# Patient Record
Sex: MALE | Race: WHITE | NOT HISPANIC OR LATINO | Employment: FULL TIME | URBAN - METROPOLITAN AREA
[De-identification: names, ages, dates, MRNs, and addresses within clinical notes are randomized per-mention and may not be internally consistent; named-entity substitution may affect disease eponyms.]

---

## 2017-03-08 DIAGNOSIS — E11.9 TYPE 2 DIABETES, HBA1C GOAL < 7% (H): Primary | ICD-10-CM

## 2017-03-08 NOTE — PATIENT INSTRUCTIONS
Need previsit labs-See pending orders and close encounter./Sendy Alvarez,         DM check 3/21/17

## 2017-03-14 ENCOUNTER — DOCUMENTATION ONLY (OUTPATIENT)
Dept: LAB | Facility: CLINIC | Age: 52
End: 2017-03-14

## 2017-03-14 NOTE — PROGRESS NOTES
PLEASE REVIEW, PLACE FUTURE ORDERS OR SIGN PENDED ORDERS FOR PATIENT'S UPCOMING LAB ONLY APPOINTMENT ON 03.15.2017 am.    THANK YOU.   MAN CRUZ

## 2017-03-15 LAB
ANION GAP SERPL CALCULATED.3IONS-SCNC: 8 MMOL/L (ref 3–14)
BUN SERPL-MCNC: 22 MG/DL (ref 7–30)
CALCIUM SERPL-MCNC: 8.8 MG/DL (ref 8.5–10.1)
CHLORIDE SERPL-SCNC: 104 MMOL/L (ref 94–109)
CO2 SERPL-SCNC: 26 MMOL/L (ref 20–32)
CREAT SERPL-MCNC: 0.92 MG/DL (ref 0.66–1.25)
GFR SERPL CREATININE-BSD FRML MDRD: 87 ML/MIN/1.7M2
GLUCOSE SERPL-MCNC: 180 MG/DL (ref 70–99)
HBA1C MFR BLD: 8 % (ref 4.3–6)
POTASSIUM SERPL-SCNC: 4 MMOL/L (ref 3.4–5.3)
SODIUM SERPL-SCNC: 138 MMOL/L (ref 133–144)

## 2017-03-15 PROCEDURE — 83036 HEMOGLOBIN GLYCOSYLATED A1C: CPT | Performed by: FAMILY MEDICINE

## 2017-03-15 PROCEDURE — 36415 COLL VENOUS BLD VENIPUNCTURE: CPT | Performed by: FAMILY MEDICINE

## 2017-03-15 PROCEDURE — 80048 BASIC METABOLIC PNL TOTAL CA: CPT | Performed by: FAMILY MEDICINE

## 2017-03-20 NOTE — PROGRESS NOTES
SUBJECTIVE:                                                    Wei Wolf is a 51 year old male who presents to clinic today for the following health issues:      Diabetes   Diabetes:     Frequency of checking blood sugars::  Weekly    Diabetic concerns::  None    Hypoglycemia symptoms::  Shaky    Paraesthesia present::  No    Eye Exam in the last year::  Yes    11-16  Frequency of exercise::  2-3 days/week  Duration of exercise::  15-30 minutes  Taking medications regularly::  Yes  Additional concerns today::  No  History of Present Illness   Diabetes:     Frequency of checking blood sugars::  Weekly    Diabetic concerns::  None    Hypoglycemia symptoms::  Shaky    Paraesthesia present::  No    Eye Exam in the last year::  Yes    11-16  Frequency of exercise::  2-3 days/week  Duration of exercise::  15-30 minutes  Taking medications regularly::  Yes  Additional concerns today::  No    SUBJECTIVE:  51 year oldyear old male enters with a hx of hypretension.  Pt. Has been compliant with medications and medications were reviewed.  No side effects. No chest pain or sob. Low sodium diet.    Current Outpatient Prescriptions:      losartan (COZAAR) 25 MG tablet, Take 1 tablet (25 mg) by mouth daily, Disp: 90 tablet, Rfl: 1     metFORMIN (GLUCOPHAGE-XR) 500 MG 24 hr tablet, Take 2 tablets (1,000 mg) by mouth daily (with dinner), Disp: 180 tablet, Rfl: 0     glipiZIDE (GLIPIZIDE XL) 10 MG 24 hr tablet, Two tablet daily, Disp: 180 tablet, Rfl: 0     pioglitazone (ACTOS) 45 MG tablet, Take 1 tablet (45 mg) by mouth daily, Disp: 90 tablet, Rfl: 0     blood glucose monitoring (NO BRAND SPECIFIED) test strip, 1 strip by In Vitro route daily accucheck compact plus test strips.  Testing once daily, Disp: 3 Box, Rfl: 3     simvastatin (ZOCOR) 10 MG tablet, Take 1 tablet (10 mg) by mouth At Bedtime, Disp: 90 tablet, Rfl: 2     order for DME, Equipment being ordered: wrist splint, Disp: 1 Units, Rfl: 0     ASPIRIN NOT  PRESCRIBED, INTENTIONAL,, continuous prn Antiplatelet medication not prescribed intentionally due to GI Issues / Ulcer Disease, Disp: 1 each, Rfl: 0     BLOOD GLUCOSE MONITOR KIT, pt tests twice daily - per insurance plan, Disp: 1 Kit, Rfl: 0     [DISCONTINUED] pioglitazone (ACTOS) 45 MG tablet, Take 1 tablet (45 mg) by mouth daily, Disp: 90 tablet, Rfl: 0     [DISCONTINUED] glipiZIDE (GLIPIZIDE XL) 10 MG 24 hr tablet, Two tablet daily, Disp: 180 tablet, Rfl: 0     [DISCONTINUED] metFORMIN (GLUCOPHAGE-XR) 500 MG 24 hr tablet, Take 2 tablets (1,000 mg) by mouth daily (with dinner), Disp: 180 tablet, Rfl: 0     [DISCONTINUED] losartan (COZAAR) 25 MG tablet, Take 1 tablet (25 mg) by mouth daily, Disp: 90 tablet, Rfl: 1  Past Medical History   Diagnosis Date     AR (allergic rhinitis)      Diabetes (H)      Hypertension goal BP (blood pressure) < 140/90 12/20/2010     Orders Only on 03/15/2017   Component Date Value Ref Range Status     Hemoglobin A1C 03/15/2017 8.0* 4.3 - 6.0 % Final     Sodium 03/15/2017 138  133 - 144 mmol/L Final     Potassium 03/15/2017 4.0  3.4 - 5.3 mmol/L Final     Chloride 03/15/2017 104  94 - 109 mmol/L Final     Carbon Dioxide 03/15/2017 26  20 - 32 mmol/L Final     Anion Gap 03/15/2017 8  3 - 14 mmol/L Final     Glucose 03/15/2017 180* 70 - 99 mg/dL Final    Fasting specimen     Urea Nitrogen 03/15/2017 22  7 - 30 mg/dL Final     Creatinine 03/15/2017 0.92  0.66 - 1.25 mg/dL Final     GFR Estimate 03/15/2017 87  >60 mL/min/1.7m2 Final    Non  GFR Calc     GFR Estimate If Black 03/15/2017   >60 mL/min/1.7m2 Final                    Value:>90   GFR Calc       Calcium 03/15/2017 8.8  8.5 - 10.1 mg/dL Final      Reviewed health maintenance  Patient Active Problem List   Diagnosis     TYPE 2 DIABETES, HBA1C GOAL < 7%     HYPERLIPIDEMIA LDL GOAL <100     Hypertension goal BP (blood pressure) < 140/90     Obesity     SAI (obstructive sleep apnea)     Morbid obesity  "(H)     Uncontrolled diabetes mellitus type 2 without complications (H)         OBJECTIVE:  no apparent distress  /65  Pulse 97  Temp 97.9  F (36.6  C) (Oral)  Ht 5' 9\" (1.753 m)  Wt 278 lb 9.6 oz (126.4 kg)  SpO2 98%  BMI 41.14 kg/m2     Head: Normocephalic. No masses, lesions, tenderness or abnormalities.  Neck::Neck supple. No adenopathy. Thyroid symmetric, normal size.    Cardiovascular: negative. No lifts, heaves, or thrills. RRR. No murmurs, clicks gallops or rubs  Respiratory. Good diaphragmatic excursion. Lungs clear  Gastrointestinal:Abdomen soft, non-tender.  No masses, organomegaly    Orders Only on 03/15/2017   Component Date Value Ref Range Status     Hemoglobin A1C 03/15/2017 8.0* 4.3 - 6.0 % Final     Sodium 03/15/2017 138  133 - 144 mmol/L Final     Potassium 03/15/2017 4.0  3.4 - 5.3 mmol/L Final     Chloride 03/15/2017 104  94 - 109 mmol/L Final     Carbon Dioxide 03/15/2017 26  20 - 32 mmol/L Final     Anion Gap 03/15/2017 8  3 - 14 mmol/L Final     Glucose 03/15/2017 180* 70 - 99 mg/dL Final    Fasting specimen     Urea Nitrogen 03/15/2017 22  7 - 30 mg/dL Final     Creatinine 03/15/2017 0.92  0.66 - 1.25 mg/dL Final     GFR Estimate 03/15/2017 87  >60 mL/min/1.7m2 Final    Non  GFR Calc     GFR Estimate If Black 03/15/2017   >60 mL/min/1.7m2 Final                    Value:>90   GFR Calc       Calcium 03/15/2017 8.8  8.5 - 10.1 mg/dL Final         ICD-10-CM    1. SAI (obstructive sleep apnea) G47.33    2. Uncontrolled type 2 diabetes mellitus without complication, without long-term current use of insulin (H) E11.65 metFORMIN (GLUCOPHAGE-XR) 500 MG 24 hr tablet     glipiZIDE (GLIPIZIDE XL) 10 MG 24 hr tablet     pioglitazone (ACTOS) 45 MG tablet     blood glucose monitoring (NO BRAND SPECIFIED) test strip     DIABETES EDUCATOR REFERRAL   3. Morbid obesity due to excess calories (H) E66.01    4. Hypertension goal BP (blood pressure) < 140/90 I10 losartan " (COZAAR) 25 MG tablet    PLAN: Follow up in 6 months               SUBJECTIVE:  Wei Wolf presents today for follow up of DIABETES MELLITUS .       Patient glucose self monitoring: one time daily, once daily.  Blood glucose averages: 125  Symptoms of low blood sugar (hypoglycemia)? y  Problems taking medications regularly? y   Side effects? n  What are you doing for exercise outside of work or your daily activities? Not much  Reviewed health maintenance  Patient Active Problem List   Diagnosis     TYPE 2 DIABETES, HBA1C GOAL < 7%     HYPERLIPIDEMIA LDL GOAL <100     Hypertension goal BP (blood pressure) < 140/90     Obesity     SAI (obstructive sleep apnea)     Morbid obesity (H)     Uncontrolled diabetes mellitus type 2 without complications (H)       Smokes n  PHQ-2  Over the last two weeks- Have you been bothered by little interest or pleasure in doing things?  No  Over the last two weeks- Have you been been feeling down, depressed, or hopeless?  No    BP:   BP Readings from Last 3 Encounters:   03/21/17 135/65   08/01/16 136/75   02/22/16 138/75       Lab Results   Component Value Date    A1C 8.0 03/15/2017    A1C 7.6 10/28/2016    A1C 8.5 07/28/2016       Recent Labs   Lab Test  07/28/16   0940  05/27/15   1652  07/21/14   0915   CHOL  121  156  134   HDL  43  37*  28*   LDL  64  95  81   TRIG  70  121  126   CHOLHDLRATIO   --   4.2  4.8       Wt Readings from Last 3 Encounters:   03/21/17 278 lb 9.6 oz (126.4 kg)   08/01/16 251 lb (113.9 kg)   02/22/16 260 lb (117.9 kg)     Asa is included in med list    Current Outpatient Prescriptions   Medication Sig Dispense Refill     losartan (COZAAR) 25 MG tablet Take 1 tablet (25 mg) by mouth daily 90 tablet 1     metFORMIN (GLUCOPHAGE-XR) 500 MG 24 hr tablet Take 2 tablets (1,000 mg) by mouth daily (with dinner) 180 tablet 0     glipiZIDE (GLIPIZIDE XL) 10 MG 24 hr tablet Two tablet daily 180 tablet 0     pioglitazone (ACTOS) 45 MG tablet Take 1 tablet (45  "mg) by mouth daily 90 tablet 0     blood glucose monitoring (NO BRAND SPECIFIED) test strip 1 strip by In Vitro route daily accucheck compact plus test strips.  Testing once daily 3 Box 3     simvastatin (ZOCOR) 10 MG tablet Take 1 tablet (10 mg) by mouth At Bedtime 90 tablet 2     order for DME Equipment being ordered: wrist splint 1 Units 0     ASPIRIN NOT PRESCRIBED, INTENTIONAL, continuous prn Antiplatelet medication not prescribed intentionally due to GI Issues / Ulcer Disease 1 each 0     BLOOD GLUCOSE MONITOR KIT pt tests twice daily - per insurance plan 1 Kit 0     [DISCONTINUED] pioglitazone (ACTOS) 45 MG tablet Take 1 tablet (45 mg) by mouth daily 90 tablet 0     [DISCONTINUED] glipiZIDE (GLIPIZIDE XL) 10 MG 24 hr tablet Two tablet daily 180 tablet 0     [DISCONTINUED] metFORMIN (GLUCOPHAGE-XR) 500 MG 24 hr tablet Take 2 tablets (1,000 mg) by mouth daily (with dinner) 180 tablet 0     [DISCONTINUED] losartan (COZAAR) 25 MG tablet Take 1 tablet (25 mg) by mouth daily 90 tablet 1       Histories reviewed and updated in Epic.       EXAM:  Vitals: /65  Pulse 97  Temp 97.9  F (36.6  C) (Oral)  Ht 5' 9\" (1.753 m)  Wt 278 lb 9.6 oz (126.4 kg)  SpO2 98%  BMI 41.14 kg/m2  BMIE= Body mass index is 41.14 kg/(m^2).  GENERAL APPEARANCE: alert and no acute distress  PSYCH: mentation appears normal and affect normal/bright  RESP: lungs clear to auscultation - no rales, rhonchi or wheezes  CV: regular rate and rhythm, normal S1 S2, no S3 or S4 and no murmur, click or rub -  EXT: no cyanosis or edema in lower extremities  SKIN: no venous stasis changes    ICD-10-CM    1. SAI (obstructive sleep apnea) G47.33    2. Uncontrolled type 2 diabetes mellitus without complication, without long-term current use of insulin (H) E11.65 metFORMIN (GLUCOPHAGE-XR) 500 MG 24 hr tablet     glipiZIDE (GLIPIZIDE XL) 10 MG 24 hr tablet     pioglitazone (ACTOS) 45 MG tablet     blood glucose monitoring (NO BRAND SPECIFIED) test strip     " "DIABETES EDUCATOR REFERRAL   3. Morbid obesity due to excess calories (H) E66.01    4. Hypertension goal BP (blood pressure) < 140/90 I10 losartan (COZAAR) 25 MG tablet    PLAN:qid blood sugars and follow up with diabetic education                SUBJECTIVE:  51 year old.The patient has a history of right hand pain and swelling.  This started 6 months ago. Location dorsum of hand at the 2nd MCP koint quality sharp Associated symptoms are pop sometimes.  Brought on by use of hand .  Better with rest. ROS does not get hot or red    NO injury  Reviewed health maintenance  Patient Active Problem List   Diagnosis     TYPE 2 DIABETES, HBA1C GOAL < 7%     HYPERLIPIDEMIA LDL GOAL <100     Hypertension goal BP (blood pressure) < 140/90     Obesity     SAI (obstructive sleep apnea)     Morbid obesity (H)     Uncontrolled diabetes mellitus type 2 without complications (H)     Past Medical History   Diagnosis Date     AR (allergic rhinitis)      Diabetes (H)      Hypertension goal BP (blood pressure) < 140/90 12/20/2010       OBJECTIVE:  no apparent distress  /65  Pulse 97  Temp 97.9  F (36.6  C) (Oral)  Ht 5' 9\" (1.753 m)  Wt 278 lb 9.6 oz (126.4 kg)  SpO2 98%  BMI 41.14 kg/m2    Swelling orver dorsum of the right hand at his 2 MCP joint       ICD-10-CM    1. SAI (obstructive sleep apnea) G47.33    2. Uncontrolled type 2 diabetes mellitus without complication, without long-term current use of insulin (H) E11.65 metFORMIN (GLUCOPHAGE-XR) 500 MG 24 hr tablet     glipiZIDE (GLIPIZIDE XL) 10 MG 24 hr tablet     pioglitazone (ACTOS) 45 MG tablet     blood glucose monitoring (NO BRAND SPECIFIED) test strip     DIABETES EDUCATOR REFERRAL   3. Morbid obesity due to excess calories (H) E66.01    4. Hypertension goal BP (blood pressure) < 140/90 I10 losartan (COZAAR) 25 MG tablet    PLAN:         "

## 2017-03-21 ENCOUNTER — RADIANT APPOINTMENT (OUTPATIENT)
Dept: GENERAL RADIOLOGY | Facility: CLINIC | Age: 52
End: 2017-03-21
Attending: FAMILY MEDICINE
Payer: COMMERCIAL

## 2017-03-21 ENCOUNTER — OFFICE VISIT (OUTPATIENT)
Dept: FAMILY MEDICINE | Facility: CLINIC | Age: 52
End: 2017-03-21
Payer: COMMERCIAL

## 2017-03-21 VITALS
HEART RATE: 97 BPM | BODY MASS INDEX: 41.26 KG/M2 | TEMPERATURE: 97.9 F | SYSTOLIC BLOOD PRESSURE: 135 MMHG | DIASTOLIC BLOOD PRESSURE: 65 MMHG | WEIGHT: 278.6 LBS | OXYGEN SATURATION: 98 % | HEIGHT: 69 IN

## 2017-03-21 DIAGNOSIS — M79.644 PAIN OF FINGER OF RIGHT HAND: ICD-10-CM

## 2017-03-21 DIAGNOSIS — G47.33 OSA (OBSTRUCTIVE SLEEP APNEA): Primary | ICD-10-CM

## 2017-03-21 DIAGNOSIS — I10 HYPERTENSION GOAL BP (BLOOD PRESSURE) < 140/90: ICD-10-CM

## 2017-03-21 DIAGNOSIS — E66.01 MORBID OBESITY DUE TO EXCESS CALORIES (H): ICD-10-CM

## 2017-03-21 LAB
BASOPHILS # BLD AUTO: 0 10E9/L (ref 0–0.2)
BASOPHILS NFR BLD AUTO: 0.6 %
CRP SERPL-MCNC: <2.9 MG/L (ref 0–8)
DIFFERENTIAL METHOD BLD: NORMAL
EOSINOPHIL # BLD AUTO: 0.1 10E9/L (ref 0–0.7)
EOSINOPHIL NFR BLD AUTO: 2.7 %
ERYTHROCYTE [DISTWIDTH] IN BLOOD BY AUTOMATED COUNT: 13.4 % (ref 10–15)
ERYTHROCYTE [SEDIMENTATION RATE] IN BLOOD BY WESTERGREN METHOD: 6 MM/H (ref 0–15)
HCT VFR BLD AUTO: 43.7 % (ref 40–53)
HGB BLD-MCNC: 14.4 G/DL (ref 13.3–17.7)
LYMPHOCYTES # BLD AUTO: 1.3 10E9/L (ref 0.8–5.3)
LYMPHOCYTES NFR BLD AUTO: 27.2 %
MCH RBC QN AUTO: 30.1 PG (ref 26.5–33)
MCHC RBC AUTO-ENTMCNC: 33 G/DL (ref 31.5–36.5)
MCV RBC AUTO: 91 FL (ref 78–100)
MONOCYTES # BLD AUTO: 0.3 10E9/L (ref 0–1.3)
MONOCYTES NFR BLD AUTO: 6.9 %
NEUTROPHILS # BLD AUTO: 3 10E9/L (ref 1.6–8.3)
NEUTROPHILS NFR BLD AUTO: 62.6 %
PLATELET # BLD AUTO: 174 10E9/L (ref 150–450)
RBC # BLD AUTO: 4.78 10E12/L (ref 4.4–5.9)
URATE SERPL-MCNC: 3.4 MG/DL (ref 3.5–7.2)
WBC # BLD AUTO: 4.8 10E9/L (ref 4–11)

## 2017-03-21 PROCEDURE — 85025 COMPLETE CBC W/AUTO DIFF WBC: CPT | Performed by: FAMILY MEDICINE

## 2017-03-21 PROCEDURE — 36415 COLL VENOUS BLD VENIPUNCTURE: CPT | Performed by: FAMILY MEDICINE

## 2017-03-21 PROCEDURE — 85652 RBC SED RATE AUTOMATED: CPT | Performed by: FAMILY MEDICINE

## 2017-03-21 PROCEDURE — 86038 ANTINUCLEAR ANTIBODIES: CPT | Performed by: FAMILY MEDICINE

## 2017-03-21 PROCEDURE — 86431 RHEUMATOID FACTOR QUANT: CPT | Performed by: FAMILY MEDICINE

## 2017-03-21 PROCEDURE — 86140 C-REACTIVE PROTEIN: CPT | Performed by: FAMILY MEDICINE

## 2017-03-21 PROCEDURE — 99214 OFFICE O/P EST MOD 30 MIN: CPT | Performed by: FAMILY MEDICINE

## 2017-03-21 PROCEDURE — 73130 X-RAY EXAM OF HAND: CPT | Mod: RT

## 2017-03-21 PROCEDURE — 84550 ASSAY OF BLOOD/URIC ACID: CPT | Performed by: FAMILY MEDICINE

## 2017-03-21 RX ORDER — METFORMIN HCL 500 MG
1000 TABLET, EXTENDED RELEASE 24 HR ORAL
Qty: 180 TABLET | Refills: 0 | Status: SHIPPED | OUTPATIENT
Start: 2017-03-21 | End: 2017-07-18

## 2017-03-21 RX ORDER — GLIPIZIDE 10 MG/1
TABLET, FILM COATED, EXTENDED RELEASE ORAL
Qty: 180 TABLET | Refills: 0 | Status: SHIPPED | OUTPATIENT
Start: 2017-03-21 | End: 2017-07-18

## 2017-03-21 RX ORDER — PIOGLITAZONEHYDROCHLORIDE 45 MG/1
45 TABLET ORAL DAILY
Qty: 90 TABLET | Refills: 0 | Status: SHIPPED | OUTPATIENT
Start: 2017-03-21 | End: 2017-07-18

## 2017-03-21 RX ORDER — LOSARTAN POTASSIUM 25 MG/1
25 TABLET ORAL DAILY
Qty: 90 TABLET | Refills: 1 | Status: SHIPPED | OUTPATIENT
Start: 2017-03-21 | End: 2017-12-21

## 2017-03-21 NOTE — NURSING NOTE
"Chief Complaint   Patient presents with     Diabetes     Musculoskeletal Problem     recheck on right hand        Initial /73  Pulse 97  Temp 97.9  F (36.6  C) (Oral)  Ht 5' 9\" (1.753 m)  Wt 278 lb 9.6 oz (126.4 kg)  SpO2 98%  BMI 41.14 kg/m2 Estimated body mass index is 41.14 kg/(m^2) as calculated from the following:    Height as of this encounter: 5' 9\" (1.753 m).    Weight as of this encounter: 278 lb 9.6 oz (126.4 kg).  Medication Reconciliation: complete  Mathew Tidwell CMA    "

## 2017-03-21 NOTE — MR AVS SNAPSHOT
After Visit Summary   3/21/2017    Wei Wolf    MRN: [id number]           Patient Information     Date Of Birth          1965        Visit Information        Provider Department      3/21/2017 10:15 AM Yobany Bernal MD [facility]        Today's Diagnoses     SAI (obstructive sleep apnea)    -  1    Uncontrolled type 2 diabetes mellitus without complication, without long-term current use of insulin (H)        Morbid obesity due to excess calories (H)        Hypertension goal BP (blood pressure) < 140/90           Follow-ups after your visit        Additional Services     DIABETES EDUCATOR REFERRAL       DIABETES SELF MANAGEMENT TRAINING (DSMT)      Your provider has referred you to Diabetes Education: [facility]: Diabetes Education - All [facility] [phone]   [email or web address]    Type of training and number of hours: Previous Diagnosis: Follow-up DSMT - 2 hours.    Medicare covers: 10 hours of initial DSMT in 12 month period from the time of first visit, plus 2 hours of follow-up DSMT annually, and additional hours as requested for insulin training.    Diabetes Type: Type 2 - On Oral Medication             Diabetes Co-Morbidities: hypertension               A1C Goal:  <7.0       A1C is: Lab Results       Component                Value               Date                       A1C                      8.0                 03/15/2017              If an urgent visit is needed or A1C is above 12, Care Team to call the Diabetes Education Team at [phone] or send an In Basket message to the Diabetes Education Pool (P DIAB ED-PATIENT CARE).    Diabetes Education Topics: Comprehensive Knowledge Assessment and Instruction    Special Educational Needs Requiring Individual DSMT: None       MEDICAL NUTRITION THERAPY (MNT) for Diabetes    Medical Nutrition Therapy with a Registered Dietitian can be provided in coordination  with Diabetes Self-Management Training to assist in achieving optimal diabetes management.                         Medicare will cover: 3 hours initial MNT in 12 month period after first visit, plus 2 hours of follow-up MNT annually    Please be aware that coverage of these services is subject to the terms and limitations of your health insurance plan.  Call member services at your health plan to determine Diabetes Self-Management Training benefits and ask which blood glucose monitor brands are covered by your plan.      Please bring the following with you to your appointment:    (1)  List of current medications   (2)  List of Blood Glucose Monitor brands that are covered by your insurance plan  (3)  Blood Glucose Monitor and log book  (4)   Food records for the 3 days prior to your visit    The Certified Diabetes Educator may make diabetes medication adjustments per the CDE Protocol and Collaborative Practice Agreement.                  Who to contact     If you have questions or need follow up information about today's clinic visit or your schedule please contact [facility] directly at [phone].  Normal or non-critical lab and imaging results will be communicated to you by PrePlayhart, letter or phone within 4 business days after the clinic has received the results. If you do not hear from us within 7 days, please contact the clinic through [employer] or phone. If you have a critical or abnormal lab result, we will notify you by phone as soon as possible.  Submit refill requests through [employer] or call your pharmacy and they will forward the refill request to us. Please allow 3 business days for your refill to be completed.          Additional Information About Your Visit        PrePlayharWP Engine Information     [employer] gives you secure access to your electronic health record. If you see a primary care provider, you can also send messages to your care team and make appointments. If you have questions, please call  "your primary care clinic.  If you do not have a primary care provider, please call [phone] and they will assist you.        Care EveryWhere ID     This is your Care EveryWhere ID. This could be used by other organizations to access your [facility] medical records  [id number]        Your Vitals Were     Pulse Temperature Height Pulse Oximetry BMI (Body Mass Index)       97 97.9  F (36.6  C) (Oral) 5' 9\" (1.753 m) 98% 41.14 kg/m2        Blood Pressure from Last 3 Encounters:   03/21/17 135/65   08/01/16 136/75   02/22/16 138/75    Weight from Last 3 Encounters:   03/21/17 278 lb 9.6 oz (126.4 kg)   08/01/16 251 lb (113.9 kg)   02/22/16 260 lb (117.9 kg)              We Performed the Following     DIABETES EDUCATOR REFERRAL          Where to get your medicines      These medications were sent to [id number] - [place] - [address] [address]  [address] [facility] [place] [address]    Hours:  test fax successful 4/5/04  Phone:  [phone]     blood glucose monitoring test strip    glipiZIDE 10 MG 24 hr tablet    losartan 25 MG tablet    metFORMIN 500 MG 24 hr tablet    pioglitazone 45 MG tablet          Primary Care Provider Office Phone # Fax #    Yobany Bernal [phone]       [facility] [address] [address] [address]        Thank you!     Thank you for choosing [facility]  for your care. Our goal is always to provide you with excellent care. Hearing back from our patients is one way we can continue to improve our services. Please take a few minutes to complete the written survey that you may receive in the mail after your visit with us. Thank you!             Your Updated Medication List - Protect others around you: Learn how to safely use, store and throw away your medicines at www.disposemymeds.org.          This list is accurate as of: 3/21/17 10:31 AM.  Always use your most recent med list.                   Brand Name " Dispense Instructions for use    ASPIRIN NOT PRESCRIBED    INTENTIONAL    1 each    continuous prn Antiplatelet medication not prescribed intentionally due to GI Issues / Ulcer Disease       blood glucose monitor Kit     1 Kit    pt tests twice daily - per insurance plan       blood glucose monitoring test strip    no brand specified    3 Box    1 strip by In Vitro route daily accucheck compact plus test strips.  Testing once daily       glipiZIDE 10 MG 24 hr tablet    glipiZIDE XL    180 tablet    Two tablet daily       losartan 25 MG tablet    COZAAR    90 tablet    Take 1 tablet (25 mg) by mouth daily       metFORMIN 500 MG 24 hr tablet    GLUCOPHAGE-XR    180 tablet    Take 2 tablets (1,000 mg) by mouth daily (with dinner)       order for DME     1 Units    Equipment being ordered: wrist splint       pioglitazone 45 MG tablet    ACTOS    90 tablet    Take 1 tablet (45 mg) by mouth daily       simvastatin 10 MG tablet    ZOCOR    90 tablet    Take 1 tablet (10 mg) by mouth At Bedtime

## 2017-03-22 LAB
ANA SER QL IA: NORMAL
RHEUMATOID FACT SER NEPH-ACNC: <20 IU/ML (ref 0–20)

## 2017-03-22 NOTE — PROGRESS NOTES
"SUBJECTIVE:  Wei Wolf is a 51 year old male who presents at the request of Yobany Bernal MD  sustained a right hand pain at the 2nd MCP joint area for 2 months. The pain and swelling is constant with the swelling being on and off. Symptoms have been worsening since that time. The pain is more in the 2nd webbed space, dorsally. He has some snapping.     Prior history of related problems: no prior problems with this area in the past.    Past Medical, social, family histories, medications, and allergies reviewed and updated.     OBJECTIVE:  Blood pressure 136/70, pulse 89, resp. rate 14, height 1.753 m (5' 9\"), weight 125.6 kg (277 lb).     RIGHT HAND EXAM:  There is some fullness over the 2nd webbed space that is tender. It feels soft and compressible.   It seems to be swollen medial or ulnar to the 2nd MCP joint.   Tender: 2nd webbed space dorsally  Mild pain with squeezing or compression of the fingers.   Pain with resisted adduction of the index finger.   Not much pain with resisted abduction.   Mild pain with resisted extension of the PIP joint.   Pain with resisted extension of the MP joint.  No subluxation of the extensor tendons can be appreciated.     X-ray: Obtained 3/21/17 of the RIGHT HAND: 3-views, reviewed in the office with the patient by myself today and are essentially normal. No arthritic changes or calcific changes. There might be a small bony erosion or bone cyst in the 2nd metacarpal head and an even smaller one more centrally.     ASSESSMENT:  1. Mass of the right hand with a non specific lytic lesion in the distal 2nd metacarpal.  I'm not sure if the two are associated.  No history of CA.    PLAN:  I recommended an MRI of the right hand to assess the fullness at the 2nd metacarpal. All questions were answered. The patient understands and has elected to proceed.   MRI scan of the right hand ordered with IV contrast.     Return to the clinic to discuss MRI results.     BLANE Johnson " MD  Dept. Orthopedic Surgery  [facility]    This document serves as a record of the services and decisions personally performed and made by Dr. BLANE Johnson MD. It was created on his behalf by Susan Bettencourt, a trained medical scribe. The creation of this record is based on the provider's personal observations and the statements of the patient. This document has been checked and approved by the attending provider.   Susan Bettencourt March 23, 2017 9:51 AM

## 2017-03-23 ENCOUNTER — OFFICE VISIT (OUTPATIENT)
Dept: ORTHOPEDICS | Facility: CLINIC | Age: 52
End: 2017-03-23
Payer: COMMERCIAL

## 2017-03-23 VITALS
BODY MASS INDEX: 41.03 KG/M2 | HEIGHT: 69 IN | RESPIRATION RATE: 14 BRPM | WEIGHT: 277 LBS | HEART RATE: 89 BPM | SYSTOLIC BLOOD PRESSURE: 136 MMHG | DIASTOLIC BLOOD PRESSURE: 70 MMHG

## 2017-03-23 DIAGNOSIS — M25.841 MASS OF JOINT OF RIGHT HAND: Primary | ICD-10-CM

## 2017-03-23 PROCEDURE — 99203 OFFICE O/P NEW LOW 30 MIN: CPT | Performed by: ORTHOPAEDIC SURGERY

## 2017-03-23 ASSESSMENT — PAIN SCALES - GENERAL: PAINLEVEL: MILD PAIN (3)

## 2017-03-23 NOTE — MR AVS SNAPSHOT
After Visit Summary   3/23/2017    Wei Wolf    MRN: [id number]           Patient Information     Date Of Birth          1965        Visit Information        Provider Department      3/23/2017 9:15 AM Ajith Johnson MD [facility]        Today's Diagnoses     Mass of joint of right hand    -  1      Care Instructions    Please remember to call and schedule a follow up appointment if one was recommended at your earliest convenience.   Orthopedics CLINIC HOURS TELEPHONE NUMBER   Ajith Johnson M.D.      Iwona Edwards  Medical Assistant Tuesday 8 am -5 pm    Wednesday 8 am -1 pm    Thursday 8 am -5 pm   Specialty schedulers:   [phone] to make an appointment with any Specialty Provider.   Main Clinic:   [phone] to make an appointment with your primary provider   Urgent Care locations:    [facility] Monday-Friday 5 pm - 9 pm  Saturday-Sunday 9 am -5pm      Monday-Friday 11 am - 9 pm  Saturday 9 am - 5 pm [phone]     If SURGERY has been recommended, please call our Specialty Schedulers at [phone] to schedule your procedure.    If you need a medication refill, please contact your pharmacy. Please allow 3 business days for your refill to be completed.  Use [employer] (secure e-mail communication and access to your chart) to send a message or to make an appointment. Please ask how you can sign up for [employer].        Follow-ups after your visit        Future tests that were ordered for you today     Open Future Orders        Priority Expected Expires Ordered    MR Hand Right w Contrast Routine 3/23/2017 3/23/2018 3/23/2017    X-ray rt Arthrogram of wrist Routine 3/23/2017 3/23/2018 3/23/2017            Who to contact     If you have questions or need follow up information about today's clinic visit or your schedule please contact [facility] directly at [phone].  Normal or non-critical lab and  "imaging results will be communicated to you by MyChart, letter or phone within 4 business days after the clinic has received the results. If you do not hear from us within 7 days, please contact the clinic through [employer] or phone. If you have a critical or abnormal lab result, we will notify you by phone as soon as possible.  Submit refill requests through [employer] or call your pharmacy and they will forward the refill request to us. Please allow 3 business days for your refill to be completed.          Additional Information About Your Visit        [employer] Information     [employer] gives you secure access to your electronic health record. If you see a primary care provider, you can also send messages to your care team and make appointments. If you have questions, please call your primary care clinic.  If you do not have a primary care provider, please call [phone] and they will assist you.        Care EveryWhere ID     This is your Care EveryWhere ID. This could be used by other organizations to access your [place] medical records  [id number]        Your Vitals Were     Pulse Respirations Height BMI (Body Mass Index)          89 14 1.753 m (5' 9\") 40.91 kg/m2         Blood Pressure from Last 3 Encounters:   03/23/17 136/70   03/21/17 135/65   08/01/16 136/75    Weight from Last 3 Encounters:   03/23/17 125.6 kg (277 lb)   03/21/17 126.4 kg (278 lb 9.6 oz)   08/01/16 113.9 kg (251 lb)               Primary Care Provider Office Phone # Fax #    Yobany Bernal [phone]       [facility] [address] [facility] [address]        Thank you!     Thank you for choosing [facility]  for your care. Our goal is always to provide you with excellent care. Hearing back from our patients is one way we can continue to improve our services. Please take a few minutes to complete the written survey that you may receive in the mail after your visit with us. Thank you!      "        Your Updated Medication List - Protect others around you: Learn how to safely use, store and throw away your medicines at www.disposemymeds.org.          This list is accurate as of: 3/23/17 11:16 AM.  Always use your most recent med list.                   Brand Name Dispense Instructions for use    ASPIRIN NOT PRESCRIBED    INTENTIONAL    1 each    continuous prn Antiplatelet medication not prescribed intentionally due to GI Issues / Ulcer Disease       blood glucose monitor Kit     1 Kit    pt tests twice daily - per insurance plan       blood glucose monitoring test strip    no brand specified    3 Box    1 strip by In Vitro route daily accucheck compact plus test strips.  Testing once daily       glipiZIDE 10 MG 24 hr tablet    glipiZIDE XL    180 tablet    Two tablet daily       losartan 25 MG tablet    COZAAR    90 tablet    Take 1 tablet (25 mg) by mouth daily       metFORMIN 500 MG 24 hr tablet    GLUCOPHAGE-XR    180 tablet    Take 2 tablets (1,000 mg) by mouth daily (with dinner)       order for DME     1 Units    Equipment being ordered: wrist splint       pioglitazone 45 MG tablet    ACTOS    90 tablet    Take 1 tablet (45 mg) by mouth daily       simvastatin 10 MG tablet    ZOCOR    90 tablet    Take 1 tablet (10 mg) by mouth At Bedtime

## 2017-03-23 NOTE — LETTER
"  3/23/2017       RE: Wei Wolf  [address] [facility] [address]           Dear Colleague,    Thank you for referring your patient, Wei Wolf, to the [facility]. Please see a copy of my visit note below.    SUBJECTIVE:  Wei Wolf is a 51 year old male who presents at the request of Yobany Bernal MD  sustained a right hand pain at the 2nd MCP joint area for 2 months. The pain and swelling is constant with the swelling being on and off. Symptoms have been worsening since that time. The pain is more in the 2nd webbed space, dorsally. He has some snapping.     Prior history of related problems: no prior problems with this area in the past.    Past Medical, social, family histories, medications, and allergies reviewed and updated.     OBJECTIVE:  Blood pressure 136/70, pulse 89, resp. rate 14, height 1.753 m (5' 9\"), weight 125.6 kg (277 lb).     RIGHT HAND EXAM:  There is some fullness over the 2nd webbed space that is tender. It feels soft and compressible.   It seems to be swollen medial or ulnar to the 2nd MCP joint.   Tender: 2nd webbed space dorsally  Mild pain with squeezing or compression of the fingers.   Pain with resisted adduction of the index finger.   Not much pain with resisted abduction.   Mild pain with resisted extension of the PIP joint.   Pain with resisted extension of the MP joint.  No subluxation of the extensor tendons can be appreciated.     X-ray: Obtained 3/21/17 of the RIGHT HAND: 3-views, reviewed in the office with the patient by myself today and are essentially normal. No arthritic changes or calcific changes. There might be a small bony erosion or bone cyst in the 2nd metacarpal head and an even smaller one more centrally.     ASSESSMENT:  1. Mass of the right hand with a non specific lytic lesion in the distal 2nd metacarpal.  I'm not sure if the two are associated.  No history of CA.    PLAN:  I recommended an MRI of the right hand to " assess the fullness at the 2nd metacarpal. All questions were answered. The patient understands and has elected to proceed.   MRI scan of the right hand ordered with IV contrast.     Return to the clinic to discuss MRI results.     BLANE Johnson MD  Dept. Orthopedic Surgery  [facility]    This document serves as a record of the services and decisions personally performed and made by Dr. BLANE Johnson MD. It was created on his behalf by Susan Bettencourt, a trained medical scribe. The creation of this record is based on the provider's personal observations and the statements of the patient. This document has been checked and approved by the attending provider.   Susan Bettencourt March 23, 2017 9:51 AM    Again, thank you for allowing me to participate in the care of your patient.        Sincerely,    Ajith Johnson MD

## 2017-03-23 NOTE — PATIENT INSTRUCTIONS
Please remember to call and schedule a follow up appointment if one was recommended at your earliest convenience.   Orthopedics CLINIC HOURS TELEPHONE NUMBER   Ajith Johnson M.D.      Iwona Edwards  Medical Assistant Tuesday 8 am -5 pm    Wednesday 8 am -1 pm    Thursday 8 am -5 pm   Specialty schedulers:   [phone] to make an appointment with any Specialty Provider.   Main Clinic:   [phone] to make an appointment with your primary provider   Urgent Care locations:    [facility] Monday-Friday 5 pm - 9 pm  Saturday-Sunday 9 am -5pm      Monday-Friday 11 am - 9 pm  Saturday 9 am - 5 pm [phone]     If SURGERY has been recommended, please call our Specialty Schedulers at [phone] to schedule your procedure.    If you need a medication refill, please contact your pharmacy. Please allow 3 business days for your refill to be completed.  Use [employer] (secure e-mail communication and access to your chart) to send a message or to make an appointment. Please ask how you can sign up for [employer].

## 2017-03-23 NOTE — NURSING NOTE
"Chief Complaint   Patient presents with     Hand Pain     right hand pain and swelling.started 6 months ago. Location dorsum of hand at the 2nd MCP       Initial /70 (BP Location: Left arm, Patient Position: Chair, Cuff Size: Adult Large)  Pulse 89  Resp 14  Ht 1.753 m (5' 9\")  Wt 125.6 kg (277 lb)  BMI 40.91 kg/m2 Estimated body mass index is 40.91 kg/(m^2) as calculated from the following:    Height as of this encounter: 1.753 m (5' 9\").    Weight as of this encounter: 125.6 kg (277 lb).  Medication Reconciliation: complete   Iwona Edwards MA      "

## 2017-03-24 ENCOUNTER — RADIANT APPOINTMENT (OUTPATIENT)
Dept: MRI IMAGING | Facility: CLINIC | Age: 52
End: 2017-03-24
Attending: ORTHOPAEDIC SURGERY
Payer: COMMERCIAL

## 2017-03-24 DIAGNOSIS — M25.841 MASS OF JOINT OF RIGHT HAND: ICD-10-CM

## 2017-03-24 PROCEDURE — 73223 MRI JOINT UPR EXTR W/O&W/DYE: CPT | Mod: RT | Performed by: RADIOLOGY

## 2017-03-24 PROCEDURE — A9585 GADOBUTROL INJECTION: HCPCS | Performed by: RADIOLOGY

## 2017-03-24 RX ORDER — GADOBUTROL 604.72 MG/ML
10 INJECTION INTRAVENOUS ONCE
Status: COMPLETED | OUTPATIENT
Start: 2017-03-24 | End: 2017-03-24

## 2017-03-24 RX ADMIN — GADOBUTROL 10 ML: 604.72 INJECTION INTRAVENOUS at 10:15

## 2017-03-24 NOTE — NURSING NOTE
Mass right hand 2nd MCP joint. MRI with IV contrast.  **NOT an ARTHROGRAM**, but no other option on EPIC to choose IV contrast. (per  Radiology statement regarding confusing and incomplete description of this requested study)  Alberto Velazquez OPA

## 2017-04-05 ENCOUNTER — TELEPHONE (OUTPATIENT)
Dept: EDUCATION SERVICES | Facility: CLINIC | Age: 52
End: 2017-04-05

## 2017-04-05 NOTE — TELEPHONE ENCOUNTER
I know, but he may not be thinking this through or communicating well.    Luciana Borrego RN/CDE  [place] [facility]

## 2017-04-05 NOTE — TELEPHONE ENCOUNTER
Is there anyway someone can call his insurance to see about coverage to come and see me.?    Luciana Borrego RN/ÁNGEL  [place] [facility]

## 2017-04-05 NOTE — TELEPHONE ENCOUNTER
Typically it is the patient's responsibility to check on insurance coverage, unless it is a PA or an Appeal.    Sendy Alvarez,

## 2017-04-18 ENCOUNTER — OFFICE VISIT (OUTPATIENT)
Dept: ORTHOPEDICS | Facility: CLINIC | Age: 52
End: 2017-04-18
Payer: COMMERCIAL

## 2017-04-18 VITALS — WEIGHT: 279 LBS | RESPIRATION RATE: 14 BRPM | BODY MASS INDEX: 41.32 KG/M2 | HEIGHT: 69 IN

## 2017-04-18 DIAGNOSIS — M85.441: Primary | ICD-10-CM

## 2017-04-18 PROCEDURE — 99213 OFFICE O/P EST LOW 20 MIN: CPT | Mod: 25 | Performed by: ORTHOPAEDIC SURGERY

## 2017-04-18 PROCEDURE — 20600 DRAIN/INJ JOINT/BURSA W/O US: CPT | Mod: RT | Performed by: ORTHOPAEDIC SURGERY

## 2017-04-18 ASSESSMENT — PAIN SCALES - GENERAL: PAINLEVEL: EXTREME PAIN (8)

## 2017-04-18 NOTE — NURSING NOTE
"A steroid and or Hylan G - F 20 injection was performed on 4/18/2017 at 5:12 PM. Risks,benefits and complications of the injection were discussed with the patient and the patient has elected to proceed after verbal consent. Using sterile technique, the area was prepped with betadine . A 25 Gauge 1.5\" was used to inject the medication(s) listed below.This was well tolerated.    The following medication(s) was given:     MEDICATION: Kenalog-40 40mg per mL  ROUTE: intraarticular  SITE:Right index finger   : [employer] (Kenalog-40)  DOSE: 1 ml  LOT #: [id number]  EXPIRATION DATE:  Jun 2018    Alberto CUMMINGS      "

## 2017-04-18 NOTE — MR AVS SNAPSHOT
"              After Visit Summary   4/18/2017    Wei Wolf    MRN: [id number]           Patient Information     Date Of Birth          1965        Visit Information        Provider Department      4/18/2017 4:15 PM Ajith Johnson MD [facility]         Follow-ups after your visit        Who to contact     If you have questions or need follow up information about today's clinic visit or your schedule please contact [facility] at [phone].  Normal or non-critical lab and imaging results will be communicated to you by MyChart, letter or phone within 4 business days after the clinic has received the results. If you do not hear from us within 7 days, please contact the clinic through [employer] or phone. If you have a critical or abnormal lab result, we will notify you by phone as soon as possible.  Submit refill requests through [employer] or call your pharmacy and they will forward the refill request to us. Please allow 3 business days for your refill to be completed.          Additional Information About Your Visit        MyChart Information     [employer] gives you secure access to your electronic health record. If you see a primary care provider, you can also send messages to your care team and make appointments. If you have questions, please call your primary care clinic.  If you do not have a primary care provider, please call [phone] and they will assist you.        Care EveryWhere ID     This is your Care EveryWhere ID. This could be used by other organizations to access your [place] medical records  [id number]        Your Vitals Were     Respirations Height BMI (Body Mass Index)             14 1.753 m (5' 9\") 41.2 kg/m2          Blood Pressure from Last 3 Encounters:   03/23/17 136/70   03/21/17 135/65   08/01/16 136/75    Weight from Last 3 Encounters:   04/18/17 126.6 kg (279 lb)   03/23/17 125.6 kg (277 lb)   03/21/17 126.4 kg (278 lb 9.6 oz)    "           Today, you had the following     No orders found for display       Primary Care Provider Office Phone # Fax #    Yobany Bernal [phone]       [facility] [address] [facility] [address]        Thank you!     Thank you for choosing [facility]  for your care. Our goal is always to provide you with excellent care. Hearing back from our patients is one way we can continue to improve our services. Please take a few minutes to complete the written survey that you may receive in the mail after your visit with us. Thank you!             Your Updated Medication List - Protect others around you: Learn how to safely use, store and throw away your medicines at www.disposemymeds.org.          This list is accurate as of: 4/18/17  4:24 PM.  Always use your most recent med list.                   Brand Name Dispense Instructions for use    ASPIRIN NOT PRESCRIBED    INTENTIONAL    1 each    continuous prn Antiplatelet medication not prescribed intentionally due to GI Issues / Ulcer Disease       blood glucose monitor Kit     1 Kit    pt tests twice daily - per insurance plan       blood glucose monitoring test strip    no brand specified    3 Box    1 strip by In Vitro route daily accucheck compact plus test strips.  Testing once daily       glipiZIDE 10 MG 24 hr tablet    glipiZIDE XL    180 tablet    Two tablet daily       losartan 25 MG tablet    COZAAR    90 tablet    Take 1 tablet (25 mg) by mouth daily       metFORMIN 500 MG 24 hr tablet    GLUCOPHAGE-XR    180 tablet    Take 2 tablets (1,000 mg) by mouth daily (with dinner)       order for DME     1 Units    Equipment being ordered: wrist splint       pioglitazone 45 MG tablet    ACTOS    90 tablet    Take 1 tablet (45 mg) by mouth daily       simvastatin 10 MG tablet    ZOCOR    90 tablet    Take 1 tablet (10 mg) by mouth At Bedtime

## 2017-04-18 NOTE — LETTER
4/18/2017       RE: Wei Wolf  [address]  [facility] [address]           Dear Colleague,    Thank you for referring your patient, Wei Wolf, to the [facility]. Please see a copy of my visit note below.    SUBJECTIVE:  Patient returns for 3/24/17 right hand MRI results, which are reviewed with the patient by myself and showed:  1. 3.5 x 3.5 x 3.5 mm subcortical cyst in the second metacarpal head with subjacent articular cartilage loss most consistent with an osteochondral lesion with surrounding reactive edema likely  degenerative in nature. Overall mild enhancement of the synovium of the second metacarpophalangeal joint, the subcortical cyst itself and the tendon sheath of the extensor tendon at this level consistent with mild reactive inflammatory changes.  2. Nonspecific tiny subcortical cyst in the third metacarpal head.     I did send a correspondence to our hand specialist about their opinion, but did not get a response.     ASSESSMENT:  1. Subcortical cyst of the 3rd metacarpal head, right     PLAN:  We discussed the possibilities which include, surgical curettement vs steroid injection to control the inflammation. We decided to proceed a steroid injection today. If symptoms do not improve with the steroid injection, consider referral to hand service.     A Right Hand intraarticular second MCP joint steroid injection was performed using 1 cc Kenalog-40 40mg per mL after sterile prep. Risks, benefits and complications of the injection were discussed with the patient and the patient elected to proceed. This was tolerated well by the patient. It was a difficult injection, due to the landmarks being obscured by the swelling of his hand, and may not have entered the joint.    Return to the clinic MARGARETTE Johnson MD  Dept. Orthopedic Surgery  [facility]    This document serves as a record of the services and decisions personally performed and made by Dr. ARGUELLES  KATLIN Johnson MD. It was created on his behalf by Susan Bettencourt, a trained medical scribe. The creation of this record is based on the provider's personal observations and the statements of the patient. This document has been checked and approved by the attending provider.   Susan Bettencourt April 18, 2017 4:35 PM    Again, thank you for allowing me to participate in the care of your patient.        Sincerely,              Ajith Johnson MD

## 2017-04-18 NOTE — PATIENT INSTRUCTIONS
Please remember to call and schedule a follow up appointment if one was recommended at your earliest convenience.   Orthopedics CLINIC HOURS TELEPHONE NUMBER   Ajith Johnson M.D.      Iwona Edwards  Medical Assistant Tuesday 8 am -5 pm    Wednesday 8 am -1 pm    Thursday 8 am -5 pm   Specialty schedulers:   [phone] to make an appointment with any Specialty Provider.   Main Clinic:   [phone] to make an appointment with your primary provider   Urgent Care locations:    [facility] Monday-Friday 5 pm - 9 pm  Saturday-Sunday 9 am -5pm      Monday-Friday 11 am - 9 pm  Saturday 9 am - 5 pm [phone]     If SURGERY has been recommended, please call our Specialty Schedulers at [phone] to schedule your procedure.    If you need a medication refill, please contact your pharmacy. Please allow 3 business days for your refill to be completed.  Use [employer] (secure e-mail communication and access to your chart) to send a message or to make an appointment. Please ask how you can sign up for [employer].

## 2017-04-18 NOTE — NURSING NOTE
"Chief Complaint   Patient presents with     RECHECK     follow up of right hand mass.      Results     MRI results right hand       Initial Resp 14  Ht 1.753 m (5' 9\")  Wt 126.6 kg (279 lb)  BMI 41.2 kg/m2 Estimated body mass index is 41.2 kg/(m^2) as calculated from the following:    Height as of this encounter: 1.753 m (5' 9\").    Weight as of this encounter: 126.6 kg (279 lb).  Medication Reconciliation: complete   Iwona Edwards MA      "

## 2017-04-18 NOTE — PROGRESS NOTES
SUBJECTIVE:  Patient returns for 3/24/17 right hand MRI results, which are reviewed with the patient by myself and showed:  1. 3.5 x 3.5 x 3.5 mm subcortical cyst in the second metacarpal head with subjacent articular cartilage loss most consistent with an osteochondral lesion with surrounding reactive edema likely  degenerative in nature. Overall mild enhancement of the synovium of the second metacarpophalangeal joint, the subcortical cyst itself and the tendon sheath of the extensor tendon at this level consistent with mild reactive inflammatory changes.  2. Nonspecific tiny subcortical cyst in the third metacarpal head.     I did send a correspondence to our hand specialist about their opinion, but did not get a response.     ASSESSMENT:  1. Subcortical cyst of the 3rd metacarpal head, right     PLAN:  We discussed the possibilities which include, surgical curettement vs steroid injection to control the inflammation. We decided to proceed a steroid injection today. If symptoms do not improve with the steroid injection, consider referral to hand service.     A Right Hand intraarticular second MCP joint steroid injection was performed using 1 cc Kenalog-40 40mg per mL after sterile prep. Risks, benefits and complications of the injection were discussed with the patient and the patient elected to proceed. This was tolerated well by the patient. It was a difficult injection, due to the landmarks being obscured by the swelling of his hand, and may not have entered the joint.    Return to the clinic PRN.     BLANE Johnson MD  Dept. Orthopedic Surgery  [facility]    This document serves as a record of the services and decisions personally performed and made by Dr. BLANE Johnson MD. It was created on his behalf by Susan Bettencourt, a trained medical scribe. The creation of this record is based on the provider's personal observations and the statements of the patient. This document has been checked and approved  by the attending provider.   Susan Bettencourt April 18, 2017 4:35 PM

## 2017-05-25 ENCOUNTER — TELEPHONE (OUTPATIENT)
Dept: FAMILY MEDICINE | Facility: CLINIC | Age: 52
End: 2017-05-25

## 2017-05-25 NOTE — TELEPHONE ENCOUNTER
Panel Management Review      Patient has the following on his problem list:     Diabetes    ASA: Not Required     Last A1C  Lab Results   Component Value Date    A1C 8.0 03/15/2017    A1C 7.6 10/28/2016    A1C 8.5 07/28/2016    A1C 7.9 12/08/2015    A1C 8.0 11/03/2015     A1C tested: FAILED    Last LDL:    Lab Results   Component Value Date    CHOL 121 07/28/2016     Lab Results   Component Value Date    HDL 43 07/28/2016     Lab Results   Component Value Date    LDL 64 07/28/2016     Lab Results   Component Value Date    TRIG 70 07/28/2016     Lab Results   Component Value Date    CHOLHDLRATIO 4.2 05/27/2015     Lab Results   Component Value Date    NHDL 78 07/28/2016       Is the patient on a Statin? YES             Is the patient on Aspirin? NO    Medications     HMG CoA Reductase Inhibitors    simvastatin (ZOCOR) 10 MG tablet          Last three blood pressure readings:  BP Readings from Last 3 Encounters:   03/23/17 136/70   03/21/17 135/65   08/01/16 136/75       Date of last diabetes office visit: 3/2017     Tobacco History:     History   Smoking Status     Never Smoker   Smokeless Tobacco     Never Used           Composite cancer screening  Chart review shows that this patient is due/due soon for the following None  Summary:    Patient is due/failing the following:   A1C    Action needed:   Patient needs office visit for diabetic follow up.    Type of outreach:    none.  Per last visit note patient to follow up in September    Questions for provider review:    None                                                                                                                                    Mathew Tidwell CMA       Chart routed to closed .

## 2017-08-29 ENCOUNTER — TELEPHONE (OUTPATIENT)
Dept: FAMILY MEDICINE | Facility: CLINIC | Age: 52
End: 2017-08-29

## 2017-08-29 NOTE — TELEPHONE ENCOUNTER
Panel Management Review      Patient has the following on his problem list:     Diabetes    ASA: Passed    Last A1C  Lab Results   Component Value Date    A1C 8.0 03/15/2017    A1C 7.6 10/28/2016    A1C 8.5 07/28/2016    A1C 7.9 12/08/2015    A1C 8.0 11/03/2015     A1C tested: FAILED    Last LDL:    Lab Results   Component Value Date    CHOL 121 07/28/2016     Lab Results   Component Value Date    HDL 43 07/28/2016     Lab Results   Component Value Date    LDL 64 07/28/2016     Lab Results   Component Value Date    TRIG 70 07/28/2016     Lab Results   Component Value Date    CHOLHDLRATIO 4.2 05/27/2015     Lab Results   Component Value Date    NHDL 78 07/28/2016       Is the patient on a Statin? YES             Is the patient on Aspirin? YES    Medications     HMG CoA Reductase Inhibitors    simvastatin (ZOCOR) 10 MG tablet          Last three blood pressure readings:  BP Readings from Last 3 Encounters:   03/23/17 136/70   03/21/17 135/65   08/01/16 136/75       Date of last diabetes office visit: 3/21/17     Tobacco History:     History   Smoking Status     Never Smoker   Smokeless Tobacco     Never Used             Composite cancer screening  Chart review shows that this patient is due/due soon for the following None  Summary:    Patient is due/failing the following:   A1C    Action needed:   Patient needs office visit for diabetic check.    Type of outreach:    Sent letter.    Questions for provider review:    None                                                                                                                                    Mathew Tidwell CMA       Chart routed to closed .

## 2017-08-29 NOTE — LETTER
[facility]  [address] [facility] [address]  Phone: [phone]    08/29/17    Wei Schmidthosea  [address]  [facility] [address]      To whom it may concern:     Our records indicate that you have not scheduled for a(n)appointment with  Yobany Bernal MD which was recommended by your health care team. Monitoring and managing your preventative and chronic health conditions are very important to us.     If you have received your health care elsewhere, please provide us with that information so it can be documented in your chart.    Please call [phone] or message us through your [facility] account to schedule an appointment or provide information for your chart.     I look forward to seeing you and working with you on your health care needs.       *If you have already scheduled an appointment, please disregard this reminder      Sincerely,      Yobany Bernal MD/suhail

## 2017-09-06 RX ORDER — PIOGLITAZONEHYDROCHLORIDE 45 MG/1
TABLET ORAL
Qty: 90 TABLET | Refills: 0 | OUTPATIENT
Start: 2017-09-06

## 2017-10-12 ENCOUNTER — OFFICE VISIT (OUTPATIENT)
Dept: URGENT CARE | Facility: URGENT CARE | Age: 52
End: 2017-10-12
Payer: COMMERCIAL

## 2017-10-12 VITALS
SYSTOLIC BLOOD PRESSURE: 142 MMHG | HEART RATE: 87 BPM | OXYGEN SATURATION: 97 % | DIASTOLIC BLOOD PRESSURE: 77 MMHG | WEIGHT: 280 LBS | BODY MASS INDEX: 41.35 KG/M2 | TEMPERATURE: 98.5 F

## 2017-10-12 DIAGNOSIS — B96.89 BACTERIAL CONJUNCTIVITIS OF BOTH EYES: Primary | ICD-10-CM

## 2017-10-12 DIAGNOSIS — H10.9 BACTERIAL CONJUNCTIVITIS OF BOTH EYES: Primary | ICD-10-CM

## 2017-10-12 PROCEDURE — 99213 OFFICE O/P EST LOW 20 MIN: CPT | Performed by: NURSE PRACTITIONER

## 2017-10-12 RX ORDER — POLYMYXIN B SULFATE AND TRIMETHOPRIM 1; 10000 MG/ML; [USP'U]/ML
1 SOLUTION OPHTHALMIC EVERY 4 HOURS
Qty: 3 ML | Refills: 0 | Status: SHIPPED | OUTPATIENT
Start: 2017-10-12 | End: 2017-10-19

## 2017-10-12 NOTE — MR AVS SNAPSHOT
After Visit Summary   10/12/2017    Wei Wolf    MRN: [id number]           Patient Information     Date Of Birth          1965        Visit Information        Provider Department      10/12/2017 7:45 PM Echo Scott NP [facility]        Today's Diagnoses     Bacterial conjunctivitis of both eyes    -  1      Care Instructions      Conjunctivitis, Bacterial    You have an infection in the membranes covering the white part of the eye. This part of the eye is called the conjunctiva. The infection is called conjunctivitis. The most common symptoms of conjunctivitis include a thick, pus-like discharge from the eye, swollen eyelids, redness, eyelids sticking together upon awakening, and a gritty or scratchy feeling in the eye. Your infection was caused by bacteria. It may be treated with medicine. With treatment, the infection takes about 7 to 10 days to resolve.  Home care    Use prescribed antibiotic eye drops or ointment as directed to treat the infection.    Apply a warm compress (towel soaked in warm water) to the affected eye 3 to 4 times a day. Do this just before applying medicine to the eye.    Use a warm, wet cloth to wipe away crusting of the eyelids in the morning. This is caused by mucus drainage during the night. You may also use saline irrigating solution or artificial tears to rinse away mucus in the eye. Do not put a patch over the eye.    Wash your hands before and after touching the infected eye. This is to prevent spreading the infection to the other eye, and to other people. Do not share your towels or washcloths with others.    You may use acetaminophen or ibuprofen to control pain, unless another medicine was prescribed. (Note: If you have chronic liver or kidney disease or have ever had a stomach ulcer or gastrointestinal bleeding, talk with your doctor before using these medicines.)    Do not wear contact lenses until your eyes have healed and all  symptoms are gone.  Follow-up care  Follow up with your healthcare provider, or as advised.  When to seek medical advice  Call your healthcare provider right away if any of these occur:    Worsening vision    Increasing pain in the eye    Increasing swelling or redness of the eyelid    Redness spreading around the eye  Date Last Reviewed: 6/14/2015 2000-2017 [employer]. [address] [place] [address]. All rights reserved. This information is not intended as a substitute for professional medical care. Always follow your healthcare professional's instructions.                Follow-ups after your visit        Your next 10 appointments already scheduled     Nov 30, 2017  8:00 AM CST   (Arrive by 7:45 AM)   NEW HAND with Lydia Corrales MD   [facility] ([facility])    [address] [place] [address] [phone]              Who to contact     If you have questions or need follow up information about today's clinic visit or your schedule please contact [facility] directly at [phone].  Normal or non-critical lab and imaging results will be communicated to you by BrainMasshart, letter or phone within 4 business days after the clinic has received the results. If you do not hear from us within 7 days, please contact the clinic through BrainMasshart or phone. If you have a critical or abnormal lab result, we will notify you by phone as soon as possible.  Submit refill requests through jiffstore or call your pharmacy and they will forward the refill request to us. Please allow 3 business days for your refill to be completed.          Additional Information About Your Visit        BrainMassharXIFIN Information     jiffstore gives you secure access to your electronic health record. If you see a primary care provider, you can also send messages to your care team and make appointments. If you have questions, please call your  primary care clinic.  If you do not have a primary care provider, please call [phone] and they will assist you.        Care EveryWhere ID     This is your Care EveryWhere ID. This could be used by other organizations to access your [place] medical records  [id number]        Your Vitals Were     Pulse Temperature Pulse Oximetry BMI (Body Mass Index)          87 98.5  F (36.9  C) (Tympanic) 97% 41.35 kg/m2         Blood Pressure from Last 3 Encounters:   10/12/17 142/77   03/23/17 136/70   03/21/17 135/65    Weight from Last 3 Encounters:   10/12/17 280 lb (127 kg)   04/18/17 279 lb (126.6 kg)   03/23/17 277 lb (125.6 kg)              Today, you had the following     No orders found for display         Today's Medication Changes          These changes are accurate as of: 10/12/17  8:09 PM.  If you have any questions, ask your nurse or doctor.               Start taking these medicines.        Dose/Directions    trimethoprim-polymyxin b ophthalmic solution   Commonly known as:  POLYTRIM   Used for:  Bacterial conjunctivitis of both eyes   Started by:  Echo Scott NP        Dose:  1 drop   Place 1 drop into both eyes every 4 hours for 7 days   Quantity:  3 mL   Refills:  0            Where to get your medicines      [facility] [address] [place] [address] [address] [place] [address] AT SEC of [address]  [address] [facility] [address]     Phone:  [phone]     trimethoprim-polymyxin b ophthalmic solution                Primary Care Provider Office Phone # Fax #    Yobany Bernal [phone] [address] [facility] [address]        Equal Access to Services     RYAN MOSER AH: Hadii luciana bunn Sodiego, waaxda luqadaha, qaybta kaalmada adeegyada, ray aburto. So [facility] [phone].    ATENCIÓN: Si habla español, tiene a richardson disposición servicios gratuitos de asistencia lingüística. Llame  al [phone].    We comply with applicable federal civil rights laws and [place] laws. We do not discriminate on the basis of race, color, national origin, age, disability, sex, sexual orientation, or gender identity.            Thank you!     Thank you for choosing [facility]  [facility] your [facility]. Our goal is always to provide you with excellent care. Hearing back from our patients is one way we can continue to improve our services. Please take a few minutes to complete the written survey that you may receive in the mail after your visit with us. Thank you!             Your Updated Medication List - Protect others around you: Learn how to safely use, store and throw away your medicines at www.disposemymeds.org.          This list is accurate as of: 10/12/17  8:09 PM.  Always use your most recent med list.                   Brand Name Dispense Instructions for use Diagnosis    ASPIRIN NOT PRESCRIBED    INTENTIONAL    1 each    continuous prn Antiplatelet medication not prescribed intentionally due to GI Issues / Ulcer Disease        blood glucose monitor Kit     1 Kit    pt tests twice daily - per insurance plan    Type II or unspecified type diabetes mellitus without mention of complication, not stated as uncontrolled       blood glucose monitoring test strip    no brand specified    3 Box    1 strip by In Vitro route daily accucheck compact plus test strips.  Testing once daily    Uncontrolled type 2 diabetes mellitus without complication, without long-term current use of insulin (H)       glipiZIDE 10 MG 24 hr tablet    glipiZIDE XL    180 tablet    Two tablet daily    Uncontrolled type 2 diabetes mellitus without complication, without long-term current use of insulin (H)       losartan 25 MG tablet    COZAAR    90 tablet    Take 1 tablet (25 mg) by mouth daily    Hypertension goal BP (blood pressure) < 140/90       metFORMIN 500 MG 24 hr tablet    GLUCOPHAGE-XR    180 tablet    Take 2 tablets (1,000  mg) by mouth daily (with dinner)    Uncontrolled type 2 diabetes mellitus without complication, without long-term current use of insulin (H)       order for DME     1 Units    Equipment being ordered: wrist splint    Left wrist pain       pioglitazone 45 MG tablet    ACTOS    90 tablet    Take 1 tablet (45 mg) by mouth daily    Uncontrolled type 2 diabetes mellitus without complication, without long-term current use of insulin (H)       simvastatin 10 MG tablet    ZOCOR    90 tablet    Take 1 tablet (10 mg) by mouth At Bedtime    High cholesterol       trimethoprim-polymyxin b ophthalmic solution    POLYTRIM    3 mL    Place 1 drop into both eyes every 4 hours for 7 days    Bacterial conjunctivitis of both eyes

## 2017-10-13 NOTE — PATIENT INSTRUCTIONS
Conjunctivitis, Bacterial    You have an infection in the membranes covering the white part of the eye. This part of the eye is called the conjunctiva. The infection is called conjunctivitis. The most common symptoms of conjunctivitis include a thick, pus-like discharge from the eye, swollen eyelids, redness, eyelids sticking together upon awakening, and a gritty or scratchy feeling in the eye. Your infection was caused by bacteria. It may be treated with medicine. With treatment, the infection takes about 7 to 10 days to resolve.  Home care    Use prescribed antibiotic eye drops or ointment as directed to treat the infection.    Apply a warm compress (towel soaked in warm water) to the affected eye 3 to 4 times a day. Do this just before applying medicine to the eye.    Use a warm, wet cloth to wipe away crusting of the eyelids in the morning. This is caused by mucus drainage during the night. You may also use saline irrigating solution or artificial tears to rinse away mucus in the eye. Do not put a patch over the eye.    Wash your hands before and after touching the infected eye. This is to prevent spreading the infection to the other eye, and to other people. Do not share your towels or washcloths with others.    You may use acetaminophen or ibuprofen to control pain, unless another medicine was prescribed. (Note: If you have chronic liver or kidney disease or have ever had a stomach ulcer or gastrointestinal bleeding, talk with your doctor before using these medicines.)    Do not wear contact lenses until your eyes have healed and all symptoms are gone.  Follow-up care  Follow up with your healthcare provider, or as advised.  When to seek medical advice  Call your healthcare provider right away if any of these occur:    Worsening vision    Increasing pain in the eye    Increasing swelling or redness of the eyelid    Redness spreading around the eye  Date Last Reviewed: 6/14/2015 2000-2017 [employer]. [address] [place] [address], [place] [address]. All rights reserved. This information is not intended as a substitute for professional medical care. Always follow your healthcare professional's instructions.

## 2017-10-13 NOTE — PROGRESS NOTES
SUBJECTIVE:                                                    Wei Wolf is a 51 year old male who presents to clinic today for the following health issues:      Eye(s) Problem      Duration: today     Description:  Location: bilateral  Pain: no   Redness: YES  Discharge: YES    Accompanying signs and symptoms:     History (Trauma, foreign body exposure,): None    Precipitating or alleviating factors (contact use): None, daughter had pink eye     Therapies tried and outcome: None        Problem list and histories reviewed & adjusted, as indicated.  Additional history: as documented    Patient Active Problem List   Diagnosis     TYPE 2 DIABETES, HBA1C GOAL < 7%     HYPERLIPIDEMIA LDL GOAL <100     Hypertension goal BP (blood pressure) < 140/90     Obesity     SAI (obstructive sleep apnea)     Morbid obesity (H)     Uncontrolled diabetes mellitus type 2 without complications (H)     Past Surgical History:   Procedure Laterality Date     ABDOMEN SURGERY       NO HISTORY OF SURGERY         Social History   Substance Use Topics     Smoking status: Never Smoker     Smokeless tobacco: Never Used     Alcohol use No     Family History   Problem Relation Age of Onset     Asthma Mother      CANCER Mother      CERVICAL     DIABETES Mother      CANCER Father      BRAIN     DIABETES Father      Asthma Brother          Current Outpatient Prescriptions   Medication Sig Dispense Refill     trimethoprim-polymyxin b (POLYTRIM) ophthalmic solution Place 1 drop into both eyes every 4 hours for 7 days 3 mL 0     simvastatin (ZOCOR) 10 MG tablet Take 1 tablet (10 mg) by mouth At Bedtime 90 tablet 0     metFORMIN (GLUCOPHAGE-XR) 500 MG 24 hr tablet Take 2 tablets (1,000 mg) by mouth daily (with dinner) 180 tablet 0     glipiZIDE (GLIPIZIDE XL) 10 MG 24 hr tablet Two tablet daily 180 tablet 0     pioglitazone (ACTOS) 45 MG tablet Take 1 tablet (45 mg) by mouth daily 90 tablet 0     losartan (COZAAR) 25 MG tablet Take 1 tablet  (25 mg) by mouth daily 90 tablet 1     blood glucose monitoring (NO BRAND SPECIFIED) test strip 1 strip by In Vitro route daily accucheck compact plus test strips.  Testing once daily 3 Box 3     order for DME Equipment being ordered: wrist splint 1 Units 0     ASPIRIN NOT PRESCRIBED, INTENTIONAL, continuous prn Antiplatelet medication not prescribed intentionally due to GI Issues / Ulcer Disease 1 each 0     BLOOD GLUCOSE MONITOR KIT pt tests twice daily - per insurance plan 1 Kit 0     Allergies   Allergen Reactions     Metformin Diarrhea         ROS:  C: NEGATIVE for fever, chills, change in weight  EYES: discharge bilateral and redness bilateral  E/M: NEGATIVE for ear, mouth and throat problems  R: NEGATIVE for significant cough or SOB  CV: NEGATIVE for chest pain, palpitations or peripheral edema    OBJECTIVE:     /77  Pulse 87  Temp 98.5  F (36.9  C) (Tympanic)  Wt 280 lb (127 kg)  SpO2 97%  BMI 41.35 kg/m2  Body mass index is 41.35 kg/(m^2).  GENERAL: healthy, alert and no distress  EYES: PERRL, EOMI and conjunctiva/corneas- conjunctival injection on both eyes and yellow colored discharge present bilateral  NECK: no adenopathy, no asymmetry, masses, or scars and thyroid normal to palpation  RESP: lungs clear to auscultation - no rales, rhonchi or wheezes  CV: regular rate and rhythm, normal S1 S2, no S3 or S4, no murmur, click or rub, no peripheral edema and peripheral pulses strong  ABDOMEN: soft, nontender, no hepatosplenomegaly, no masses and bowel sounds normal  MS: no gross musculoskeletal defects noted, no edema    Diagnostic Test Results:  none     ASSESSMENT/PLAN:       ICD-10-CM    1. Bacterial conjunctivitis of both eyes H10.9 trimethoprim-polymyxin b (POLYTRIM) ophthalmic solution     Advised patient to use warm compresses to keep the eyelids clean. To keep the bacteria from spreading, wash  hands often. Use a separate tissue to wipe each eye. Don t touch eyes or share bedding or  towels.  Patient educational/instructional material provided including reasons for follow-up    The patient indicates understanding of these issues and agrees with the plan.    Echo Scott NP  [facility]

## 2017-11-15 NOTE — TELEPHONE ENCOUNTER
APPT INFO    Date /Time: 11/30/17 8AM   Reason for Appt: Rt Hand Pointer Finger Cyst   Ref Provider/Clinic: Dr. Ajith Johnson   Are there internal records? If yes, list: YES -      [facility] -- imaging in pacs   Patient Contact (Y/N) & Call Details: No - internal referral   Action: --

## 2017-11-16 ASSESSMENT — ENCOUNTER SYMPTOMS
SMELL DISTURBANCE: 0
STIFFNESS: 0
COUGH DISTURBING SLEEP: 1
MYALGIAS: 0
SHORTNESS OF BREATH: 0
NECK PAIN: 0
SINUS CONGESTION: 1
BACK PAIN: 0
JOINT SWELLING: 0
SPUTUM PRODUCTION: 1
HEMOPTYSIS: 0
SINUS PAIN: 0
NECK MASS: 0
MUSCLE WEAKNESS: 0
TASTE DISTURBANCE: 0
POSTURAL DYSPNEA: 0
TROUBLE SWALLOWING: 0
SNORES LOUDLY: 1
DYSPNEA ON EXERTION: 0
SORE THROAT: 0
MUSCLE CRAMPS: 0
ARTHRALGIAS: 1
COUGH: 1
HOARSE VOICE: 0
WHEEZING: 0

## 2017-11-30 ENCOUNTER — PRE VISIT (OUTPATIENT)
Dept: ORTHOPEDICS | Facility: CLINIC | Age: 52
End: 2017-11-30

## 2017-11-30 ENCOUNTER — OFFICE VISIT (OUTPATIENT)
Dept: ORTHOPEDICS | Facility: CLINIC | Age: 52
End: 2017-11-30

## 2017-11-30 ENCOUNTER — DOCUMENTATION ONLY (OUTPATIENT)
Dept: LAB | Facility: CLINIC | Age: 52
End: 2017-11-30

## 2017-11-30 VITALS — HEIGHT: 70 IN | WEIGHT: 288.7 LBS | BODY MASS INDEX: 41.33 KG/M2

## 2017-11-30 DIAGNOSIS — I10 HYPERTENSION GOAL BP (BLOOD PRESSURE) < 140/90: ICD-10-CM

## 2017-11-30 DIAGNOSIS — M79.644 PAIN OF FINGER OF RIGHT HAND: Primary | ICD-10-CM

## 2017-11-30 DIAGNOSIS — E78.5 HYPERLIPIDEMIA LDL GOAL <100: ICD-10-CM

## 2017-11-30 LAB — HBA1C MFR BLD: 8.8 % (ref 4.3–6)

## 2017-11-30 PROCEDURE — 82043 UR ALBUMIN QUANTITATIVE: CPT | Performed by: FAMILY MEDICINE

## 2017-11-30 PROCEDURE — 84443 ASSAY THYROID STIM HORMONE: CPT | Performed by: FAMILY MEDICINE

## 2017-11-30 PROCEDURE — 36415 COLL VENOUS BLD VENIPUNCTURE: CPT | Performed by: FAMILY MEDICINE

## 2017-11-30 PROCEDURE — 83036 HEMOGLOBIN GLYCOSYLATED A1C: CPT | Performed by: FAMILY MEDICINE

## 2017-11-30 PROCEDURE — 80061 LIPID PANEL: CPT | Performed by: FAMILY MEDICINE

## 2017-11-30 NOTE — PROGRESS NOTES
HISTORY OF PRESENT ILLNESS:  Wei is here on referral from Dr. Yasmeen Johnson for evaluation and treatment of right index finger pain.  He reports that he has had difficulties for about a year.  He initially saw Dr. Johnson on 04/18/2017.  An MRI was performed after plain x-rays.  He was noted to have a subcortical cyst in the second metacarpal head.  He underwent an injection which gave him about 4-5 months of relief.  He would like to have something more permanent done.  Dr. Johnson had talked about a possible bone graft.      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      PAST SURGICAL HISTORY:  Non-insulin-dependent diabetes.      REVIEW OF SYSTEMS:  See Epic reviewed questions.        FAMILY HISTORY:  Noncontributory.      MEDICATIONS:  Reviewed in Epic.      PHYSICAL EXAMINATION:  On examination, today, Wei is a delightful, cooperative 51-year-old male.  He demonstrates for me today swelling that is visible on the index finger MCP joint.  His range of motion is from 0-70 degrees.  His PIP and DIP joints are unaffected.  His pain is localized specifically to the index metacarpal head which he ranks at 3/10 at rest.  His  strength is 110 on the right and 120 on the left.  His pinch strength is 18 on the right and 24 on the left nondominant hand.      IMAGING:  X-rays are taken today.  He has a 5 cm subchondral cyst on the index finger metacarpal head.      MRI scan from previous is reviewed and shows a subchondral cyst with what appears to be intact cartilage over the surface.  It measures 7 mm in April at the time of the scan.      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      At this time, I discussed with him a curettage and bone graft if the cartilaginous surface is intact and autograft could be done with cancellous bone from the distal radius.  I also offered him an allograft and he would prefer autograft.  We did discuss that if the  cartilage surface is not intact, that an osteochondral graft may be necessary.  This could be done from the radial styloid area.  He appears to be in full understanding and wishes to proceed; he will be in a cast for a month followed by a brace for month with protective rehabilitation.  Surgery orders were written, verbal consent was obtained.      cc: Ajith Johnson MD       Answers for HPI/ROS submitted by the patient on 11/16/2017   General Symptoms: No  Skin Symptoms: No  HENT Symptoms: Yes  EYE SYMPTOMS: No  HEART SYMPTOMS: No  LUNG SYMPTOMS: Yes  INTESTINAL SYMPTOMS: No  URINARY SYMPTOMS: No  REPRODUCTIVE SYMPTOMS: No  SKELETAL SYMPTOMS: Yes  BLOOD SYMPTOMS: No  NERVOUS SYSTEM SYMPTOMS: No  MENTAL HEALTH SYMPTOMS: No  Ear pain: No  Ear discharge: No  Hearing loss: No  Tinnitus: No  Nosebleeds: No  Congestion: Yes  Sinus pain: No  Trouble swallowing: No   Voice hoarseness: No  Mouth sores: No  Sore throat: No  Tooth pain: No  Gum tenderness: No  Bleeding gums: No  Change in taste: No  Change in sense of smell: No  Dry mouth: No  Hearing aid used: No  Neck lump: No  Cough: Yes  Sputum or phlegm: Yes  Coughing up blood: No  Difficulty breating or shortness of breath: No  Snoring: Yes  Wheezing: No  Difficulty breathing on exertion: No  Nighttime Cough: Yes  Difficulty breathing when lying flat: No  Back pain: No  Muscle aches: No  Neck pain: No  Swollen joints: No  Joint pain: Yes  Bone pain: No  Muscle cramps: No  Muscle weakness: No  Joint stiffness: No  Bone fracture: No

## 2017-11-30 NOTE — NURSING NOTE
"Reason For Visit:   Chief Complaint   Patient presents with     Consult     The patient is here today with right index finger pain. He reports having a cyst in the joint. He has has a previous cortison injection with relief of his complaints.        Primary MD: Yobany Bernal  Ref. MD: Ajith Johnson    Age: 51 year old    ?  No    The patient is looking for other options then injections.     Ht 1.765 m (5' 9.5\")  Wt 131 kg (288 lb 11.2 oz)  BMI 42.02 kg/m2      Pain Assessment  Patient Currently in Pain: Yes  0-10 Pain Scale: 3  Primary Pain Location: Finger (Comment which one) (index)  Pain Orientation: Right  Pain Descriptors: Sharp  Alleviating Factors: Rest  Aggravating Factors: Movement    Hand Dominance Evaluation  Hand Dominance: Right  Pinch force  R hand key force: 8.165 kg (18 lb)  L hand key force: 10.9 kg (24 lb)   force  R hand  level 2 force: 49.9 kg (110 lb)  L hand  level  2 force: 54.4 kg (120 lb)    QuickDASH Assessment  QuickDASH Main 11/16/2017   1.Open a tight or new jar. Mild difficulty   2. Do heavy household chores (e.g., wash walls, floors) Mild difficulty   3. Carry a shopping bag or briefcase. Mild difficulty   4. Wash your back. Mild difficulty   5. Use a knife to cut food. Mild difficulty   6. Recreational activities in which you take some force or impact through your arm, shoulder or hand (e.g., golf, hammering, tennis, etc.). Moderate difficulty   7. During the past week, to what extent has your arm, shoulder or hand problem interfered with your normal social activities with family, friends, neighbours or groups? Slightly   8. During the past week, were you limited in your work or other regular daily activities as a result of your arm, shoulder or hand problem? Slightly limited   9. Arm, shoulder or hand pain. Moderate   10.Tingling (pins and needles) in your arm,shoulder or hand. None   11. During the past week, how much difficulty have you had sleeping " because of the pain in your arm, shoulder or hand? ([place] number) No difficulty   Quickdash Ability Score 25          Current Outpatient Prescriptions   Medication Sig Dispense Refill     simvastatin (ZOCOR) 10 MG tablet Take 1 tablet (10 mg) by mouth At Bedtime 90 tablet 0     metFORMIN (GLUCOPHAGE-XR) 500 MG 24 hr tablet Take 2 tablets (1,000 mg) by mouth daily (with dinner) 180 tablet 0     glipiZIDE (GLIPIZIDE XL) 10 MG 24 hr tablet Two tablet daily 180 tablet 0     pioglitazone (ACTOS) 45 MG tablet Take 1 tablet (45 mg) by mouth daily 90 tablet 0     losartan (COZAAR) 25 MG tablet Take 1 tablet (25 mg) by mouth daily 90 tablet 1     blood glucose monitoring (NO BRAND SPECIFIED) test strip 1 strip by In Vitro route daily accucheck compact plus test strips.  Testing once daily 3 Box 3     order for DME Equipment being ordered: wrist splint 1 Units 0     ASPIRIN NOT PRESCRIBED, INTENTIONAL, continuous prn Antiplatelet medication not prescribed intentionally due to GI Issues / Ulcer Disease 1 each 0     BLOOD GLUCOSE MONITOR KIT pt tests twice daily - per insurance plan 1 Kit 0       Allergies   Allergen Reactions     Metformin Diarrhea       Michell Arias, ATC

## 2017-11-30 NOTE — MR AVS SNAPSHOT
After Visit Summary   11/30/2017    Wei Wolf    MRN: [id number]           Patient Information     Date Of Birth          1965        Visit Information        Provider Department      11/30/2017 8:00 AM Lydia Corrales MD [facility]        Today's Diagnoses     Pain of finger of right hand    -  1       Follow-ups after your visit        Your next 10 appointments already scheduled     Dec 14, 2017  7:30 AM CST   MyChart Long with Yobany Bernal MD   [facility]    [id number] [facility] [id number] [phone]            Dec 21, 2017   Procedure with Lydia Corrales MD   [facility] ([facility])    [address] [place] [id number] [phone]           Located in the Clinics and Surgery Center at [address] [place] [address].   parking is very convenient and highly recommended.  is a $6 flat rate fee.  Both  and self parkers should enter the main arrival plaza from [facility]; parking attendants will direct you based on your parking preference.            Jan 11, 2018 12:30 PM CST   (Arrive by 12:15 PM)   Post-Op with SURAJ FELDMAN ORTHO HAND POP   [facility] ([facility])    [address] [place] [id number] [phone]            Jan 11, 2018  1:00 PM CST   (Arrive by 12:45 PM)   CONOR Hand with ABELINO Green   [facility] ([facility])    [address] [place] [id number] [phone]            Feb 01, 2018 11:00 AM CST   (Arrive by 10:45 AM)   POST-OP HAND with Lydia Corrales MD   [facility] ([facility])    [address] [place] [id number] [phone]              Who to contact  "    Please call your clinic at [phone] to:    Ask questions about your health    Make or cancel appointments    Discuss your medicines    Learn about your test results    Speak to your doctor   If you have compliments or concerns about an experience at your clinic, or if you wish to file a complaint, please contact [facility] Patient Relations at [phone] or email us at [email or web address]         Additional Information About Your Visit        NearVersehart Information     Singspielt gives you secure access to your electronic health record. If you see a primary care provider, you can also send messages to your care team and make appointments. If you have questions, please call your primary care clinic.  If you do not have a primary care provider, please call [phone] and they will assist you.      Devtap is an electronic gateway that provides easy, online access to your medical records. With Devtap, you can request a clinic appointment, read your test results, renew a prescription or communicate with your care team.     To access your existing account, please contact your [facility] or call [phone] for assistance.        Care EveryWhere ID     This is your Care EveryWhere ID. This could be used by other organizations to access your [facility] medical records  [id number]        Your Vitals Were     Height BMI (Body Mass Index)                1.765 m (5' 9.5\") 42.02 kg/m2           Blood Pressure from Last 3 Encounters:   10/12/17 142/77   03/23/17 136/70   03/21/17 135/65    Weight from Last 3 Encounters:   11/30/17 131 kg (288 lb 11.2 oz)   10/12/17 127 kg (280 lb)   04/18/17 126.6 kg (279 lb)              We Performed the Following     Ness-Operative Worksheet (Hand)        Primary Care Provider Office Phone # Fax #    Yobany Bernal [phone] [address] [facility] [address]        Equal Access to " Services     [facility]: Hadii aad ku hadkinzamyrna Deannaali, watedda luqadaha, qaybta kachrispratibha freeman, ray aburto. So [facility] [phone].    ATENCIÓN: Si habla español, tiene a richardson disposición servicios gratuitos de asistencia lingüística. Llame al [phone].    We comply with applicable federal civil rights laws and [place] laws. We do not discriminate on the basis of race, color, national origin, age, disability, sex, sexual orientation, or gender identity.            Thank you!     Thank you for choosing [facility]  for your care. Our goal is always to provide you with excellent care. Hearing back from our patients is one way we can continue to improve our services. Please take a few minutes to complete the written survey that you may receive in the mail after your visit with us. Thank you!             Your Updated Medication List - Protect others around you: Learn how to safely use, store and throw away your medicines at www.disposemymeds.org.          This list is accurate as of: 11/30/17 11:59 PM.  Always use your most recent med list.                   Brand Name Dispense Instructions for use Diagnosis    ASPIRIN NOT PRESCRIBED    INTENTIONAL    1 each    continuous prn Antiplatelet medication not prescribed intentionally due to GI Issues / Ulcer Disease        blood glucose monitor Kit     1 Kit    pt tests twice daily - per insurance plan    Type II or unspecified type diabetes mellitus without mention of complication, not stated as uncontrolled       blood glucose monitoring test strip    no brand specified    3 Box    1 strip by In Vitro route daily accucheck compact plus test strips.  Testing once daily    Uncontrolled type 2 diabetes mellitus without complication, without long-term current use of insulin (H)       glipiZIDE 10 MG 24 hr tablet    glipiZIDE XL    180 tablet    Two tablet daily    Uncontrolled type 2 diabetes mellitus without complication,  without long-term current use of insulin (H)       losartan 25 MG tablet    COZAAR    90 tablet    Take 1 tablet (25 mg) by mouth daily    Hypertension goal BP (blood pressure) < 140/90       order for DME     1 Units    Equipment being ordered: wrist splint    Left wrist pain

## 2017-11-30 NOTE — NURSING NOTE
Teaching Flowsheet   Relevant Diagnosis: Right index finger MC bone cyst   Teaching Topic: Pre-op Right index finger MC bone cyst currettage with autograft (distal radius)      Person(s) involved in teaching:   Patient     Motivation Level:  Asks Questions: Yes  Eager to Learn: Yes  Cooperative: Yes  Receptive (willing/able to accept information): Yes  Any cultural factors/[place]beterian beliefs that may influence understanding or compliance? No       Patient demonstrates understanding of the following:  Reason for the appointment, diagnosis and treatment plan: Yes  Knowledge of proper use of medications and conditions for which they are ordered (with special attention to potential side effects or drug interactions): Yes  Which situations necessitate calling provider and whom to contact: Yes       Teaching Concerns Addressed:   Comments: Patient is Diabetic. Understands he needs a responsible adult  and caretaker for 24 hours after surgery.      Proper use and care of  (medical equip, care aids, etc.): NA  Nutritional needs and diet plan: Yes  Pain management techniques: Yes  Wound Care: Yes  How and/when to access community resources: NA     Instructional Materials Used/Given: pre-op packet, antiseptic soap      Time spent with patient: 12 minutes.

## 2017-11-30 NOTE — LETTER
11/30/2017       RE: Wei Wolf  [address] [facility] [address]     Dear Colleague,    Thank you for referring your patient, Wei Wolf, to the [facility] at [facility]. Please see a copy of my visit note below.    HISTORY OF PRESENT ILLNESS:  Wei is here on referral from Dr. Yasmeen Johnson for evaluation and treatment of right index finger pain.  He reports that he has had difficulties for about a year.  He initially saw Dr. Johnson on 04/18/2017.  An MRI was performed after plain x-rays.  He was noted to have a subcortical cyst in the second metacarpal head.  He underwent an injection which gave him about 4-5 months of relief.  He would like to have something more permanent done.  Dr. Johnson had talked about a possible bone graft.      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      PAST SURGICAL HISTORY:  Non-insulin-dependent diabetes.      REVIEW OF SYSTEMS:  See Epic reviewed questions.        FAMILY HISTORY:  Noncontributory.      MEDICATIONS:  Reviewed in Epic.      PHYSICAL EXAMINATION:  On examination, today, Wei is a delightful, cooperative 51-year-old male.  He demonstrates for me today swelling that is visible on the index finger MCP joint.  His range of motion is from 0-70 degrees.  His PIP and DIP joints are unaffected.  His pain is localized specifically to the index metacarpal head which he ranks at 3/10 at rest.  His  strength is 110 on the right and 120 on the left.  His pinch strength is 18 on the right and 24 on the left nondominant hand.      IMAGING:  X-rays are taken today.  He has a 5 cm subchondral cyst on the index finger metacarpal head.      MRI scan from previous is reviewed and shows a subchondral cyst with what appears to be intact cartilage over the surface.  It measures 7 mm in April at the time of the scan.      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      At this time, I discussed with him a curettage and bone graft if the cartilaginous surface is intact and autograft could be done with cancellous bone from the distal radius.  I also offered him an allograft and he would prefer autograft.  We did discuss that if the cartilage surface is not intact, that an osteochondral graft may be necessary.  This could be done from the radial styloid area.  He appears to be in full understanding and wishes to proceed; he will be in a cast for a month followed by a brace for month with protective rehabilitation.  Surgery orders were written, verbal consent was obtained.      cc: Ajith Johnson MD         Again, thank you for allowing me to participate in the care of your patient.      Sincerely,    Lydia Corrales MD

## 2017-12-01 LAB
CHOLEST SERPL-MCNC: 173 MG/DL
CREAT UR-MCNC: 199 MG/DL
HDLC SERPL-MCNC: 41 MG/DL
LDLC SERPL CALC-MCNC: 115 MG/DL
MICROALBUMIN UR-MCNC: 15 MG/L
MICROALBUMIN/CREAT UR: 7.69 MG/G CR (ref 0–17)
NONHDLC SERPL-MCNC: 132 MG/DL
TRIGL SERPL-MCNC: 83 MG/DL
TSH SERPL DL<=0.005 MIU/L-ACNC: 1.56 MU/L (ref 0.4–4)

## 2017-12-12 NOTE — PROGRESS NOTES
Diabetes     Diet:  Diabetic  Frequency of exercise:  2-3 days/week  Duration of exercise:  30-45 minutes  Taking medications regularly:  Yes  Medication side effects:  None  History of Present Illness     Diet:  Diabetic  Frequency of exercise:  2-3 days/week  Duration of exercise:  30-45 minutes  Taking medications regularly:  Yes  Medication side effects:  None    PRE-OP EVALUATION:  Today's date: 2017    Wei Wolf (: 1965) presents for pre-operative evaluation assessment as requested by Dr. Corrales.  He requires evaluation and anesthesia risk assessment prior to undergoing surgery/procedure for treatment of Exostosis   .  Proposed procedure: Right Index Finger Metacarpal Bone Cyst Curettage with Allograft from Distal Radius    Date of Surgery/ Procedure: 17  Time of Surgery/ Procedure: 3:30  Hospital[facility]Surgical [facility]   Fax number for surgical facility:    Primary Physician: Yobany Bernal  Type of Anesthesia Anticipated: Regional     Patient has a Health Care Directive or Living Will:  NO    1. NO - Do you have a history of heart attack, stroke, stent, bypass or surgery on an artery in the head, neck, heart or legs?  2. NO - Do you ever have any pain or discomfort in your chest?  3. NO - Do you have a history of  Heart Failure?  4. NO - Are you troubled by shortness of breath when: walking on the level, up a slight hill or at night?  5. NO - Do you currently have a cold, bronchitis or other respiratory infection?  6. YES - DO YOU HAVE A COUGH, SHORTNESS OF BREATH OR WHEEZING? Bad cold in October but has improved  7. NO - Do you sometimes get pains in the calves of your legs when you walk?  8. NO - Do you or anyone in your family have previous history of blood clots?  9. NO - Do you or does anyone in your family have a serious bleeding problem such as prolonged bleeding following surgeries or cuts?  10. NO - Have you ever had problems with anemia or been told to  take iron pills?  11. NO - Have you had any abnormal blood loss such as black, tarry or bloody stools, or abnormal vaginal bleeding?  12. NO - Have you ever had a blood transfusion?  13. NO - Have you or any of your relatives ever had problems with anesthesia?  14. YES - DO YOU HAVE SLEEP APNEA, EXCESSIVE SNORING OR DAYTIME DROWSINESS? CPAP  15. NO - Do you have any prosthetic heart valves?  16. NO - Do you have prosthetic joints?  17. NO - Is there any chance that you may be pregnant?        HPI:                                                      Brief HPI related to upcoming procedure: right bone graft to index finger      DIABETES - Patient has a longstanding history of DiabetesType Type II . Patient is being treated with oral agents and denies significant side effects. Control has been good. Complicating factors include but are not limited to: none.                                                                                                             .  HYPERTENSION - Patient has longstanding history of mod-severe HTN , currently denies any symptoms referable to elevated blood pressure. Specifically denies chest pain, palpitations, dyspnea, orthopnea, PND or peripheral edema. Blood pressure readings have been in normal range. Current medication regimen is as listed below. Patient denies any side effects of medication.                                                                                                                                                                                          .    MEDICAL HISTORY:                                                    Patient Active Problem List    Diagnosis Date Noted     Morbid obesity (H) 10/21/2015     Priority: Medium     Uncontrolled diabetes mellitus type 2 without complications (H) 10/21/2015     Priority: Medium     SAI (obstructive sleep apnea) 03/24/2011     Priority: Medium     AHI was 106.7, with desaturations down to 71%. .2. REM  RDI N/A. Supine .6. No PLMS       Obesity 01/12/2011     Priority: Medium     Hypertension goal BP (blood pressure) < 140/90 12/20/2010     Priority: Medium     TYPE 2 DIABETES, HBA1C GOAL < 7% 10/31/2010     Priority: Medium     HYPERLIPIDEMIA LDL GOAL <100 10/31/2010     Priority: Medium      Past Medical History:   Diagnosis Date     AR (allergic rhinitis)      Diabetes (H)      Hypertension goal BP (blood pressure) < 140/90 12/20/2010     Past Surgical History:   Procedure Laterality Date     ABDOMEN SURGERY       NO HISTORY OF SURGERY       Current Outpatient Prescriptions   Medication Sig Dispense Refill     metFORMIN (GLUCOPHAGE-XR) 500 MG 24 hr tablet Take 2 tablets (1,000 mg) by mouth daily (with dinner) 60 tablet 0     pioglitazone (ACTOS) 45 MG tablet Take 1 tablet (45 mg) by mouth daily 30 tablet 0     simvastatin (ZOCOR) 10 MG tablet Take 1 tablet (10 mg) by mouth At Bedtime 30 tablet 0     glipiZIDE (GLIPIZIDE XL) 10 MG 24 hr tablet Two tablet daily 180 tablet 0     losartan (COZAAR) 25 MG tablet Take 1 tablet (25 mg) by mouth daily 90 tablet 1     blood glucose monitoring (NO BRAND SPECIFIED) test strip 1 strip by In Vitro route daily accucheck compact plus test strips.  Testing once daily 3 Box 3     order for DME Equipment being ordered: wrist splint 1 Units 0     ASPIRIN NOT PRESCRIBED, INTENTIONAL, continuous prn Antiplatelet medication not prescribed intentionally due to GI Issues / Ulcer Disease 1 each 0     BLOOD GLUCOSE MONITOR KIT pt tests twice daily - per insurance plan 1 Kit 0     OTC products: None, except as noted above and Aspirin    Allergies   Allergen Reactions     Metformin Diarrhea      Latex Allergy: NO    Social History   Substance Use Topics     Smoking status: Never Smoker     Smokeless tobacco: Never Used     Alcohol use No     History   Drug Use No       REVIEW OF SYSTEMS:                                                    C: NEGATIVE for fever, chills, change in  weight  I: NEGATIVE for worrisome rashes, moles or lesions  E: NEGATIVE for vision changes or irritation  E/M: NEGATIVE for ear, mouth and throat problems  R: NEGATIVE for significant cough or SOB  B: NEGATIVE for masses, tenderness or discharge  CV: NEGATIVE for chest pain, palpitations or peripheral edema  GI: NEGATIVE for nausea, abdominal pain, heartburn, or change in bowel habits  : NEGATIVE for frequency, dysuria, or hematuria  M: NEGATIVE for significant arthralgias or myalgia  N: NEGATIVE for weakness, dizziness or paresthesias  E: NEGATIVE for temperature intolerance, skin/hair changes  H: NEGATIVE for bleeding problems  P: NEGATIVE for changes in mood or affect    EXAM:                                                    /70    GENERAL APPEARANCE: healthy, alert and no distress     EYES: EOMI,  PERRL     HENT: ear canals and TM's normal and nose and mouth without ulcers or lesions     NECK: no adenopathy, no asymmetry, masses, or scars and thyroid normal to palpation     RESP: lungs clear to auscultation - no rales, rhonchi or wheezes     CV: regular rates and rhythm, normal S1 S2, no S3 or S4 and no murmur, click or rub     ABDOMEN:  soft, nontender, no HSM or masses and bowel sounds normal     MS: extremities normal- no gross deformities noted, no evidence of inflammation in joints, FROM in all extremities.     SKIN: no suspicious lesions or rashes     NEURO: Normal strength and tone, sensory exam grossly normal, mentation intact and speech normal     PSYCH: mentation appears normal. and affect normal/bright     LYMPHATICS: No axillary, cervical, or supraclavicular nodes    DIAGNOSTICS:                                                    EKG: appears normal, NSR, normal axis, normal intervals, no acute ST/T changes c/w ischemia, no LVH by voltage criteria, unchanged from previous tracings    Recent Labs   Lab Test  11/30/17   1111  03/21/17   1057  03/15/17   0800  10/28/16   1000   07/21/14   0915    HGB   --   14.4   --    --    --   15.3   PLT   --   174   --    --    --   169   NA   --    --   138  141   < >  134   POTASSIUM   --    --   4.0  4.1   < >  4.1   CR   --    --   0.92  0.92   < >  0.76   A1C  8.8*   --   8.0*  7.6*   < >  9.9*    < > = values in this interval not displayed.        IMPRESSION:                                                    Reason for surgery/procedure: exise exostosis finger  Diagnosis/reason for consult: Anesthesia     The proposed surgical procedure is considered LOW risk.    REVISED CARDIAC RISK INDEX  The patient has the following serious cardiovascular risks for perioperative complications such as (MI, PE, VFib and 3  AV Block):  No serious cardiac risks  INTERPRETATION: 0 risks: Class I (very low risk - 0.4% complication rate)    The patient has the following additional risks for perioperative complications:  Patient has A1c above 8.0      ICD-10-CM    1. Preop general physical exam Z01.818 FOOT EXAM  NO CHARGE [93573.114]     EKG 12-lead complete w/read - Clinics     DIABETES EDUCATOR REFERRAL   2. Uncontrolled type 2 diabetes mellitus without complication, without long-term current use of insulin (H) E11.65 DIABETES EDUCATOR REFERRAL       RECOMMENDATIONS:                                                          --Patient is to take all scheduled medications on the day of surgery EXCEPT for modifications listed below.    Surgery is NOT recommended due to uncontrolled diabetes (A1C >8.5%, Glucose >200 mg/dl). Stabilization required prior to elective surgery. Referral to Diabetes Educator (Preoperative Intensive Glucose Management)          Signed Electronically by: Yobany Bernal MD    Copy of this evaluation report is provided to requesting physician.    [facility] Preop Guidelines

## 2017-12-14 ENCOUNTER — MYC MEDICAL ADVICE (OUTPATIENT)
Dept: FAMILY MEDICINE | Facility: CLINIC | Age: 52
End: 2017-12-14

## 2017-12-14 ENCOUNTER — TELEPHONE (OUTPATIENT)
Dept: ORTHOPEDICS | Facility: CLINIC | Age: 52
End: 2017-12-14

## 2017-12-14 ENCOUNTER — OFFICE VISIT (OUTPATIENT)
Dept: FAMILY MEDICINE | Facility: CLINIC | Age: 52
End: 2017-12-14
Payer: COMMERCIAL

## 2017-12-14 VITALS — SYSTOLIC BLOOD PRESSURE: 135 MMHG | DIASTOLIC BLOOD PRESSURE: 70 MMHG

## 2017-12-14 DIAGNOSIS — Z01.818 PREOP GENERAL PHYSICAL EXAM: Primary | ICD-10-CM

## 2017-12-14 PROCEDURE — 99215 OFFICE O/P EST HI 40 MIN: CPT | Performed by: FAMILY MEDICINE

## 2017-12-14 PROCEDURE — 93000 ELECTROCARDIOGRAM COMPLETE: CPT | Performed by: FAMILY MEDICINE

## 2017-12-14 NOTE — TELEPHONE ENCOUNTER
Patient called stating he had his pre-op physical today and his A1C was 8.8. Dr. Corrales was notified and stated she is comfortable proceeding with surgery next week.

## 2017-12-14 NOTE — MR AVS SNAPSHOT
After Visit Summary   12/14/2017    Wei Wolf    MRN: [id number]           Patient Information     Date Of Birth          1965        Visit Information        Provider Department      12/14/2017 7:30 AM Yobany Bernal MD [facility] [place]        Today's Diagnoses     Preop general physical exam    -  1    Uncontrolled type 2 diabetes mellitus without complication, without long-term current use of insulin (H)          Care Instructions      Before Your Surgery      Call your surgeon if there is any change in your health. This includes signs of a cold or flu (such as a sore throat, runny nose, cough, rash or fever).    Do not smoke, drink alcohol or take over the counter medicine (unless your surgeon or primary care doctor tells you to) for the 24 hours before and after surgery.    If you take prescribed drugs: Follow your doctor s orders about which medicines to take and which to stop until after surgery.    Eating and drinking prior to surgery: follow the instructions from your surgeon    Take a shower or bath the night before surgery. Use the soap your surgeon gave you to gently clean your skin. If you do not have soap from your surgeon, use your regular soap. Do not shave or scrub the surgery site.  Wear clean pajamas and have clean sheets on your bed.           Follow-ups after your visit        Additional Services     DIABETES EDUCATOR REFERRAL       DIABETES SELF MANAGEMENT TRAINING (DSMT)      Your provider has referred you to Diabetes Education: FM[facility]: Diabetes Education - All [facility] [phone]   [email or web address]     If an urgent visit is needed or A1C is above 12, Care Team to call the Diabetes  Education Team at [phone] or send an In Basket message to the Diabetes Education Pool (P DIAB ED-PATIENT CARE).    A  will call you to make your appointment. If it has been more than 3 business  days since your referral was placed, please call the above phone number to schedule.    Type of training and number of hours: Preoperative Intensive Glucose Management     Medicare covers: 10 hours of initial DSMT in 12 month period from the time of first visit, plus 2 hours of follow-up DSMT annually, and additional hours as requested for insulin training.    Diabetes Type: Type 2 - Diet Control             Diabetes Co-Morbidities: none               A1C Goal:  <7.0       A1C is: Lab Results       Component                Value               Date                       A1C                      8.8                 11/30/2017              Diabetes Education Topics: Comprehensive Knowledge Assessment and Instruction    Special Educational Needs Requiring Individual DSMT: None       MEDICAL NUTRITION THERAPY (MNT) for Diabetes    Medical Nutrition Therapy with a Registered Dietitian can be provided in coordination with Diabetes Self-Management Training to assist in achieving optimal diabetes management.    MNT Type and Hours: Do not initiate MNT at this time.                       Medicare will cover: 3 hours initial MNT in 12 month period after first visit, plus 2 hours of follow-up MNT annually    Please be aware that coverage of these services is subject to the terms and limitations of your health insurance plan.  Call member services at your health plan to determine Diabetes Self-Management Training (Codes  &amp; ) and Medical Nutrition Therapy (Codes 36711 & 45260) benefits and ask which blood glucose monitor brands are covered by your plan.  Please bring the following with you to your appointment:    (1)  List of current medications   (2)  List of Blood Glucose Monitor brands that are covered by your insurance plan  (3)  Blood Glucose Monitor and log book  (4)   Food records for the 3 days prior to your visit    The Certified Diabetes Educator may make diabetes medication adjustments per the CDE Protocol  and Collaborative Practice Agreement.                  Your next 10 appointments already scheduled     Dec 21, 2017   Procedure with Lydia Corrales MD   [facility] ([facility])    [address] [place] [address]  [facility] [address] [phone]           Located in the Clinics and Surgery Center at [address] [place] [address].   parking is very convenient and highly recommended.  is a $6 flat rate fee.  Both  and self parkers should enter the main arrival plaza from [address]; parking attendants will direct you based on your parking preference.            Jan 11, 2018 12:30 PM CST   (Arrive by 12:15 PM)   Post-Op with SURAJ FELDMAN ORTHO HAND POP   [facility] ([facility])    [address] [place] [address]  [facility] [address] [phone]            Jan 11, 2018  1:00 PM CST   (Arrive by 12:45 PM)   CONOR Hand with ABELINO Green   [facility] ([facility])    [address] [place] [id number] [phone]            Feb 01, 2018 11:00 AM CST   (Arrive by 10:45 AM)   POST-OP HAND with Lydia Corrales MD   [facility] ([facility])    [address] [place] [address] [phone]              Who to contact     If you have questions or need follow up information about today's clinic visit or your schedule please contact [facility] directly at [phone].  Normal or non-critical lab and imaging results will be communicated to you by MyChart, letter or phone within 4 business days after the clinic has received the results. If you do not hear from us within 7 days, please contact the clinic through MyChart or phone. If you have a critical or abnormal lab result, we will notify you by phone as soon as possible.  Submit  refill requests through [employer] or call your pharmacy and they will forward the refill request to us. Please allow 3 business days for your refill to be completed.          Additional Information About Your Visit        [employer] Information     [employer] gives you secure access to your electronic health record. If you see a primary care provider, you can also send messages to your care team and make appointments. If you have questions, please call your primary care clinic.  If you do not have a primary care provider, please call [phone] and they will assist you.        Care EveryWhere ID     This is your Care EveryWhere ID. This could be used by other organizations to access your [facility] medical records  [id number]         Blood Pressure from Last 3 Encounters:   12/14/17 135/70   10/12/17 142/77   03/23/17 136/70    Weight from Last 3 Encounters:   11/30/17 288 lb 11.2 oz (131 kg)   10/12/17 280 lb (127 kg)   04/18/17 279 lb (126.6 kg)              We Performed the Following     DIABETES EDUCATOR REFERRAL     EKG 12-lead complete w/read - Clinics     FOOT EXAM  NO CHARGE [[id number]]        Primary Care Provider Office Phone # Fax #    Yobany Srini [facility] [phone] [address] [facility] [address]        Equal Access to Services     FINA MOSER : Hadii aad ku hadasho Soomaali, waaxda luqadaha, qaybta kaalmada adeegyada, waxay idiin hayolgan lilli aburto. So [facility] [phone].    ATENCIÓN: Si habla español, tiene a richardson disposición servicios gratuitos de asistencia lingüística. ame al [phone].    We comply with applicable federal civil rights laws and [place] laws. We do not discriminate on the basis of race, color, national origin, age, disability, sex, sexual orientation, or gender identity.            Thank you!     Thank you for choosing [facility]  for your care. Our goal is always to provide you with excellent care. Hearing back from our  patients is one way we can continue to improve our services. Please take a few minutes to complete the written survey that you may receive in the mail after your visit with us. Thank you!             Your Updated Medication List - Protect others around you: Learn how to safely use, store and throw away your medicines at www.disposemymeds.org.          This list is accurate as of: 12/14/17  8:03 AM.  Always use your most recent med list.                   Brand Name Dispense Instructions for use Diagnosis    ASPIRIN NOT PRESCRIBED    INTENTIONAL    1 each    continuous prn Antiplatelet medication not prescribed intentionally due to GI Issues / Ulcer Disease        blood glucose monitor Kit     1 Kit    pt tests twice daily - per insurance plan    Type II or unspecified type diabetes mellitus without mention of complication, not stated as uncontrolled       blood glucose monitoring test strip    no brand specified    3 Box    1 strip by In Vitro route daily accucheck compact plus test strips.  Testing once daily    Uncontrolled type 2 diabetes mellitus without complication, without long-term current use of insulin (H)       glipiZIDE 10 MG 24 hr tablet    glipiZIDE XL    180 tablet    Two tablet daily    Uncontrolled type 2 diabetes mellitus without complication, without long-term current use of insulin (H)       losartan 25 MG tablet    COZAAR    90 tablet    Take 1 tablet (25 mg) by mouth daily    Hypertension goal BP (blood pressure) < 140/90       metFORMIN 500 MG 24 hr tablet    GLUCOPHAGE-XR    60 tablet    Take 2 tablets (1,000 mg) by mouth daily (with dinner)    Uncontrolled type 2 diabetes mellitus without complication, without long-term current use of insulin (H)       order for DME     1 Units    Equipment being ordered: wrist splint    Left wrist pain       pioglitazone 45 MG tablet    ACTOS    30 tablet    Take 1 tablet (45 mg) by mouth daily    Uncontrolled type 2 diabetes mellitus without complication,  without long-term current use of insulin (H)       simvastatin 10 MG tablet    ZOCOR    30 tablet    Take 1 tablet (10 mg) by mouth At Bedtime    High cholesterol

## 2017-12-20 ENCOUNTER — ANESTHESIA EVENT (OUTPATIENT)
Dept: SURGERY | Facility: AMBULATORY SURGERY CENTER | Age: 52
End: 2017-12-20

## 2017-12-21 ENCOUNTER — ANESTHESIA (OUTPATIENT)
Dept: SURGERY | Facility: AMBULATORY SURGERY CENTER | Age: 52
End: 2017-12-21

## 2017-12-21 ENCOUNTER — SURGERY (OUTPATIENT)
Age: 52
End: 2017-12-21

## 2017-12-21 ENCOUNTER — HOSPITAL ENCOUNTER (OUTPATIENT)
Facility: AMBULATORY SURGERY CENTER | Age: 52
End: 2017-12-21
Attending: ORTHOPAEDIC SURGERY
Payer: COMMERCIAL

## 2017-12-21 ENCOUNTER — RADIANT APPOINTMENT (OUTPATIENT)
Dept: RADIOLOGY | Facility: AMBULATORY SURGERY CENTER | Age: 52
End: 2017-12-21
Attending: ORTHOPAEDIC SURGERY
Payer: COMMERCIAL

## 2017-12-21 VITALS
RESPIRATION RATE: 16 BRPM | DIASTOLIC BLOOD PRESSURE: 68 MMHG | OXYGEN SATURATION: 94 % | TEMPERATURE: 96.6 F | HEART RATE: 78 BPM | SYSTOLIC BLOOD PRESSURE: 132 MMHG

## 2017-12-21 DIAGNOSIS — M79.644 PAIN OF FINGER OF RIGHT HAND: ICD-10-CM

## 2017-12-21 DIAGNOSIS — M85.60 BONE CYST: Primary | ICD-10-CM

## 2017-12-21 LAB
GLUCOSE BLDC GLUCOMTR-MCNC: 142 MG/DL (ref 70–99)
GLUCOSE BLDC GLUCOMTR-MCNC: 162 MG/DL (ref 70–99)

## 2017-12-21 RX ORDER — ACETAMINOPHEN 325 MG/1
975 TABLET ORAL ONCE
Status: COMPLETED | OUTPATIENT
Start: 2017-12-21 | End: 2017-12-21

## 2017-12-21 RX ORDER — FENTANYL CITRATE 50 UG/ML
25-50 INJECTION, SOLUTION INTRAMUSCULAR; INTRAVENOUS
Status: DISCONTINUED | OUTPATIENT
Start: 2017-12-21 | End: 2017-12-21 | Stop reason: HOSPADM

## 2017-12-21 RX ORDER — LIDOCAINE 40 MG/G
CREAM TOPICAL
Status: DISCONTINUED | OUTPATIENT
Start: 2017-12-21 | End: 2017-12-21 | Stop reason: HOSPADM

## 2017-12-21 RX ORDER — GABAPENTIN 300 MG/1
300 CAPSULE ORAL ONCE
Status: DISCONTINUED | OUTPATIENT
Start: 2017-12-21 | End: 2017-12-21 | Stop reason: HOSPADM

## 2017-12-21 RX ORDER — FLUMAZENIL 0.1 MG/ML
0.2 INJECTION, SOLUTION INTRAVENOUS
Status: DISCONTINUED | OUTPATIENT
Start: 2017-12-21 | End: 2017-12-21 | Stop reason: HOSPADM

## 2017-12-21 RX ORDER — ONDANSETRON 4 MG/1
4 TABLET, ORALLY DISINTEGRATING ORAL EVERY 30 MIN PRN
Status: DISCONTINUED | OUTPATIENT
Start: 2017-12-21 | End: 2017-12-22 | Stop reason: HOSPADM

## 2017-12-21 RX ORDER — NALOXONE HYDROCHLORIDE 0.4 MG/ML
.1-.4 INJECTION, SOLUTION INTRAMUSCULAR; INTRAVENOUS; SUBCUTANEOUS
Status: DISCONTINUED | OUTPATIENT
Start: 2017-12-21 | End: 2017-12-22 | Stop reason: HOSPADM

## 2017-12-21 RX ORDER — SODIUM CHLORIDE, SODIUM LACTATE, POTASSIUM CHLORIDE, CALCIUM CHLORIDE 600; 310; 30; 20 MG/100ML; MG/100ML; MG/100ML; MG/100ML
INJECTION, SOLUTION INTRAVENOUS CONTINUOUS
Status: DISCONTINUED | OUTPATIENT
Start: 2017-12-21 | End: 2017-12-22 | Stop reason: HOSPADM

## 2017-12-21 RX ORDER — ACETAMINOPHEN 325 MG/1
650 TABLET ORAL
Status: DISCONTINUED | OUTPATIENT
Start: 2017-12-21 | End: 2017-12-22 | Stop reason: HOSPADM

## 2017-12-21 RX ORDER — MAGNESIUM HYDROXIDE 1200 MG/15ML
LIQUID ORAL PRN
Status: DISCONTINUED | OUTPATIENT
Start: 2017-12-21 | End: 2017-12-21 | Stop reason: HOSPADM

## 2017-12-21 RX ORDER — ONDANSETRON 2 MG/ML
4 INJECTION INTRAMUSCULAR; INTRAVENOUS EVERY 30 MIN PRN
Status: DISCONTINUED | OUTPATIENT
Start: 2017-12-21 | End: 2017-12-22 | Stop reason: HOSPADM

## 2017-12-21 RX ORDER — BUPIVACAINE HYDROCHLORIDE AND EPINEPHRINE 5; 5 MG/ML; UG/ML
INJECTION, SOLUTION PERINEURAL PRN
Status: DISCONTINUED | OUTPATIENT
Start: 2017-12-21 | End: 2017-12-21

## 2017-12-21 RX ORDER — OXYCODONE HYDROCHLORIDE 5 MG/1
5 TABLET ORAL
Status: DISCONTINUED | OUTPATIENT
Start: 2017-12-21 | End: 2017-12-22 | Stop reason: HOSPADM

## 2017-12-21 RX ORDER — LIDOCAINE HYDROCHLORIDE 20 MG/ML
INJECTION, SOLUTION INFILTRATION; PERINEURAL PRN
Status: DISCONTINUED | OUTPATIENT
Start: 2017-12-21 | End: 2017-12-21

## 2017-12-21 RX ORDER — PROPOFOL 10 MG/ML
INJECTION, EMULSION INTRAVENOUS CONTINUOUS PRN
Status: DISCONTINUED | OUTPATIENT
Start: 2017-12-21 | End: 2017-12-21

## 2017-12-21 RX ORDER — BACITRACIN ZINC 500 [USP'U]/G
OINTMENT TOPICAL PRN
Status: DISCONTINUED | OUTPATIENT
Start: 2017-12-21 | End: 2017-12-21 | Stop reason: HOSPADM

## 2017-12-21 RX ORDER — CEFAZOLIN SODIUM 1 G/50ML
3 SOLUTION INTRAVENOUS
Status: COMPLETED | OUTPATIENT
Start: 2017-12-21 | End: 2017-12-21

## 2017-12-21 RX ORDER — OXYCODONE HYDROCHLORIDE 5 MG/1
5-10 TABLET ORAL EVERY 4 HOURS PRN
Qty: 30 TABLET | Refills: 0 | Status: SHIPPED | OUTPATIENT
Start: 2017-12-21 | End: 2018-01-11

## 2017-12-21 RX ORDER — SODIUM CHLORIDE, SODIUM LACTATE, POTASSIUM CHLORIDE, CALCIUM CHLORIDE 600; 310; 30; 20 MG/100ML; MG/100ML; MG/100ML; MG/100ML
INJECTION, SOLUTION INTRAVENOUS CONTINUOUS
Status: DISCONTINUED | OUTPATIENT
Start: 2017-12-21 | End: 2017-12-21 | Stop reason: HOSPADM

## 2017-12-21 RX ORDER — NALOXONE HYDROCHLORIDE 0.4 MG/ML
.1-.4 INJECTION, SOLUTION INTRAMUSCULAR; INTRAVENOUS; SUBCUTANEOUS
Status: DISCONTINUED | OUTPATIENT
Start: 2017-12-21 | End: 2017-12-21 | Stop reason: HOSPADM

## 2017-12-21 RX ORDER — MEPERIDINE HYDROCHLORIDE 25 MG/ML
12.5 INJECTION INTRAMUSCULAR; INTRAVENOUS; SUBCUTANEOUS
Status: DISCONTINUED | OUTPATIENT
Start: 2017-12-21 | End: 2017-12-22 | Stop reason: HOSPADM

## 2017-12-21 RX ADMIN — BUPIVACAINE HYDROCHLORIDE AND EPINEPHRINE 25 ML: 5; 5 INJECTION, SOLUTION PERINEURAL at 15:10

## 2017-12-21 RX ADMIN — PROPOFOL: 10 INJECTION, EMULSION INTRAVENOUS at 16:52

## 2017-12-21 RX ADMIN — ACETAMINOPHEN 975 MG: 325 TABLET ORAL at 14:29

## 2017-12-21 RX ADMIN — CEFAZOLIN SODIUM 3 G: 1 SOLUTION INTRAVENOUS at 16:21

## 2017-12-21 RX ADMIN — FENTANYL CITRATE 25 MCG: 50 INJECTION, SOLUTION INTRAMUSCULAR; INTRAVENOUS at 15:05

## 2017-12-21 RX ADMIN — SODIUM CHLORIDE, SODIUM LACTATE, POTASSIUM CHLORIDE, CALCIUM CHLORIDE: 600; 310; 30; 20 INJECTION, SOLUTION INTRAVENOUS at 16:11

## 2017-12-21 RX ADMIN — BACITRACIN ZINC 1 G: 500 OINTMENT TOPICAL at 17:00

## 2017-12-21 RX ADMIN — PROPOFOL 150 MCG/KG/MIN: 10 INJECTION, EMULSION INTRAVENOUS at 16:15

## 2017-12-21 RX ADMIN — MAGNESIUM HYDROXIDE 1000 ML: 1200 LIQUID ORAL at 16:36

## 2017-12-21 RX ADMIN — LIDOCAINE HYDROCHLORIDE 100 MG: 20 INJECTION, SOLUTION INFILTRATION; PERINEURAL at 16:15

## 2017-12-21 NOTE — DISCHARGE INSTRUCTIONS
[facility]  Home Care Following Anesthesia  For 24 hours after surgery:  1. Get plenty of rest.  A responsible adult must stay with you for at least 24 hours after you leave the surgery center.  2. Do not drive or use heavy equipment.  If you have weakness or tingling, don't drive or use heavy equipment until this feeling goes away.   3. Do not drink alcohol.   4. Avoid strenuous or risky activities.  Ask for help when climbing stairs.  5. You may feel lightheaded.  IF so, sit for a few minutes before standing.  Have someone help you get up.   6. If you have nausea (feel sick to your stomach): Drink only clear liquids such as apple juice, ginger ale, broth or 7-Up.  Rest may also help.  Be sure to drink enough fluids.  Move to a regular diet as you feel able.   7. You may have a slight fever.  Call the doctor if your fever is over 100 F (37.7 C) (taken under the tongue) or lasts longer than 24 hours.  8. You may have a dry mouth, a sore throat, muscle aches or trouble sleeping. These should go away after 24 hours.  9. Do not make important or legal decisions.               Tips for taking pain medications  To get the best pain relief possible, remember these points:    Take pain medications as directed, before pain becomes severe.    Pain medication can upset your stomach: taking it with food may help.    Constipation is a common side effect of pain medication. Drink plenty of  fluids.    Eat foods high in fiber. Take a stool softener if recommended by your doctor or pharmacist.    Do not drink alcohol, drive or operate machinery while taking pain medications.    Ask about other ways to control pain, such as with heat, ice or relaxation.    Tylenol/Acetaminophen Consumption  To help encourage the safe use of acetaminophen, the makers of TYLENOL  have lowered the maximum daily dose for single-ingredient Extra Strength TYLENOL  (acetaminophen) products sold in the [place] from 8  pills per day (4,000 mg) to 6 pills per day (3,000 mg). The dosing interval has also changed from 2 pills every 4-6 hours to 2 pills every 6 hours.    If you feel your pain relief is insufficient, you may take Tylenol/Acetaminophen in addition to your narcotic pain medication.     Be careful not to exceed 3,000 mg of Tylenol/Acetaminophen in a 24 hour period from all sources.    If you are taking extra strength Tylenol/acetaminophen (500 mg), the maximum dose is 6 tablets in 24 hours.    If you are taking regular strength acetaminophen (325 mg), the maximum dose is 9 tablets in 24 hours.    Call a doctor for any of the followin. Signs of infection (fever, growing tenderness at the surgery site, a large amount of drainage or bleeding, severe pain, foul-smelling drainage, redness, swelling).  2. It has been over 8 to 10 hours since surgery and you are still not able to urinate (pass water).  3. Headache for over 24 hours.  4. Numbness, tingling or weakness the day after surgery (if you had spinal anesthesia).  Your doctor is:       Dr. Lydia Corrales, Orthopaedics: [phone]               Or dial [phone] and ask for the resident on call for:  Orthopaedics  For emergency care, call the:  [facility] Emergency Department: [phone] (TTY for hearing impaired: [phone])

## 2017-12-21 NOTE — OP NOTE
ORTHOPAEDIC OPERATIVE REPORT    PATIENT: Wei Wolf  : 1965  MRN: [id number]  DATE OF SERVICE: 2017 5:24 PM    PRE-OPERATIVE DIAGNOSIS:   1. Right Index Finger Metacarpal Bone Cyst    POST-OPERATIVE DIAGNOSIS:   1. Right Index Finger Metacarpal Bone Cyst    PROCEDURE(S) PERFORMED:   -Procedure(s):  Right Index Finger Metacarpal Bone Cyst Curettage with Allograft from Distal Radius - Wound Class: I-Clean   - Wound Class: I-Clean    STAFF: Sanjiv Osborne  ASSISTANTS: Gi Silvestre     IMPLANTS:  1. None      EBL: 5 cc  UOP: see anesthesia record    Tourniquet Time: 36  Drains: None  Intra-op Labs/Cxs/Specimens: None  Complications: None  Findings:   1. Intraosseous defect within 2nd metacarpal head    INDICATION FOR PROCEDURE:   Wei is here on referral from Dr. Yasmeen Johnson for evaluation and treatment of right index finger pain.  He reports that he has had difficulties for about a year.  He initially saw Dr. Johnson on 2017.  An MRI was performed after plain x-rays.  He was noted to have a subcortical cyst in the second metacarpal head.  He underwent an injection which gave him about 4-5 months of relief.  He would like to have something more permanent done.    DESCRIPTION OF THE PROCEDURE:  Patient was met in the preoperative holding area with the correct site was marked and the consent was confirmed. Patient was transferred to the operating table and placed supine with all bony prominences well-padded. Anesthetic was induced by anesthesia team. The operative extremity was prepped and draped in usual sterile fashion. A timeout was performed again and correctly identified and the patient lateral laterality and procedure performed. All in the room agreed.    The tourniquet was inflated.  We obtained fluoroscopic imaging to confirm placement of our incision.  We incised the skin with a 15 blade directly over the second metatarsal head overlying the extensor tendon.  We dissected down to the  sheath of the extensor tendon.  We split the extensor tendon over the metacarpal head and were able to reach the bony surface of the dorsal aspect of the second metacarpal.  We then used a dental pick to investigate the dorsal aspect of the bony cortex and the dental pick was readily advanced through the weakened bone.  We then subsequently dilated the cortical defect of the bone.  We confirmed placement with our dental pick using fluoroscopic imaging.  We then curettaged the bony lesion with curettes.  Next    We then moved to our autograft procedure of the dorsal radial aspect of the distal radius.  We planned our incision between the first and second dorsal compartments.  We incised the skin and then bluntly dissected down to the level of the extensor tendons.  We split the soft tissue between the first and second dorsal compartments and retracted the tendons volarly and dorsally.  We are then able to access the cortex of the distal radius.  We made a 5 mm x 5 mm cortical window with an osteotome and obtained our autograft from the distal radius.  We then implanted the distal radius autograft into the osseous defect of the metacarpal head.  We subsequently closed the longitudinal incision of the extensor tendon with running suture.  We then irrigated and debrided the wounds and subsequently closed these with chromic and PDS suture, respectively.  A sterile dressing was applied followed by a short arm volar dorsal splint to the level of the PIPs to allow for PIP range of motion but prohibit MCP range of motion.  At the end of the procedure sponge and needle counts were correct.  Patient was transferred to the PACU in stable condition.    POST-OPERATIVE PLAN:  Patient will be in the short arm splint until clinic follow-up in 1 week.  He was instructed to keep this clean dry and intact.  Again patient will follow-up in approximately 1 week time for splint removal wound check and placement into a cast.

## 2017-12-21 NOTE — ANESTHESIA PROCEDURE NOTES
Peripheral Nerve Block Procedure Note    Staff:     Anesthesiologist:  PHILLY PROCTOR    Resident/CRNA:  NEEL CORNEJO    Referred By:  CHIN JOHNSTON  Location: Pre-op  Procedure Start/Stop TImes:      12/21/2017 3:00 PM     12/21/2017 3:10 PM    patient identified, IV checked, site marked, risks and benefits discussed, informed consent, monitors and equipment checked, pre-op evaluation, at physician/surgeon's request and post-op pain management      Correct Patient: Yes      Correct Position: Yes      Correct Site: Yes      Correct Procedure: Yes      Correct Laterality:  Yes    Site Marked:  Yes  Procedure details:     Procedure:  Supraclavicular    Laterality:  Right    Position:  Sitting    Sterile Prep: chloraprep, patient draped, mask and sterile gloves      Local skin infiltration:  1% lidocaine    amount (mL):  2    Needle:  Short bevel and insulated    Needle gauge:  21    Needle length (mm):  100    Ultrasound: Yes      Ultrasound used to identify targeted nerve, plexus, or vascular structure and placed a needle adjacent to it      Permanent Image entered into patiient's record      Abnormal pain on injection: No      Blood Aspirated: No      Paresthesias:  No    Bleeding at site: No      Bolus via:  Needle    Infusion Method:  Single Shot    Complications:  None

## 2017-12-21 NOTE — ANESTHESIA CARE TRANSFER NOTE
Patient: Wei Wolf    Procedure(s):  Right Index Finger Metacarpal Bone Cyst Curettage with Allograft from Distal Radius - Wound Class: I-Clean   - Wound Class: I-Clean    Diagnosis: Right Index Finger Metacarpal Bone Cyst  Diagnosis Additional Information: No value filed.    Anesthesia Type:   MAC, Peripheral Nerve Block, Periph. Nerve Block for postop pain     Note:  Airway :Room Air  Patient transferred to:Phase II  Handoff Report: Identifed the Patient, Identified the Reponsible Provider, Reviewed the pertinent medical history, Discussed the surgical course, Reviewed Intra-OP anesthesia mangement and issues during anesthesia, Set expectations for post-procedure period and Allowed opportunity for questions and acknowledgement of understanding      Vitals: (Last set prior to Anesthesia Care Transfer)    CRNA VITALS  12/21/2017 1643 - 12/21/2017 1724      12/21/2017             Pulse: 86    SpO2: 96 %    Resp Rate (observed): (!)  1    Resp Rate (set): 10                Electronically Signed By: GARRETT Bhat CRNA  December 21, 2017  5:24 PM

## 2017-12-21 NOTE — IP AVS SNAPSHOT
[facility]    [address] [place] [address]    Phone:  [phone]    Fax:  [phone]                                       After Visit Summary   12/21/2017    Wei Wolf    MRN: [id number]           After Visit Summary Signature Page     I have received my discharge instructions, and my questions have been answered. I have discussed any challenges I see with this plan with the nurse or doctor.    ..........................................................................................................................................  Patient/Patient Representative Signature      ..........................................................................................................................................  Patient Representative Print Name and Relationship to Patient    ..................................................               ................................................  Date                                            Time    ..........................................................................................................................................  Reviewed by Signature/Title    ...................................................              ..............................................  Date                                                            Time

## 2017-12-21 NOTE — IP AVS SNAPSHOT
MRN:[id number]                      After Visit Summary   12/21/2017    Wei Wolf    MRN: [id number]           Thank you!     Thank you for choosing [facility] for your care. Our goal is always to provide you with excellent care. Hearing back from our patients is one way we can continue to improve our services. Please take a few minutes to complete the written survey that you may receive in the mail after you visit with us. Thank you!        Patient Information     Date Of Birth          1965        About your hospital stay     You were admitted on:  December 21, 2017 [facility]    You were discharged on:  December 21, 2017       Who to Call     For medical emergencies, please call 911.  For non-urgent questions about your medical care, please call your primary care provider or clinic, [phone]  For questions related to your surgery, please call your surgery clinic        Attending Provider     Provider Specialty    Lydia Corrales MD Orthopedics       Primary Care Provider Office Phone # Fax #    Yobany Bernal [phone]      After Care Instructions      Diet as Tolerated       Return to diet before surgery, unless instructed otherwise.            Discharge Instructions       Review outpatient procedure discharge instructions with patient as directed by Provider            Discharge Instructions - Lifting Limit (specify)       Instructions for after your surgery    Leave your splint on and keep it dry. Do not remove it or get it wet.     Keep your operative arm elevated as much as possible. This will help with pain and swelling.     Do not use your operative arm for any lifting, pushing, pulling, weight bearing, or other activities.     Wear your sling as needed for comfort. If you choose to wear the sling, be sure to take it off and range your shoulder and elbow (if not included in the  splint) a few times a day so they do not get stiff. (If you have received a regional block, wear the sling at all times until the block wears off.)     You will have a follow-up appointment scheduled with Dr. Qureshi or Rebeca. If you do not know when this appointment is, please call Dr. Qureshi's office to find out.     Call Dr. Corrales's office if you experience any of the following:   Fevers, chills, increasing wound drainage, pain that is not controlled with the medications you have been prescribing, swelling that is not controlled with elevation, problems with your splint, or any other questions or concerns.            Ice to affected area       Ice pack to surgical site every 15 minutes per hour for 24 hours            No Dressing Change       No dressing change until follow up appointment.            No driving or operating machinery        until the day after procedure            No weight bearing           Notify Provider       For signs and symptoms of infection: Fever greater than 101, redness, swelling, heat at site, drainage, pus.            Remove dressing - at 72 hours            Return to clinic       Return to clinic in 2 weeks            Shower       Cover dressing if dressing is not going to be changed today.  OK to remove dressing in 4 days and shower and get incision wet.  No soaking in a tub or pool for 2 weeks.                  Your next 10 appointments already scheduled     Dec 28, 2017  2:00 PM CST   Diabetic Education with [facility] DIAB[facility] ED RESOURCE   [facility]    [address] [facility] [address]   [phone]            Jan 11, 2018 12:30 PM CST   (Arrive by 12:15 PM)   Post-Op with [facility] ORTHO HAND POP   [facility] ([facility])    [address]  4th Floor  [facility] [address] [phone]            Jan 11, 2018  1:00 PM CST   (Arrive by 12:45 PM)   CONOR Hand with An Barraza  OTR   [facility] ([facility])    [address] [address] [phone]            Feb 01, 2018 11:00 AM CST   (Arrive by 10:45 AM)   POST-OP HAND with Lydia Corrales MD   [facility] ([facility])    [address] [place] [id number] [phone]              Further instructions from your care team       [facility]  Home Care Following Anesthesia  For 24 hours after surgery:  1. Get plenty of rest.  A responsible adult must stay with you for at least 24 hours after you leave the surgery center.  2. Do not drive or use heavy equipment.  If you have weakness or tingling, don't drive or use heavy equipment until this feeling goes away.   3. Do not drink alcohol.   4. Avoid strenuous or risky activities.  Ask for help when climbing stairs.  5. You may feel lightheaded.  IF so, sit for a few minutes before standing.  Have someone help you get up.   6. If you have nausea (feel sick to your stomach): Drink only clear liquids such as apple juice, ginger ale, broth or 7-Up.  Rest may also help.  Be sure to drink enough fluids.  Move to a regular diet as you feel able.   7. You may have a slight fever.  Call the doctor if your fever is over 100 F (37.7 C) (taken under the tongue) or lasts longer than 24 hours.  8. You may have a dry mouth, a sore throat, muscle aches or trouble sleeping. These should go away after 24 hours.  9. Do not make important or legal decisions.               Tips for taking pain medications  To get the best pain relief possible, remember these points:    Take pain medications as directed, before pain becomes severe.    Pain medication can upset your stomach: taking it with food may help.    Constipation is a common side effect of pain medication. Drink plenty of  fluids.    Eat foods high in fiber. Take a stool  softener if recommended by your doctor or pharmacist.    Do not drink alcohol, drive or operate machinery while taking pain medications.    Ask about other ways to control pain, such as with heat, ice or relaxation.    Tylenol/Acetaminophen Consumption  To help encourage the safe use of acetaminophen, the makers of TYLENOL  have lowered the maximum daily dose for single-ingredient Extra Strength TYLENOL  (acetaminophen) products sold in the [place] from 8 pills per day (4,000 mg) to 6 pills per day (3,000 mg). The dosing interval has also changed from 2 pills every 4-6 hours to 2 pills every 6 hours.    If you feel your pain relief is insufficient, you may take Tylenol/Acetaminophen in addition to your narcotic pain medication.     Be careful not to exceed 3,000 mg of Tylenol/Acetaminophen in a 24 hour period from all sources.    If you are taking extra strength Tylenol/acetaminophen (500 mg), the maximum dose is 6 tablets in 24 hours.    If you are taking regular strength acetaminophen (325 mg), the maximum dose is 9 tablets in 24 hours.    Call a doctor for any of the followin. Signs of infection (fever, growing tenderness at the surgery site, a large amount of drainage or bleeding, severe pain, foul-smelling drainage, redness, swelling).  2. It has been over 8 to 10 hours since surgery and you are still not able to urinate (pass water).  3. Headache for over 24 hours.  4. Numbness, tingling or weakness the day after surgery (if you had spinal anesthesia).  Your doctor is:       Dr. Lydia Corrales, Orthopaedics: [phone]               Or dial [phone] and ask for the resident on call for:  Orthopaedics  For emergency care, call the:  [facility] Emergency Department: [phone] (TTY for hearing impaired: [phone])                  Pending Results     No orders found from 2017 to 2017.            Admission Information     Date & Time Provider Department Dept. Phone    2017 Sanjiv Osborne  Lydia Holder MD [facility] [phone]      Your Vitals Were     Blood Pressure Pulse Temperature Respirations Pulse Oximetry       132/70 78 98.1  F (36.7  C) (Oral) 20 93%       MyChart Information     [facility] gives you secure access to your electronic health record. If you see a primary care provider, you can also send messages to your care team and make appointments. If you have questions, please call your primary care clinic.  If you do not have a primary care provider, please call [phone] and they will assist you.      [facility] is an electronic gateway that provides easy, online access to your medical records. With [facility], you can request a clinic appointment, read your test results, renew a prescription or communicate with your care team.     To access your existing account, please contact your [facility] or call [phone] for assistance.        Care EveryWhere ID     This is your Care EveryWhere ID. This could be used by other organizations to access your [facility] medical records  [id number]        Equal Access to Services     FINA MOSER : Hadii luciana lamao Sodiego, waaxda luqadaha, qaybta kaalmapratibha freeman, ray raines . So [facility] [phone].    ATENCIÓN: Si habla español, tiene a richardson disposición servicios gratuitos de asistencia lingüística. Llame al [phone].    We comply with applicable federal civil rights laws and [place] laws. We do not discriminate on the basis of race, color, national origin, age, disability, sex, sexual orientation, or gender identity.               Review of your medicines      START taking        Dose / Directions    oxyCODONE IR 5 MG tablet   Commonly known as:  ROXICODONE   Used for:  Bone cyst        Dose:  5-10 mg   Take 1-2 tablets (5-10 mg) by mouth every 4 hours as needed for pain or other (Moderate to Severe)   Quantity:  30 tablet   Refills:  0         CONTINUE  these medicines which have NOT CHANGED        Dose / Directions    ASPIRIN NOT PRESCRIBED   Commonly known as:  INTENTIONAL        continuous prn Antiplatelet medication not prescribed intentionally due to GI Issues / Ulcer Disease   Quantity:  1 each   Refills:  0       blood glucose monitor Kit   Used for:  Type II or unspecified type diabetes mellitus without mention of complication, not stated as uncontrolled        pt tests twice daily - per insurance plan   Quantity:  1 Kit   Refills:  0       blood glucose monitoring test strip   Commonly known as:  no brand specified   Used for:  Uncontrolled type 2 diabetes mellitus without complication, without long-term current use of insulin (H)        Dose:  1 strip   1 strip by In Vitro route daily accucheck compact plus test strips.  Testing once daily   Quantity:  3 Box   Refills:  3       glipiZIDE 10 MG 24 hr tablet   Commonly known as:  glipiZIDE XL   Used for:  Uncontrolled type 2 diabetes mellitus without complication, without long-term current use of insulin (H)        Two tablet daily   Quantity:  180 tablet   Refills:  0       metFORMIN 500 MG 24 hr tablet   Commonly known as:  GLUCOPHAGE-XR   Used for:  Uncontrolled type 2 diabetes mellitus without complication, without long-term current use of insulin (H)        Dose:  1000 mg   Take 2 tablets (1,000 mg) by mouth daily (with dinner)   Quantity:  60 tablet   Refills:  0       order for DME   Used for:  Left wrist pain        Equipment being ordered: wrist splint   Quantity:  1 Units   Refills:  0       pioglitazone 45 MG tablet   Commonly known as:  ACTOS   Used for:  Uncontrolled type 2 diabetes mellitus without complication, without long-term current use of insulin (H)        Dose:  45 mg   Take 1 tablet (45 mg) by mouth daily   Quantity:  30 tablet   Refills:  0       simvastatin 10 MG tablet   Commonly known as:  ZOCOR   Used for:  High cholesterol        Dose:  10 mg   Take 1 tablet (10 mg) by mouth At  Bedtime   Quantity:  30 tablet   Refills:  0            Where to get your medicines      Some of these will need a paper prescription and others can be bought over the counter. Ask your nurse if you have questions.     Bring a paper prescription for each of these medications     oxyCODONE IR 5 MG tablet                Protect others around you: Learn how to safely use, store and throw away your medicines at www.disposemymeds.org.             Medication List: This is a list of all your medications and when to take them. Check marks below indicate your daily home schedule. Keep this list as a reference.      Medications           Morning Afternoon Evening Bedtime As Needed    ASPIRIN NOT PRESCRIBED   Commonly known as:  INTENTIONAL   continuous prn Antiplatelet medication not prescribed intentionally due to GI Issues / Ulcer Disease                                blood glucose monitor Kit   pt tests twice daily - per insurance plan                                blood glucose monitoring test strip   Commonly known as:  no brand specified   1 strip by In Vitro route daily accucheck compact plus test strips.  Testing once daily                                glipiZIDE 10 MG 24 hr tablet   Commonly known as:  glipiZIDE XL   Two tablet daily                                metFORMIN 500 MG 24 hr tablet   Commonly known as:  GLUCOPHAGE-XR   Take 2 tablets (1,000 mg) by mouth daily (with dinner)                                order for DME   Equipment being ordered: wrist splint                                oxyCODONE IR 5 MG tablet   Commonly known as:  ROXICODONE   Take 1-2 tablets (5-10 mg) by mouth every 4 hours as needed for pain or other (Moderate to Severe)                                pioglitazone 45 MG tablet   Commonly known as:  ACTOS   Take 1 tablet (45 mg) by mouth daily                                simvastatin 10 MG tablet   Commonly known as:  ZOCOR   Take 1 tablet (10 mg) by mouth At Bedtime

## 2017-12-21 NOTE — ANESTHESIA PREPROCEDURE EVALUATION
Anesthesia Evaluation     . Pt has had prior anesthetic. Type: General           ROS/MED HX    ENT/Pulmonary:     (+)sleep apnea, uses CPAP , . .    Neurologic:  - neg neurologic ROS     Cardiovascular:     (+) hypertension----. : . . . :. .       METS/Exercise Tolerance:  4 - Raking leaves, gardening   Hematologic:  - neg hematologic  ROS       Musculoskeletal:  - neg musculoskeletal ROS       GI/Hepatic:  - neg GI/hepatic ROS       Renal/Genitourinary:  - ROS Renal section negative       Endo:     (+) type II DM Obesity, .      Psychiatric:  - neg psychiatric ROS       Infectious Disease:         Malignancy:         Other:                     Physical Exam  Normal systems: dental    Airway   Mallampati: II  TM distance: >3 FB  Neck ROM: full    Dental     Cardiovascular   Rhythm and rate: regular and normal      Pulmonary    breath sounds clear to auscultation                    Anesthesia Plan      History & Physical Review  History and physical reviewed and following examination; no interval change.    ASA Status:  2 .    NPO Status:  > 6 hours    Plan for MAC, Peripheral Nerve Block and Periph. Nerve Block for postop pain with Intravenous induction. Maintenance will be TIVA.  Reason for MAC:  Deep or markedly invasive procedure (G8)  PONV prophylaxis:  Ondansetron (or other 5HT-3) and Dexamethasone or Solumedrol       Postoperative Care  Postoperative pain management:  Oral pain medications, Multi-modal analgesia and Peripheral nerve block (Single Shot).      Consents  Anesthetic plan, risks, benefits and alternatives discussed with:  Patient..                          .

## 2017-12-28 ENCOUNTER — MYC REFILL (OUTPATIENT)
Dept: FAMILY MEDICINE | Facility: CLINIC | Age: 52
End: 2017-12-28

## 2017-12-28 ENCOUNTER — ALLIED HEALTH/NURSE VISIT (OUTPATIENT)
Dept: EDUCATION SERVICES | Facility: CLINIC | Age: 52
End: 2017-12-28
Payer: COMMERCIAL

## 2017-12-28 DIAGNOSIS — E78.00 HIGH CHOLESTEROL: ICD-10-CM

## 2017-12-28 PROCEDURE — 99207 ZZC DROP WITH A PROCEDURE: CPT

## 2017-12-28 PROCEDURE — G0108 DIAB MANAGE TRN  PER INDIV: HCPCS

## 2017-12-28 RX ORDER — GLIPIZIDE 10 MG/1
TABLET, FILM COATED, EXTENDED RELEASE ORAL
Qty: 90 TABLET | Refills: 1 | Status: SHIPPED | OUTPATIENT
Start: 2017-12-28 | End: 2018-06-22

## 2017-12-28 RX ORDER — LIRAGLUTIDE 6 MG/ML
1.2 INJECTION SUBCUTANEOUS DAILY
Qty: 18 ML | Refills: 3 | Status: SHIPPED | OUTPATIENT
Start: 2017-12-28 | End: 2018-01-29

## 2017-12-28 RX ORDER — METFORMIN HCL 500 MG
2000 TABLET, EXTENDED RELEASE 24 HR ORAL
Qty: 360 TABLET | Refills: 1 | Status: SHIPPED | OUTPATIENT
Start: 2017-12-28 | End: 2018-06-22

## 2017-12-28 NOTE — PROGRESS NOTES
Diabetes Self Management Training: Individual Review Visit    Wei Wolf presents today for education and evaluation of glucose control related to Type 2 diabetes.    He is accompanied by self    Patient's diabetes management related comments/concerns: A1C numbers are too high, and maxed out with medication.      Patient's emotional response to diabetes: expresses readiness to learn and concern for health and well-being    Patient would like this visit to be focused around the following diabetes-related behaviors and goals: Diabetes pathophysiology, Assistance with making lifestyle changes, Self-care behavioral goal setting, Healthy Eating, Being Active, Monitoring, Taking Medication, Problem Solving, Reducing Risks and Healthy Coping    ASSESSMENT:  Patient Problem List and Family Medical History reviewed for relevant medical history, current medical status, and diabetes risk factors.    Current Diabetes Management per Patient:  Taking diabetes medications?   yes:     Diabetes Medication(s)     Biguanides Sig    metFORMIN (GLUCOPHAGE-XR) 500 MG 24 hr tablet Take 2 tablets (1,000 mg) by mouth daily (with dinner)    Sulfonylureas Sig    glipiZIDE (GLIPIZIDE XL) 10 MG 24 hr tablet Two tablet daily    Insulin Sensitizing Agents Sig    pioglitazone (ACTOS) 45 MG tablet Take 1 tablet (45 mg) by mouth daily      , Oral Medications: Actos - Dose: 45 mg , Time: breakfast, Glipizide - Dose: XL 10 mg takes 20 mg , Time: breakfast and Metformin - Dose:  mg takes 1000 mg , Time: breakfast    *Abbreviated insulin dose documentation key: Insulin Trade Name (jjfyswrqx-ijdpu-kvgwli-bedtime) - i.e. Humalog 5-5-5-0 (Humalog 5 units at breakfast, 5 units at lunch, and 5 units at dinner).    Past Diabetes Education: Yes, about 10 yrs ago.     Patient glucose self monitoring as follows: rarely.   BG meter: Accu-Chek Compact meter  BG results: not available     BG values are: unable to assess  Patient's most recent   Lab  "Results   Component Value Date    A1C 8.8 11/30/2017    is not meeting goal of <7.0    Nutrition:  Patient has an inconsistent intake of carbohydrates    Breakfast - skips   Lunch - some times, at work.  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  Goes to [employer]   Dinner - chicken, pork.  Not many potatoes   Snacks - Belvita crackers.      Beverages: diet coke , water,     Cultural/Jainism diet restrictions: No     Biggest Challenge to Healthy Eating: knowing what to eat    Physical Activity:    Type: on the road a lot, not much but is planning a routine    Diabetes Risk Factors:  family history, age over 45 years, overweight/obesity and inactivity    Diabetes Complications:  Not discussed today.    Vitals:  There were no vitals taken for this visit.  Estimated body mass index is 42.02 kg/(m^2) as calculated from the following:    Height as of 11/30/17: 1.765 m (5' 9.5\").    Weight as of 11/30/17: 131 kg (288 lb 11.2 oz).   Last 3 BP:   BP Readings from Last 3 Encounters:   12/21/17 132/68   12/14/17 135/70   10/12/17 142/77       History   Smoking Status     Never Smoker   Smokeless Tobacco     Never Used       Labs:  Lab Results   Component Value Date    A1C 8.8 11/30/2017     Lab Results   Component Value Date     03/15/2017     Lab Results   Component Value Date     11/30/2017     HDL Cholesterol   Date Value Ref Range Status   11/30/2017 41 >39 mg/dL Final   ]  GFR Estimate   Date Value Ref Range Status   03/15/2017 87 >60 mL/min/1.7m2 Final     Comment:     Non  GFR Calc     GFR Estimate If Black   Date Value Ref Range Status   03/15/2017 >90   GFR Calc   >60 mL/min/1.7m2 Final     Lab Results   Component Value Date    CR 0.92 03/15/2017     No results found for: MICROALBUMIN    Socio/Economic Considerations:    Support system: family, spouse/significant other and children    Health Beliefs and Attitudes:   Patient Activation Measure Survey Score:  ANA Score (Last " Two) 9/27/2011   ANA Raw Score 39   Activation Score 56.4   ANA Level 3       Stage of Change: PREPARATION (Decided to change - considering how)      Diabetes knowledge and skills assessment:     Patient is knowledgeable in diabetes management concepts related to: Monitoring and Taking Medication    Patient needs further education on the following diabetes management concepts: Healthy Eating, Being Active, Monitoring and Taking Medication    Barriers to Learning Assessment: No Barriers identified    Based on learning assessment above, most appropriate setting for further diabetes education would be: Individual setting.    INTERVENTION:   Education provided today on:  AADE Self-Care Behaviors:  Healthy Eating: carbohydrate counting, consistency in amount, composition, and timing of food intake, weight reduction, heart healthy diet, eating out, portion control, plate planning method and label reading  Being Active: relationship to blood glucose and describe appropriate activity program  Monitoring: purpose, proper technique, log and interpret results, individual blood glucose targets and frequency of monitoring  Taking Medication: action of prescribed medication, drawing up, administering and storing injectable diabetes medications, proper site selection and rotation for injections, side effects of prescribed medications and when to take medications    Opportunities for ongoing education and support in diabetes-self management were discussed.    Pt verbalized understanding of concepts discussed and recommendations provided today.       Education Materials Provided:  [place] Understanding Diabetes Booklet, Carbohydrate Counting and My Plate Planner    PLAN:  See Patient Instructions for co-developed, patient-stated behavior change goals.  AVS printed and provided to patient today.    FOLLOW-UP:  Follow-up appointment scheduled on Jan 29.  Chart routed to referring provider.    Ongoing plan for education and support:  Follow-up visit with diabetes educator in [place]    Luciana Borrego RN/ÁNGEL Rojas Diabetes Educator    Time Spent: 60 minutes  Encounter Type: Individual    Any diabetes medication dose changes were made via the CDE Protocol and Collaborative Practice Agreement with the patient's primary care provider. A copy of this encounter was shared with the provider.

## 2017-12-28 NOTE — PATIENT INSTRUCTIONS
My Diabetes Care Goals:    Healthy Eating: 3 meals per day. Proteins with meals.      Being Active: come up with a plan ie : the mall to walk    Monitoring: check every morning and then one of your meals, check 2 hrs. After.     Taking Medication: Metformin  mg take 2 tablets with breakfast and 2 tablets with dinner or all 4 tablets with breakfast or dinner.   STOP Actos  Glipizide XL 10 mg take 1 daily]  Victoza start 0.6 mg daily for 7 days, the increase to 1.2 mg on day 8.    Follow up:  Follow-up diabetes education appointment scheduled on Monday Jan. 29 @ 8:30.      Bring blood glucose meter and logbook with you to all doctor and follow-up appointments.     [place] [facility] for the [facility]:  For Your Diabetes Education and Nutrition Appointments Call:  [phone]   For Diabetes Education or Nutrition Related Questions:   Phone: [phone]  E-mail: [email or web address]  Fax: [phone]   If you need a medication refill please contact your pharmacy. Please allow 3 business days for your refills to be completed.    Instructions for emailing the Diabetes Educators    If you need to communicate a non-urgent message to a Diabetes Educator via email, please send to [email or web address].    Please follow the following email guidelines:    Subject line: Secure: your clinic name (example: Secure: [facility])  In the email please include: First name, middle initial, last name and date of birth.    We will be in touch with you within one (1) business day.

## 2017-12-28 NOTE — MR AVS SNAPSHOT
After Visit Summary   12/28/2017    Wei Wolf    MRN: [id number]           Patient Information     Date Of Birth          1965        Visit Information        Provider Department      12/28/2017 2:00 PM AN DIABETIC ED RESOURCE [facility]   [place]        Today's Diagnoses     Uncontrolled type 2 diabetes mellitus without complication, without long-term current use of insulin (H)    -  1      Care Instructions    My Diabetes Care Goals:    Healthy Eating: 3 meals per day. Proteins with meals.      Being Active: come up with a plan ie : the mall to walk    Monitoring: check every morning and then one of your meals, check 2 hrs. After.     Taking Medication: Metformin  mg take 2 tablets with breakfast and 2 tablets with dinner or all 4 tablets with breakfast or dinner.   STOP Actos  Glipizide XL 10 mg take 1 daily]  Victoza start 0.6 mg daily for 7 days, the increase to 1.2 mg on day 8.    Follow up:  Follow-up diabetes education appointment scheduled on Monday Jan. 29 @ 8:30.      Bring blood glucose meter and logbook with you to all doctor and follow-up appointments.     [facility] for the [facility]:  For Your Diabetes Education and Nutrition Appointments Call:  [phone]   For Diabetes Education or Nutrition Related Questions:   Phone: [phone]  E-mail: [email or web address]  Fax: [phone]   If you need a medication refill please contact your pharmacy. Please allow 3 business days for your refills to be completed.    Instructions for emailing the Diabetes Educators    If you need to communicate a non-urgent message to a Diabetes Educator via email, please send to [email or web address].    Please follow the following email guidelines:    Subject line: Secure: your clinic name (example: Secure: [facility])  In the email please include: First name, middle initial, last name and date of birth.    We will be  in touch with you within one (1) business day.             Follow-ups after your visit        Your next 10 appointments already scheduled     Jan 11, 2018 12:30 PM CST   (Arrive by 12:15 PM)   Post-Op with SURAJ FELDMAN ORTHO HAND POP   [facility] ([facility])    [address] [place] [address]   [phone]            Jan 11, 2018  1:00 PM CST   (Arrive by 12:45 PM)   CONOR Hand with ABELINO Green   [facility] ([facility])    [address] [place] [address]   [phone]            Jan 29, 2018  8:30 AM CST   Diabetic Education with AN DIABETIC ED RESOURCE   [facility] ([facility])    [address] [facility] [address] [phone]            Feb 01, 2018 11:00 AM CST   (Arrive by 10:45 AM)   POST-OP HAND with Lydia Corrales MD   [facility] ([facility])    [address] [place] [id number] [phone]              Who to contact     If you have questions or need follow up information about today's clinic visit or your schedule please contact [facility] directly at [phone].  Normal or non-critical lab and imaging results will be communicated to you by MyChart, letter or phone within 4 business days after the clinic has received the results. If you do not hear from us within 7 days, please contact the clinic through [employer] or phone. If you have a critical or abnormal lab result, we will notify you by phone as soon as possible.  Submit refill requests through [employer] or call your pharmacy and they will forward the refill request to us. Please allow 3 business days for your refill to be completed.          Additional Information About Your Visit        [employer] Information     [employer] gives you secure access to your electronic  health record. If you see a primary care provider, you can also send messages to your care team and make appointments. If you have questions, please call your primary care clinic.  If you do not have a primary care provider, please call [phone] and they will assist you.        Care EveryWhere ID     This is your Care EveryWhere ID. This could be used by other organizations to access your [facility] medical records  [id number]         Blood Pressure from Last 3 Encounters:   12/21/17 132/68   12/14/17 135/70   10/12/17 142/77    Weight from Last 3 Encounters:   11/30/17 131 kg (288 lb 11.2 oz)   10/12/17 127 kg (280 lb)   04/18/17 126.6 kg (279 lb)              Today, you had the following     No orders found for display         Today's Medication Changes          These changes are accurate as of: 12/28/17  2:59 PM.  If you have any questions, ask your nurse or doctor.               Start taking these medicines.        Dose/Directions    insulin pen needle 32G X 4 MM   Commonly known as:  BD GABRIELA U/F   Used for:  Uncontrolled type 2 diabetes mellitus without complication, without long-term current use of insulin (H)        Use 1 daily as directed.   Quantity:  100 each   Refills:  11       liraglutide 18 MG/3ML soln   Commonly known as:  VICTOZA   Used for:  Uncontrolled type 2 diabetes mellitus without complication, without long-term current use of insulin (H)        Dose:  1.2 mg   Inject 1.2 mg Subcutaneous daily Start out with 0.6 mg daily for 7 days, then on day 8 increase to 1.2 mg   Quantity:  18 mL   Refills:  3         These medicines have changed or have updated prescriptions.        Dose/Directions    glipiZIDE 10 MG 24 hr tablet   Commonly known as:  glipiZIDE XL   This may have changed:  additional instructions   Used for:  Uncontrolled type 2 diabetes mellitus without complication, without long-term current use of insulin (H)        One tablet daily   Quantity:  90 tablet   Refills:  1        metFORMIN 500 MG 24 hr tablet   Commonly known as:  GLUCOPHAGE-XR   This may have changed:  how much to take   Used for:  Uncontrolled type 2 diabetes mellitus without complication, without long-term current use of insulin (H)        Dose:  2000 mg   Take 4 tablets (2,000 mg) by mouth daily (with dinner)   Quantity:  360 tablet   Refills:  1         Stop taking these medicines if you haven't already. Please contact your care team if you have questions.     blood glucose monitor Kit           pioglitazone 45 MG tablet   Commonly known as:  ACTOS                Where to get your medicines      These medications were sent to [id number] - [place], [place] - [address] [address]  [address] [address], [facility] [address]    Hours:  test fax successful 4/5/04 kr Phone:  [phone]     blood glucose monitoring test strip    glipiZIDE 10 MG 24 hr tablet    insulin pen needle 32G X 4 MM    liraglutide 18 MG/3ML soln    metFORMIN 500 MG 24 hr tablet                Primary Care Provider Office Phone # Fax #    Yobany Bernal [phone] [address] [facility] [address]        Equal Access to Services     [facility]: Hadii aad ku hadasho Soomaali, waaxda luqadaha, qaybta kaalmada adeegyada, ray holbrook haylev raines . So [facility] [phone].    ATENCIÓN: Si habla español, tiene a richardson disposición servicios gratuitos de asistencia lingüística. [facility] [place] [facility] [phone].    We comply with applicable federal civil rights laws and [place] laws. We do not discriminate on the basis of race, color, national origin, age, disability, sex, sexual orientation, or gender identity.            Thank you!     Thank you for choosing [facility]  for your care. Our goal is always to provide you with excellent care. Hearing back from our patients is one way we can continue to improve our services. Please take a few minutes to complete the written survey that  you may receive in the mail after your visit with us. Thank you!             Your Updated Medication List - Protect others around you: Learn how to safely use, store and throw away your medicines at www.disposemymeds.org.          This list is accurate as of: 12/28/17  2:59 PM.  Always use your most recent med list.                   Brand Name Dispense Instructions for use Diagnosis    ASPIRIN NOT PRESCRIBED    INTENTIONAL    1 each    continuous prn Antiplatelet medication not prescribed intentionally due to GI Issues / Ulcer Disease        blood glucose monitoring test strip    no brand specified    3 Box    1 strip by In Vitro route daily accucheck compact plus test strips.  Testing once daily    Uncontrolled type 2 diabetes mellitus without complication, without long-term current use of insulin (H)       glipiZIDE 10 MG 24 hr tablet    glipiZIDE XL    90 tablet    One tablet daily    Uncontrolled type 2 diabetes mellitus without complication, without long-term current use of insulin (H)       insulin pen needle 32G X 4 MM    BD GABRIELA U/F    100 each    Use 1 daily as directed.    Uncontrolled type 2 diabetes mellitus without complication, without long-term current use of insulin (H)       liraglutide 18 MG/3ML soln    VICTOZA    18 mL    Inject 1.2 mg Subcutaneous daily Start out with 0.6 mg daily for 7 days, then on day 8 increase to 1.2 mg    Uncontrolled type 2 diabetes mellitus without complication, without long-term current use of insulin (H)       metFORMIN 500 MG 24 hr tablet    GLUCOPHAGE-XR    360 tablet    Take 4 tablets (2,000 mg) by mouth daily (with dinner)    Uncontrolled type 2 diabetes mellitus without complication, without long-term current use of insulin (H)       order for DME     1 Units    Equipment being ordered: wrist splint    Left wrist pain       oxyCODONE IR 5 MG tablet    ROXICODONE    30 tablet    Take 1-2 tablets (5-10 mg) by mouth every 4 hours as needed for pain or other (Moderate  to Severe)    Bone cyst       simvastatin 10 MG tablet    ZOCOR    30 tablet    Take 1 tablet (10 mg) by mouth At Bedtime    High cholesterol

## 2017-12-29 RX ORDER — SIMVASTATIN 10 MG
10 TABLET ORAL AT BEDTIME
Qty: 30 TABLET | Refills: 0 | Status: SHIPPED | OUTPATIENT
Start: 2017-12-29 | End: 2018-01-02

## 2017-12-29 NOTE — TELEPHONE ENCOUNTER
#30 only as patient is still due for OV with provider.   Patient had lipids done but needs office visit.    TC, patient due for:  OV for med refills.    Karin Feng RN

## 2017-12-29 NOTE — TELEPHONE ENCOUNTER
Message from MyChart:  Original authorizing provider: MD Wei Alfaro would like a refill of the following medications:  simvastatin (ZOCOR) 10 MG tablet [Yobany Bernal MD]    Preferred pharmacy: [id number] - [place], [place] - [address] [address]    Comment:

## 2018-01-02 NOTE — TELEPHONE ENCOUNTER
Notified patient of 30 day refill and that he is due for OV for further refills.    Sendy Alvarez,

## 2018-01-11 ENCOUNTER — VIRTUAL VISIT (OUTPATIENT)
Dept: EDUCATION SERVICES | Facility: CLINIC | Age: 53
End: 2018-01-11
Payer: COMMERCIAL

## 2018-01-11 ENCOUNTER — OFFICE VISIT (OUTPATIENT)
Dept: ORTHOPEDICS | Facility: CLINIC | Age: 53
End: 2018-01-11
Payer: COMMERCIAL

## 2018-01-11 ENCOUNTER — THERAPY VISIT (OUTPATIENT)
Dept: OCCUPATIONAL THERAPY | Facility: CLINIC | Age: 53
End: 2018-01-11
Payer: COMMERCIAL

## 2018-01-11 DIAGNOSIS — M25.641 STIFFNESS OF RIGHT HAND JOINT: ICD-10-CM

## 2018-01-11 DIAGNOSIS — Z09 SURGICAL FOLLOW-UP CARE: Primary | ICD-10-CM

## 2018-01-11 DIAGNOSIS — M79.641 PAIN OF RIGHT HAND: ICD-10-CM

## 2018-01-11 PROCEDURE — 97110 THERAPEUTIC EXERCISES: CPT | Mod: GO | Performed by: OCCUPATIONAL THERAPIST

## 2018-01-11 PROCEDURE — 97760 ORTHOTIC MGMT&TRAING 1ST ENC: CPT | Mod: GO | Performed by: OCCUPATIONAL THERAPIST

## 2018-01-11 PROCEDURE — 97165 OT EVAL LOW COMPLEX 30 MIN: CPT | Mod: GO | Performed by: OCCUPATIONAL THERAPIST

## 2018-01-11 NOTE — MR AVS SNAPSHOT
After Visit Summary   1/11/2018    Wei Wolf    MRN: [id number]           Patient Information     Date Of Birth          1965        Visit Information        Provider Department      1/11/2018 1:00 PM An Barraza OTR M [facility]        Today's Diagnoses     Surgical follow-up care    -  1    Pain of right hand        Stiffness of right hand joint           Follow-ups after your visit        Your next 10 appointments already scheduled     Jan 18, 2018  8:00 AM CST   CONOR Hand with ABELINO Green [facility] ([facility])    [address] [place] [id number] [phone]            Jan 29, 2018  8:30 AM CST   Diabetic Education with AN DIABETIC ED RESOURCE   [facility] ([facility])    [address] [facility] [address] [phone]            Feb 01, 2018 11:00 AM CST   (Arrive by 10:45 AM)   POST-OP HAND with Lydia Corrales MD   [facility] ([facility])    [address] [place] [id number] [phone]              Who to contact     If you have questions or need follow up information about today's clinic visit or your schedule please contact [facility] directly at [phone].  Normal or non-critical lab and imaging results will be communicated to you by MyChart, letter or phone within 4 business days after the clinic has received the results. If you do not hear from us within 7 days, please contact the clinic through MyChart or phone. If you have a critical or abnormal lab result, we will notify you by phone as soon as possible.  Submit refill requests through [employer] or call your pharmacy and they will forward the refill request to us. Please allow 3 business days for your refill to be completed.          Additional Information About Your Visit        [employer]  Information     [facility] [employer] gives you secure access to your electronic health record. If you see a primary care provider, you can also send messages to your care team and make appointments. If you have questions, please call your primary care clinic.  If you do not have a primary care provider, please call [phone] and they will assist you.        Care EveryWhere ID     This is your Care EveryWhere ID. This could be used by other organizations to access your [facility] medical records  [id number]         Blood Pressure from Last 3 Encounters:   12/21/17 132/68   12/14/17 135/70   10/12/17 142/77    Weight from Last 3 Encounters:   11/30/17 131 kg (288 lb 11.2 oz)   10/12/17 127 kg (280 lb)   04/18/17 126.6 kg (279 lb)              We Performed the Following     HC OT EVAL, LOW COMPLEXITY     ORTHOTIC MGMT AND TRAINING, EACH 15 MIN     THERAPEUTIC EXERCISES        Primary Care Provider Office Phone # Fax #    Yobany Bernal [phone] [address] [facility] [address]        Equal Access to Services     [facility]: Hadii aad ku hadasho Soomaali, waaxda luqadaha, qaybta kaalmada adeegyapratibha, ray raines . So [facility] [phone].    ATENCIÓN: Si habla español, tiene a richardson disposición servicios gratLea Regional Medical Centeros de asistencia lingüística. [facility] [phone].    We comply with applicable federal civil rights laws and [place] laws. We do not discriminate on the basis of race, color, national origin, age, disability, sex, sexual orientation, or gender identity.            Thank you!     Thank you for choosing [facility]  for your care. Our goal is always to provide you with excellent care. Hearing back from our patients is one way we can continue to improve our services. Please take a few minutes to complete the written survey that you may receive in the mail after your visit with us. Thank you!             Your Updated Medication List - Protect others  around you: Learn how to safely use, store and throw away your medicines at www.disposemymeds.org.          This list is accurate as of: 1/11/18  2:05 PM.  Always use your most recent med list.                   Brand Name Dispense Instructions for use Diagnosis    ASPIRIN NOT PRESCRIBED    INTENTIONAL    1 each    continuous prn Antiplatelet medication not prescribed intentionally due to GI Issues / Ulcer Disease        blood glucose monitoring test strip    no brand specified    3 Box    1 strip by In Vitro route daily accucheck compact plus test strips.  Testing once daily    Uncontrolled type 2 diabetes mellitus without complication, without long-term current use of insulin (H)       glipiZIDE 10 MG 24 hr tablet    glipiZIDE XL    90 tablet    One tablet daily    Uncontrolled type 2 diabetes mellitus without complication, without long-term current use of insulin (H)       insulin pen needle 32G X 4 MM    BD GABRIELA U/F    100 each    Use 1 daily as directed.    Uncontrolled type 2 diabetes mellitus without complication, without long-term current use of insulin (H)       liraglutide 18 MG/3ML soln    VICTOZA    18 mL    Inject 1.2 mg Subcutaneous daily Start out with 0.6 mg daily for 7 days, then on day 8 increase to 1.2 mg    Uncontrolled type 2 diabetes mellitus without complication, without long-term current use of insulin (H)       metFORMIN 500 MG 24 hr tablet    GLUCOPHAGE-XR    360 tablet    Take 4 tablets (2,000 mg) by mouth daily (with dinner)    Uncontrolled type 2 diabetes mellitus without complication, without long-term current use of insulin (H)       order for DME     1 Units    Equipment being ordered: wrist splint    Left wrist pain       simvastatin 10 MG tablet    ZOCOR    90 tablet    Take 1 tablet (10 mg) by mouth At Bedtime    High cholesterol

## 2018-01-11 NOTE — PROGRESS NOTES
Reason for visit:    Wei Wolf came in to the clinic for a three week post op check.    His surgery was done 12/21/17 by Dr Corrales.  He had Right Index Finger Metacarpal Bone Cyst Curettage with Allograft from Distal Radius      Assessment:    Wei came into the clinic in post op splint Non-WB    The Surgical wounds were exposed and found to be well-healed and without evidence of infection; sutures are absorbable, so the sutures were not removed.    Patient currently denies pain. Denies numbness/tingling. Sensation intact.  He has a moderate amount of swelling to his hand and fingers.     Plan:     He was placed in a dry dressing and sent to hand therapy for splint fabrication.  He was told to not submerge hand in water until incisions are completely healed. He will see Hand therapy for splint fabrication and initiation of ROM.       He has an appointment to see Dr. Corrales 2/1/18 at that time Dr. Corrales will determine further restrictions.    He has our phone number and will call with questions or problems.    Answers for HPI/ROS submitted by the patient on 1/10/2018   General Symptoms: No  Skin Symptoms: No  HENT Symptoms: No  EYE SYMPTOMS: No  HEART SYMPTOMS: No  LUNG SYMPTOMS: No  INTESTINAL SYMPTOMS: No  URINARY SYMPTOMS: No  REPRODUCTIVE SYMPTOMS: No  SKELETAL SYMPTOMS: No  BLOOD SYMPTOMS: No  NERVOUS SYSTEM SYMPTOMS: No  MENTAL HEALTH SYMPTOMS: No

## 2018-01-11 NOTE — PROGRESS NOTES
Hand Therapy Initial Evaluation    Current Date:  1/11/2018    Diagnosis: Pain of finger of right hand, Surgical follow-up care  DOS:  12/21/17  Procedure:   Right Index Finger Metacarpal Bone Cyst Curettage with                                               Allograft from Distal Radius   Post:  3w   Referring MD: Lydia Corrales MD     Initial Subjective:  Wei Wolf is a 52 year old  right  hand dominant male.    Patient reports symptoms of pain, stiffness/loss of motion and edema of the right  wrist and hand  which occurred s/p surgery. Since onset symptoms are Gradually getting better.  Special tests:  x-ray.  Previous treatment: casted for 3 weeks.    General health as reported by patient is good.  Pertinent medical history includes:diabetes, overweight  Medical allergies:Metformin.  Surgical history: other: see EHR.  Medication history: Diabetes  .  Occupational Profile Information:  Current occupation    Currently working in normal job without restrictions  Job Tasks: lifting/carrying, computer work  Prior functional level:  no limitations  Barriers include:none  Mobility: No difficulty  Transportation: drives  Leisure activities/hobbies: fish, golf,     Functional Outcome Measure:  See flowsheet      Objective:  Observation: Color/Temperature:     [x]    Normal  []    Abnormal    PAIN 1/11/2018     Location Right  wrist and hand     Radiates no     Worse d/n daytime     Frequency activity dependant     Exacerbated by movement     Relieves rest     At rest 0-10/10 0/10     On use 0-10/10 7/10     Sleep disruption?   no     Progression Gradually getting better.         ROM:  Pain Report:  - none    + mild    ++ moderate    +++ severe     Wrist  1/11/2018   AROM (PROM) R L   Extension 45 + 60   Flexion 50 + 65   RD 10 20   UD 30 30   Supination 60 90   Pronation 70 85     HAND 1/11/2018    AROM(PROM)     Index MP 15/45    PIP 20/90    DIP 0/45    BEAN     Long MP 10/50    PIP 0/85    DIP  55    BEAN     Ring MP 10/50    PIP 0/84    DIP 0/55    BEAN     Small MP 3/55    PIP 0/90    DIP 0/55    BEAN       Pain with composite flexion/extension     Strength:  [x]   Contraindicated    Edema:  moderate of the  wrist and hand    Wound/Scar: tender, clean, normal, no  drainage    Assessment:   Patient presents with symptoms consistent with above diagnosis,  with surgical  intervention.     Patient's limitations or Problem List includes:  Pain, Decreased ROM/motion and Increased edema of the right wrist and hand which interferes with the patient's ability to perform Self Care Tasks (dressing), Work Tasks, Sleep Patterns, Recreational Activities, Household Chores and Driving  as compared to previous level of function.    Rehab Potential:  Excellent - Return to full activity, no limitations    Patient will benefit from skilled Occupational Therapy to increase ROM and decrease pain and edema to return to previous activity level and resume normal daily tasks and to reach their rehab potential.    Barriers to Learning:  No barrier    Communication Issues:  Patient appears to be able to clearly communicate and understand verbal and written communication and follow directions correctly.    Chart Review: Chart Review and Simple history review with patient    Identified Performance Deficits: dressing, driving and community mobility, home establishment and management, meal preparation and cleanup, shopping, sleep, work and leisure activities    Assessment of Occupational Performance:  1-3 Performance Deficits    Treatment Explanation:  The following has been discussed with the patient:  RX ordered/plan of care  Anticipated outcomes  Possible risks and side effects    Treatment Plan:   Frequency:  1 X week, once daily  Duration:  for 5 weeks     Modalities:  US  Therapeutic Exercise:  AROM, AAROM, PROM, Tendon Gliding, Blocking, Reverse Blocking, Extensor Tracking, Isotonics, Isometrics and Stabilization  Neuromuscular  re-education:  Kinesiotaping, Isometrics and Stabilization  Manual Techniques:  Manual edema mobilization  Orthotic Fabrication:  Static orthosis    Discharge Plan:  Achieve all LTG.  Independent in home treatment program.  Reach maximal therapeutic benefit.    Home Exercise Program:  Dorsal Static hand based orthosis to protect incision on R IF MCP  Tendon glides  Wrist circumduction   Reverse blocking    Next Visit:  Gentle PROM  AAROM  Sensation

## 2018-01-11 NOTE — MR AVS SNAPSHOT
After Visit Summary   1/11/2018    Wei Wolf    MRN: [id number]           Patient Information     Date Of Birth          1965        Visit Information        Provider Department      1/11/2018 12:30 PM [facility]        Today's Diagnoses     Surgical follow-up care    -  1       Follow-ups after your visit        Your next 10 appointments already scheduled     Jan 11, 2018  1:00 PM CST   (Arrive by 12:45 PM)   CONOR Hand with ABELINO Green   [facility] ([facility])    [address]  [address] [facility] [address] [phone]            Jan 29, 2018  8:30 AM CST   Diabetic Education with AN DIABETIC ED RESOURCE   [facility] [facility] ([facility])    [address] [address] [phone]            Feb 01, 2018 11:00 AM CST   (Arrive by 10:45 AM)   POST-OP HAND with Lydia Corrales MD   [facility] ([facility])    [address]  [address] [facility] [id number] [phone]              Who to contact     Please call your clinic at [phone] to:    Ask questions about your health    Make or cancel appointments    Discuss your medicines    Learn about your test results    Speak to your doctor   If you have compliments or concerns about an experience at your clinic, or if you wish to file a complaint, please contact [facility] Patient Relations at [phone] or email us at [email or web address]         Additional Information About Your Visit        MyChart Information     AnTuTuhart gives you secure access to your electronic health record. If you see a primary care provider, you can also send messages to your care team and make appointments. If you have questions, please call your primary care clinic.  If you do not have a primary care provider,  please call [phone] and they will assist you.      Long Play is an electronic gateway that provides easy, online access to your medical records. With Long Play, you can request a clinic appointment, read your test results, renew a prescription or communicate with your care team.     To access your existing account, please contact your [facility] or call [phone] for assistance.        Care EveryWhere ID     This is your Care EveryWhere ID. This could be used by other organizations to access your [place] medical records  [id number]         Blood Pressure from Last 3 Encounters:   12/21/17 132/68   12/14/17 135/70   10/12/17 142/77    Weight from Last 3 Encounters:   11/30/17 131 kg (288 lb 11.2 oz)   10/12/17 127 kg (280 lb)   04/18/17 126.6 kg (279 lb)              Today, you had the following     No orders found for display       Primary Care Provider Office Phone # Fax #    Yobany Bernal [phone] [address] [facility] [address]        Equal Access to Services     [facility]: Hadii aad ku hadasho Soomaali, waaxda luqadaha, qaybta kaalmada pete, ray raines . So [facility] [phone].    ATENCIÓN: Si habla español, tiene a richardson disposición servicios gratuitos de asistencia lingüística. [facility] [phone].    We comply with applicable federal civil rights laws and [place] laws. We do not discriminate on the basis of race, color, national origin, age, disability, sex, sexual orientation, or gender identity.            Thank you!     Thank you for choosing [facility]  for your care. Our goal is always to provide you with excellent care. Hearing back from our patients is one way we can continue to improve our services. Please take a few minutes to complete the written survey that you may receive in the mail after your visit with us. Thank you!             Your Updated Medication List -  Protect others around you: Learn how to safely use, store and throw away your medicines at www.disposemymeds.org.          This list is accurate as of: 1/11/18 12:51 PM.  Always use your most recent med list.                   Brand Name Dispense Instructions for use Diagnosis    ASPIRIN NOT PRESCRIBED    INTENTIONAL    1 each    continuous prn Antiplatelet medication not prescribed intentionally due to GI Issues / Ulcer Disease        blood glucose monitoring test strip    no brand specified    3 Box    1 strip by In Vitro route daily accucheck compact plus test strips.  Testing once daily    Uncontrolled type 2 diabetes mellitus without complication, without long-term current use of insulin (H)       glipiZIDE 10 MG 24 hr tablet    glipiZIDE XL    90 tablet    One tablet daily    Uncontrolled type 2 diabetes mellitus without complication, without long-term current use of insulin (H)       insulin pen needle 32G X 4 MM    BD GABRIELA U/F    100 each    Use 1 daily as directed.    Uncontrolled type 2 diabetes mellitus without complication, without long-term current use of insulin (H)       liraglutide 18 MG/3ML soln    VICTOZA    18 mL    Inject 1.2 mg Subcutaneous daily Start out with 0.6 mg daily for 7 days, then on day 8 increase to 1.2 mg    Uncontrolled type 2 diabetes mellitus without complication, without long-term current use of insulin (H)       metFORMIN 500 MG 24 hr tablet    GLUCOPHAGE-XR    360 tablet    Take 4 tablets (2,000 mg) by mouth daily (with dinner)    Uncontrolled type 2 diabetes mellitus without complication, without long-term current use of insulin (H)       order for DME     1 Units    Equipment being ordered: wrist splint    Left wrist pain       simvastatin 10 MG tablet    ZOCOR    90 tablet    Take 1 tablet (10 mg) by mouth At Bedtime    High cholesterol

## 2018-01-11 NOTE — PROGRESS NOTES
Wei reports ongoing high BG readings. See my chart message of same date.  Recommended increase Victoza to 1.8 mg/day. He will see CDE on 1/29 for follow up.     Fang Mendez RD, LD, CDE

## 2018-01-18 ENCOUNTER — THERAPY VISIT (OUTPATIENT)
Dept: OCCUPATIONAL THERAPY | Facility: CLINIC | Age: 53
End: 2018-01-18
Payer: COMMERCIAL

## 2018-01-18 DIAGNOSIS — M25.641 STIFFNESS OF RIGHT HAND JOINT: Primary | ICD-10-CM

## 2018-01-18 DIAGNOSIS — M79.641 PAIN OF RIGHT HAND: ICD-10-CM

## 2018-01-18 DIAGNOSIS — Z09 SURGICAL FOLLOW-UP CARE: ICD-10-CM

## 2018-01-18 PROCEDURE — 97140 MANUAL THERAPY 1/> REGIONS: CPT | Mod: GO | Performed by: OCCUPATIONAL THERAPIST

## 2018-01-18 PROCEDURE — 97110 THERAPEUTIC EXERCISES: CPT | Mod: GO | Performed by: OCCUPATIONAL THERAPIST

## 2018-01-18 NOTE — MR AVS SNAPSHOT
After Visit Summary   1/18/2018    Wei Wolf    MRN: [id number]           Patient Information     Date Of Birth          1965        Visit Information        Provider Department      1/18/2018 8:00 AM An Barraza OTR [facility]        Today's Diagnoses     Stiffness of right hand joint    -  1    Surgical follow-up care        Pain of right hand           Follow-ups after your visit        Your next 10 appointments already scheduled     Jan 29, 2018  8:30 AM CST   Diabetic Education with AN DIABETIC ED RESOURCE   [place] [facility] ([facility])    [address] [facility] [facility] [address]   [phone]            Feb 01, 2018 11:00 AM CST   (Arrive by 10:45 AM)   POST-OP HAND with Lydia Corrales MD   [facility] ([facility])    [address] [place] [id number] [phone]            Feb 01, 2018 11:30 AM CST   CONOR Hand with ABELINO Green   [facility] ([facility])    [address] [place] [id number] [phone]              Who to contact     If you have questions or need follow up information about today's clinic visit or your schedule please contact [facility] directly at [phone].  Normal or non-critical lab and imaging results will be communicated to you by MyChart, letter or phone within 4 business days after the clinic has received the results. If you do not hear from us within 7 days, please contact the clinic through MyChart or phone. If you have a critical or abnormal lab result, we will notify you by phone as soon as possible.  Submit refill requests through Newtricious or call your pharmacy and they will forward the refill request to us. Please allow 3 business days for your refill to be completed.          Additional Information About Your Visit        Newtricious  Information     [employer] gives you secure access to your electronic health record. If you see a primary care provider, you can also send messages to your care team and make appointments. If you have questions, please call your primary care clinic.  If you do not have a primary care provider, please call [phone] and they will assist you.        Care EveryWhere ID     This is your Care EveryWhere ID. This could be used by other organizations to access your [place] medical records  [id number]         Blood Pressure from Last 3 Encounters:   12/21/17 132/68   12/14/17 135/70   10/12/17 142/77    Weight from Last 3 Encounters:   11/30/17 131 kg (288 lb 11.2 oz)   10/12/17 127 kg (280 lb)   04/18/17 126.6 kg (279 lb)              We Performed the Following     MANUAL THER TECH,1+REGIONS,EA 15 MIN     THERAPEUTIC EXERCISES        Primary Care Provider Office Phone # Fax #    Yobany Srini Bernal [phone] [address] [facility] [address]        Equal Access to Services     [facility]: Hadii aad ku hadasho Soomaali, waaxda luqadaha, qaybta kaalmada adeegyada, waxay yusef raines . So [facility] [phone].    ATENCIÓN: Si habla español, tiene a richardson disposición servicios gratuitos de asistencia lingüística. [facility] [phone].    We comply with applicable federal civil rights laws and [place] laws. We do not discriminate on the basis of race, color, national origin, age, disability, sex, sexual orientation, or gender identity.            Thank you!     Thank you for choosing [facility]  for your care. Our goal is always to provide you with excellent care. Hearing back from our patients is one way we can continue to improve our services. Please take a few minutes to complete the written survey that you may receive in the mail after your visit with us. Thank you!             Your Updated Medication List - Protect others around you: Learn how to safely  use, store and throw away your medicines at www.disposemymeds.org.          This list is accurate as of: 1/18/18  8:42 AM.  Always use your most recent med list.                   Brand Name Dispense Instructions for use Diagnosis    ASPIRIN NOT PRESCRIBED    INTENTIONAL    1 each    continuous prn Antiplatelet medication not prescribed intentionally due to GI Issues / Ulcer Disease        blood glucose monitoring test strip    no brand specified    3 Box    1 strip by In Vitro route daily accucheck compact plus test strips.  Testing once daily    Uncontrolled type 2 diabetes mellitus without complication, without long-term current use of insulin (H)       glipiZIDE 10 MG 24 hr tablet    glipiZIDE XL    90 tablet    One tablet daily    Uncontrolled type 2 diabetes mellitus without complication, without long-term current use of insulin (H)       insulin pen needle 32G X 4 MM    BD GABRIELA U/F    100 each    Use 1 daily as directed.    Uncontrolled type 2 diabetes mellitus without complication, without long-term current use of insulin (H)       liraglutide 18 MG/3ML soln    VICTOZA    18 mL    Inject 1.2 mg Subcutaneous daily Start out with 0.6 mg daily for 7 days, then on day 8 increase to 1.2 mg    Uncontrolled type 2 diabetes mellitus without complication, without long-term current use of insulin (H)       metFORMIN 500 MG 24 hr tablet    GLUCOPHAGE-XR    360 tablet    Take 4 tablets (2,000 mg) by mouth daily (with dinner)    Uncontrolled type 2 diabetes mellitus without complication, without long-term current use of insulin (H)       order for DME     1 Units    Equipment being ordered: wrist splint    Left wrist pain       simvastatin 10 MG tablet    ZOCOR    90 tablet    Take 1 tablet (10 mg) by mouth At Bedtime    High cholesterol

## 2018-01-18 NOTE — PROGRESS NOTES
SOAP note objective information for 1/18/2018.    Please refer to the daily flowsheet for treatment today, total treatment time and time spent performing 1:1 timed codes.        ROM:  Pain Report:  - none    + mild    ++ moderate    +++ severe     Wrist  1/11/2018 1/18/18   AROM (PROM) R L R   Extension 45 + 60 65   Flexion 50 + 65 50   RD 10 20 10   UD 30 30 35   Supination 60 90    Pronation 70 85      HAND 1/11/2018 1/18/18   AROM(PROM)     Index MP 15/45 0//60   PIP 20/90 0/100   DIP 0/45 0/55   BEAN     Long MP 10/50 10/60   PIP 0/85 0/100   DIP 55 0/55   BEAN     Ring MP 10/50 5/60   PIP 0/84 0/90   DIP 0/55 0/55   BEAN     Small MP 3/55 0/60   PIP 0/90 0/90   DIP 0/55 0/55   BEAN

## 2018-01-29 ENCOUNTER — ALLIED HEALTH/NURSE VISIT (OUTPATIENT)
Dept: EDUCATION SERVICES | Facility: CLINIC | Age: 53
End: 2018-01-29
Payer: COMMERCIAL

## 2018-01-29 DIAGNOSIS — E11.9 DIABETES MELLITUS WITHOUT COMPLICATION (H): ICD-10-CM

## 2018-01-29 PROCEDURE — 99207 ZZC DROP WITH A PROCEDURE: CPT

## 2018-01-29 PROCEDURE — G0108 DIAB MANAGE TRN  PER INDIV: HCPCS

## 2018-01-29 RX ORDER — LANCETS
EACH MISCELLANEOUS
Qty: 102 EACH | Refills: 11 | Status: SHIPPED | OUTPATIENT
Start: 2018-01-29 | End: 2018-10-30

## 2018-01-29 RX ORDER — LIRAGLUTIDE 6 MG/ML
1.8 INJECTION SUBCUTANEOUS DAILY
Qty: 27 ML | Refills: 3 | Status: SHIPPED | OUTPATIENT
Start: 2018-01-29 | End: 2018-03-19

## 2018-01-29 NOTE — PATIENT INSTRUCTIONS
My Diabetes Care Goals:    Healthy Eating: be sure getting the carbs, bedtime snack is good.    Being Active: being more consistent with the timing  Taking Medication: Metformin  mg take 4 tabs with breakfast.  victoza 1.8 mg daily  Stop Glipizide for now.    Follow up:  With Dr. Bernal, have A1C done in March and see me when needed or if A1c climbs higher.      Bring blood glucose meter and logbook with you to all doctor and follow-up appointments.     [place] [facility] for the [facility]:  For Your Diabetes Education and Nutrition Appointments Call:  [phone]   For Diabetes Education or Nutrition Related Questions:   Phone: [phone]  E-mail: [email or web address]  Fax: [phone]   If you need a medication refill please contact your pharmacy. Please allow 3 business days for your refills to be completed.    Instructions for emailing the Diabetes Educators    If you need to communicate a non-urgent message to a Diabetes Educator via email, please send to [email or web address].    Please follow the following email guidelines:    Subject line: Secure: your clinic name (example: Secure: [facility])  In the email please include: First name, middle initial, last name and date of birth.    We will be in touch with you within one (1) business day.

## 2018-01-29 NOTE — PROGRESS NOTES
Diabetes Self Management Training: Follow-up Visit    Wei Wolf presents today for education, evaluation of glucose control and modification of medication(s) related to Type 2 diabetes.    He is accompanied by self    Patient's diabetes management related comments/concerns: going all right numbers seem to be going up.     Patient would like this visit to be focused around the following diabetes-related behaviors and goals: Healthy Eating and Taking Medication    ASSESSMENT:  Patient Problem List reviewed for relevant medical history and current medical status. He has lost 12 lbs.  And feeling better.      Current Diabetes Management per Patient:  Taking diabetes medications?   yes:     Diabetes Medication(s)     Biguanides Sig    metFORMIN (GLUCOPHAGE-XR) 500 MG 24 hr tablet Take 4 tablets (2,000 mg) by mouth daily (with dinner)    Sulfonylureas Sig    glipiZIDE (GLIPIZIDE XL) 10 MG 24 hr tablet One tablet daily    Incretin Mimetic Agents (GLP-1 Receptor Agonists) Sig    liraglutide (VICTOZA) 18 MG/3ML soln Inject 1.2 mg Subcutaneous daily Start out with 0.6 mg daily for 7 days, then on day 8 increase to 1.2 mg      , Oral Medications: Metformin - Dose:  mg takes 2000 mg , Glipizide XL 10 gm once per day.  Will stop this to see how it will work without it.  Time: breakfast, Injectable Medications: Victoza 1.8mg once daily    *Abbreviated insulin dose documentation key: Insulin Trade Name (qhsxwdqmn-zvwmz-spmtee-bedtime) - i.e. Humalog 5-5-5-0 (Humalog 5 units at breakfast, 5 units at lunch, and 5 units at dinner).    Patient glucose self monitoring as follows: two times daily.   BG meter: Accu-Chek Compact meter  BG results: see blood glucose log attached to this encounter     BG values are: In goal  Patient's most recent   Lab Results   Component Value Date    A1C 8.8 11/30/2017    is not meeting goal of <7.0    Nutrition:  Patient eats 3 meals per day    Breakfast - granola bar and yogurt, or  "[employer].   Lunch - varies, usually does have protein and carbs.     Dinner - varies, protein and carbs.  With a fruit   Snacks - not usually.  Beef jerkey.      Beverages: diet coke, water    Cultural/Yazidism diet restrictions: No     Biggest Challenge to Healthy Eating: none    Physical Activity:    Type: walking every where, takes the stairs more.      Diabetes Complications:  Chronic Complication Prevention: Eyes: exam within in the last year? No, this will be soon.   Nerve/Circulation: foot exam within the last year Yes  Dental Health: brushing/flossing regularly Yes, dental exam within last year Yes    Vitals:  There were no vitals taken for this visit.  Estimated body mass index is 42.02 kg/(m^2) as calculated from the following:    Height as of 11/30/17: 1.765 m (5' 9.5\").    Weight as of 11/30/17: 131 kg (288 lb 11.2 oz).   Last 3 BP:   BP Readings from Last 3 Encounters:   12/21/17 132/68   12/14/17 135/70   10/12/17 142/77       History   Smoking Status     Never Smoker   Smokeless Tobacco     Never Used       Labs:  Lab Results   Component Value Date    A1C 8.8 11/30/2017     Lab Results   Component Value Date     03/15/2017     Lab Results   Component Value Date     11/30/2017     HDL Cholesterol   Date Value Ref Range Status   11/30/2017 41 >39 mg/dL Final   ]  GFR Estimate   Date Value Ref Range Status   03/15/2017 87 >60 mL/min/1.7m2 Final     Comment:     Non  GFR Calc     GFR Estimate If Black   Date Value Ref Range Status   03/15/2017 >90   GFR Calc   >60 mL/min/1.7m2 Final     Lab Results   Component Value Date    CR 0.92 03/15/2017     No results found for: MICROALBUMIN    Health Beliefs and Attitudes:   Patient Activation Measure Survey Score:  ANA Score (Last Two) 9/27/2011   ANA Raw Score 39   Activation Score 56.4   ANA Level 3       Stage of Change: ACTION (Actively working towards change)    Progress toward meeting diabetes-related " behavioral goals:    GOALS % Met Goal   Healthy Eating  100   Physical Activity  75   Monitoring  100   Medication Taking  100   Problem Solving     Healthy Coping     Risk Reduction           Diabetes knowledge and skills assessment:     Patient is knowledgeable in diabetes management concepts related to: Healthy Eating, Being Active, Monitoring and Taking Medication    Patient needs further education on the following diabetes management concepts: Healthy Eating, Being Active, Monitoring and Taking Medication    Barriers to Learning Assessment: No Barriers identified    Based on learning assessment above, most appropriate setting for further diabetes education would be: Individual setting.    INTERVENTION:    Education provided today on:  AADE Self-Care Behaviors:  Healthy Eating: carbohydrate counting, consistency in amount, composition, and timing of food intake, weight reduction, heart healthy diet, eating out, portion control, plate planning method and label reading  Being Active: relationship to blood glucose and describe appropriate activity program  Taking Medication: action of prescribed medication, drawing up, administering and storing injectable diabetes medications, proper site selection and rotation for injections, side effects of prescribed medications and when to take medications    Opportunities for ongoing education and support in diabetes-self management were discussed.    Pt verbalized understanding of concepts discussed and recommendations provided today.       Education Materials Provided:  No new materials provided today    PLAN:  See Patient Instructions for co-developed, patient-stated behavior change goals.  AVS printed and provided to patient today.    FOLLOW-UP:  Follow-up appointment with Dr Bernal and have A1C done in March.  See me in 6 months to 12 months  Chart routed to referring provider.    Ongoing plan for education and support: Follow-up visit with diabetes educator in  Aj Borrego RN/ÁNGEL  [place] Diabetes Educator    Time Spent: 30 minutes  Encounter Type: Individual    Any diabetes medication dose changes were made via the CDE Protocol and Collaborative Practice Agreement with the patient's primary care provider. A copy of this encounter was shared with the provider.

## 2018-01-29 NOTE — MR AVS SNAPSHOT
After Visit Summary   1/29/2018    Wei Wolf    MRN: [id number]           Patient Information     Date Of Birth          1965        Visit Information        Provider Department      1/29/2018 8:30 AM AN DIABETIC ED RESOURCE [facility] [place]        Today's Diagnoses     Uncontrolled type 2 diabetes mellitus without complication, without long-term current use of insulin (H)    -  1      Care Instructions    My Diabetes Care Goals:    Healthy Eating: be sure getting the carbs, bedtime snack is good.    Being Active: being more consistent with the timing  Taking Medication: Metformin  mg take 4 tabs with breakfast.  victoza 1.8 mg daily  Stop Glipizide for now.    Follow up:  With Dr. Bernal, have A1C done in March and see me when needed or if A1c climbs higher.      Bring blood glucose meter and logbook with you to all doctor and follow-up appointments.     [facility]:  For Your Diabetes Education and Nutrition Appointments Call:  [phone]   For Diabetes Education or Nutrition Related Questions:   Phone: [phone]  E-mail: [email or web address]  Fax: [phone]   If you need a medication refill please contact your pharmacy. Please allow 3 business days for your refills to be completed.    Instructions for emailing the Diabetes Educators    If you need to communicate a non-urgent message to a Diabetes Educator via email, please send to [email or web address].    Please follow the following email guidelines:    Subject line: Secure: your clinic name (example: Secure: [facility])  In the email please include: First name, middle initial, last name and date of birth.    We will be in touch with you within one (1) business day.             Follow-ups after your visit        Your next 10 appointments already scheduled     Feb 01, 2018 11:00 AM CST   (Arrive by 10:45 AM)   POST-OP HAND with Lydia  Glory Corrales MD   [facility] ([facility])    [address] [address] [phone]            Feb 01, 2018 11:30 AM CST   CONOR Hand with ABELINO Green   [facility] ([facility])    [address] [id number] [phone]              Who to contact     If you have questions or need follow up information about today's clinic visit or your schedule please contact [facility] directly at [phone].  Normal or non-critical lab and imaging results will be communicated to you by MyChart, letter or phone within 4 business days after the clinic has received the results. If you do not hear from us within 7 days, please contact the clinic through PlanetHShart or phone. If you have a critical or abnormal lab result, we will notify you by phone as soon as possible.  Submit refill requests through Tapdaq or call your pharmacy and they will forward the refill request to us. Please allow 3 business days for your refill to be completed.          Additional Information About Your Visit        PlanetHSharTeeBeeDee Information     Tapdaq gives you secure access to your electronic health record. If you see a primary care provider, you can also send messages to your care team and make appointments. If you have questions, please call your primary care clinic.  If you do not have a primary care provider, please call [phone] and they will assist you.        Care EveryWhere ID     This is your Care EveryWhere ID. This could be used by other organizations to access your [place] medical records  [id number]         Blood Pressure from Last 3 Encounters:   12/21/17 132/68   12/14/17 135/70   10/12/17 142/77    Weight from Last 3 Encounters:   11/30/17 131 kg (288 lb 11.2 oz)   10/12/17 127 kg (280 lb)   04/18/17 126.6 kg (279 lb)              Today, you had the  following     No orders found for display         Today's Medication Changes          These changes are accurate as of 1/29/18  9:06 AM.  If you have any questions, ask your nurse or doctor.               Start taking these medicines.        Dose/Directions    blood glucose monitoring lancets   Used for:  Uncontrolled type 2 diabetes mellitus without complication, without long-term current use of insulin (H)        Use to test blood sugar 2 times daily or as directed.  Ok to substitute alternative if insurance prefers.   Quantity:  102 each   Refills:  11         These medicines have changed or have updated prescriptions.        Dose/Directions    * blood glucose monitoring test strip   Commonly known as:  no brand specified   This may have changed:  Another medication with the same name was added. Make sure you understand how and when to take each.   Used for:  Uncontrolled type 2 diabetes mellitus without complication, without long-term current use of insulin (H)        Dose:  1 strip   1 strip by In Vitro route daily accucheck compact plus test strips.  Testing once daily   Quantity:  3 Box   Refills:  3       * blood glucose monitoring test strip   Commonly known as:  ACCU-CHEK GUIDE   This may have changed:  You were already taking a medication with the same name, and this prescription was added. Make sure you understand how and when to take each.   Used for:  Uncontrolled type 2 diabetes mellitus without complication, without long-term current use of insulin (H)        Use to test blood sugar 2 times daily or as directed.   Quantity:  100 strip   Refills:  11       liraglutide 18 MG/3ML soln   Commonly known as:  VICTOZA   This may have changed:    - how much to take  - additional instructions   Used for:  Uncontrolled type 2 diabetes mellitus without complication, without long-term current use of insulin (H)        Dose:  1.8 mg   Inject 1.8 mg Subcutaneous daily   Quantity:  27 mL   Refills:  3       *  Notice:  This list has 2 medication(s) that are the same as other medications prescribed for you. Read the directions carefully, and ask your doctor or other care provider to review them with you.         Where to get your medicines      These medications were sent to [id number] - [place], [place] [facility] [address]  [address], [place] [address]    Hours:  test fax successful 4/5/04 kr Phone:  [phone]     blood glucose monitoring lancets    blood glucose monitoring test strip    liraglutide 18 MG/3ML soln                Primary Care Provider Office Phone # Fax #    Yobany Bernal [phone] [address] [facility] [address]        Equal Access to Services     FINA MOSER : Hadii aad ku hadasho Soomaali, waaxda luqadaha, qaybta kaalmada adeegyada, waxay daysiin haylev raines . So [facility] [phone].    ATENCIÓN: Si habla español, tiene a richardson disposición servicios gratuitos de asistencia lingüística. [facility] [phone].    We comply with applicable federal civil rights laws and [place] laws. We do not discriminate on the basis of race, color, national origin, age, disability, sex, sexual orientation, or gender identity.            Thank you!     Thank you for choosing [facility]  for your care. Our goal is always to provide you with excellent care. Hearing back from our patients is one way we can continue to improve our services. Please take a few minutes to complete the written survey that you may receive in the mail after your visit with us. Thank you!             Your Updated Medication List - Protect others around you: Learn how to safely use, store and throw away your medicines at www.disposemymeds.org.          This list is accurate as of 1/29/18  9:06 AM.  Always use your most recent med list.                   Brand Name Dispense Instructions for use Diagnosis    ASPIRIN NOT PRESCRIBED    INTENTIONAL     1 each    continuous prn Antiplatelet medication not prescribed intentionally due to GI Issues / Ulcer Disease        blood glucose monitoring lancets     102 each    Use to test blood sugar 2 times daily or as directed.  Ok to substitute alternative if insurance prefers.    Uncontrolled type 2 diabetes mellitus without complication, without long-term current use of insulin (H)       * blood glucose monitoring test strip    no brand specified    3 Box    1 strip by In Vitro route daily accucheck compact plus test strips.  Testing once daily    Uncontrolled type 2 diabetes mellitus without complication, without long-term current use of insulin (H)       * blood glucose monitoring test strip    ACCU-CHEK GUIDE    100 strip    Use to test blood sugar 2 times daily or as directed.    Uncontrolled type 2 diabetes mellitus without complication, without long-term current use of insulin (H)       glipiZIDE 10 MG 24 hr tablet    glipiZIDE XL    90 tablet    One tablet daily    Uncontrolled type 2 diabetes mellitus without complication, without long-term current use of insulin (H)       insulin pen needle 32G X 4 MM    BD GABRIELA U/F    100 each    Use 1 daily as directed.    Uncontrolled type 2 diabetes mellitus without complication, without long-term current use of insulin (H)       liraglutide 18 MG/3ML soln    VICTOZA    27 mL    Inject 1.8 mg Subcutaneous daily    Uncontrolled type 2 diabetes mellitus without complication, without long-term current use of insulin (H)       metFORMIN 500 MG 24 hr tablet    GLUCOPHAGE-XR    360 tablet    Take 4 tablets (2,000 mg) by mouth daily (with dinner)    Uncontrolled type 2 diabetes mellitus without complication, without long-term current use of insulin (H)       order for DME     1 Units    Equipment being ordered: wrist splint    Left wrist pain       simvastatin 10 MG tablet    ZOCOR    90 tablet    Take 1 tablet (10 mg) by mouth At Bedtime    High cholesterol       * Notice:   This list has 2 medication(s) that are the same as other medications prescribed for you. Read the directions carefully, and ask your doctor or other care provider to review them with you.

## 2018-02-01 ENCOUNTER — RADIANT APPOINTMENT (OUTPATIENT)
Dept: GENERAL RADIOLOGY | Facility: CLINIC | Age: 53
End: 2018-02-01
Attending: ORTHOPAEDIC SURGERY
Payer: COMMERCIAL

## 2018-02-01 ENCOUNTER — THERAPY VISIT (OUTPATIENT)
Dept: OCCUPATIONAL THERAPY | Facility: CLINIC | Age: 53
End: 2018-02-01
Payer: COMMERCIAL

## 2018-02-01 ENCOUNTER — OFFICE VISIT (OUTPATIENT)
Dept: ORTHOPEDICS | Facility: CLINIC | Age: 53
End: 2018-02-01
Payer: COMMERCIAL

## 2018-02-01 DIAGNOSIS — M25.641 STIFFNESS OF RIGHT HAND JOINT: ICD-10-CM

## 2018-02-01 DIAGNOSIS — Z09 SURGICAL FOLLOW-UP CARE: ICD-10-CM

## 2018-02-01 DIAGNOSIS — M79.641 PAIN OF RIGHT HAND: Primary | ICD-10-CM

## 2018-02-01 DIAGNOSIS — Z09 SURGICAL FOLLOW-UP CARE: Primary | ICD-10-CM

## 2018-02-01 PROCEDURE — 97110 THERAPEUTIC EXERCISES: CPT | Mod: GO | Performed by: OCCUPATIONAL THERAPIST

## 2018-02-01 NOTE — PROGRESS NOTES
HISTORY OF PRESENT ILLNESS: Wei is a 52-year-old right-hand dominant male who had a right index finger metacarpal bone cyst curettage with autograft from the distal radius on 12/21/2017.  He initially was treated with immobilization followed by a removable splint.  He reports he has not worn the removable splint for about a week and his finger still feels stiff.      PHYSICAL EXAMINATION:  On examination today, his scar is well-healed.  He has full extension.  He can bring his fingertip nearly to his distal palmar crease.  He does not have tenderness over the MP joint with deep palpation; distal sensory and motor circulation is full.      X-rays are taken today which show incorporation of the bone graft into the cyst.      IMPRESSION:  Healed cyst.      At this time, we will advance his therapy to full strengthening with no limitations.  He will be seen back on an as needed basis should any further questions or problems arise.

## 2018-02-01 NOTE — LETTER
2/1/2018       RE: Wei Wolf  [address] [address]     Dear Colleague,    Thank you for referring your patient, Wei Wolf, to the [facility] at [facility]. Please see a copy of my visit note below.    HISTORY OF PRESENT ILLNESS: Wei is a 52-year-old right-hand dominant male who had a right index finger metacarpal bone cyst curettage with autograft from the distal radius on 12/21/2017.  He initially was treated with immobilization followed by a removable splint.  He reports he has not worn the removable splint for about a week and his finger still feels stiff.      PHYSICAL EXAMINATION:  On examination today, his scar is well-healed.  He has full extension.  He can bring his fingertip nearly to his distal palmar crease.  He does not have tenderness over the MP joint with deep palpation; distal sensory and motor circulation is full.      X-rays are taken today which show incorporation of the bone graft into the cyst.      IMPRESSION:  Healed cyst.      At this time, we will advance his therapy to full strengthening with no limitations.  He will be seen back on an as needed basis should any further questions or problems arise.         Again, thank you for allowing me to participate in the care of your patient.      Sincerely,    Lydia Corrales MD

## 2018-02-01 NOTE — MR AVS SNAPSHOT
After Visit Summary   2/1/2018    Wei Wolf    MRN: [id number]           Patient Information     Date Of Birth          1965        Visit Information        Provider Department      2/1/2018 11:00 AM Lydia Corrales MD [facility]        Today's Diagnoses     Surgical follow-up care    -  1       Follow-ups after your visit        Additional Services     HAND THERAPY       Hand Therapy Referral                  Follow-up notes from your care team     Return if symptoms worsen or fail to improve.      Who to contact     Please call your clinic at [phone] to:    Ask questions about your health    Make or cancel appointments    Discuss your medicines    Learn about your test results    Speak to your doctor   If you have compliments or concerns about an experience at your clinic, or if you wish to file a complaint, please contact [facility] Patient Relations at [phone] or email us at [email or web address]         Additional Information About Your Visit        MyChart Information     Teladoct gives you secure access to your electronic health record. If you see a primary care provider, you can also send messages to your care team and make appointments. If you have questions, please call your primary care clinic.  If you do not have a primary care provider, please call [phone] and they will assist you.      Zizerones is an electronic gateway that provides easy, online access to your medical records. With Zizerones, you can request a clinic appointment, read your test results, renew a prescription or communicate with your care team.     To access your existing account, please contact your [facility] or call [phone] for assistance.        Care EveryWhere ID     This is your Care EveryWhere ID. This could be used by other organizations to access your [place] medical records  [id number]          Blood Pressure from Last 3 Encounters:   12/21/17 132/68   12/14/17 135/70   10/12/17 142/77    Weight from Last 3 Encounters:   11/30/17 131 kg (288 lb 11.2 oz)   10/12/17 127 kg (280 lb)   04/18/17 126.6 kg (279 lb)              We Performed the Following     HAND THERAPY        Primary Care Provider Office Phone # Fax #    Yobany Srini Bernal [phone] [address] [facility] [address]        Equal Access to Services     [facility]: Hadii aad ku hadasho Soomaali, waaxda luqadaha, qaybta kaalmada adeegyada, waxay daysiin haylev raines . So [facility] [phone].    ATENCIÓN: Si habla español, tiene a richardson disposición servicios gratuitos de asistencia lingüística. [facility] [phone].    We comply with applicable federal civil rights laws and [place] laws. We do not discriminate on the basis of race, color, national origin, age, disability, sex, sexual orientation, or gender identity.            Thank you!     Thank you for choosing [facility]  for your care. Our goal is always to provide you with excellent care. Hearing back from our patients is one way we can continue to improve our services. Please take a few minutes to complete the written survey that you may receive in the mail after your visit with us. Thank you!             Your Updated Medication List - Protect others around you: Learn how to safely use, store and throw away your medicines at www.disposemymeds.org.          This list is accurate as of 2/1/18 11:59 PM.  Always use your most recent med list.                   Brand Name Dispense Instructions for use Diagnosis    ASPIRIN NOT PRESCRIBED    INTENTIONAL    1 each    continuous prn Antiplatelet medication not prescribed intentionally due to GI Issues / Ulcer Disease        blood glucose monitoring lancets     102 each    Use to test blood sugar 2 times daily or as directed.  Ok to substitute alternative if insurance prefers.     Uncontrolled type 2 diabetes mellitus without complication, without long-term current use of insulin (H)       * blood glucose monitoring test strip    no brand specified    3 Box    1 strip by In Vitro route daily accucheck compact plus test strips.  Testing once daily    Uncontrolled type 2 diabetes mellitus without complication, without long-term current use of insulin (H)       * blood glucose monitoring test strip    ACCU-CHEK GUIDE    100 strip    Use to test blood sugar 2 times daily or as directed.    Uncontrolled type 2 diabetes mellitus without complication, without long-term current use of insulin (H)       glipiZIDE 10 MG 24 hr tablet    glipiZIDE XL    90 tablet    One tablet daily    Uncontrolled type 2 diabetes mellitus without complication, without long-term current use of insulin (H)       insulin pen needle 32G X 4 MM    BD GABRIELA U/F    100 each    Use 1 daily as directed.    Uncontrolled type 2 diabetes mellitus without complication, without long-term current use of insulin (H)       liraglutide 18 MG/3ML soln    VICTOZA    27 mL    Inject 1.8 mg Subcutaneous daily    Uncontrolled type 2 diabetes mellitus without complication, without long-term current use of insulin (H)       metFORMIN 500 MG 24 hr tablet    GLUCOPHAGE-XR    360 tablet    Take 4 tablets (2,000 mg) by mouth daily (with dinner)    Uncontrolled type 2 diabetes mellitus without complication, without long-term current use of insulin (H)       order for DME     1 Units    Equipment being ordered: wrist splint    Left wrist pain       simvastatin 10 MG tablet    ZOCOR    90 tablet    Take 1 tablet (10 mg) by mouth At Bedtime    High cholesterol       * Notice:  This list has 2 medication(s) that are the same as other medications prescribed for you. Read the directions carefully, and ask your doctor or other care provider to review them with you.

## 2018-02-01 NOTE — NURSING NOTE
Reason For Visit:   Chief Complaint   Patient presents with     Surgical Followup     Right Index Finger Metacarpal Bone Cyst Curettage with Allograft from Distal Radius, DOS: 12-. Pt stated that he does not have his strength back and he has stiffness fingers and pain in his right wrist.        Primary MD: Yobany Bernal    Age: 52 year old    ?  No      Pain Assessment  Patient Currently in Pain: Yes (Stiffness)  0-10 Pain Scale:  (a little bit. )  Primary Pain Location: Wrist  Pain Orientation: Right, Posterior  Aggravating Factors:  (Closing hand)    Hand Dominance Evaluation  Hand Dominance: Right          QuickDASH Assessment  QuickDASH Main 11/16/2017   1.Open a tight or new jar. Mild difficulty   2. Do heavy household chores (e.g., wash walls, floors) Mild difficulty   3. Carry a shopping bag or briefcase. Mild difficulty   4. Wash your back. Mild difficulty   5. Use a knife to cut food. Mild difficulty   6. Recreational activities in which you take some force or impact through your arm, shoulder or hand (e.g., golf, hammering, tennis, etc.). Moderate difficulty   7. During the past week, to what extent has your arm, shoulder or hand problem interfered with your normal social activities with family, friends, neighbours or groups? Slightly   8. During the past week, were you limited in your work or other regular daily activities as a result of your arm, shoulder or hand problem? Slightly limited   9. Arm, shoulder or hand pain. Moderate   10.Tingling (pins and needles) in your arm,shoulder or hand. None   11. During the past week, how much difficulty have you had sleeping because of the pain in your arm, shoulder or hand? ([place] number) No difficulty   Quickdash Ability Score 25          Current Outpatient Prescriptions   Medication Sig Dispense Refill     blood glucose monitoring (ACCU-CHEK FASTCLIX) lancets Use to test blood sugar 2 times daily or as directed.  Ok to substitute  alternative if insurance prefers. 102 each 11     blood glucose monitoring (ACCU-CHEK GUIDE) test strip Use to test blood sugar 2 times daily or as directed. 100 strip 11     liraglutide (VICTOZA) 18 MG/3ML soln Inject 1.8 mg Subcutaneous daily 27 mL 3     simvastatin (ZOCOR) 10 MG tablet Take 1 tablet (10 mg) by mouth At Bedtime 90 tablet 1     insulin pen needle (BD GABRIELA U/F) 32G X 4 MM Use 1 daily as directed. 100 each 11     metFORMIN (GLUCOPHAGE-XR) 500 MG 24 hr tablet Take 4 tablets (2,000 mg) by mouth daily (with dinner) 360 tablet 1     glipiZIDE (GLIPIZIDE XL) 10 MG 24 hr tablet One tablet daily 90 tablet 1     blood glucose monitoring (NO BRAND SPECIFIED) test strip 1 strip by In Vitro route daily accucheck compact plus test strips.  Testing once daily 3 Box 3     order for DME Equipment being ordered: wrist splint 1 Units 0     ASPIRIN NOT PRESCRIBED, INTENTIONAL, continuous prn Antiplatelet medication not prescribed intentionally due to GI Issues / Ulcer Disease 1 each 0       Allergies   Allergen Reactions     Metformin Diarrhea       Dulce Aleman LPN

## 2018-02-20 ENCOUNTER — TELEPHONE (OUTPATIENT)
Dept: FAMILY MEDICINE | Facility: CLINIC | Age: 53
End: 2018-02-20

## 2018-02-20 NOTE — TELEPHONE ENCOUNTER
Panel Management Review      Patient has the following on his problem list:     Diabetes    ASA: Not Required     Last A1C  Lab Results   Component Value Date    A1C 8.8 11/30/2017    A1C 8.0 03/15/2017    A1C 7.6 10/28/2016    A1C 8.5 07/28/2016    A1C 7.9 12/08/2015     A1C tested: FAILED    Last LDL:    Lab Results   Component Value Date    CHOL 173 11/30/2017     Lab Results   Component Value Date    HDL 41 11/30/2017     Lab Results   Component Value Date     11/30/2017     Lab Results   Component Value Date    TRIG 83 11/30/2017     Lab Results   Component Value Date    CHOLHDLRATIO 4.2 05/27/2015     Lab Results   Component Value Date    NHDL 132 11/30/2017       Is the patient on a Statin? YES             Is the patient on Aspirin? NO    Medications     HMG CoA Reductase Inhibitors    simvastatin (ZOCOR) 10 MG tablet          Last three blood pressure readings:  BP Readings from Last 3 Encounters:   12/21/17 132/68   12/14/17 135/70   10/12/17 142/77       Date of last diabetes office visit: 11/2017     Tobacco History:     History   Smoking Status     Never Smoker   Smokeless Tobacco     Never Used           Composite cancer screening  Chart review shows that this patient is due/due soon for the following Colonoscopy  Summary:    Patient is due/failing the following:   A1C and COLONOSCOPY    Action needed:   Patient needs office visit for a1c, colonoscopy.    Type of outreach:    Sent letter.    Questions for provider review:    None                                                                                                                                    Mathew Tidwell CMA       Chart routed to closed .

## 2018-02-28 ENCOUNTER — MYC MEDICAL ADVICE (OUTPATIENT)
Dept: FAMILY MEDICINE | Facility: CLINIC | Age: 53
End: 2018-02-28

## 2018-02-28 NOTE — TELEPHONE ENCOUNTER
Saw sleep center 2/10/11.  That provider also wrote a DME prescription for the BiPaP.    FilesXhart message sent to patient to see what he would like ;  or mail out information.  Copies Left in  in basket. Myranda Franco RN

## 2018-03-05 ENCOUNTER — MYC MEDICAL ADVICE (OUTPATIENT)
Dept: FAMILY MEDICINE | Facility: CLINIC | Age: 53
End: 2018-03-05

## 2018-03-06 ENCOUNTER — OFFICE VISIT (OUTPATIENT)
Dept: SLEEP MEDICINE | Facility: CLINIC | Age: 53
End: 2018-03-06
Payer: COMMERCIAL

## 2018-03-06 VITALS
RESPIRATION RATE: 16 BRPM | WEIGHT: 277 LBS | HEIGHT: 70 IN | DIASTOLIC BLOOD PRESSURE: 84 MMHG | HEART RATE: 85 BPM | SYSTOLIC BLOOD PRESSURE: 134 MMHG | BODY MASS INDEX: 39.65 KG/M2 | OXYGEN SATURATION: 97 %

## 2018-03-06 DIAGNOSIS — G47.33 OSA (OBSTRUCTIVE SLEEP APNEA): Primary | ICD-10-CM

## 2018-03-06 PROCEDURE — 99214 OFFICE O/P EST MOD 30 MIN: CPT | Performed by: PHYSICIAN ASSISTANT

## 2018-03-06 NOTE — MR AVS SNAPSHOT
After Visit Summary   3/6/2018    Wei Wolf    MRN: [id number]           Patient Information     Date Of Birth          1965        Visit Information        Provider Department      3/6/2018 11:30 AM Goltz, Bennett Ezra, PA-C [facility]        Today's Diagnoses     SAI (obstructive sleep apnea)    -  1      Care Instructions      Your BMI is Body mass index is 39.75 kg/(m^2).  Weight management is a personal decision.  If you are interested in exploring weight loss strategies, the following discussion covers the approaches that may be successful. Body mass index (BMI) is one way to tell whether you are at a healthy weight, overweight, or obese. It measures your weight in relation to your height.  A BMI of 18.5 to 24.9 is in the healthy range. A person with a BMI of 25 to 29.9 is considered overweight, and someone with a BMI of 30 or greater is considered obese. More than two-thirds of American adults are considered overweight or obese.  Being overweight or obese increases the risk for further weight gain. Excess weight may lead to heart disease and diabetes.  Creating and following plans for healthy eating and physical activity may help you improve your health.  Weight control is part of healthy lifestyle and includes exercise, emotional health, and healthy eating habits. Careful eating habits lifelong are the mainstay of weight control. Though there are significant health benefits from weight loss, long-term weight loss with diet alone may be very difficult to achieve- studies show long-term success with dietary management in less than 10% of people. Attaining a healthy weight may be especially difficult to achieve in those with severe obesity. In some cases, medications, devices and surgical management might be considered.  What can you do?  If you are overweight or obese and are interested in methods for weight loss, you should discuss this with your provider.      Consider reducing daily calorie intake by 500 calories.     Keep a food journal.     Avoiding skipping meals, consider cutting portions instead.    Diet combined with exercise helps maintain muscle while optimizing fat loss. Strength training is particularly important for building and maintaining muscle mass. Exercise helps reduce stress, increase energy, and improves fitness. Increasing exercise without diet control, however, may not burn enough calories to loose weight.       Start walking three days a week 10-20 minutes at a time    Work towards walking thirty minutes five days a week     Eventually, increase the speed of your walking for 1-2 minutes at time    In addition, we recommend that you review healthy lifestyles and methods for weight loss available through the National Institutes of Health patient information sites:  http://win.niddk.nih.gov/publications/index.htm    And look into health and wellness programs that may be available through your health insurance provider, employer, local community center, or na club.    Weight management plan: Patient was referred to their PCP to discuss a diet and exercise plan.            Follow-ups after your visit        Follow-up notes from your care team     Return in about 2 months (around 5/6/2018) for CPAP compliance recheck.      Your next 10 appointments already scheduled     May 09, 2018  8:30 AM CDT   Return Sleep Patient with Bennett Ezra Goltz, PA-C   [facility] ([facility])    [address] [place] [id number] [phone]              Who to contact     If you have questions or need follow up information about today's clinic visit or your schedule please contact [facility] directly at [phone].  Normal or non-critical lab and imaging results will be communicated to you by MyChart, letter or phone within 4 business days after the clinic has received the results. If  "you do not hear from us within 7 days, please contact the clinic through [employer] or phone. If you have a critical or abnormal lab result, we will notify you by phone as soon as possible.  Submit refill requests through [employer] or call your pharmacy and they will forward the refill request to us. Please allow 3 business days for your refill to be completed.          Additional Information About Your Visit        [employer] Information     [employer] gives you secure access to your electronic health record. If you see a primary care provider, you can also send messages to your care team and make appointments. If you have questions, please call your primary care clinic.  If you do not have a primary care provider, please call [phone] and they will assist you.        Care EveryWhere ID     This is your Care EveryWhere ID. This could be used by other organizations to access your [facility] medical records  [id number]        Your Vitals Were     Pulse Respirations Height Pulse Oximetry BMI (Body Mass Index)       85 16 1.778 m (5' 10\") 97% 39.75 kg/m2        Blood Pressure from Last 3 Encounters:   03/06/18 134/84   12/21/17 132/68   12/14/17 135/70    Weight from Last 3 Encounters:   03/06/18 125.6 kg (277 lb)   11/30/17 131 kg (288 lb 11.2 oz)   10/12/17 127 kg (280 lb)              We Performed the Following     Comprehensive DME        Primary Care Provider Office Phone # Fax #    Yobany Srini Bernal [phone] [address] [facility] [address]        Equal Access to Services     FINA MOSER : Hadii aad ku hadasho Soomaali, waaxda luqadaha, qaybta kaalmada adeegyada, ray aburto. So [facility] [phone].    ATENCIÓN: Si habla español, tiene a richardson disposición servicios gratuitos de asistencia lingüística. Llame al [phone].    We comply with applicable federal civil rights laws and [place] laws. We do not discriminate on the basis of race, color, national " origin, age, disability, sex, sexual orientation, or gender identity.            Thank you!     Thank you for choosing [facility]  for your care. Our goal is always to provide you with excellent care. Hearing back from our patients is one way we can continue to improve our services. Please take a few minutes to complete the written survey that you may receive in the mail after your visit with us. Thank you!             Your Updated Medication List - Protect others around you: Learn how to safely use, store and throw away your medicines at www.disposemymeds.org.          This list is accurate as of 3/6/18 12:02 PM.  Always use your most recent med list.                   Brand Name Dispense Instructions for use Diagnosis    ASPIRIN NOT PRESCRIBED    INTENTIONAL    1 each    continuous prn Antiplatelet medication not prescribed intentionally due to GI Issues / Ulcer Disease        blood glucose monitoring lancets     102 each    Use to test blood sugar 2 times daily or as directed.  Ok to substitute alternative if insurance prefers.    Uncontrolled type 2 diabetes mellitus without complication, without long-term current use of insulin (H)       * blood glucose monitoring test strip    no brand specified    3 Box    1 strip by In Vitro route daily accucheck compact plus test strips.  Testing once daily    Uncontrolled type 2 diabetes mellitus without complication, without long-term current use of insulin (H)       * blood glucose monitoring test strip    ACCU-CHEK GUIDE    100 strip    Use to test blood sugar 2 times daily or as directed.    Uncontrolled type 2 diabetes mellitus without complication, without long-term current use of insulin (H)       glipiZIDE 10 MG 24 hr tablet    glipiZIDE XL    90 tablet    One tablet daily    Uncontrolled type 2 diabetes mellitus without complication, without long-term current use of insulin (H)       insulin pen needle 32G X 4 MM    BD GABRIELA U/F    100 each    Use  1 daily as directed.    Uncontrolled type 2 diabetes mellitus without complication, without long-term current use of insulin (H)       liraglutide 18 MG/3ML soln    VICTOZA    27 mL    Inject 1.8 mg Subcutaneous daily    Uncontrolled type 2 diabetes mellitus without complication, without long-term current use of insulin (H)       metFORMIN 500 MG 24 hr tablet    GLUCOPHAGE-XR    360 tablet    Take 4 tablets (2,000 mg) by mouth daily (with dinner)    Uncontrolled type 2 diabetes mellitus without complication, without long-term current use of insulin (H)       order for DME     1 Units    Equipment being ordered: wrist splint    Left wrist pain       simvastatin 10 MG tablet    ZOCOR    90 tablet    Take 1 tablet (10 mg) by mouth At Bedtime    High cholesterol       * Notice:  This list has 2 medication(s) that are the same as other medications prescribed for you. Read the directions carefully, and ask your doctor or other care provider to review them with you.

## 2018-03-06 NOTE — NURSING NOTE
"Chief Complaint   Patient presents with     CPAP Follow Up     Needs a new machine       Initial /84  Pulse 85  Resp 16  Ht 1.778 m (5' 10\")  Wt 125.6 kg (277 lb)  SpO2 97%  BMI 39.75 kg/m2 Estimated body mass index is 39.75 kg/(m^2) as calculated from the following:    Height as of this encounter: 1.778 m (5' 10\").    Weight as of this encounter: 125.6 kg (277 lb).  Medication Reconciliation: complete     ESS 4  Sally Szymanski    "

## 2018-03-06 NOTE — PATIENT INSTRUCTIONS

## 2018-03-06 NOTE — PROGRESS NOTES
Sleep Study Follow-Up Visit:    Date on this visit: 3/6/2018    Wei Wolf comes in today for follow-up of his BiPAP use for severe SAI.  He was initially seen at the [facility] for snoring, apneas, obesity (planning bariatric surgery), narrowed oropharynx. He has not been seen since 2011.  AHI was 106.7, with significant desaturations down to 71%. .2. REM RDI N/A. Supine .6, consistent with severe SUPINE SAI. Periodic Limb Movement Index 0/hour.  Auto-BiPAP delta 4 cm, Min EPAP 12 cm, Max IPAP 25 cm. He uses a Rodirguez nasal pillows mask. He says it is noisy but it leaks less. He denies significant dry nose or mouth as long as he uses the humidifier. He feels the pressures are high initially, but it reduces after a while. He has not been snoring with it.   His weight was 288 in 2011. It is 277 today. He had weight loss surgery. He lost about 90 pounds and regained most of it.   His machine broke about a week ago. He feels it was working fine before that.   Bedtime is about 11:30 PM. Wake time is 6 AM. He does not usually wake in the night, but feels he is still a light sleeper. No naps. He denies difficulty falling asleep. No difficulty staying awake.     Past medical/surgical history, family history, social history, medications and allergies were reviewed.      Problem List:  Patient Active Problem List    Diagnosis Date Noted     Surgical follow-up care 01/11/2018     Priority: Medium     Pain of right hand 01/11/2018     Priority: Medium     Stiffness of right hand joint 01/11/2018     Priority: Medium     Morbid obesity (H) 10/21/2015     Priority: Medium     Uncontrolled diabetes mellitus type 2 without complications (H) 10/21/2015     Priority: Medium     SAI (obstructive sleep apnea) 03/24/2011     Priority: Medium     AHI was 106.7, with desaturations down to 71%. .2. REM RDI N/A. Supine .6. No PLMS       Obesity 01/12/2011     Priority: Medium      Hypertension goal BP (blood pressure) < 140/90 12/20/2010     Priority: Medium     TYPE 2 DIABETES, HBA1C GOAL < 7% 10/31/2010     Priority: Medium     HYPERLIPIDEMIA LDL GOAL <100 10/31/2010     Priority: Medium        Impression/Plan:    (G47.33) SAI (obstructive sleep apnea)  (primary encounter diagnosis)  Comment: Mr. Wolf presents to get a prescription for a replacement machine as his broke. His weight is down just slightly, but he feels the pressure is sometimes a little high and seems less symptomatic.  Plan: Comprehensive DME        Order placed for a replacement auto BiPAP with min EPAP 10 cm, max IPAP 20 cm, fixed PS of 4 cm.       He will follow up with me in about 2 month(s) to get a download to make sure the settings are effective.     Twenty-five minutes spent with patient, all of which were spent face-to-face counseling, consulting, coordinating plan of care.      Bennett Goltz, PA-C    CC: No ref. provider found

## 2018-03-19 ENCOUNTER — TELEPHONE (OUTPATIENT)
Dept: FAMILY MEDICINE | Facility: CLINIC | Age: 53
End: 2018-03-19

## 2018-03-19 RX ORDER — LIRAGLUTIDE 6 MG/ML
1.8 INJECTION SUBCUTANEOUS DAILY
Qty: 27 ML | Refills: 0 | Status: SHIPPED | OUTPATIENT
Start: 2018-03-19 | End: 2018-07-03

## 2018-03-19 NOTE — TELEPHONE ENCOUNTER
Pharmacy calling regarding RX for Victoza. Patient states that there was a dosage change to 1.8 ml every day. Pharmacy is looking for a new RX, verbal is okay.

## 2018-06-22 ENCOUNTER — MYC REFILL (OUTPATIENT)
Dept: FAMILY MEDICINE | Facility: CLINIC | Age: 53
End: 2018-06-22

## 2018-06-22 DIAGNOSIS — E78.00 HIGH CHOLESTEROL: ICD-10-CM

## 2018-06-22 RX ORDER — SIMVASTATIN 10 MG
10 TABLET ORAL AT BEDTIME
Qty: 90 TABLET | Refills: 0 | Status: SHIPPED | OUTPATIENT
Start: 2018-06-22 | End: 2018-09-21

## 2018-06-22 RX ORDER — METFORMIN HCL 500 MG
2000 TABLET, EXTENDED RELEASE 24 HR ORAL
Qty: 360 TABLET | Refills: 0 | Status: SHIPPED | OUTPATIENT
Start: 2018-06-22 | End: 2018-09-21

## 2018-06-22 RX ORDER — GLIPIZIDE 10 MG/1
TABLET, FILM COATED, EXTENDED RELEASE ORAL
Qty: 90 TABLET | Refills: 0 | Status: SHIPPED | OUTPATIENT
Start: 2018-06-22 | End: 2018-09-21

## 2018-06-22 NOTE — TELEPHONE ENCOUNTER
Message from MyChart:  Original authorizing provider: MD Wei Alfaro LYNDAFei Wolf would like a refill of the following medications:  metFORMIN (GLUCOPHAGE-XR) 500 MG 24 hr tablet [Yobany Bernal MD]  glipiZIDE (GLIPIZIDE XL) 10 MG 24 hr tablet [Yobany Bernal MD]  simvastatin (ZOCOR) 10 MG tablet [Yobany Bernal MD]    Preferred pharmacy: [id number] - [place] - [address] [facility]    Comment:

## 2018-06-23 ENCOUNTER — DOCUMENTATION ONLY (OUTPATIENT)
Dept: LAB | Facility: CLINIC | Age: 53
End: 2018-06-23

## 2018-06-23 NOTE — PROGRESS NOTES
Patient has an upcoming previsit appointment on 06/24/2018. Please review pended orders and add additional orders if needed.     Thank you,   Faye Terry

## 2018-06-25 LAB
ANION GAP SERPL CALCULATED.3IONS-SCNC: 10 MMOL/L (ref 3–14)
BUN SERPL-MCNC: 15 MG/DL (ref 7–30)
CALCIUM SERPL-MCNC: 9 MG/DL (ref 8.5–10.1)
CHLORIDE SERPL-SCNC: 106 MMOL/L (ref 94–109)
CO2 SERPL-SCNC: 24 MMOL/L (ref 20–32)
CREAT SERPL-MCNC: 0.92 MG/DL (ref 0.66–1.25)
GFR SERPL CREATININE-BSD FRML MDRD: 86 ML/MIN/1.7M2
GLUCOSE SERPL-MCNC: 181 MG/DL (ref 70–99)
HBA1C MFR BLD: 8.6 % (ref 0–5.6)
POTASSIUM SERPL-SCNC: 4 MMOL/L (ref 3.4–5.3)
SODIUM SERPL-SCNC: 140 MMOL/L (ref 133–144)

## 2018-06-25 PROCEDURE — 80048 BASIC METABOLIC PNL TOTAL CA: CPT | Performed by: FAMILY MEDICINE

## 2018-06-25 PROCEDURE — 83036 HEMOGLOBIN GLYCOSYLATED A1C: CPT | Performed by: FAMILY MEDICINE

## 2018-06-25 PROCEDURE — 36415 COLL VENOUS BLD VENIPUNCTURE: CPT | Performed by: FAMILY MEDICINE

## 2018-06-25 NOTE — PROGRESS NOTES
Patient was in yesterday for lab work and we do not have any orders. Please sign pended orders. We are holding the specimens.    Thanks,  Layla Guidry

## 2018-07-02 RX ORDER — LIRAGLUTIDE 6 MG/ML
1.8 INJECTION SUBCUTANEOUS DAILY
Qty: 27 ML | Refills: 0 | Status: CANCELLED | OUTPATIENT
Start: 2018-07-02

## 2018-07-03 ENCOUNTER — MYC REFILL (OUTPATIENT)
Dept: FAMILY MEDICINE | Facility: CLINIC | Age: 53
End: 2018-07-03

## 2018-07-03 RX ORDER — LIRAGLUTIDE 6 MG/ML
1.8 INJECTION SUBCUTANEOUS DAILY
Qty: 27 ML | Refills: 1 | Status: SHIPPED | OUTPATIENT
Start: 2018-07-03 | End: 2018-09-21

## 2018-07-03 NOTE — TELEPHONE ENCOUNTER
Message from MyChart:  Original authorizing provider: MD Wei Alfaro LYNDAFei Wolf would like a refill of the following medications:  liraglutide (VICTOZA) 18 MG/3ML soln [Yobany Bernal MD]    Preferred pharmacy: [facility] [id number] [address]    Comment:

## 2018-07-03 NOTE — TELEPHONE ENCOUNTER
victoza refill request  Last OV Dr. Yobany Bernal was in December.  Cancelled today's appt  Saw diabetic ed 1/29/18    Hemoglobin A1C   Date Value Ref Range Status   06/25/2018 8.6 (H) 0 - 5.6 % Final     Comment:     Normal <5.7% Prediabetes 5.7-6.4%  Diabetes 6.5% or higher - adopted from ADA   consensus guidelines.

## 2018-07-11 DIAGNOSIS — M25.531 RIGHT WRIST PAIN: Primary | ICD-10-CM

## 2018-07-12 ENCOUNTER — THERAPY VISIT (OUTPATIENT)
Dept: OCCUPATIONAL THERAPY | Facility: CLINIC | Age: 53
End: 2018-07-12
Attending: ORTHOPAEDIC SURGERY
Payer: COMMERCIAL

## 2018-07-12 ENCOUNTER — OFFICE VISIT (OUTPATIENT)
Dept: ORTHOPEDICS | Facility: CLINIC | Age: 53
End: 2018-07-12
Payer: COMMERCIAL

## 2018-07-12 ENCOUNTER — RADIANT APPOINTMENT (OUTPATIENT)
Dept: GENERAL RADIOLOGY | Facility: CLINIC | Age: 53
End: 2018-07-12
Attending: ORTHOPAEDIC SURGERY
Payer: COMMERCIAL

## 2018-07-12 VITALS — WEIGHT: 277 LBS | BODY MASS INDEX: 39.65 KG/M2 | HEIGHT: 70 IN

## 2018-07-12 DIAGNOSIS — M79.641 PAIN OF RIGHT HAND: ICD-10-CM

## 2018-07-12 DIAGNOSIS — M25.531 RIGHT WRIST PAIN: Primary | ICD-10-CM

## 2018-07-12 DIAGNOSIS — Z09 SURGICAL FOLLOW-UP CARE: ICD-10-CM

## 2018-07-12 DIAGNOSIS — M25.531 RIGHT WRIST PAIN: ICD-10-CM

## 2018-07-12 DIAGNOSIS — M25.641 STIFFNESS OF RIGHT HAND JOINT: ICD-10-CM

## 2018-07-12 PROCEDURE — 97140 MANUAL THERAPY 1/> REGIONS: CPT | Mod: GO | Performed by: OCCUPATIONAL THERAPIST

## 2018-07-12 PROCEDURE — 97112 NEUROMUSCULAR REEDUCATION: CPT | Mod: GO | Performed by: OCCUPATIONAL THERAPIST

## 2018-07-12 PROCEDURE — 97110 THERAPEUTIC EXERCISES: CPT | Mod: GO | Performed by: OCCUPATIONAL THERAPIST

## 2018-07-12 RX ORDER — METHYLPREDNISOLONE 4 MG
TABLET, DOSE PACK ORAL
Qty: 21 TABLET | Refills: 0 | Status: SHIPPED | OUTPATIENT
Start: 2018-07-12 | End: 2018-10-30

## 2018-07-12 NOTE — MR AVS SNAPSHOT
After Visit Summary   7/12/2018    Wei Wolf    MRN: [id number]           Patient Information     Date Of Birth          1965        Visit Information        Provider Department      7/12/2018 11:30 AM yMrna Negron OT [facility]        Today's Diagnoses     Pain of right hand        Surgical follow-up care        Stiffness of right hand joint           Follow-ups after your visit        Your next 10 appointments already scheduled     Jul 20, 2018  7:00 AM CDT   [facility] ([facility])    [address] [place] [id number] [phone]            Jul 30, 2018 12:30 PM CDT   [facility] ([facility])    [address] [place] [id number] [phone]            Aug 02, 2018  8:00 AM CDT   (Arrive by 7:45 AM)   RETURN HAND with Lydia Corrales MD   [facility] ([facility])    [address] [place] [id number] [phone]              Who to contact     If you have questions or need follow up information about today's clinic visit or your schedule please contact [facility] directly at [phone].  Normal or non-critical lab and imaging results will be communicated to you by MyChart, letter or phone within 4 business days after the clinic has received the results. If you do not hear from us within 7 days, please contact the clinic through MyChart or phone. If you have a critical or abnormal lab result, we will notify you by phone as soon as possible.  Submit refill requests through [employer] or call your pharmacy and they will forward the refill request to us. Please allow 3 business days for your refill to be completed.          Additional Information About Your Visit        [facility]  Information     [employer] gives you secure access to your electronic health record. If you see a primary care provider, you can also send messages to your care team and make appointments. If you have questions, please call your primary care clinic.  If you do not have a primary care provider, please call [phone] and they will assist you.        Care EveryWhere ID     This is your Care EveryWhere ID. This could be used by other organizations to access your [place] medical records  [id number]         Blood Pressure from Last 3 Encounters:   03/06/18 134/84   12/21/17 132/68   12/14/17 135/70    Weight from Last 3 Encounters:   07/12/18 125.6 kg (277 lb)   03/06/18 125.6 kg (277 lb)   11/30/17 131 kg (288 lb 11.2 oz)              We Performed the Following     MANUAL THER TECH,1+REGIONS,EA 15 MIN     NEUROMUSCULAR RE-EDUCATION     THERAPEUTIC EXERCISES          Today's Medication Changes          These changes are accurate as of 7/12/18  3:05 PM.  If you have any questions, ask your nurse or doctor.               Start taking these medicines.        Dose/Directions    methylPREDNISolone 4 MG tablet   Commonly known as:  MEDROL DOSEPAK   Used for:  Right wrist pain   Started by:  Lydia Corrales MD        Follow package instructions   Quantity:  21 tablet   Refills:  0            Where to get your medicines      These medications were sent to [id number] - [place], [place] - [address] [facility]  [address] [facility] [address]    Hours:  test fax successful 4/5/04  Phone:  [phone]     methylPREDNISolone 4 MG tablet                Primary Care Provider Office Phone # Fax #    Yobany Bernal [phone] [address] [facility] [address]        Equal Access to Services     RYAN MOSER : Manav Campo, lazara oliveros, leigh freeman, ray aburto. So [facility] [phone].    ATENCIÓN: Si  dominick guallpa, tiene a richardson disposición servicios gratuitos de asistencia lingüística. Caio christianson [phone].    We comply with applicable federal civil rights laws and [place] laws. We do not discriminate on the basis of race, color, national origin, age, disability, sex, sexual orientation, or gender identity.            Thank you!     Thank you for choosing [facility]  for your care. Our goal is always to provide you with excellent care. Hearing back from our patients is one way we can continue to improve our services. Please take a few minutes to complete the written survey that you may receive in the mail after your visit with us. Thank you!             Your Updated Medication List - Protect others around you: Learn how to safely use, store and throw away your medicines at www.disposemymeds.org.          This list is accurate as of 7/12/18  3:05 PM.  Always use your most recent med list.                   Brand Name Dispense Instructions for use Diagnosis    ASPIRIN NOT PRESCRIBED    INTENTIONAL    1 each    continuous prn Antiplatelet medication not prescribed intentionally due to GI Issues / Ulcer Disease        blood glucose monitoring lancets     102 each    Use to test blood sugar 2 times daily or as directed.  Ok to substitute alternative if insurance prefers.    Uncontrolled type 2 diabetes mellitus without complication, without long-term current use of insulin (H)       * blood glucose monitoring test strip    no brand specified    3 Box    1 strip by In Vitro route daily accucheck compact plus test strips.  Testing once daily    Uncontrolled type 2 diabetes mellitus without complication, without long-term current use of insulin (H)       * blood glucose monitoring test strip    ACCU-CHEK GUIDE    100 strip    Use to test blood sugar 2 times daily or as directed.    Uncontrolled type 2 diabetes mellitus without complication, without long-term current use of insulin (H)       glipiZIDE 10 MG  24 hr tablet    glipiZIDE XL    90 tablet    One tablet daily    Uncontrolled type 2 diabetes mellitus without complication, without long-term current use of insulin (H)       insulin pen needle 32G X 4 MM    BD GABRIELA U/F    100 each    Use 1 daily as directed.    Uncontrolled type 2 diabetes mellitus without complication, without long-term current use of insulin (H)       liraglutide 18 MG/3ML soln    VICTOZA    27 mL    Inject 1.8 mg Subcutaneous daily    Uncontrolled type 2 diabetes mellitus without complication, without long-term current use of insulin (H)       metFORMIN 500 MG 24 hr tablet    GLUCOPHAGE-XR    360 tablet    Take 4 tablets (2,000 mg) by mouth daily (with dinner)    Uncontrolled type 2 diabetes mellitus without complication, without long-term current use of insulin (H)       methylPREDNISolone 4 MG tablet    MEDROL DOSEPAK    21 tablet    Follow package instructions    Right wrist pain       order for DME     1 Units    Equipment being ordered: wrist splint    Left wrist pain       simvastatin 10 MG tablet    ZOCOR    90 tablet    Take 1 tablet (10 mg) by mouth At Bedtime    High cholesterol       * Notice:  This list has 2 medication(s) that are the same as other medications prescribed for you. Read the directions carefully, and ask your doctor or other care provider to review them with you.

## 2018-07-12 NOTE — PROGRESS NOTES
"Hand Therapy Progress Note    Current Date:  7/12/2018    Diagnosis: Pain of finger of right hand, Surgical follow-up care  DOS:  12/21/17  Procedure:   Right Index Finger Metacarpal Bone Cyst Curettage with                                               Allograft from Distal Radius   Post:  > 6 months  Referring MD: Lydia Corrales MD     Reporting period is 1/11/18 to 7/12/2018    Subjective:   Subjective changes noted by patient:  \"My pain has been getting worse this summer.  My index finger is actually doing ok, but the other three fingers are stiff.  My wrist has been pretty painful.\"  Functional changes noted by patient:  Improvement in Self Care Tasks (dressing, eating, bathing, hygiene/toileting)  Patient has noted adverse reaction to:  None    Functional Outcome Measure:  Upper Extremity Functional Index Score:  SCORE:   Column Totals: /80: 36   (A lower score indicates greater disability.)    Objective:  Observation: Color/Temperature:     [x]    Normal  []    Abnormal    PAIN 1/11/2018 7/12/18   Location Right  wrist and hand Radial and ulnar wrist   Radiates no no   Worse d/n daytime daytime   Frequency activity dependant intermittent   Exacerbated by movement Wrist extension, rotation   Relieves rest Rest, pain medication   At rest 0-10/10 0/10 0/10   At worst 7/10 6/10   Sleep disruption?   no yes   Progression Gradually getting better. Slow improvement     ROM:  Pain Report:  - none    + mild    ++ moderate    +++ severe     Wrist  1/11/2018 7/12/18   AROM (PROM) R L    Extension 45 + 60 38++   Flexion 50 + 65 20+   RD 10 20 12+   UD 30 30 18++   Supination 60 90 52++   Pronation 70 85 90     HAND 1/11/2018 7/12/18   AROM(PROM)     Index MP 15/45 -7/52   PIP 20/90 -15/82   DIP 0/45 0/45   BEAN 145 157   Long MP 10/50 -13/67   PIP 0/85 -5/84   DIP 55 -5/69   BEAN 180 197   Ring MP 10/50 -7/65   PIP 0/84 0/80   DIP 0/55 0/58   BEAN 179 196   Small MP 3/55 0/67   PIP 0/90 -10/77   DIP 0/55 0/59   BEAN 197 193 "     Edema  Circumference:  (Measured in cm)  DWC 7/12/2018   Right 20.5   Left 18.9     Finger Edema  Circumference:  (Measured in cm)  Edema 7/12/2018   side Right   Index P1 8.2   Long P1 8.2   Ring P1 7.4   Small P1 6.96     Wound/Scar: tender, clean, normal, no  Drainage  Strength:   (Measured in pounds)  Pain Report:  - none    + mild    ++ moderate    +++ severe     7/12/2018   Trials left right   1  2  3 119  115  109 35+++  20  19   Average: 114 25     Lat Pinch  7/12/2018   Trials left right   1  2  3 25  27  27 26  25  25   Average: 26 25     3 Pt Pinch  7/12/2018   Trials left right   1  2  3 26  25  25 16+  17+  14+   Average: 25 16     Please refer to the daily flowsheet for treatment provided today.     Assessment:  Response to therapy has been improvement to:  ROM of Fingers: All Planes  Response to therapy has been lack of progress in:  ROM of Wrist:  All Planes    Overall Assessment:  Patient would benefit from continued therapy to achieve rehab potential  STG/LTG:  STGoals have been reviewed and no progress has been made;  see goal sheet for details and changes.    Plan:  Frequency/Duration:  Recommend continuing with the current treatment plan. 1 X week, once daily  for 6 weeks  Appropriateness of Rx I have re-evaluated this patient and find that the nature, scope, duration and intensity of the therapy is appropriate for the medical condition of the patient.  Recommendations for Continued Therapy  Additions to Treatment Plan -  Therapeutic Exercise:  Wrist AROM, scrub & carry    Home Exercise Program:  OTC wrist orthosis, prn  Tendon glides  Wrist AROM    Next Visit:  A/AA/PROM  MFR  strengthening

## 2018-07-12 NOTE — LETTER
7/12/2018       RE: Wei Wolf  [address] [facility] [id number]     Dear Colleague,    Thank you for referring your patient, Wei Wolf, to the [facility]. Please see a copy of my visit note below.    [facility]  Orthopedics  Lydia Corrales MD  07/12/2018      Chief Complaint: Right wrist pain    History of Present Illness:   Mr. Borrego is a right-hand dominant 52-year-old male n who underwent curettage and bone grafting of the cyst in the head of the right index metacarpal on 12/21/2018.  He was casted for approximately 6 weeks postoperatively.  After his cast was taken off he was quite stiff and sore in the wrist.  He worked with hand therapy but unfortunately his wrist has remained very stiff and painful.  He describes intermittent pain associated primarily with pronation supination of the wrist.  His pain seems worse along the ulnar aspect of the wrist we does get pain dorsally and radially.  He denies any popping or clicking.  Unfortunately, his symptoms and fear of onset of symptoms have prevented him from using the wrist much over the last few months.  He has intermittently been able to use the hand for more strenuous activities, however, he then seems to have profound pain and soreness in the wrist 1-2 days later.  He has no pain at rest.  He denies hypersensitivity of the skin.  He denies changes in sweat pattern or color of the skin.  He denies any symptoms in other joints.  He denies any redness or swelling of the area.    Review of Systems:   A 14-point review of systems was obtained and is negative except for as noted in the HPI.     Physical Examination:  There were no vitals taken for this visit.  Focused examination of the right upper extremity reveals intact skin.  He has a well-healed incision over the dorsal aspect of his first MCP joint.  There is no focal swelling redness or erythema of the hand.   He has no pain with palpation about the hand wrist forearm.  His wrist range of motion is 30  of flexion and 15  of extension.  He has no pain with pronation and supination.  He has no hypersensitivity of the skin.  He has no tenderness at his TFCC and his DRUJ is stable.  He has no evidence of compressive neuropathy.  Tinel's is negative at the carpal tunnel and cubital tunnel.  His flexion compression tests are negative at these sites as well.  No evidence for radiculopathy or radial compression the supinator.  He is neurovascularly intact to light touch.  He has brisk cap refill.    Radiographs:   Radiographs of the right wrist- 3 views (07/12/2018)  X-rays were obtained today and reviewed.  There is no evidence of cyst in the metacarpal head and bone graft has incorporated nicely.  He has a cyst on the radial aspect of the radial distal metaphysis.  He has no other osseous findings.    I have independently reviewed the above imaging studies; the results were discussed with the patient.     Assessment:   52-year-old right-hand-dominant male status post curettage of a right index metacarpal cyst with bone grafting on 12/21/2018 now recovered, however, patient has had onset of significant right wrist and forearm pain associated primarily with supination pronation.    Plan:   On examination today the patient's most profound finding the stiffness of the wrist.  He does have a history of frozen shoulder which this seemingly resembles clinically.  Patient does not appear to have clinical signs and symptoms of complex regional pain syndrome or compressive neuropathy.  This does not appear to be related to his previous surgery.  We have recommended formal hand therapy with a CRPS protocol.  We will provide a Medrol Dosepak to see if this could break the cycle of inflammation.  We will have him follow-up in 3 weeks for repeat examination and evaluation of his progress.    Afshin Stanford MD   Orthopedic Surgery;  PGY-4    This patient was seen, examined and counseled by Dr. Corrales.    I have personally examined this patient and have reviewed the clinical presentation and progress note with the resident. I agree with the treatment plan as outlined. The plan was formulated with the resident on the day of the resident's dictation.     Sincerely,    Lydia Corrales MD

## 2018-07-12 NOTE — NURSING NOTE
"Reason For Visit:   Chief Complaint   Patient presents with     Right Hand - Pain     Surgical Followup     The patient is following up today with right hand/wrist pain. He feel that pain started after his right index finger surgery. He has a ringht index metacarpal bone cyst curettage with autograft from distal radius. DOS 12/21/18  He reports some tendinitis synptoms, but also a zinger feeling. He reports dropping items with the right hand.        Primary MD: Yobany Bernal  Ref. MD: self    Age: 52 year old    ?  No      Ht 1.778 m (5' 10\")  Wt 125.6 kg (277 lb)  BMI 39.75 kg/m2      Pain Assessment  Patient Currently in Pain: Yes  0-10 Pain Scale: 0  Primary Pain Location: Hand  Pain Orientation: Right  Pain Descriptors: Intermittent, Aching, Throbbing  Alleviating Factors: Rest  Aggravating Factors: Movement    Hand Dominance Evaluation  Hand Dominance: Right  Pinch force  R hand key force: 11.8 kg (26 lb)  R hand 2 pt force: 13.2 kg (29 lb)   force  R hand  level 2 force: 18.1 kg (40 lb)  L hand  level  2 force: 54.4 kg (120 lb)    QuickDASH Assessment  QuickDASH Main 11/16/2017   1.Open a tight or new jar. Mild difficulty   2. Do heavy household chores (e.g., wash walls, floors) Mild difficulty   3. Carry a shopping bag or briefcase. Mild difficulty   4. Wash your back. Mild difficulty   5. Use a knife to cut food. Mild difficulty   6. Recreational activities in which you take some force or impact through your arm, shoulder or hand (e.g., golf, hammering, tennis, etc.). Moderate difficulty   7. During the past week, to what extent has your arm, shoulder or hand problem interfered with your normal social activities with family, friends, neighbours or groups? Slightly   8. During the past week, were you limited in your work or other regular daily activities as a result of your arm, shoulder or hand problem? Slightly limited   9. Arm, shoulder or hand pain. Moderate   10.Tingling " (pins and needles) in your arm,shoulder or hand. None   11. During the past week, how much difficulty have you had sleeping because of the pain in your arm, shoulder or hand? ([place] number) No difficulty   Quickdash Ability Score 25          Current Outpatient Prescriptions   Medication Sig Dispense Refill     ASPIRIN NOT PRESCRIBED, INTENTIONAL, continuous prn Antiplatelet medication not prescribed intentionally due to GI Issues / Ulcer Disease 1 each 0     blood glucose monitoring (ACCU-CHEK FASTCLIX) lancets Use to test blood sugar 2 times daily or as directed.  Ok to substitute alternative if insurance prefers. 102 each 11     blood glucose monitoring (ACCU-CHEK GUIDE) test strip Use to test blood sugar 2 times daily or as directed. 100 strip 11     blood glucose monitoring (NO BRAND SPECIFIED) test strip 1 strip by In Vitro route daily accucheck compact plus test strips.  Testing once daily 3 Box 3     glipiZIDE (GLIPIZIDE XL) 10 MG 24 hr tablet One tablet daily 90 tablet 0     insulin pen needle (BD GABRIELA U/F) 32G X 4 MM Use 1 daily as directed. 100 each 11     liraglutide (VICTOZA) 18 MG/3ML soln Inject 1.8 mg Subcutaneous daily 27 mL 1     metFORMIN (GLUCOPHAGE-XR) 500 MG 24 hr tablet Take 4 tablets (2,000 mg) by mouth daily (with dinner) 360 tablet 0     order for DME Equipment being ordered: wrist splint 1 Units 0     simvastatin (ZOCOR) 10 MG tablet Take 1 tablet (10 mg) by mouth At Bedtime 90 tablet 0       Allergies   Allergen Reactions     Metformin Diarrhea       Michell Arias, ATC

## 2018-07-12 NOTE — MR AVS SNAPSHOT
After Visit Summary   7/12/2018    Wei Wolf    MRN: [id number]           Patient Information     Date Of Birth          1965        Visit Information        Provider Department      7/12/2018 10:50 AM Lydia Corrales MD [facility]        Today's Diagnoses     Right wrist pain    -  1       Follow-ups after your visit        Additional Services     HAND THERAPY Occupational Therapy or Physical Therapy       Hand Therapy Referral                  Your next 10 appointments already scheduled     Jul 20, 2018  7:00 AM CDT   CONOR Hand with Myrna Negron OT   [facility] ([facility])    [address] [place] [id number]   [phone]            Jul 30, 2018 12:30 PM CDT   CONOR Hand with Myrna Negron OT   [facility] ([facility])    [address] [place] [id number] [phone]            Aug 02, 2018  8:00 AM CDT   (Arrive by 7:45 AM)   RETURN HAND with Lydia Corrales MD   [facility] ([facility])    [address] [place] [id number] [phone]              Who to contact     Please call your clinic at [phone] to:    Ask questions about your health    Make or cancel appointments    Discuss your medicines    Learn about your test results    Speak to your doctor            Additional Information About Your Visit        MyChart Information     [facility] gives you secure access to your electronic health record. If you see a primary care provider, you can also send messages to your care team and make appointments. If you have questions, please call your primary care clinic.  If you do not have a primary care provider, please call [phone] and they will assist you.      [employer] is an electronic gateway that provides easy, online access to your medical  "records. With [facility], you can request a clinic appointment, read your test results, renew a prescription or communicate with your care team.     To access your existing account, please contact your [facility] or call [phone] for assistance.        Care EveryWhere ID     This is your Care EveryWhere ID. This could be used by other organizations to access your [place] medical records  [id number]        Your Vitals Were     Height BMI (Body Mass Index)                1.778 m (5' 10\") 39.75 kg/m2           Blood Pressure from Last 3 Encounters:   03/06/18 134/84   12/21/17 132/68   12/14/17 135/70    Weight from Last 3 Encounters:   07/12/18 125.6 kg (277 lb)   03/06/18 125.6 kg (277 lb)   11/30/17 131 kg (288 lb 11.2 oz)              We Performed the Following     HAND THERAPY Occupational Therapy or Physical Therapy          Today's Medication Changes          These changes are accurate as of 7/12/18 11:59 PM.  If you have any questions, ask your nurse or doctor.               Start taking these medicines.        Dose/Directions    methylPREDNISolone 4 MG tablet   Commonly known as:  MEDROL DOSEPAK   Used for:  Right wrist pain   Started by:  Lydia Corrales MD        Follow package instructions   Quantity:  21 tablet   Refills:  0            Where to get your medicines      These medications were sent to [id number] - [place], [place] - [address] [facility]  [address] [facility], [facility] [address]    Hours:  test fax successful 4/5/04  Phone:  [phone]     methylPREDNISolone 4 MG tablet                Primary Care Provider Office Phone # Fax #    Yobany Bernal [phone] [address] [facility] [address]        Equal Access to Services     FINA MOSER : Manav Campo, lazara oliveros, leigh freeman, ray aburto. So [facility] [phone].    ATENCIÓN: Si habla " español, tiene a richardson disposición servicios gratuitos de asistencia lingüística. Caio christianson [phone].    We comply with applicable federal civil rights laws and [place] laws. We do not discriminate on the basis of race, color, national origin, age, disability, sex, sexual orientation, or gender identity.            Thank you!     Thank you for choosing [facility]  for your care. Our goal is always to provide you with excellent care. Hearing back from our patients is one way we can continue to improve our services. Please take a few minutes to complete the written survey that you may receive in the mail after your visit with us. Thank you!             Your Updated Medication List - Protect others around you: Learn how to safely use, store and throw away your medicines at www.disposemymeds.org.          This list is accurate as of 7/12/18 11:59 PM.  Always use your most recent med list.                   Brand Name Dispense Instructions for use Diagnosis    ASPIRIN NOT PRESCRIBED    INTENTIONAL    1 each    continuous prn Antiplatelet medication not prescribed intentionally due to GI Issues / Ulcer Disease        blood glucose monitoring lancets     102 each    Use to test blood sugar 2 times daily or as directed.  Ok to substitute alternative if insurance prefers.    Uncontrolled type 2 diabetes mellitus without complication, without long-term current use of insulin (H)       * blood glucose monitoring test strip    no brand specified    3 Box    1 strip by In Vitro route daily accucheck compact plus test strips.  Testing once daily    Uncontrolled type 2 diabetes mellitus without complication, without long-term current use of insulin (H)       * blood glucose monitoring test strip    ACCU-CHEK GUIDE    100 strip    Use to test blood sugar 2 times daily or as directed.    Uncontrolled type 2 diabetes mellitus without complication, without long-term current use of insulin (H)       glipiZIDE 10 MG 24  hr tablet    glipiZIDE XL    90 tablet    One tablet daily    Uncontrolled type 2 diabetes mellitus without complication, without long-term current use of insulin (H)       insulin pen needle 32G X 4 MM    BD GABRIELA U/F    100 each    Use 1 daily as directed.    Uncontrolled type 2 diabetes mellitus without complication, without long-term current use of insulin (H)       liraglutide 18 MG/3ML soln    VICTOZA    27 mL    Inject 1.8 mg Subcutaneous daily    Uncontrolled type 2 diabetes mellitus without complication, without long-term current use of insulin (H)       metFORMIN 500 MG 24 hr tablet    GLUCOPHAGE-XR    360 tablet    Take 4 tablets (2,000 mg) by mouth daily (with dinner)    Uncontrolled type 2 diabetes mellitus without complication, without long-term current use of insulin (H)       methylPREDNISolone 4 MG tablet    MEDROL DOSEPAK    21 tablet    Follow package instructions    Right wrist pain       order for DME     1 Units    Equipment being ordered: wrist splint    Left wrist pain       simvastatin 10 MG tablet    ZOCOR    90 tablet    Take 1 tablet (10 mg) by mouth At Bedtime    High cholesterol       * Notice:  This list has 2 medication(s) that are the same as other medications prescribed for you. Read the directions carefully, and ask your doctor or other care provider to review them with you.

## 2018-07-13 NOTE — PROGRESS NOTES
[facility]  Orthopedics  Lydia Corrales MD  07/12/2018      Chief Complaint: Right wrist pain    History of Present Illness:   Mr. Borrego is a right-hand dominant 52-year-old male n who underwent curettage and bone grafting of the cyst in the head of the right index metacarpal on 12/21/2018.  He was casted for approximately 6 weeks postoperatively.  After his cast was taken off he was quite stiff and sore in the wrist.  He worked with hand therapy but unfortunately his wrist has remained very stiff and painful.  He describes intermittent pain associated primarily with pronation supination of the wrist.  His pain seems worse along the ulnar aspect of the wrist we does get pain dorsally and radially.  He denies any popping or clicking.  Unfortunately, his symptoms and fear of onset of symptoms have prevented him from using the wrist much over the last few months.  He has intermittently been able to use the hand for more strenuous activities, however, he then seems to have profound pain and soreness in the wrist 1-2 days later.  He has no pain at rest.  He denies hypersensitivity of the skin.  He denies changes in sweat pattern or color of the skin.  He denies any symptoms in other joints.  He denies any redness or swelling of the area.    Review of Systems:   A 14-point review of systems was obtained and is negative except for as noted in the HPI.     Physical Examination:  There were no vitals taken for this visit.  Focused examination of the right upper extremity reveals intact skin.  He has a well-healed incision over the dorsal aspect of his first MCP joint.  There is no focal swelling redness or erythema of the hand.  He has no pain with palpation about the hand wrist forearm.  His wrist range of motion is 30  of flexion and 15  of extension.  He has no pain with pronation and supination.  He has no hypersensitivity of the skin.  He has no tenderness at his TFCC and his DRUJ is stable.  He has no  evidence of compressive neuropathy.  Tinel's is negative at the carpal tunnel and cubital tunnel.  His flexion compression tests are negative at these sites as well.  No evidence for radiculopathy or radial compression the supinator.  He is neurovascularly intact to light touch.  He has brisk cap refill.    Radiographs:   Radiographs of the right wrist- 3 views (07/12/2018)  X-rays were obtained today and reviewed.  There is no evidence of cyst in the metacarpal head and bone graft has incorporated nicely.  He has a cyst on the radial aspect of the radial distal metaphysis.  He has no other osseous findings.    I have independently reviewed the above imaging studies; the results were discussed with the patient.     Assessment:   52-year-old right-hand-dominant male status post curettage of a right index metacarpal cyst with bone grafting on 12/21/2018 now recovered, however, patient has had onset of significant right wrist and forearm pain associated primarily with supination pronation.    Plan:   On examination today the patient's most profound finding the stiffness of the wrist.  He does have a history of frozen shoulder which this seemingly resembles clinically.  Patient does not appear to have clinical signs and symptoms of complex regional pain syndrome or compressive neuropathy.  This does not appear to be related to his previous surgery.  We have recommended formal hand therapy with a CRPS protocol.  We will provide a Medrol Dosepak to see if this could break the cycle of inflammation.  We will have him follow-up in 3 weeks for repeat examination and evaluation of his progress.    Afshin Stanford MD   Orthopedic Surgery; PGY-4    This patient was seen, examined and counseled by Dr. Corrales.    I have personally examined this patient and have reviewed the clinical presentation and progress note with the resident. I agree with the treatment plan as outlined. The plan was formulated with the resident on the  day of the resident's dictation.

## 2018-07-20 ENCOUNTER — THERAPY VISIT (OUTPATIENT)
Dept: OCCUPATIONAL THERAPY | Facility: CLINIC | Age: 53
End: 2018-07-20
Payer: COMMERCIAL

## 2018-07-20 DIAGNOSIS — M79.641 PAIN OF RIGHT HAND: ICD-10-CM

## 2018-07-20 DIAGNOSIS — Z09 SURGICAL FOLLOW-UP CARE: ICD-10-CM

## 2018-07-20 DIAGNOSIS — M25.641 STIFFNESS OF RIGHT HAND JOINT: ICD-10-CM

## 2018-07-20 PROCEDURE — 97110 THERAPEUTIC EXERCISES: CPT | Mod: GO | Performed by: OCCUPATIONAL THERAPIST

## 2018-07-20 PROCEDURE — 97140 MANUAL THERAPY 1/> REGIONS: CPT | Mod: GO | Performed by: OCCUPATIONAL THERAPIST

## 2018-07-20 PROCEDURE — 97112 NEUROMUSCULAR REEDUCATION: CPT | Mod: GO | Performed by: OCCUPATIONAL THERAPIST

## 2018-07-20 NOTE — PROGRESS NOTES
SOAP note objective information for 7/20/2018.  ROM:  Pain Report:  - none    + mild    ++ moderate    +++ severe     Wrist  1/11/2018 7/12/18 7/20/18   AROM (PROM) R L     Extension 45 + 60 38++ 42   Flexion 50 + 65 20+ 30+   RD 10 20 12+ 13+   UD 30 30 18++ 25++   Supination 60 90 52++ 60+   Pronation 70 85 90      HAND 1/11/2018 7/12/18   AROM(PROM)     Index MP 15/45 -7/52   PIP 20/90 -15/82   DIP 0/45 0/45   BEAN 145 157   Long MP 10/50 -13/67   PIP 0/85 -5/84   DIP 55 -5/69   BEAN 180 197   Ring MP 10/50 -7/65   PIP 0/84 0/80   DIP 0/55 0/58   BEAN 179 196   Small MP 3/55 0/67   PIP 0/90 -10/77   DIP 0/55 0/59   BEAN 197 193     Nine-Hole Peg Test   7/20/2018   Hand Time in seconds   Right 24:55   Left 24:73   Please refer to the daily flowsheet for treatment today, total treatment time and time spent performing 1:1 timed codes.       Home Exercise Program:  OTC wrist orthosis, prn  Tendon glides  Wrist AROM  Ice  Heat  Massage to extensors and flexors    Next Visit:  A/AA/PROM  MFR  strengthening

## 2018-07-20 NOTE — MR AVS SNAPSHOT
After Visit Summary   7/20/2018    Wei Wolf    MRN: [id number]           Patient Information     Date Of Birth          1965        Visit Information        Provider Department      7/20/2018 7:00 AM Myrna Negron OT [facility]        Today's Diagnoses     Pain of right hand        Surgical follow-up care        Stiffness of right hand joint           Follow-ups after your visit        Your next 10 appointments already scheduled     Jul 30, 2018 12:30 PM CDT   [facility] Hand with [facility] ([facility])    [address] [place] [address] [phone]            Aug 02, 2018  8:00 AM CDT   (Arrive by 7:45 AM)   RETURN HAND with Lydia Corrales MD   [facility] ([facility])    [address] [place] [id number] [phone]              Who to contact     If you have questions or need follow up information about today's clinic visit or your schedule please contact [facility] directly at [phone].  Normal or non-critical lab and imaging results will be communicated to you by [employer], letter or phone within 4 business days after the clinic has received the results. If you do not hear from us within 7 days, please contact the clinic through [employer] or phone. If you have a critical or abnormal lab result, we will notify you by phone as soon as possible.  Submit refill requests through [employer] or call your pharmacy and they will forward the refill request to us. Please allow 3 business days for your refill to be completed.          Additional Information About Your Visit        MyChart Information     [employer] gives you secure access to your electronic health record. If you see a primary care provider, you can also send messages to your care team and make appointments. If you have questions, please call  your primary care clinic.  If you do not have a primary care provider, please call [phone] and they will assist you.        Care EveryWhere ID     This is your Care EveryWhere ID. This could be used by other organizations to access your [place] medical records  [id number]         Blood Pressure from Last 3 Encounters:   03/06/18 134/84   12/21/17 132/68   12/14/17 135/70    Weight from Last 3 Encounters:   07/12/18 125.6 kg (277 lb)   03/06/18 125.6 kg (277 lb)   11/30/17 131 kg (288 lb 11.2 oz)              We Performed the Following     MANUAL THER TECH,1+REGIONS,EA 15 MIN     NEUROMUSCULAR RE-EDUCATION     THERAPEUTIC EXERCISES        Primary Care Provider Office Phone # Fax #    Yobany Bernal [phone] [address] [facility] [address]        Equal Access to Services     [facility] : Hadii aad ku hadasho Soomaali, waaxda luqadaha, qaybta kaalmada adeegyada, waxay idiin hayaan lilli greenaragarry apodacan . So [facility] [phone].    ATENCIÓN: Si habla español, tiene a richardson disposición servicios gratuitos de asistencia lingüística. Caio al [phone].    We comply with applicable federal civil rights laws and [place] laws. We do not discriminate on the basis of race, color, national origin, age, disability, sex, sexual orientation, or gender identity.            Thank you!     Thank you for choosing [facility]  for your care. Our goal is always to provide you with excellent care. Hearing back from our patients is one way we can continue to improve our services. Please take a few minutes to complete the written survey that you may receive in the mail after your visit with us. Thank you!             Your Updated Medication List - Protect others around you: Learn how to safely use, store and throw away your medicines at www.disposemymeds.org.          This list is accurate as of 7/20/18  7:34 AM.  Always use your most recent med list.                   Brand Name  Dispense Instructions for use Diagnosis    ASPIRIN NOT PRESCRIBED    INTENTIONAL    1 each    continuous prn Antiplatelet medication not prescribed intentionally due to GI Issues / Ulcer Disease        blood glucose monitoring lancets     102 each    Use to test blood sugar 2 times daily or as directed.  Ok to substitute alternative if insurance prefers.    Uncontrolled type 2 diabetes mellitus without complication, without long-term current use of insulin (H)       * blood glucose monitoring test strip    no brand specified    3 Box    1 strip by In Vitro route daily accucheck compact plus test strips.  Testing once daily    Uncontrolled type 2 diabetes mellitus without complication, without long-term current use of insulin (H)       * blood glucose monitoring test strip    ACCU-CHEK GUIDE    100 strip    Use to test blood sugar 2 times daily or as directed.    Uncontrolled type 2 diabetes mellitus without complication, without long-term current use of insulin (H)       glipiZIDE 10 MG 24 hr tablet    glipiZIDE XL    90 tablet    One tablet daily    Uncontrolled type 2 diabetes mellitus without complication, without long-term current use of insulin (H)       insulin pen needle 32G X 4 MM    BD GABRIELA U/F    100 each    Use 1 daily as directed.    Uncontrolled type 2 diabetes mellitus without complication, without long-term current use of insulin (H)       liraglutide 18 MG/3ML soln    VICTOZA    27 mL    Inject 1.8 mg Subcutaneous daily    Uncontrolled type 2 diabetes mellitus without complication, without long-term current use of insulin (H)       metFORMIN 500 MG 24 hr tablet    GLUCOPHAGE-XR    360 tablet    Take 4 tablets (2,000 mg) by mouth daily (with dinner)    Uncontrolled type 2 diabetes mellitus without complication, without long-term current use of insulin (H)       methylPREDNISolone 4 MG tablet    MEDROL DOSEPAK    21 tablet    Follow package instructions    Right wrist pain       order for DME     1  Units    Equipment being ordered: wrist splint    Left wrist pain       simvastatin 10 MG tablet    ZOCOR    90 tablet    Take 1 tablet (10 mg) by mouth At Bedtime    High cholesterol       * Notice:  This list has 2 medication(s) that are the same as other medications prescribed for you. Read the directions carefully, and ask your doctor or other care provider to review them with you.

## 2018-07-30 ENCOUNTER — THERAPY VISIT (OUTPATIENT)
Dept: OCCUPATIONAL THERAPY | Facility: CLINIC | Age: 53
End: 2018-07-30
Payer: COMMERCIAL

## 2018-07-30 DIAGNOSIS — M25.641 STIFFNESS OF RIGHT HAND JOINT: ICD-10-CM

## 2018-07-30 DIAGNOSIS — Z09 SURGICAL FOLLOW-UP CARE: ICD-10-CM

## 2018-07-30 DIAGNOSIS — M79.641 PAIN OF RIGHT HAND: ICD-10-CM

## 2018-07-30 PROCEDURE — 97140 MANUAL THERAPY 1/> REGIONS: CPT | Mod: GO | Performed by: OCCUPATIONAL THERAPIST

## 2018-07-30 PROCEDURE — 97110 THERAPEUTIC EXERCISES: CPT | Mod: GO | Performed by: OCCUPATIONAL THERAPIST

## 2018-07-30 PROCEDURE — 97112 NEUROMUSCULAR REEDUCATION: CPT | Mod: GO | Performed by: OCCUPATIONAL THERAPIST

## 2018-07-30 NOTE — MR AVS SNAPSHOT
After Visit Summary   7/30/2018    Wei Wolf    MRN: [id number]           Patient Information     Date Of Birth          1965        Visit Information        Provider Department      7/30/2018 12:30 PM Myrna [facility]gron OT [facility]        Today's Diagnoses     Pain of right hand        Surgical follow-up care        Stiffness of right hand joint           Follow-ups after your visit        Your next 10 appointments already scheduled     Aug 02, 2018  8:00 AM CDT   (Arrive by 7:45 AM)   RETURN HAND with Lydia Corrales MD   [facility] ([facility])    [address] [place] [id number]   [phone]            Aug 09, 2018  7:00 AM CDT   [facility] Hand [facility] ([facility])    [address] [place] [id number] [phone]            Aug 23, 2018  7:00 AM CDT   [facility] Hand [facility] ([facility])    [address] [place] [id number] [phone]            Sep 06, 2018  7:00 AM CDT   [facility] Hand [facility] ([facility])    [address] [place] [id number] [phone]              Who to contact     If you have questions or need follow up information about today's clinic visit or your schedule please contact [facility] directly at [phone].  Normal or non-critical lab and imaging results will be communicated to you by MyChart, letter or phone within 4 business days after the clinic has received the results. If you do not hear from us within 7 days, please contact the clinic through MyChart or phone. If you have a critical or abnormal lab result, we will notify you by phone as soon as possible.  Submit refill requests  through [employer] or call your pharmacy and they will forward the refill request to us. Please allow 3 business days for your refill to be completed.          Additional Information About Your Visit        [employer] Information     [employer] gives you secure access to your electronic health record. If you see a primary care provider, you can also send messages to your care team and make appointments. If you have questions, please call your primary care clinic.  If you do not have a primary care provider, please call [phone] and they will assist you.        Care EveryWhere ID     This is your Care EveryWhere ID. This could be used by other organizations to access your [place] medical records  [id number]         Blood Pressure from Last 3 Encounters:   03/06/18 134/84   12/21/17 132/68   12/14/17 135/70    Weight from Last 3 Encounters:   07/12/18 125.6 kg (277 lb)   03/06/18 125.6 kg (277 lb)   11/30/17 131 kg (288 lb 11.2 oz)              We Performed the Following     MANUAL THER TECH,1+REGIONS,EA 15 MIN     NEUROMUSCULAR RE-EDUCATION     THERAPEUTIC EXERCISES        Primary Care Provider Office Phone # Fax #    Yobany Bernal [phone] [address] [facility] [address]        Equal Access to Services     FINA MOSER : Hadii aad ku hadasho Soomaali, waaxda luqadaha, qaybta kaalmada adeegyada, waxay idiin hayolgan lilli kovacs lapeter . So [facility] [phone].    ATENCIÓN: Si habla español, tiene a richardson disposición servicios gratuitos de asistencia lingüística. Llame al [phone].    We comply with applicable federal civil rights laws and [place] laws. We do not discriminate on the basis of race, color, national origin, age, disability, sex, sexual orientation, or gender identity.            Thank you!     Thank you for choosing [facility]  for your care. Our goal is always to provide you with excellent care. Hearing back from our patients is one way we can continue  to improve our services. Please take a few minutes to complete the written survey that you may receive in the mail after your visit with us. Thank you!             Your Updated Medication List - Protect others around you: Learn how to safely use, store and throw away your medicines at www.disposemymeds.org.          This list is accurate as of 7/30/18 11:59 PM.  Always use your most recent med list.                   Brand Name Dispense Instructions for use Diagnosis    ASPIRIN NOT PRESCRIBED    INTENTIONAL    1 each    continuous prn Antiplatelet medication not prescribed intentionally due to GI Issues / Ulcer Disease        blood glucose monitoring lancets     102 each    Use to test blood sugar 2 times daily or as directed.  Ok to substitute alternative if insurance prefers.    Uncontrolled type 2 diabetes mellitus without complication, without long-term current use of insulin (H)       * blood glucose monitoring test strip    no brand specified    3 Box    1 strip by In Vitro route daily accucheck compact plus test strips.  Testing once daily    Uncontrolled type 2 diabetes mellitus without complication, without long-term current use of insulin (H)       * blood glucose monitoring test strip    ACCU-CHEK GUIDE    100 strip    Use to test blood sugar 2 times daily or as directed.    Uncontrolled type 2 diabetes mellitus without complication, without long-term current use of insulin (H)       glipiZIDE 10 MG 24 hr tablet    glipiZIDE XL    90 tablet    One tablet daily    Uncontrolled type 2 diabetes mellitus without complication, without long-term current use of insulin (H)       insulin pen needle 32G X 4 MM    BD GABRIELA U/F    100 each    Use 1 daily as directed.    Uncontrolled type 2 diabetes mellitus without complication, without long-term current use of insulin (H)       liraglutide 18 MG/3ML soln    VICTOZA    27 mL    Inject 1.8 mg Subcutaneous daily    Uncontrolled type 2 diabetes mellitus without  complication, without long-term current use of insulin (H)       metFORMIN 500 MG 24 hr tablet    GLUCOPHAGE-XR    360 tablet    Take 4 tablets (2,000 mg) by mouth daily (with dinner)    Uncontrolled type 2 diabetes mellitus without complication, without long-term current use of insulin (H)       methylPREDNISolone 4 MG tablet    MEDROL DOSEPAK    21 tablet    Follow package instructions    Right wrist pain       order for DME     1 Units    Equipment being ordered: wrist splint    Left wrist pain       simvastatin 10 MG tablet    ZOCOR    90 tablet    Take 1 tablet (10 mg) by mouth At Bedtime    High cholesterol       * Notice:  This list has 2 medication(s) that are the same as other medications prescribed for you. Read the directions carefully, and ask your doctor or other care provider to review them with you.

## 2018-07-30 NOTE — PROGRESS NOTES
SOAP note objective information for 7/30/2018.  ROM:  Pain Report:  - none    + mild    ++ moderate    +++ severe     Wrist  1/11/2018 7/12/18 7/20/18 7/30/18   AROM (PROM) R L      Extension 45 + 60 38++ 42 46   Flexion 50 + 65 20+ 30+ 32   RD 10 20 12+ 13+ 11+   UD 30 30 18++ 25++ 32   Supination 60 90 52++ 60+ 71   Pronation 70 85 90     Please refer to the daily flowsheet for treatment today, total treatment time and time spent performing 1:1 timed codes.       Home Exercise Program:  OTC wrist orthosis, prn  Tendon glides  Wrist AROM  Ice  Heat  Massage to extensors and flexors  Functional use, as much as able    Next Visit:  A/AA/PROM  MFR  strengthening

## 2018-08-02 ENCOUNTER — OFFICE VISIT (OUTPATIENT)
Dept: ORTHOPEDICS | Facility: CLINIC | Age: 53
End: 2018-08-02
Payer: COMMERCIAL

## 2018-08-02 ENCOUNTER — THERAPY VISIT (OUTPATIENT)
Dept: OCCUPATIONAL THERAPY | Facility: CLINIC | Age: 53
End: 2018-08-02
Payer: COMMERCIAL

## 2018-08-02 VITALS — WEIGHT: 277 LBS | HEIGHT: 70 IN | BODY MASS INDEX: 39.65 KG/M2

## 2018-08-02 DIAGNOSIS — M79.641 PAIN OF RIGHT HAND: ICD-10-CM

## 2018-08-02 DIAGNOSIS — M25.641 STIFFNESS OF RIGHT HAND JOINT: ICD-10-CM

## 2018-08-02 DIAGNOSIS — Z09 SURGICAL FOLLOW-UP CARE: ICD-10-CM

## 2018-08-02 DIAGNOSIS — M79.641 PAIN OF RIGHT HAND: Primary | ICD-10-CM

## 2018-08-02 PROCEDURE — 97140 MANUAL THERAPY 1/> REGIONS: CPT | Mod: GO | Performed by: OCCUPATIONAL THERAPIST

## 2018-08-02 PROCEDURE — 97110 THERAPEUTIC EXERCISES: CPT | Mod: GO | Performed by: OCCUPATIONAL THERAPIST

## 2018-08-02 PROCEDURE — 97022 WHIRLPOOL THERAPY: CPT | Mod: GO | Performed by: OCCUPATIONAL THERAPIST

## 2018-08-02 NOTE — MR AVS SNAPSHOT
After Visit Summary   8/2/2018    Wei Wolf    MRN: [id number]           Patient Information     Date Of Birth          1965        Visit Information        Provider Department      8/2/2018 8:00 AM [facility]        Today's Diagnoses     Pain of right hand    -  1       Follow-ups after your visit        Additional Services     HAND THERAPY Occupational Therapy or Physical Therapy       Hand Therapy Referral  Edema glove to wear at night                  Your next 10 appointments already scheduled     Aug 09, 2018  7:00 AM CDT   [facility] Hand [facility]I [facility] ([facility])    [address] [place] [address]   [phone]            Aug 13, 2018  7:00 AM CDT   [facility] ([facility])    [address] [place] [address]   [phone]            Aug 20, 2018  7:00 AM CDT   [facility] Hand [facility] ([facility])    [address] [place] [address]   [phone]            Aug 23, 2018  7:00 AM CDT   [facility] Hand [facility] ([facility])    [address] [place] [address]   [phone]            Aug 27, 2018  7:00 AM CDT   [facility] Hand [facility]I [facility] ([facility])    [address] [place] [address]   [phone]            Aug 30, 2018  8:00 AM CDT   [facility] Hand [facility] ([facility])    [address] [place] [address]   [phone]            Sep 06, 2018  7:00 AM CDT   [facility] Hand [facility] (  "[facility])    [address] [place] [address]   [phone]            Sep 13, 2018  7:00 AM CDT   CONOR Hand with Myrna Negron OT   [facility] ([facility])    [address] [place] [id number] [phone]            Sep 20, 2018  7:30 AM CDT   (Arrive by 7:15 AM)   RETURN HAND with Lydia Corrales MD   [facility] ([facility])    [address] [place] [id number] [phone]              Who to contact     Please call your clinic at [phone] to:    Ask questions about your health    Make or cancel appointments    Discuss your medicines    Learn about your test results    Speak to your doctor            Additional Information About Your Visit        [employer] Information     [facility] [facility] gives you secure access to your electronic health record. If you see a primary care provider, you can also send messages to your care team and make appointments. If you have questions, please call your primary care clinic.  If you do not have a primary care provider, please call [phone] and they will assist you.      [facility] is an electronic gateway that provides easy, online access to your medical records. With [facility], you can request a clinic appointment, read your test results, renew a prescription or communicate with your care team.     To access your existing account, please contact your [facility] or call [phone] for assistance.        Care EveryWhere ID     This is your Care EveryWhere ID. This could be used by other organizations to access your [place] medical records  [id number]        Your Vitals Were     Height BMI (Body Mass Index)                1.778 m (5' 10\") 39.75 kg/m2           Blood Pressure from Last 3 Encounters:   03/06/18 134/84   12/21/17 132/68   12/14/17 135/70    Weight " from Last 3 Encounters:   08/02/18 125.6 kg (277 lb)   07/12/18 125.6 kg (277 lb)   03/06/18 125.6 kg (277 lb)              We Performed the Following     HAND THERAPY Occupational Therapy or Physical Therapy        Primary Care Provider Office Phone # Fax #    Yobany Bernal [phone] [address] [facility] [address]        Equal Access to Services     FINA MOSER : Hadii aad ku hadasho Soomaali, waaxda luqadaha, qaybta kaalmada adeegyada, waxay idiin hayaan adeeg kharash lapeter . So [facility] [phone].    ATENCIÓN: Si josuéla ramu, tiene a richardson disposición servicios gratuitos de asistencia lingüística. Llame al [phone].    We comply with applicable federal civil rights laws and [place] laws. We do not discriminate on the basis of race, color, national origin, age, disability, sex, sexual orientation, or gender identity.            Thank you!     Thank you for [facility]  for your care. Our goal is always to provide you with excellent care. Hearing back from our patients is one way we can continue to improve our services. Please take a few minutes to complete the written survey that you may receive in the mail after your visit with us. Thank you!             Your Updated Medication List - Protect others around you: Learn how to safely use, store and throw away your medicines at www.disposemymeds.org.          This list is accurate as of 8/2/18  1:46 PM.  Always use your most recent med list.                   Brand Name Dispense Instructions for use Diagnosis    ASPIRIN NOT PRESCRIBED    INTENTIONAL    1 each    continuous prn Antiplatelet medication not prescribed intentionally due to GI Issues / Ulcer Disease        blood glucose monitoring lancets     102 each    Use to test blood sugar 2 times daily or as directed.  Ok to substitute alternative if insurance prefers.    Uncontrolled type 2 diabetes mellitus without complication, without  long-term current use of insulin (H)       * blood glucose monitoring test strip    no brand specified    3 Box    1 strip by In Vitro route daily accucheck compact plus test strips.  Testing once daily    Uncontrolled type 2 diabetes mellitus without complication, without long-term current use of insulin (H)       * blood glucose monitoring test strip    ACCU-CHEK GUIDE    100 strip    Use to test blood sugar 2 times daily or as directed.    Uncontrolled type 2 diabetes mellitus without complication, without long-term current use of insulin (H)       glipiZIDE 10 MG 24 hr tablet    glipiZIDE XL    90 tablet    One tablet daily    Uncontrolled type 2 diabetes mellitus without complication, without long-term current use of insulin (H)       insulin pen needle 32G X 4 MM    BD GABRIELA U/F    100 each    Use 1 daily as directed.    Uncontrolled type 2 diabetes mellitus without complication, without long-term current use of insulin (H)       liraglutide 18 MG/3ML soln    VICTOZA    27 mL    Inject 1.8 mg Subcutaneous daily    Uncontrolled type 2 diabetes mellitus without complication, without long-term current use of insulin (H)       metFORMIN 500 MG 24 hr tablet    GLUCOPHAGE-XR    360 tablet    Take 4 tablets (2,000 mg) by mouth daily (with dinner)    Uncontrolled type 2 diabetes mellitus without complication, without long-term current use of insulin (H)       methylPREDNISolone 4 MG tablet    MEDROL DOSEPAK    21 tablet    Follow package instructions    Right wrist pain       order for DME     1 Units    Equipment being ordered: wrist splint    Left wrist pain       simvastatin 10 MG tablet    ZOCOR    90 tablet    Take 1 tablet (10 mg) by mouth At Bedtime    High cholesterol       * Notice:  This list has 2 medication(s) that are the same as other medications prescribed for you. Read the directions carefully, and ask your doctor or other care provider to review them with you.

## 2018-08-02 NOTE — LETTER
2018       RE: Wei Wolf  [address] [facility] [address]     Dear Colleague,    Thank you for referring your patient, Wei Wolf, to the [facility]. Please see a copy of my visit note below.    [facility]  Orthopedics  Lydia Corrales MD  2018     Name: Wei Wolf  MRN: [id number]  Age: 52 year old  : 1965  Referring provider: Referred Self     Chief Complaint: RECHECK and Pain of the Right Wrist    History of Present Illness:   Wei Wolf is a 52 year old, right handed male who presents today for follow-up regarding right wrist pain. He is status-post curettage and bone grafting of the cyst in the head of his right index metacarpal on 2018. I last evaluated the patient on 2018; please see this note for further details. At that time, he was complaining of persistent, but intermittent pain associated primarily with pronation and supination of the wrist. Pain appeared to worse along the ulnar aspect of the wrist, though he did mention some pain localized both dorsally and radially. He was noted to have profound stiffness on examination of the wrist. Recommendation was made for formal hand therapy with a CRPS protocol. He was additionally provided a prescription for a Medrol Dosepak to help break the cycle of inflammation. Today, the patient voices the Medrol Dosepak helped to relieve some of the sharp, shooting pain. He does mention this caused elevation of his glucose levels. He additionally states he developed another dental abscess while on the prednisone which his dentist feels may have precipitated this. Wrist extension continues to be difficult, but he is improving with flexion. If he does not complete his daily exercises, the wrist becomes very sore.     Review of Systems:   A 14-point review of systems was obtained and is negative except for as noted in the HPI.  "    Physical Examination:  Height 1.778 m (5' 10\"), weight 125.6 kg (277 lb).  Pinch force  R hand key force: 11.3 kg (25 lb)  L hand key force: 12.2 kg (27 lb)   force  R hand  level 2 force: 20.4 kg (45 lb)  L hand  level  2 force: 56.7 kg (125 lb)  General: Healthy appearing male. Affect appropriate. Normal gait. Alert and oriented to surroundings.   Right Upper Extremity: Lacks 10 degrees of terminal extension at the digits but full flexion. Extension to 46 degrees. Flexion to 32 degrees. Radial deviation 11+. Ulnar deviation 32 degrees. Supination 71 degrees.     Assessment:   52-year-old right-hand-dominant male status-post curettage of a right index metacarpal cyst with bone grafting on 12/21/2018 now recovered, now with onset of significant right wrist and forearm pain associated primarily with supination and pronation.     Plan:   I have recommended Wei increase his frequency of hand therapy; he was able to get in for an appointment today. We discussed obtaining a glove in therapy to help with swelling. He will follow up in 6 weeks. He was in agreement with this plan.       Scribe Disclosure:   I, Kelsi Sliva, am serving as a scribe to document services personally performed by Lydia Corrales MD at this visit, based upon the provider's statements to me. All documentation has been reviewed by the aforementioned provider prior to being entered into the official medical record.     Portions of this medical record were completed by a scribe. UPON MY REVIEW AND AUTHENTICATION BY ELECTRONIC SIGNATURE, this confirms (a) I performed the applicable clinical services, and (b) the record is accurate.    Lydia Corrales MD   Hand and Upper Extremity Specialist  [facility]         "

## 2018-08-02 NOTE — PROGRESS NOTES
"[facility]  Orthopedics  Lydia Corrales MD  2018     Name: Wei Wolf  MRN: [id number]  Age: 52 year old  : 1965  Referring provider: Referred Self     Chief Complaint: RECHECK and Pain of the Right Wrist    History of Present Illness:   Wei Wolf is a 52 year old, right handed male who presents today for follow-up regarding right wrist pain. He is status-post curettage and bone grafting of the cyst in the head of his right index metacarpal on 2018. I last evaluated the patient on 2018; please see this note for further details. At that time, he was complaining of persistent, but intermittent pain associated primarily with pronation and supination of the wrist. Pain appeared to worse along the ulnar aspect of the wrist, though he did mention some pain localized both dorsally and radially. He was noted to have profound stiffness on examination of the wrist. Recommendation was made for formal hand therapy with a CRPS protocol. He was additionally provided a prescription for a Medrol Dosepak to help break the cycle of inflammation. Today, the patient voices the Medrol Dosepak helped to relieve some of the sharp, shooting pain. He does mention this caused elevation of his glucose levels. He additionally states he developed another dental abscess while on the prednisone which his dentist feels may have precipitated this. Wrist extension continues to be difficult, but he is improving with flexion. If he does not complete his daily exercises, the wrist becomes very sore.     Review of Systems:   A 14-point review of systems was obtained and is negative except for as noted in the HPI.     Physical Examination:  Height 1.778 m (5' 10\"), weight 125.6 kg (277 lb).  Pinch force  R hand key force: 11.3 kg (25 lb)  L hand key force: 12.2 kg (27 lb)   force  R hand  level 2 force: 20.4 kg (45 lb)  L hand  level  2 force: 56.7 kg (125 lb)  General: Healthy appearing " male. Affect appropriate. Normal gait. Alert and oriented to surroundings.   Right Upper Extremity: Lacks 10 degrees of terminal extension at the digits but full flexion. Extension to 46 degrees. Flexion to 32 degrees. Radial deviation 11+. Ulnar deviation 32 degrees. Supination 71 degrees.     Assessment:   52-year-old right-hand-dominant male status-post curettage of a right index metacarpal cyst with bone grafting on 12/21/2018 now recovered, now with onset of significant right wrist and forearm pain associated primarily with supination and pronation.     Plan:   I have recommended Wei increase his frequency of hand therapy; he was able to get in for an appointment today. We discussed obtaining a glove in therapy to help with swelling. He will follow up in 6 weeks. He was in agreement with this plan.       Scribe Disclosure:   I, Kelsi Silva, am serving as a scribe to document services personally performed by Lydia Corrales MD at this visit, based upon the provider's statements to me. All documentation has been reviewed by the aforementioned provider prior to being entered into the official medical record.     Portions of this medical record were completed by a scribe. UPON MY REVIEW AND AUTHENTICATION BY ELECTRONIC SIGNATURE, this confirms (a) I performed the applicable clinical services, and (b) the record is accurate.    Lydia Corrales MD   Hand and Upper Extremity Specialist  [facility]

## 2018-08-02 NOTE — NURSING NOTE
"Reason For Visit:   Chief Complaint   Patient presents with     Right Wrist - RECHECK, Pain       Primary MD: Yobany Bernal  Ref. MD: self    Age: 52 year old    ?  No      Ht 1.778 m (5' 10\")  Wt 125.6 kg (277 lb)  BMI 39.75 kg/m2      Pain Assessment  Patient Currently in Pain: Yes  0-10 Pain Scale: 2  Primary Pain Location: Wrist  Pain Orientation: Right  Pain Descriptors: Aching  Alleviating Factors: Rest  Aggravating Factors: Movement    Hand Dominance Evaluation  Hand Dominance: Right  Pinch force  R hand key force: 11.3 kg (25 lb)  L hand key force: 12.2 kg (27 lb)   force  R hand  level 2 force: 20.4 kg (45 lb)  L hand  level  2 force: 56.7 kg (125 lb)    QuickDASH Assessment  QuickDASH Main 11/16/2017   1.Open a tight or new jar. Mild difficulty   2. Do heavy household chores (e.g., wash walls, floors) Mild difficulty   3. Carry a shopping bag or briefcase. Mild difficulty   4. Wash your back. Mild difficulty   5. Use a knife to cut food. Mild difficulty   6. Recreational activities in which you take some force or impact through your arm, shoulder or hand (e.g., golf, hammering, tennis, etc.). Moderate difficulty   7. During the past week, to what extent has your arm, shoulder or hand problem interfered with your normal social activities with family, friends, neighbours or groups? Slightly   8. During the past week, were you limited in your work or other regular daily activities as a result of your arm, shoulder or hand problem? Slightly limited   9. Arm, shoulder or hand pain. Moderate   10.Tingling (pins and needles) in your arm,shoulder or hand. None   11. During the past week, how much difficulty have you had sleeping because of the pain in your arm, shoulder or hand? ([place] number) No difficulty   Quickdash Ability Score 25          Current Outpatient Prescriptions   Medication Sig Dispense Refill     ASPIRIN NOT PRESCRIBED, INTENTIONAL, continuous prn Antiplatelet " medication not prescribed intentionally due to GI Issues / Ulcer Disease 1 each 0     blood glucose monitoring (ACCU-CHEK FASTCLIX) lancets Use to test blood sugar 2 times daily or as directed.  Ok to substitute alternative if insurance prefers. 102 each 11     blood glucose monitoring (ACCU-CHEK GUIDE) test strip Use to test blood sugar 2 times daily or as directed. 100 strip 11     blood glucose monitoring (NO BRAND SPECIFIED) test strip 1 strip by In Vitro route daily accucheck compact plus test strips.  Testing once daily 3 Box 3     glipiZIDE (GLIPIZIDE XL) 10 MG 24 hr tablet One tablet daily 90 tablet 0     insulin pen needle (BD GABRIELA U/F) 32G X 4 MM Use 1 daily as directed. 100 each 11     liraglutide (VICTOZA) 18 MG/3ML soln Inject 1.8 mg Subcutaneous daily 27 mL 1     metFORMIN (GLUCOPHAGE-XR) 500 MG 24 hr tablet Take 4 tablets (2,000 mg) by mouth daily (with dinner) 360 tablet 0     methylPREDNISolone (MEDROL DOSEPAK) 4 MG tablet Follow package instructions 21 tablet 0     order for DME Equipment being ordered: wrist splint 1 Units 0     simvastatin (ZOCOR) 10 MG tablet Take 1 tablet (10 mg) by mouth At Bedtime 90 tablet 0       Allergies   Allergen Reactions     Metformin Diarrhea       Michell Arias, ATC

## 2018-08-09 ENCOUNTER — THERAPY VISIT (OUTPATIENT)
Dept: OCCUPATIONAL THERAPY | Facility: CLINIC | Age: 53
End: 2018-08-09
Payer: COMMERCIAL

## 2018-08-09 DIAGNOSIS — Z09 SURGICAL FOLLOW-UP CARE: ICD-10-CM

## 2018-08-09 DIAGNOSIS — M79.641 PAIN OF RIGHT HAND: ICD-10-CM

## 2018-08-09 DIAGNOSIS — M25.641 STIFFNESS OF RIGHT HAND JOINT: ICD-10-CM

## 2018-08-09 PROCEDURE — 97140 MANUAL THERAPY 1/> REGIONS: CPT | Mod: GO | Performed by: OCCUPATIONAL THERAPIST

## 2018-08-09 PROCEDURE — 97110 THERAPEUTIC EXERCISES: CPT | Mod: GO | Performed by: OCCUPATIONAL THERAPIST

## 2018-08-09 PROCEDURE — 97022 WHIRLPOOL THERAPY: CPT | Mod: GO | Performed by: OCCUPATIONAL THERAPIST

## 2018-08-09 NOTE — MR AVS SNAPSHOT
After Visit Summary   8/9/2018    Wei Wolf    MRN: [id number]           Patient Information     Date Of Birth          1965        Visit Information        Provider Department      8/9/2018 7:00 AM Myrna Negron OT [facility]        Today's Diagnoses     Pain of right hand        Surgical follow-up care        Stiffness of right hand joint           Follow-ups after your visit        Your next 10 appointments already scheduled     Aug 13, 2018  7:00 AM CDT   [facility] Hand with [facility] ([facility] [place])    [address] [place] [id number] [phone]            Aug 20, 2018  7:00 AM CDT   [facility] Hand with [facility] ([facility] [place])    [address] [place] [id number] [phone]            Aug 23, 2018  7:00 AM CDT   [facility] ([facility] [place])    [address] [place] [id number] [phone]            Aug 27, 2018  7:00 AM CDT   [facility] Hand [facility] ([facility])    [address] [place] [id number] [phone]            Aug 30, 2018  8:00 AM CDT   [facility] Hand [facility] ([facility] [place])    [address] [place] [id number] [phone]            Sep 06, 2018  7:00 AM CDT   [facility] ([facility] [place])    [address] [place] [id number] [phone]            Sep 13, 2018  7:00 AM CDT   [facility] Hand with [facility] ([facility] [place])    [address] [place] [address]    [phone]            Sep 20, 2018  7:30 AM CDT   (Arrive by 7:15 AM)   RETURN HAND with Lydia Corrales MD   [facility] ([facility])    [address] [facility] [address] [phone]              Who to contact     If you have questions or need follow up information about today's clinic visit or your schedule please contact [facility] directly at [phone].  Normal or non-critical lab and imaging results will be communicated to you by [employer], letter or phone within 4 business days after the clinic has received the results. If you do not hear from us within 7 days, please contact the clinic through [employer] or phone. If you have a critical or abnormal lab result, we will notify you by phone as soon as possible.  Submit refill requests through [employer] or call your pharmacy and they will forward the refill request to us. Please allow 3 business days for your refill to be completed.          Additional Information About Your Visit        [employer] Information     [employer] gives you secure access to your electronic health record. If you see a primary care provider, you can also send messages to your care team and make appointments. If you have questions, please call your primary care clinic.  If you do not have a primary care provider, please call [phone] and they will assist you.        Care EveryWhere ID     This is your Care EveryWhere ID. This could be used by other organizations to access your [place] medical records  [id number]         Blood Pressure from Last 3 Encounters:   03/06/18 134/84   12/21/17 132/68   12/14/17 135/70    Weight from Last 3 Encounters:   08/02/18 125.6 kg (277 lb)   07/12/18 125.6 kg (277 lb)   03/06/18 125.6 kg (277 lb)              We Performed the Following     MANUAL THER TECH,1+REGIONS,EA 15 MIN     THERAPEUTIC EXERCISES     WHIRLPOOL THERAPY        Primary Care Provider Office Phone #  Fax #    Yobany Bernal [phone] [address] [facility] [address]        Equal Access to Services     FINA MOSER : Manav luciana may oni [place], watedda luqjen, qasteventa kaalmada pete, ray apodacaelías criselda. So [facility] [phone].    ATENCIÓN: Si habla español, tiene a richardson disposición servicios gratuitos de asistencia lingüística. Llame al [phone].    We comply with applicable federal civil rights laws and [place] laws. We do not discriminate on the basis of race, color, national origin, age, disability, sex, sexual orientation, or gender identity.            Thank you!     Thank you for choosing [facility]  for your care. Our goal is always to provide you with excellent care. Hearing back from our patients is one way we can continue to improve our services. Please take a few minutes to complete the written survey that you may receive in the mail after your visit with us. Thank you!             Your Updated Medication List - Protect others around you: Learn how to safely use, store and throw away your medicines at www.disposemymeds.org.          This list is accurate as of 8/9/18  7:37 AM.  Always use your most recent med list.                   Brand Name Dispense Instructions for use Diagnosis    ASPIRIN NOT PRESCRIBED    INTENTIONAL    1 each    continuous prn Antiplatelet medication not prescribed intentionally due to GI Issues / Ulcer Disease        blood glucose monitoring lancets     102 each    Use to test blood sugar 2 times daily or as directed.  Ok to substitute alternative if insurance prefers.    Uncontrolled type 2 diabetes mellitus without complication, without long-term current use of insulin (H)       * blood glucose monitoring test strip    no brand specified    3 Box    1 strip by In Vitro route daily accucheck compact plus test strips.  Testing once daily    Uncontrolled type 2 diabetes mellitus without  complication, without long-term current use of insulin (H)       * blood glucose monitoring test strip    ACCU-CHEK GUIDE    100 strip    Use to test blood sugar 2 times daily or as directed.    Uncontrolled type 2 diabetes mellitus without complication, without long-term current use of insulin (H)       glipiZIDE 10 MG 24 hr tablet    glipiZIDE XL    90 tablet    One tablet daily    Uncontrolled type 2 diabetes mellitus without complication, without long-term current use of insulin (H)       insulin pen needle 32G X 4 MM    BD GABRIELA U/F    100 each    Use 1 daily as directed.    Uncontrolled type 2 diabetes mellitus without complication, without long-term current use of insulin (H)       liraglutide 18 MG/3ML soln    VICTOZA    27 mL    Inject 1.8 mg Subcutaneous daily    Uncontrolled type 2 diabetes mellitus without complication, without long-term current use of insulin (H)       metFORMIN 500 MG 24 hr tablet    GLUCOPHAGE-XR    360 tablet    Take 4 tablets (2,000 mg) by mouth daily (with dinner)    Uncontrolled type 2 diabetes mellitus without complication, without long-term current use of insulin (H)       methylPREDNISolone 4 MG tablet    MEDROL DOSEPAK    21 tablet    Follow package instructions    Right wrist pain       order for DME     1 Units    Equipment being ordered: wrist splint    Left wrist pain       simvastatin 10 MG tablet    ZOCOR    90 tablet    Take 1 tablet (10 mg) by mouth At Bedtime    High cholesterol       * Notice:  This list has 2 medication(s) that are the same as other medications prescribed for you. Read the directions carefully, and ask your doctor or other care provider to review them with you.

## 2018-08-09 NOTE — PROGRESS NOTES
SOAP note objective information for 8/9/2018.  ROM:  Pain Report:  - none    + mild    ++ moderate    +++ severe     Wrist  1/11/2018 7/12/18 7/20/18 7/30/18 8/9/18   AROM (PROM) R L       Extension 45 + 60 38++ 42 46 47   Flexion 50 + 65 20+ 30+ 32 33   RD 10 20 12+ 13+ 11+ 12++   UD 30 30 18++ 25++ 32 28   Supination 60 90 52++ 60+ 71 67++   Pronation 70 85 90      Please refer to the daily flowsheet for treatment today, total treatment time and time spent performing 1:1 timed codes.       Home Exercise Program:  OTC wrist orthosis, prn-rarely  Tendon glides  Wrist AROM  Ice  Heat  Massage to extensors and flexors  Functional use, as much as able    Next Visit:  A/AA/PROM  MFR  strengthening

## 2018-08-13 ENCOUNTER — THERAPY VISIT (OUTPATIENT)
Dept: OCCUPATIONAL THERAPY | Facility: CLINIC | Age: 53
End: 2018-08-13
Payer: COMMERCIAL

## 2018-08-13 DIAGNOSIS — M25.641 STIFFNESS OF RIGHT HAND JOINT: ICD-10-CM

## 2018-08-13 DIAGNOSIS — Z09 SURGICAL FOLLOW-UP CARE: ICD-10-CM

## 2018-08-13 DIAGNOSIS — M79.641 PAIN OF RIGHT HAND: ICD-10-CM

## 2018-08-13 PROCEDURE — 97110 THERAPEUTIC EXERCISES: CPT | Mod: GO | Performed by: OCCUPATIONAL THERAPIST

## 2018-08-13 PROCEDURE — 97140 MANUAL THERAPY 1/> REGIONS: CPT | Mod: GO | Performed by: OCCUPATIONAL THERAPIST

## 2018-08-13 NOTE — MR AVS SNAPSHOT
After Visit Summary   8/13/2018    Wei Wolf    MRN: [id number]           Patient Information     Date Of Birth          1965        Visit Information        Provider Department      8/13/2018 7:00 AM [facility]s [facility]        Today's Diagnoses     Surgical follow-up care        Pain of right hand        Stiffness of right hand joint           Follow-ups after your visit        Your next 10 appointments already scheduled     Aug 20, 2018  7:00 AM CDT   CON[facility] Hand with [facility] ([facility])    [address] [place] [address]   [phone]            Aug 23, 2018  7:00 AM CDT   [facility] ([facility])    [address] [place] [address]   [phone]            Aug 27, 2018  7:00 AM CDT   [facility] Hand [facility] ([facility])    [address] [place] [address]   [phone]            Aug 30, 2018  8:00 AM CDT   [facility] ([facility])    [address] [place] [address]   [phone]            Sep 06, 2018  7:00 AM CDT   [facility] ([facility])    [address] [place] [address]   [phone]            Sep 13, 2018  7:00 AM CDT   [facility] ([facility])    [address] [place] [address]   [phone]            Sep 20, 2018  7:30 AM CDT   (Arrive by 7:15 AM)   RETURN HAND with Lydia Corrales MD   [facility] ([facility])    [address] [place] [address]  Se  4th Floor  [facility] [address] [phone]              Who to contact     If you have questions or need follow up information about today's clinic visit or your schedule please contact [facility] directly at [phone].  Normal or non-critical lab and imaging results will be communicated to you by MyChart, letter or phone within 4 business days after the clinic has received the results. If you do not hear from us within 7 days, please contact the clinic through [employer] or phone. If you have a critical or abnormal lab result, we will notify you by phone as soon as possible.  Submit refill requests through LearnSomething or call your pharmacy and they will forward the refill request to us. Please allow 3 business days for your refill to be completed.          Additional Information About Your Visit        [email or web address] Information     LearnSomething gives you secure access to your electronic health record. If you see a primary care provider, you can also send messages to your care team and make appointments. If you have questions, please call your primary care clinic.  If you do not have a primary care provider, please call [phone] and they will assist you.        Care EveryWhere ID     This is your Care EveryWhere ID. This could be used by other organizations to access your [facility] medical records  [id number]         Blood Pressure from Last 3 Encounters:   03/06/18 134/84   12/21/17 132/68   12/14/17 135/70    Weight from Last 3 Encounters:   08/02/18 125.6 kg (277 lb)   07/12/18 125.6 kg (277 lb)   03/06/18 125.6 kg (277 lb)              We Performed the Following     MANUAL THER TECH,1+REGIONS,EA 15 MIN     THERAPEUTIC EXERCISES        Primary Care Provider Office Phone # Fax #    Yobany Bernal [phone] [address] [address] [facility] [address]        Equal Access to Services     FINA MOSER : Manav Campo, lazara oliveros, leigh freeman,  ray loganelías cottrell'aan ah. So [facility] [phone].    ATENCIÓN: Si habla ramu, tiene a richardson disposición servicios gratuitos de asistencia lingüística. Caio al [phone].    We comply with applicable federal civil rights laws and [place] laws. We do not discriminate on the basis of race, color, national origin, age, disability, sex, sexual orientation, or gender identity.            Thank you!     Thank you for choosing [facility] THER[facility]  for your care. Our goal is always to provide you with excellent care. Hearing back from our patients is one way we can continue to improve our services. Please take a few minutes to complete the written survey that you may receive in the mail after your visit with us. Thank you!             Your Updated Medication List - Protect others around you: Learn how to safely use, store and throw away your medicines at www.disposemymeds.org.          This list is accurate as of 8/13/18  7:48 AM.  Always use your most recent med list.                   Brand Name Dispense Instructions for use Diagnosis    ASPIRIN NOT PRESCRIBED    INTENTIONAL    1 each    continuous prn Antiplatelet medication not prescribed intentionally due to GI Issues / Ulcer Disease        blood glucose monitoring lancets     102 each    Use to test blood sugar 2 times daily or as directed.  Ok to substitute alternative if insurance prefers.    Uncontrolled type 2 diabetes mellitus without complication, without long-term current use of insulin (H)       * blood glucose monitoring test strip    no brand specified    3 Box    1 strip by In Vitro route daily accucheck compact plus test strips.  Testing once daily    Uncontrolled type 2 diabetes mellitus without complication, without long-term current use of insulin (H)       * blood glucose monitoring test strip    ACCU-CHEK GUIDE    100 strip    Use to test blood sugar 2 times daily or as directed.    Uncontrolled type 2 diabetes mellitus without  complication, without long-term current use of insulin (H)       glipiZIDE 10 MG 24 hr tablet    glipiZIDE XL    90 tablet    One tablet daily    Uncontrolled type 2 diabetes mellitus without complication, without long-term current use of insulin (H)       insulin pen needle 32G X 4 MM    BD GABRIELA U/F    100 each    Use 1 daily as directed.    Uncontrolled type 2 diabetes mellitus without complication, without long-term current use of insulin (H)       liraglutide 18 MG/3ML soln    VICTOZA    27 mL    Inject 1.8 mg Subcutaneous daily    Uncontrolled type 2 diabetes mellitus without complication, without long-term current use of insulin (H)       metFORMIN 500 MG 24 hr tablet    GLUCOPHAGE-XR    360 tablet    Take 4 tablets (2,000 mg) by mouth daily (with dinner)    Uncontrolled type 2 diabetes mellitus without complication, without long-term current use of insulin (H)       methylPREDNISolone 4 MG tablet    MEDROL DOSEPAK    21 tablet    Follow package instructions    Right wrist pain       order for DME     1 Units    Equipment being ordered: wrist splint    Left wrist pain       simvastatin 10 MG tablet    ZOCOR    90 tablet    Take 1 tablet (10 mg) by mouth At Bedtime    High cholesterol       * Notice:  This list has 2 medication(s) that are the same as other medications prescribed for you. Read the directions carefully, and ask your doctor or other care provider to review them with you.

## 2018-08-13 NOTE — PROGRESS NOTES
Hand Therapy Progress Note    Current Date:  8/13/2018    Diagnosis: Pain of finger of right hand, Surgical follow-up care  DOS:  12/21/17  Procedure:   Right Index Finger Metacarpal Bone Cyst Curettage with                                               Allograft from Distal Radius   Post:  > 6 months  Referring MD: Lydia Corrales MD   Reporting period is 7/12/18 to 8/13/2018    Subjective:   Subjective changes noted by patient:  It's really sore. I was working on a car on Friday and caught my thumb and it bent my wrist backwards. It's been painful since. Overall it's better since I first came in July, I'm not having the lightening pain anymore. I haven't been wearing the wrist brace. I just feel weak, I have a hard time opening small caps (water/soda)  Functional changes noted by patient:  Improvement in Self Care Tasks (dressing, bathing), Sleep Patterns and Household Chores  Patient has noted adverse reaction to:  None    Functional Outcome Measure:  Upper Extremity Functional Index Score:  SCORE:   Column Totals: /80: 48   (A lower score indicates greater disability.)    Objective:  Observation: Color/Temperature:     [x]    Normal  []    Abnormal    PAIN 1/11/2018 7/12/18 8/13/18   Location Right  wrist and hand Radial and ulnar wrist Volar ulnar wrist    Radiates no no no   Worse d/n daytime daytime Daytime and laying in bed at night   Frequency activity dependant intermittent constant   Exacerbated by movement Wrist extension, rotation Flexing wrist, pronation   Relieves rest Rest, pain medication Rest, ibuprofen   At rest 0-10/10 0/10 0/10 0/10   At worst 7/10 6/10 4/10 - 6/10 after bending it too far   Sleep disruption?   no yes no   Progression Gradually getting better. Slow improvement Slowly improving     ROM:  Pain Report:  - none    + mild    ++ moderate    +++ severe     Wrist  1/11/2018 7/12/18 7/20/18 7/30/18 8/9/18 8/13/18   AROM (PROM) R L     R   Extension 45 + 60 38++ 42 46 47 45   Flexion 50 +  65 20+ 30+ 32 33 37   RD 10 20 12+ 13+ 11+ 12++ 10+   UD 30 30 18++ 25++ 32 28 22++   Supination 60 90 52++ 60+ 71 67++ 65   Pronation 70 85 90    75     Strength:   (Measured in pounds)  Pain Report:  - none    + mild    ++ moderate    +++ severe     7/12/2018 8/13/18   Trials left right R   1  2  3 119  115  109 35+++  20  19 44++  32  35   Average: 114 25 37     Lat Pinch  7/12/2018 8/13/18   Trials left right R   1  2  3 25  27  27 26  25  25 25  24  25   Average: 26 25 25     3 Pt Pinch  7/12/2018 8/13/18   Trials left right R   1  2  3 26  25  25 16+  17+  14+ 19  21  18   Average: 25 16 19     Please refer to the daily flowsheet for treatment today, total treatment time and time spent performing 1:1 timed codes.      Assessment:  Response to therapy has been improvement to:  ROM of Wrist:  Ext  and Flex  Strength:   and pinch  Pain:  frequency is less and intensity of pain is decreased    Overall Assessment:  Patient is becoming more independent in home exercise program  Patient has experienced an exacerbation of symptoms.  STG/LTG:  STGoals have been reviewed and progress or achievement has occurred;  see goal sheet for details and updates.    Plan:  Frequency/Duration:  Recommend continuing with the current treatment plan. 1 X week, once daily  for 6 weeks  Appropriateness of Rx I have re-evaluated this patient and find that the nature, scope, duration and intensity of the therapy is appropriate for the medical condition of the patient.  Recommendations for Continued Therapy    Modalities:  US, fluidotherapy  Therapeutic Exercise:  AROM, AAROM, PROM, Tendon Gliding, Blocking, Reverse Blocking, Extensor Tracking, Isotonics, Isometrics and Stabilization  Neuromuscular re-education:  Kinesiotaping, Isometrics and Stabilization  Manual Techniques:  Manual edema mobilization  Orthotic Fabrication:  Static orthosis    Home Exercise Program:  OTC wrist orthosis  Tendon glides  Wrist  AROM  Ice  Heat  Massage to extensors and flexors  Functional use, as much as able    Next Visit:  US  Ktape?  Wrist PROM  Functional ax  MFR  Strengthening if pain is under control

## 2018-08-20 ENCOUNTER — THERAPY VISIT (OUTPATIENT)
Dept: OCCUPATIONAL THERAPY | Facility: CLINIC | Age: 53
End: 2018-08-20
Payer: COMMERCIAL

## 2018-08-20 DIAGNOSIS — Z09 SURGICAL FOLLOW-UP CARE: ICD-10-CM

## 2018-08-20 DIAGNOSIS — M79.641 PAIN OF RIGHT HAND: ICD-10-CM

## 2018-08-20 DIAGNOSIS — M25.641 STIFFNESS OF RIGHT HAND JOINT: ICD-10-CM

## 2018-08-20 PROCEDURE — 97140 MANUAL THERAPY 1/> REGIONS: CPT | Mod: GO | Performed by: OCCUPATIONAL THERAPIST

## 2018-08-20 PROCEDURE — 97035 APP MDLTY 1+ULTRASOUND EA 15: CPT | Mod: GO | Performed by: OCCUPATIONAL THERAPIST

## 2018-08-20 PROCEDURE — 97110 THERAPEUTIC EXERCISES: CPT | Mod: GO | Performed by: OCCUPATIONAL THERAPIST

## 2018-08-20 NOTE — PROGRESS NOTES
SOAP note objective information for 8/20/2018.    Objective:  Observation: Color/Temperature:     [x]    Normal  []    Abnormal    PAIN 1/11/2018 7/12/18 8/13/18   Location Right  wrist and hand Radial and ulnar wrist Volar ulnar wrist    Radiates no no no   Worse d/n daytime daytime Daytime and laying in bed at night   Frequency activity dependant intermittent constant   Exacerbated by movement Wrist extension, rotation Flexing wrist, pronation   Relieves rest Rest, pain medication Rest, ibuprofen   At rest 0-10/10 0/10 0/10 0/10   At worst 7/10 6/10 4/10 - 6/10 after bending it too far   Sleep disruption?   no yes no   Progression Gradually getting better. Slow improvement Slowly improving     ROM:  Pain Report:  - none    + mild    ++ moderate    +++ severe     Wrist  1/11/2018 7/12/18 7/20/18 7/30/18 8/9/18 8/13/18 8/20/18   AROM (PROM) R L     R R   Extension 45 + 60 38++ 42 46 47 45 43   Flexion 50 + 65 20+ 30+ 32 33 37 30   RD 10 20 12+ 13+ 11+ 12++ 10+ 10   UD 30 30 18++ 25++ 32 28 22++ 15   Supination 60 90 52++ 60+ 71 67++ 65 65   Pronation 70 85 90    75 75     Strength:   (Measured in pounds)  Pain Report:  - none    + mild    ++ moderate    +++ severe     7/12/2018 8/13/18   Trials left right R   1  2  3 119  115  109 35+++  20  19 44++  32  35   Average: 114 25 37     Lat Pinch  7/12/2018 8/13/18   Trials left right R   1  2  3 25  27  27 26  25  25 25  24  25   Average: 26 25 25     3 Pt Pinch  7/12/2018 8/13/18   Trials left right R   1  2  3 26  25  25 16+  17+  14+ 19  21  18   Average: 25 16 19     Please refer to the daily flowsheet for treatment today, total treatment time and time spent performing 1:1 timed codes.

## 2018-08-20 NOTE — MR AVS SNAPSHOT
After Visit Summary   8/20/2018    Wei Wolf    MRN: [id number]           Patient Information     Date Of Birth          1965        Visit Information        Provider Department      8/20/2018 7:00 AM Heather [facility]s OT [facility]        Today's Diagnoses     Surgical follow-up care        Pain of right hand        Stiffness of right hand joint           Follow-ups after your visit        Your next 10 appointments already scheduled     Aug 23, 2018  7:00 AM CDT   [facility] Hand [facility] ([facility])    [address] [place] [address]   [phone]            Aug 27, 2018  7:00 AM CDT   [facility] Hand [facility] ([facility])    [address] [place] [address] [phone]            Aug 30, 2018  8:00 AM CDT   [facility] Hand [facility] ([facility])    [address] [place] [address] [phone]            Sep 06, 2018  7:00 AM CDT   [facility] Hand [facility] ([facility])    [address] [place] [address] [phone]            Sep 13, 2018  7:00 AM CDT   [facility] Hand [facility] ([facility])    [address] [place] [address] [phone]            Sep 20, 2018  7:30 AM CDT   (Arrive by 7:15 AM)   RETURN HAND with Lydia Corrales MD   [facility] ([facility])    [address] [place] [address] [phone]              Who to contact     If you have questions or need follow up information about today's clinic visit or your schedule please contact [facility]  THERAPY directly at [phone].  Normal or non-critical lab and imaging results will be communicated to you by MyChart, letter or phone within 4 business days after the clinic has received the results. If you do not hear from us within 7 days, please contact the clinic through [employer] or phone. If you have a critical or abnormal lab result, we will notify you by phone as soon as possible.  Submit refill requests through [employer] or call your pharmacy and they will forward the refill request to us. Please allow 3 business days for your refill to be completed.          Additional Information About Your Visit        [employer] Information     [employer] gives you secure access to your electronic health record. If you see a primary care provider, you can also send messages to your care team and make appointments. If you have questions, please call your primary care clinic.  If you do not have a primary care provider, please call [phone] and they will assist you.        Care EveryWhere ID     This is your Care EveryWhere ID. This could be used by other organizations to access your [facility] medical records  [id number]         Blood Pressure from Last 3 Encounters:   03/06/18 134/84   12/21/17 132/68   12/14/17 135/70    Weight from Last 3 Encounters:   08/02/18 125.6 kg (277 lb)   07/12/18 125.6 kg (277 lb)   03/06/18 125.6 kg (277 lb)              We Performed the Following     MANUAL THER TECH,1+REGIONS,EA 15 MIN     THERAPEUTIC EXERCISES     ULTRASOUND THERAPY        Primary Care Provider Office Phone # Fax #    Yobany Bernal [phone] [address] [facility] [address]        Equal Access to Services     [facility]: Hadii luciana may hadolivier Sodiego, waaxda luqadaha, qaybta kaalmaray barajas. So [facility] [phone].    ATENCIÓN: Si habla español, tiene a richardson disposición servicios gratuitos de asistencia lingüística. Llame al  [phone].    We comply with applicable federal civil rights laws and [place] laws. We do not discriminate on the basis of race, color, national origin, age, disability, sex, sexual orientation, or gender identity.            Thank you!     Thank you for choosing [facility]  for your care. Our goal is always to provide you with excellent care. Hearing back from our patients is one way we can continue to improve our services. Please take a few minutes to complete the written survey that you may receive in the mail after your visit with us. Thank you!             Your Updated Medication List - Protect others around you: Learn how to safely use, store and throw away your medicines at www.disposemymeds.org.          This list is accurate as of 8/20/18  8:15 AM.  Always use your most recent med list.                   Brand Name Dispense Instructions for use Diagnosis    ASPIRIN NOT PRESCRIBED    INTENTIONAL    1 each    continuous prn Antiplatelet medication not prescribed intentionally due to GI Issues / Ulcer Disease        blood glucose monitoring lancets     102 each    Use to test blood sugar 2 times daily or as directed.  Ok to substitute alternative if insurance prefers.    Uncontrolled type 2 diabetes mellitus without complication, without long-term current use of insulin (H)       * blood glucose monitoring test strip    no brand specified    3 Box    1 strip by In Vitro route daily accucheck compact plus test strips.  Testing once daily    Uncontrolled type 2 diabetes mellitus without complication, without long-term current use of insulin (H)       * blood glucose monitoring test strip    ACCU-CHEK GUIDE    100 strip    Use to test blood sugar 2 times daily or as directed.    Uncontrolled type 2 diabetes mellitus without complication, without long-term current use of insulin (H)       glipiZIDE 10 MG 24 hr tablet    glipiZIDE XL    90 tablet    One tablet daily    Uncontrolled type 2 diabetes  mellitus without complication, without long-term current use of insulin (H)       insulin pen needle 32G X 4 MM    BD GABRIELA U/F    100 each    Use 1 daily as directed.    Uncontrolled type 2 diabetes mellitus without complication, without long-term current use of insulin (H)       liraglutide 18 MG/3ML soln    VICTOZA    27 mL    Inject 1.8 mg Subcutaneous daily    Uncontrolled type 2 diabetes mellitus without complication, without long-term current use of insulin (H)       metFORMIN 500 MG 24 hr tablet    GLUCOPHAGE-XR    360 tablet    Take 4 tablets (2,000 mg) by mouth daily (with dinner)    Uncontrolled type 2 diabetes mellitus without complication, without long-term current use of insulin (H)       methylPREDNISolone 4 MG tablet    MEDROL DOSEPAK    21 tablet    Follow package instructions    Right wrist pain       order for DME     1 Units    Equipment being ordered: wrist splint    Left wrist pain       simvastatin 10 MG tablet    ZOCOR    90 tablet    Take 1 tablet (10 mg) by mouth At Bedtime    High cholesterol       * Notice:  This list has 2 medication(s) that are the same as other medications prescribed for you. Read the directions carefully, and ask your doctor or other care provider to review them with you.

## 2018-08-23 ENCOUNTER — THERAPY VISIT (OUTPATIENT)
Dept: OCCUPATIONAL THERAPY | Facility: CLINIC | Age: 53
End: 2018-08-23
Payer: COMMERCIAL

## 2018-08-23 DIAGNOSIS — Z09 SURGICAL FOLLOW-UP CARE: ICD-10-CM

## 2018-08-23 DIAGNOSIS — M25.641 STIFFNESS OF RIGHT HAND JOINT: ICD-10-CM

## 2018-08-23 DIAGNOSIS — M79.641 PAIN OF RIGHT HAND: ICD-10-CM

## 2018-08-23 PROCEDURE — 97035 APP MDLTY 1+ULTRASOUND EA 15: CPT | Mod: GO | Performed by: OCCUPATIONAL THERAPIST

## 2018-08-23 PROCEDURE — 97140 MANUAL THERAPY 1/> REGIONS: CPT | Mod: GO | Performed by: OCCUPATIONAL THERAPIST

## 2018-08-23 PROCEDURE — 97110 THERAPEUTIC EXERCISES: CPT | Mod: GO | Performed by: OCCUPATIONAL THERAPIST

## 2018-08-23 NOTE — PROGRESS NOTES
SOAP note objective information for 8/23/2018.  Wrist  1/11/2018 7/12/18 7/20/18 7/30/18 8/9/18 8/13/18 8/20/18    AROM (PROM) R L     R R    Extension 45 + 60 38++ 42 46 47 45 43 46   Flexion 50 + 65 20+ 30+ 32 33 37 30 33   RD 10 20 12+ 13+ 11+ 12++ 10+ 10 5   UD 30 30 18++ 25++ 32 28 22++ 15 25   Supination 60 90 52++ 60+ 71 67++ 65 65 65   Pronation 70 85 90    75 75 85     HAND 1/11/2018 7/12/18    AROM(PROM)      Index MP 15/45 -7/52 -2/66   PIP 20/90 -15/82 -13/90   DIP 0/45 0/45 0/45   BEAN 145 157    Long MP 10/50 -13/67 -6/70   PIP 0/85 -5/84 0/80   DIP 55 -5/69 0/56   BEAN 180 197    Ring MP 10/50 -7/65 0/70   PIP 0/84 0/80 0/85   DIP 0/55 0/58 0/60   BEAN 179 196    Small MP 3/55 0/67 0/73   PIP 0/90 -10/77 -7/83   DIP 0/55 0/59 -9/60   BEAN 197 193

## 2018-08-23 NOTE — MR AVS SNAPSHOT
After Visit Summary   8/23/2018    Wei Wolf    MRN: [id number]           Patient Information     Date Of Birth          1965        Visit Information        Provider Department      8/23/2018 7:00 AM Myrna Negron OT [facility]        Today's Diagnoses     Stiffness of right hand joint        Surgical follow-up care        Pain of right hand           Follow-ups after your visit        Your next 10 appointments already scheduled     Aug 27, 2018  7:00 AM CDT   [facility] Hand with [facility] ([facility])    [address] [place] [id number] [phone]            Aug 30, 2018  8:00 AM CDT   [facility]I [facility] ([facility])    [address] [place] [address] [phone]            Sep 06, 2018  7:00 AM CDT   [facility] ([facility])    [address] [place] [id number] [phone]            Sep 13, 2018  7:00 AM CDT   [facility] Hand [facility] ([facility])    [address] [place] [id number] [phone]            Sep 20, 2018  7:30 AM CDT   (Arrive by 7:15 AM)   RETURN HAND with Lydia Corrales MD   [facility] ([facility])    [address] [place] [address] [phone]              Who to contact     If you have questions or need follow up information about today's clinic visit or your schedule please contact [facility] directly at [phone].  Normal or non-critical lab and imaging results will be communicated to you by MyChart, letter or phone within 4 business days after the clinic has received the results. If you do  not hear from us within 7 days, please contact the clinic through Qoof or phone. If you have a critical or abnormal lab result, we will notify you by phone as soon as possible.  Submit refill requests through Qoof or call your pharmacy and they will forward the refill request to us. Please allow 3 business days for your refill to be completed.          Additional Information About Your Visit        Postinihart Information     Qoof gives you secure access to your electronic health record. If you see a primary care provider, you can also send messages to your care team and make appointments. If you have questions, please call your primary care clinic.  If you do not have a primary care provider, please call [phone] and they will assist you.        Care EveryWhere ID     This is your Care EveryWhere ID. This could be used by other organizations to access your [place] medical records  [id number]         Blood Pressure from Last 3 Encounters:   03/06/18 134/84   12/21/17 132/68   12/14/17 135/70    Weight from Last 3 Encounters:   08/02/18 125.6 kg (277 lb)   07/12/18 125.6 kg (277 lb)   03/06/18 125.6 kg (277 lb)              We Performed the Following     MANUAL THER TECH,1+REGIONS,EA 15 MIN     THERAPEUTIC EXERCISES     ULTRASOUND THERAPY        Primary Care Provider Office Phone # Fax #    Yobany Bernal [phone] [address] [facility] [address]        Equal Access to Services     [facility]: Hadii aad ku hadasho Soomaali, waaxda luqadaha, qaybta kaalmada adeegyada, ray raines . So [facility] [phone].    ATENCIÓN: Si habla español, tiene a richardson disposición servicios gratuitos de asistencia lingüística. Llame al [phone].    We comply with applicable federal civil rights laws and [place] laws. We do not discriminate on the basis of race, color, national origin, age, disability, sex, sexual orientation, or gender identity.             Thank you!     Thank you for choosing  [facility]  for your care. Our goal is always to provide you with excellent care. Hearing back from our patients is one way we can continue to improve our services. Please take a few minutes to complete the written survey that you may receive in the mail after your visit with us. Thank you!             Your Updated Medication List - Protect others around you: Learn how to safely use, store and throw away your medicines at www.disposemymeds.org.          This list is accurate as of 8/23/18  7:39 AM.  Always use your most recent med list.                   Brand Name Dispense Instructions for use Diagnosis    ASPIRIN NOT PRESCRIBED    INTENTIONAL    1 each    continuous prn Antiplatelet medication not prescribed intentionally due to GI Issues / Ulcer Disease        blood glucose monitoring lancets     102 each    Use to test blood sugar 2 times daily or as directed.  Ok to substitute alternative if insurance prefers.    Uncontrolled type 2 diabetes mellitus without complication, without long-term current use of insulin (H)       * blood glucose monitoring test strip    no brand specified    3 Box    1 strip by In Vitro route daily accucheck compact plus test strips.  Testing once daily    Uncontrolled type 2 diabetes mellitus without complication, without long-term current use of insulin (H)       * blood glucose monitoring test strip    ACCU-CHEK GUIDE    100 strip    Use to test blood sugar 2 times daily or as directed.    Uncontrolled type 2 diabetes mellitus without complication, without long-term current use of insulin (H)       glipiZIDE 10 MG 24 hr tablet    glipiZIDE XL    90 tablet    One tablet daily    Uncontrolled type 2 diabetes mellitus without complication, without long-term current use of insulin (H)       insulin pen needle 32G X 4 MM    BD GABRIELA U/F    100 each    Use 1 daily as directed.    Uncontrolled type 2 diabetes mellitus without complication,  without long-term current use of insulin (H)       liraglutide 18 MG/3ML soln    VICTOZA    27 mL    Inject 1.8 mg Subcutaneous daily    Uncontrolled type 2 diabetes mellitus without complication, without long-term current use of insulin (H)       metFORMIN 500 MG 24 hr tablet    GLUCOPHAGE-XR    360 tablet    Take 4 tablets (2,000 mg) by mouth daily (with dinner)    Uncontrolled type 2 diabetes mellitus without complication, without long-term current use of insulin (H)       methylPREDNISolone 4 MG tablet    MEDROL DOSEPAK    21 tablet    Follow package instructions    Right wrist pain       order for DME     1 Units    Equipment being ordered: wrist splint    Left wrist pain       simvastatin 10 MG tablet    ZOCOR    90 tablet    Take 1 tablet (10 mg) by mouth At Bedtime    High cholesterol       * Notice:  This list has 2 medication(s) that are the same as other medications prescribed for you. Read the directions carefully, and ask your doctor or other care provider to review them with you.

## 2018-08-27 ENCOUNTER — THERAPY VISIT (OUTPATIENT)
Dept: OCCUPATIONAL THERAPY | Facility: CLINIC | Age: 53
End: 2018-08-27
Payer: COMMERCIAL

## 2018-08-27 DIAGNOSIS — M25.641 STIFFNESS OF RIGHT HAND JOINT: ICD-10-CM

## 2018-08-27 DIAGNOSIS — Z09 SURGICAL FOLLOW-UP CARE: ICD-10-CM

## 2018-08-27 DIAGNOSIS — M79.641 PAIN OF RIGHT HAND: ICD-10-CM

## 2018-08-27 PROCEDURE — 97035 APP MDLTY 1+ULTRASOUND EA 15: CPT | Mod: GO | Performed by: OCCUPATIONAL THERAPIST

## 2018-08-27 PROCEDURE — 97140 MANUAL THERAPY 1/> REGIONS: CPT | Mod: GO | Performed by: OCCUPATIONAL THERAPIST

## 2018-08-27 PROCEDURE — 97110 THERAPEUTIC EXERCISES: CPT | Mod: GO | Performed by: OCCUPATIONAL THERAPIST

## 2018-08-27 NOTE — MR AVS SNAPSHOT
After Visit Summary   8/27/2018    Wei Wolf    MRN: [id number]           Patient Information     Date Of Birth          1965        Visit Information        Provider Department      8/27/2018 7:00 AM Lizette Joseph OT [facility]        Today's Diagnoses     Surgical follow-up care        Pain of right hand        Stiffness of right hand joint           Follow-ups after your visit        Your next 10 appointments already scheduled     Aug 30, 2018  8:00 AM CDT   [facility] ([facility])    [address] [place] [address]   [phone]            Sep 06, 2018  7:00 AM CDT   [facility] Hand [facility] ([facility])    [address] [place] [address] [phone]            Sep 13, 2018  7:00 AM CDT   [facility] ([facility])    [address] [place] [id number] [phone]            Sep 20, 2018  7:30 AM CDT   (Arrive by 7:15 AM)   RETURN HAND with Lydia Corrales MD   [facility] ([facility])    [address] [place] [address] [phone]              Who to contact     If you have questions or need follow up information about today's clinic visit or your schedule please contact [facility] directly at [phone].  Normal or non-critical lab and imaging results will be communicated to you by MyChart, letter or phone within 4 business days after the clinic has received the results. If you do not hear from us within 7 days, please contact the clinic through MyChart or phone. If you have a critical or abnormal lab result, we will notify you by phone as soon as possible.  Submit refill requests through  SensorWave or call your pharmacy and they will forward the refill request to us. Please allow 3 business days for your refill to be completed.          Additional Information About Your Visit        MyChart Information     SensorWave gives you secure access to your electronic health record. If you see a primary care provider, you can also send messages to your care team and make appointments. If you have questions, please call your primary care clinic.  If you do not have a primary care provider, please call [phone] and they will assist you.        Care EveryWhere ID     This is your Care EveryWhere ID. This could be used by other organizations to access your [place] medical records  [id number]         Blood Pressure from Last 3 Encounters:   03/06/18 134/84   12/21/17 132/68   12/14/17 135/70    Weight from Last 3 Encounters:   08/02/18 125.6 kg (277 lb)   07/12/18 125.6 kg (277 lb)   03/06/18 125.6 kg (277 lb)              We Performed the Following     MANUAL THER TECH,1+REGIONS,EA 15 MIN     THERAPEUTIC EXERCISES     ULTRASOUND THERAPY        Primary Care Provider Office Phone # Fax #    Yobany Srini Bernal [phone] [address] [facility] [address]        Equal Access to Services     [facility] : Hadii aad ku hadasho Soomaali, waaxda luqadaha, qaybta kaalmada adeegyada, ray aburto. So [facility] [phone].    ATENCIÓN: Si habla español, tiene a richardson disposición servicios gratuitos de asistencia lingüística. Llame al [phone].    We comply with applicable federal civil rights laws and [place] laws. We do not discriminate on the basis of race, color, national origin, age, disability, sex, sexual orientation, or gender identity.            Thank you!     Thank you for choosing  [facility]  for your care. Our goal is always to provide you with excellent care. Hearing back from our patients is one way we can continue to improve our  services. Please take a few minutes to complete the written survey that you may receive in the mail after your visit with us. Thank you!             Your Updated Medication List - Protect others around you: Learn how to safely use, store and throw away your medicines at www.disposemymeds.org.          This list is accurate as of 8/27/18  7:38 AM.  Always use your most recent med list.                   Brand Name Dispense Instructions for use Diagnosis    ASPIRIN NOT PRESCRIBED    INTENTIONAL    1 each    continuous prn Antiplatelet medication not prescribed intentionally due to GI Issues / Ulcer Disease        blood glucose monitoring lancets     102 each    Use to test blood sugar 2 times daily or as directed.  Ok to substitute alternative if insurance prefers.    Uncontrolled type 2 diabetes mellitus without complication, without long-term current use of insulin (H)       * blood glucose monitoring test strip    no brand specified    3 Box    1 strip by In Vitro route daily accucheck compact plus test strips.  Testing once daily    Uncontrolled type 2 diabetes mellitus without complication, without long-term current use of insulin (H)       * blood glucose monitoring test strip    ACCU-CHEK GUIDE    100 strip    Use to test blood sugar 2 times daily or as directed.    Uncontrolled type 2 diabetes mellitus without complication, without long-term current use of insulin (H)       glipiZIDE 10 MG 24 hr tablet    glipiZIDE XL    90 tablet    One tablet daily    Uncontrolled type 2 diabetes mellitus without complication, without long-term current use of insulin (H)       insulin pen needle 32G X 4 MM    BD GABRIELA U/F    100 each    Use 1 daily as directed.    Uncontrolled type 2 diabetes mellitus without complication, without long-term current use of insulin (H)       liraglutide 18 MG/3ML soln    VICTOZA    27 mL    Inject 1.8 mg Subcutaneous daily    Uncontrolled type 2 diabetes mellitus without complication, without  long-term current use of insulin (H)       metFORMIN 500 MG 24 hr tablet    GLUCOPHAGE-XR    360 tablet    Take 4 tablets (2,000 mg) by mouth daily (with dinner)    Uncontrolled type 2 diabetes mellitus without complication, without long-term current use of insulin (H)       methylPREDNISolone 4 MG tablet    MEDROL DOSEPAK    21 tablet    Follow package instructions    Right wrist pain       order for DME     1 Units    Equipment being ordered: wrist splint    Left wrist pain       simvastatin 10 MG tablet    ZOCOR    90 tablet    Take 1 tablet (10 mg) by mouth At Bedtime    High cholesterol       * Notice:  This list has 2 medication(s) that are the same as other medications prescribed for you. Read the directions carefully, and ask your doctor or other care provider to review them with you.

## 2018-08-27 NOTE — PROGRESS NOTES
SOAP note objective information for 8/27/2018.  Wrist  1/11/2018 7/12/18 7/20/18 7/30/18 8/9/18 8/13/18 8/20/18 8/23/18 8/27/18   AROM (PROM) R L     R R  R   Extension 45 + 60 38++ 42 46 47 45 43 46 48   Flexion 50 + 65 20+ 30+ 32 33 37 30 33 35   RD 10 20 12+ 13+ 11+ 12++ 10+ 10 5    UD 30 30 18++ 25++ 32 28 22++ 15 25    Supination 60 90 52++ 60+ 71 67++ 65 65 65    Pronation 70 85 90    75 75 85      HAND 1/11/2018 7/12/18 8/23/18    AROM(PROM)       Index MP 15/45 -7/52 -2/66    PIP 20/90 -15/82 -13/90    DIP 0/45 0/45 0/45    BEAN 145 157     Long MP 10/50 -13/67 -6/70    PIP 0/85 -5/84 0/80    DIP 55 -5/69 0/56    BEAN 180 197     Ring MP 10/50 -7/65 0/70    PIP 0/84 0/80 0/85    DIP 0/55 0/58 0/60    BEAN 179 196     Small MP 3/55 0/67 0/73    PIP 0/90 -10/77 -7/83    DIP 0/55 0/59 -9/60    BEAN 197 193         Please refer to the daily flowsheet for treatment today, total treatment time and time spent performing 1:1 timed codes.

## 2018-08-30 ENCOUNTER — THERAPY VISIT (OUTPATIENT)
Dept: OCCUPATIONAL THERAPY | Facility: CLINIC | Age: 53
End: 2018-08-30
Payer: COMMERCIAL

## 2018-08-30 DIAGNOSIS — M79.641 PAIN OF RIGHT HAND: ICD-10-CM

## 2018-08-30 DIAGNOSIS — Z09 SURGICAL FOLLOW-UP CARE: ICD-10-CM

## 2018-08-30 DIAGNOSIS — M25.641 STIFFNESS OF RIGHT HAND JOINT: ICD-10-CM

## 2018-08-30 PROCEDURE — 97110 THERAPEUTIC EXERCISES: CPT | Mod: GO | Performed by: OCCUPATIONAL THERAPIST

## 2018-08-30 PROCEDURE — 97112 NEUROMUSCULAR REEDUCATION: CPT | Mod: GO | Performed by: OCCUPATIONAL THERAPIST

## 2018-08-30 PROCEDURE — 97140 MANUAL THERAPY 1/> REGIONS: CPT | Mod: GO | Performed by: OCCUPATIONAL THERAPIST

## 2018-09-20 ENCOUNTER — OFFICE VISIT (OUTPATIENT)
Dept: ORTHOPEDICS | Facility: CLINIC | Age: 53
End: 2018-09-20
Payer: COMMERCIAL

## 2018-09-20 ENCOUNTER — THERAPY VISIT (OUTPATIENT)
Dept: OCCUPATIONAL THERAPY | Facility: CLINIC | Age: 53
End: 2018-09-20
Payer: COMMERCIAL

## 2018-09-20 VITALS — WEIGHT: 280 LBS | BODY MASS INDEX: 40.09 KG/M2 | HEIGHT: 70 IN

## 2018-09-20 DIAGNOSIS — M79.641 PAIN OF RIGHT HAND: Primary | ICD-10-CM

## 2018-09-20 DIAGNOSIS — M25.641 STIFFNESS OF RIGHT HAND JOINT: ICD-10-CM

## 2018-09-20 DIAGNOSIS — Z09 SURGICAL FOLLOW-UP CARE: ICD-10-CM

## 2018-09-20 DIAGNOSIS — M79.641 PAIN OF RIGHT HAND: ICD-10-CM

## 2018-09-20 PROCEDURE — 97035 APP MDLTY 1+ULTRASOUND EA 15: CPT | Mod: GO | Performed by: OCCUPATIONAL THERAPIST

## 2018-09-20 PROCEDURE — 97140 MANUAL THERAPY 1/> REGIONS: CPT | Mod: GO | Performed by: OCCUPATIONAL THERAPIST

## 2018-09-20 PROCEDURE — 97110 THERAPEUTIC EXERCISES: CPT | Mod: GO | Performed by: OCCUPATIONAL THERAPIST

## 2018-09-20 NOTE — MR AVS SNAPSHOT
After Visit Summary   9/20/2018    Wei Wolf    MRN: [id number]           Patient Information     Date Of Birth          1965        Visit Information        Provider Department      9/20/2018 7:30 AM Lydia Corrales [facility]        Today's Diagnoses     Pain of right hand    -  1       Follow-ups after your visit        Your next 10 appointments already scheduled     Sep 25, 2018  7:30 AM CDT   CONOR Hand with Myrna Negron OT    [facility] ([facility])    [address] [place] [id number] [phone]            Oct 02, 2018  8:00 AM CDT   CONOR Hand with Myrna Negron OT    [facility] ([facility])    [address] [place] [address] [phone]            Oct 04, 2018  7:00 AM CDT   CONOR Hand with Myrna Negron OT   [facility] ([facility])    [address] [place] [address] [phone]              Who to contact     Please call your clinic at [phone] to:    Ask questions about your health    Make or cancel appointments    Discuss your medicines    Learn about your test results    Speak to your doctor            Additional Information About Your Visit        MyChart Information     SKINNYprice gives you secure access to your electronic health record. If you see a primary care provider, you can also send messages to your care team and make appointments. If you have questions, please call your primary care clinic.  If you do not have a primary care provider, please call [phone] and they will assist you.      SKINNYprice is an electronic gateway that provides easy, online access to your medical records. With SKINNYprice, you can request a clinic appointment, read your test results, renew a prescription or communicate with your care team.     To access your  "existing account, please contact your [facility] or call [phone] for assistance.        Care EveryWhere ID     This is your Care EveryWhere ID. This could be used by other organizations to access your [place] medical records  [id number]        Your Vitals Were     Height BMI (Body Mass Index)                1.778 m (5' 10\") 40.18 kg/m2           Blood Pressure from Last 3 Encounters:   03/06/18 134/84   12/21/17 132/68   12/14/17 135/70    Weight from Last 3 Encounters:   09/20/18 127 kg (280 lb)   08/02/18 125.6 kg (277 lb)   07/12/18 125.6 kg (277 lb)              Today, you had the following     No orders found for display       Primary Care Provider Office Phone # Fax #    Yobany Bernal [phone] [address] [facility] [address]        Equal Access to Services     [facility]A MOS[facility] : Hadii aad ku hadasho Soomaali, waaxda luqadaha, qaybta kaalmada adeegyada, waxay idiin hayaan lilli raines . So [facility] [phone].    ATENCIÓN: Si dominick guallpa, tiene a richardson disposición servicios gratuitos de asistencia lingüística. Llame al [phone].    We comply with applicable federal civil rights laws and [place] laws. We do not discriminate on the basis of race, color, national origin, age, disability, sex, sexual orientation, or gender identity.            Thank you!     Thank you for choosing [facility]  for your care. Our goal is always to provide you with excellent care. Hearing back from our patients is one way we can continue to improve our services. Please take a few minutes to complete the written survey that you may receive in the mail after your visit with us. Thank you!             Your Updated Medication List - Protect others around you: Learn how to safely use, store and throw away your medicines at www.disposemymeds.org.          This list is accurate as of 9/20/18  2:06 PM.  Always use your most recent med list. "                   Brand Name Dispense Instructions for use Diagnosis    ASPIRIN NOT PRESCRIBED    INTENTIONAL    1 each    continuous prn Antiplatelet medication not prescribed intentionally due to GI Issues / Ulcer Disease        blood glucose monitoring lancets     102 each    Use to test blood sugar 2 times daily or as directed.  Ok to substitute alternative if insurance prefers.    Uncontrolled type 2 diabetes mellitus without complication, without long-term current use of insulin (H)       * blood glucose monitoring test strip    no brand specified    3 Box    1 strip by In Vitro route daily accucheck compact plus test strips.  Testing once daily    Uncontrolled type 2 diabetes mellitus without complication, without long-term current use of insulin (H)       * blood glucose monitoring test strip    ACCU-CHEK GUIDE    100 strip    Use to test blood sugar 2 times daily or as directed.    Uncontrolled type 2 diabetes mellitus without complication, without long-term current use of insulin (H)       glipiZIDE 10 MG 24 hr tablet    glipiZIDE XL    90 tablet    One tablet daily    Uncontrolled type 2 diabetes mellitus without complication, without long-term current use of insulin (H)       insulin pen needle 32G X 4 MM    BD GABRIELA U/F    100 each    Use 1 daily as directed.    Uncontrolled type 2 diabetes mellitus without complication, without long-term current use of insulin (H)       liraglutide 18 MG/3ML soln    VICTOZA    27 mL    Inject 1.8 mg Subcutaneous daily    Uncontrolled type 2 diabetes mellitus without complication, without long-term current use of insulin (H)       metFORMIN 500 MG 24 hr tablet    GLUCOPHAGE-XR    360 tablet    Take 4 tablets (2,000 mg) by mouth daily (with dinner)    Uncontrolled type 2 diabetes mellitus without complication, without long-term current use of insulin (H)       methylPREDNISolone 4 MG tablet    MEDROL DOSEPAK    21 tablet    Follow package instructions    Right wrist pain        order for DME     1 Units    Equipment being ordered: wrist splint    Left wrist pain       simvastatin 10 MG tablet    ZOCOR    90 tablet    Take 1 tablet (10 mg) by mouth At Bedtime    High cholesterol       * Notice:  This list has 2 medication(s) that are the same as other medications prescribed for you. Read the directions carefully, and ask your doctor or other care provider to review them with you.

## 2018-09-20 NOTE — NURSING NOTE
"Reason For Visit:   Chief Complaint   Patient presents with     Right Wrist - RECHECK     RECHECK     The patient is here today with right wrist pain he reports his symptoms are improving.        Primary MD: Yobany Bernal  Ref. MD: self    Age: 52 year old    ?  No      Ht 1.778 m (5' 10\")  Wt 127 kg (280 lb)  BMI 40.18 kg/m2      Pain Assessment  Patient Currently in Pain: Denies    Hand Dominance Evaluation  Hand Dominance: Right      force  R hand  level 2 force: 24.9 kg (55 lb)  L hand  level  2 force: 54.4 kg (120 lb)    QuickDASH Assessment  QuickDASH Main 11/16/2017   1.Open a tight or new jar. Mild difficulty   2. Do heavy household chores (e.g., wash walls, floors) Mild difficulty   3. Carry a shopping bag or briefcase. Mild difficulty   4. Wash your back. Mild difficulty   5. Use a knife to cut food. Mild difficulty   6. Recreational activities in which you take some force or impact through your arm, shoulder or hand (e.g., golf, hammering, tennis, etc.). Moderate difficulty   7. During the past week, to what extent has your arm, shoulder or hand problem interfered with your normal social activities with family, friends, neighbours or groups? Slightly   8. During the past week, were you limited in your work or other regular daily activities as a result of your arm, shoulder or hand problem? Slightly limited   9. Arm, shoulder or hand pain. Moderate   10.Tingling (pins and needles) in your arm,shoulder or hand. None   11. During the past week, how much difficulty have you had sleeping because of the pain in your arm, shoulder or hand? (Eagle number) No difficulty   Quickdash Ability Score 25          Current Outpatient Prescriptions   Medication Sig Dispense Refill     ASPIRIN NOT PRESCRIBED, INTENTIONAL, continuous prn Antiplatelet medication not prescribed intentionally due to GI Issues / Ulcer Disease 1 each 0     blood glucose monitoring (ACCU-CHEK FASTCLIX) lancets Use " to test blood sugar 2 times daily or as directed.  Ok to substitute alternative if insurance prefers. 102 each 11     blood glucose monitoring (ACCU-CHEK GUIDE) test strip Use to test blood sugar 2 times daily or as directed. 100 strip 11     blood glucose monitoring (NO BRAND SPECIFIED) test strip 1 strip by In Vitro route daily accucheck compact plus test strips.  Testing once daily 3 Box 3     glipiZIDE (GLIPIZIDE XL) 10 MG 24 hr tablet One tablet daily 90 tablet 0     insulin pen needle (BD GABRIELA U/F) 32G X 4 MM Use 1 daily as directed. 100 each 11     liraglutide (VICTOZA) 18 MG/3ML soln Inject 1.8 mg Subcutaneous daily 27 mL 1     metFORMIN (GLUCOPHAGE-XR) 500 MG 24 hr tablet Take 4 tablets (2,000 mg) by mouth daily (with dinner) 360 tablet 0     methylPREDNISolone (MEDROL DOSEPAK) 4 MG tablet Follow package instructions 21 tablet 0     order for DME Equipment being ordered: wrist splint 1 Units 0     simvastatin (ZOCOR) 10 MG tablet Take 1 tablet (10 mg) by mouth At Bedtime 90 tablet 0       Allergies   Allergen Reactions     Metformin Diarrhea       Michell Arias, ATC

## 2018-09-20 NOTE — PROGRESS NOTES
"[facility]  Orthopedics  Lydia Corrales MD  2018     Name: Wei Wolf  MRN: [id number]  Age: 52 year old  : 1965  Referring provider: Referred Self     Chief Complaint: Right wrist pain    History of Present Illness:   Wei Wolf is a 52 year old, right handed male who presents today for follow-up regarding right wrist pain. He is status-post curettage and bone grafting of the cyst in the head of his right index metacarpal on 2018. I last evaluated him on 18, at which time he had onset of significant right wrist and forearm pain associated primarily with supination and pronation. I recommended that he obtain a glove in therapy to help with swelling. Today, he reports that he is doing very well. However, he continues to experience some difficulties bending his right wrist backwards.  He also notes that he experiences swelling in his right hand, but denies any pain in his right hand at the moment.     Review of Systems:   A 10-point review of systems was obtained and is negative except for as noted in the HPI.     Physical Examination:  Height 1.778 m (5' 10\"), weight 127 kg (280 lb).   force  R hand  level 2 force: 24.9 kg (55 lb)  L hand  level  2 force: 54.4 kg (120 lb)     General: Healthy appearing male. Affect appropriate. Normal gait. Alert and oriented to surroundings.   Right Wrist: Extension to 30 degrees. Flexion to 30 degrees. Supination 90 degrees. Pronation 90 degrees.    Assessment:   52-year-old right-hand-dominant male status-post curettage of a right index metacarpal cyst with bone grafting on 2018 now recovered, now with decreased wrist symptoms and improved forearm pain.    Plan:   I have recommended Wei to continue his physical therapy until symptoms are completely resolved. He can follow up with me as needed.  He was in agreement with this plan.     Scribe Disclosure:   I, Da Flores, am serving as a scribe to document " services personally performed by Lydia Corrales MD at this visit, based upon the provider's statements to me. All documentation has been reviewed by the aforementioned provider prior to being entered into the official medical record.     Lydia Corrales MD   Hand and Upper Extremity Specialist  [facility]

## 2018-09-20 NOTE — LETTER
"2018       RE: Wei Wolf  [address] [address] [address]     Dear Colleague,    Thank you for referring your patient, Wei Wolf, to the [facility]. Please see a copy of my visit note below.    [facility]  Orthopedics  Lydia Corrales MD  2018     Name: Wei Wolf  MRN: [id number]  Age: 52 year old  : 1965  Referring provider: Referred Self     Chief Complaint: Right wrist pain    History of Present Illness:   Wei Wolf is a 52 year old, right handed male who presents today for follow-up regarding right wrist pain. He is status-post curettage and bone grafting of the cyst in the head of his right index metacarpal on 2018. I last evaluated him on 18, at which time he had onset of significant right wrist and forearm pain associated primarily with supination and pronation. I recommended that he obtain a glove in therapy to help with swelling. Today, he reports that he is doing very well. However, he continues to experience some difficulties bending his right wrist backwards.  He also notes that he experiences swelling in his right hand, but denies any pain in his right hand at the moment.     Review of Systems:   A 10-point review of systems was obtained and is negative except for as noted in the HPI.     Physical Examination:  Height 1.778 m (5' 10\"), weight 127 kg (280 lb).   force  R hand  level 2 force: 24.9 kg (55 lb)  L hand  level  2 force: 54.4 kg (120 lb)     General: Healthy appearing male. Affect appropriate. Normal gait. Alert and oriented to surroundings.   Right  Wrist: Extension to 30 degrees. Flexion to 30 degrees. Supination 90 degrees. Pronation 90 degrees.    Assessment:   52-year-old right-hand-dominant male status-post curettage of a right index metacarpal cyst with bone grafting on 2018 now recovered, now with decreased wrist symptoms " and improved forearm pain.    Plan:   I have recommended Wei to continue his physical therapy until symptoms are completely resolved. He can follow up with me as needed.  He was in agreement with this plan.     Scribe Disclosure:   I, Da Flores, am serving as a scribe to document services personally performed by Lydia Corrales MD at this visit, based upon the provider's statements to me. All documentation has been reviewed by the aforementioned provider prior to being entered into the official medical record.     Lydia Corrales MD   Hand and Upper Extremity Specialist  [facility]

## 2018-09-25 ENCOUNTER — THERAPY VISIT (OUTPATIENT)
Dept: OCCUPATIONAL THERAPY | Facility: CLINIC | Age: 53
End: 2018-09-25
Payer: COMMERCIAL

## 2018-09-25 ENCOUNTER — DOCUMENTATION ONLY (OUTPATIENT)
Dept: LAB | Facility: CLINIC | Age: 53
End: 2018-09-25

## 2018-09-25 DIAGNOSIS — Z09 SURGICAL FOLLOW-UP CARE: ICD-10-CM

## 2018-09-25 DIAGNOSIS — M79.641 PAIN OF RIGHT HAND: ICD-10-CM

## 2018-09-25 DIAGNOSIS — M25.641 STIFFNESS OF RIGHT HAND JOINT: ICD-10-CM

## 2018-09-25 DIAGNOSIS — I10 HYPERTENSION GOAL BP (BLOOD PRESSURE) < 140/90: ICD-10-CM

## 2018-09-25 DIAGNOSIS — E78.5 HYPERLIPIDEMIA LDL GOAL <100: Primary | ICD-10-CM

## 2018-09-25 PROCEDURE — 97112 NEUROMUSCULAR REEDUCATION: CPT | Mod: GO | Performed by: OCCUPATIONAL THERAPIST

## 2018-09-25 PROCEDURE — 97140 MANUAL THERAPY 1/> REGIONS: CPT | Mod: GO | Performed by: OCCUPATIONAL THERAPIST

## 2018-09-25 PROCEDURE — 97110 THERAPEUTIC EXERCISES: CPT | Mod: GO | Performed by: OCCUPATIONAL THERAPIST

## 2018-09-25 NOTE — PROGRESS NOTES
Please review, associate diagnosis and sign pending laboratory future orders for patient's  upcoming lab appointment on 10/01/18.    Thank you,   Kristine Gordon MLT

## 2018-09-25 NOTE — PROGRESS NOTES
"Hand Therapy Progress Note    Current Date:  9/25/2018    Diagnosis: Pain of finger of right hand, Surgical follow-up care  DOS:  12/21/17  Procedure:   Right Index Finger Metacarpal Bone Cyst Curettage with                                               Allograft from Distal Radius   Post:  > 6 months  Referring MD: Lydia Corrales MD     Reporting period is 8/13/18 to 9/25/2018    Subjective:   Subjective changes noted by patient:  \"It's still painful, but it's feeling better!\"  Functional changes noted by patient:  Improvement in Self Care Tasks (dressing), Work Tasks, Sleep Patterns, Recreational Activities, Household Chores and Driving   Patient has noted adverse reaction to:  None    Objective:  Observation: Color/Temperature:     [x]    Normal  []    Abnormal    PAIN 1/11/2018 7/12/18 8/13/18 9/25/18   Location Right  wrist and hand Radial and ulnar wrist Volar ulnar wrist  Volar wrist   Radiates no no no no   Worse d/n daytime daytime Daytime and laying in bed at night daytime   Frequency activity dependant intermittent constant intermittent   Exacerbated by movement Wrist extension, rotation Flexing wrist, pronation Extension, supination, pronation   Relieves rest Rest, pain medication Rest, ibuprofen Rest, ibuprofen   At rest 0-10/10 0/10 0/10 0/10 0/10   At worst 7/10 6/10 4/10 - 6/10 after bending it too far 6/10   Sleep disruption?   no yes no no   Progression Gradually getting better. Slow improvement Slowly improving improving     ROM:  Pain Report:  - none    + mild    ++ moderate    +++ severe     Wrist  1/11/2018 7/12/18 8/13/18 8/25/18   AROM (PROM) R L  R    Extension 45 + 60 38++ 45 42+   Flexion 50 + 65 20+ 37 35+   RD 10 20 12+ 10+ 11+   UD 30 30 18++ 22++ 28+   Supination 60 90 52++ 65 71+   Pronation 70 85 90 75 88+       HAND 1/11/2018 7/12/18 8/23/18 9/25/18   AROM(PROM)       Index MP 15/45 -7/52 -2/66 -5/56   PIP 20/90 -15/82 -13/90 -6/88   DIP 0/45 0/45 0/45 -4/46   BEAN 145 157  175 "   Long MP 10/50 -13/67 -6/70 -5/68   PIP 0/85 -5/84 0/80 0/81   DIP 55 -5/69 0/56 0/61   BEAN 180 197  205   Ring MP 10/50 -7/65 0/70 0/63   PIP 0/84 0/80 0/85 0/83   DIP 0/55 0/58 0/60 0/55   BEAN 179 196  201   Small MP 3/55 0/67 0/73 0/73   PIP 0/90 -10/77 -7/83 0/83   DIP 0/55 0/59 -9/60 -4/62   BEAN 197 193  214     Strength:   (Measured in pounds)  Pain Report:  - none    + mild    ++ moderate    +++ severe     7/12/2018 8/13/18 9/25/18   Trials left right R    1  2  3 119  115  109 35+++  20  19 44++  32  35 56++  47++  51++   Average: 114 25 37 51     Lat Pinch  7/12/2018 8/13/18 9/25/18   Trials left right R    1  2  3 25  27  27 26  25  25 25  24  25 22  25  26   Average: 26 25 25 24     3 Pt Pinch  7/12/2018 8/13/18 9/25/18   Trials left right R    1  2  3 26  25  25 16+  17+  14+ 19  21  18 20  19  20   Average: 25 16 19 20     Please refer to the daily flowsheet for treatment today, total treatment time and time spent performing 1:1 timed codes.      Assessment:  Response to therapy has been improvement to:  R  Please refer to the daily flowsheet for treatment provided today.     Assessment:  Response to therapy has been improvement to:  ROM of Wrist:  All Planes  Fingers: All Planes  Strength:   and pinch  Pain:  frequency is less    Overall Assessment:  Patient's symptoms are resolving.  Patient would benefit from continued therapy to achieve rehab potential  STG/LTG:  STGoals have been reviewed and progress or achievement has occurred;  see goal sheet for details and updates.    Plan:  Frequency/Duration:  Recommend continuing with the current treatment plan. 2 X week, once daily  for 2 weeks tapering to 1 X a week over 4 weeks  Appropriateness of Rx I have re-evaluated this patient and find that the nature, scope, duration and intensity of the therapy is appropriate for the medical condition of the patient.  Recommendations for Continued Therapy  Continue with current POC.    Home Exercise  Program:  OTC wrist orthosis  Tendon glides  Wrist AROM  Ice  Heat  Massage to extensors and flexors  Functional use, as much as able    Next Visit:  US  Ktape?  Wrist PROM  Functional ax  MFR  Strengthening

## 2018-09-25 NOTE — MR AVS SNAPSHOT
After Visit Summary   9/25/2018    Wei Wolf    MRN: [id number]           Patient Information     Date Of Birth          1965        Visit Information        Provider Department      9/25/2018 7:30 AM Myrna Negron OT [facility]        Today's Diagnoses     Pain of right hand        Surgical follow-up care        Stiffness of right hand joint           Follow-ups after your visit        Your next 10 appointments already scheduled     Oct 01, 2018  7:30 AM CDT   Lab visit with [facility] LAB   [facility]    [address] [facility] [id number] [phone]           Please do not eat 10-12 hours before your appointment if you are coming in fasting for labs on lipids, cholesterol, or glucose (sugar). Does not apply to pregnant women.  Water with medications is okay. Do not drink coffee or other fluids.  If you have concerns about taking your medications, please send a message by clicking on Secure Messaging, Message Your Care Team.            Oct 02, 2018  8:00 AM CDT   [facility] ([facility])    [address] [place] [id number] [phone]            Oct 04, 2018  7:00 AM CDT   [facility] ([facility])    [address] [place] [id number] [phone]            Oct 08, 2018  5:45 PM CDT   Beena Pisano with Yobany Bernal MD   [facility] ([facility]    [address] [facility] [address] [phone]              Who to contact     If you have questions or need follow up information about today's clinic visit or your schedule please contact [facility] directly at [phone].  Normal or non-critical lab and imaging results will be communicated to you by Rainerhart, letter or  phone within 4 business days after the clinic has received the results. If you do not hear from us within 7 days, please contact the clinic through [employer] or phone. If you have a critical or abnormal lab result, we will notify you by phone as soon as possible.  Submit refill requests through [employer] or call your pharmacy and they will forward the refill request to us. Please allow 3 business days for your refill to be completed.          Additional Information About Your Visit        [email or web address] Minutes Information     [employer] gives you secure access to your electronic health record. If you see a primary care provider, you can also send messages to your care team and make appointments. If you have questions, please call your primary care clinic.  If you do not have a primary care provider, please call [phone] and they will assist you.        Care EveryWhere ID     This is your Care EveryWhere ID. This could be used by other organizations to access your [facility] medical records  [id number]         Blood Pressure from Last 3 Encounters:   03/06/18 134/84   12/21/17 132/68   12/14/17 135/70    Weight from Last 3 Encounters:   09/20/18 127 kg (280 lb)   08/02/18 125.6 kg (277 lb)   07/12/18 125.6 kg (277 lb)              We Performed the Following     MANUAL THER TECH,1+REGIONS,EA 15 MIN     NEUROMUSCULAR RE-EDUCATION     THERAPEUTIC EXERCISES        Primary Care Provider Office Phone # Fax #    Yobany Srini Bernal [phone] [address] [facility] [address]        Equal Access to Services     FINA MOSER : Hadii aad ku hadasho Soomaali, waaxda luqadaha, qaybta kaalmada adeegyada, ray raines . So [facility] [phone].    ATENCIÓN: Si habla español, tiene a richardson disposición servicios gratuitos de asistencia lingüística. Llame al [phone].    We comply with applicable federal civil rights laws and [place] laws. We do not discriminate on the basis of race, color,  national origin, age, disability, sex, sexual orientation, or gender identity.            Thank you!     Thank you for choosing [facility]  for your care. Our goal is always to provide you with excellent care. Hearing back from our patients is one way we can continue to improve our services. Please take a few minutes to complete the written survey that you may receive in the mail after your visit with us. Thank you!             Your Updated Medication List - Protect others around you: Learn how to safely use, store and throw away your medicines at www.disposemymeds.org.          This list is accurate as of 9/25/18  8:07 AM.  Always use your most recent med list.                   Brand Name Dispense Instructions for use Diagnosis    ASPIRIN NOT PRESCRIBED    INTENTIONAL    1 each    continuous prn Antiplatelet medication not prescribed intentionally due to GI Issues / Ulcer Disease        blood glucose monitoring lancets     102 each    Use to test blood sugar 2 times daily or as directed.  Ok to substitute alternative if insurance prefers.    Uncontrolled type 2 diabetes mellitus without complication, without long-term current use of insulin (H)       * blood glucose monitoring test strip    no brand specified    3 Box    1 strip by In Vitro route daily accucheck compact plus test strips.  Testing once daily    Uncontrolled type 2 diabetes mellitus without complication, without long-term current use of insulin (H)       * blood glucose monitoring test strip    ACCU-CHEK GUIDE    100 strip    Use to test blood sugar 2 times daily or as directed.    Uncontrolled type 2 diabetes mellitus without complication, without long-term current use of insulin (H)       glipiZIDE 10 MG 24 hr tablet    glipiZIDE XL    90 tablet    One tablet daily    Uncontrolled type 2 diabetes mellitus without complication, without long-term current use of insulin (H)       insulin pen needle 32G X 4 MM    BD GABRIELA U/F    100 each     Use 1 daily as directed.    Uncontrolled type 2 diabetes mellitus without complication, without long-term current use of insulin (H)       liraglutide 18 MG/3ML soln    VICTOZA    27 mL    Inject 1.8 mg Subcutaneous daily    Uncontrolled type 2 diabetes mellitus without complication, without long-term current use of insulin (H)       metFORMIN 500 MG 24 hr tablet    GLUCOPHAGE-XR    360 tablet    Take 4 tablets (2,000 mg) by mouth daily (with dinner)    Uncontrolled type 2 diabetes mellitus without complication, without long-term current use of insulin (H)       methylPREDNISolone 4 MG tablet    MEDROL DOSEPAK    21 tablet    Follow package instructions    Right wrist pain       order for DME     1 Units    Equipment being ordered: wrist splint    Left wrist pain       simvastatin 10 MG tablet    ZOCOR    90 tablet    Take 1 tablet (10 mg) by mouth At Bedtime    High cholesterol       * Notice:  This list has 2 medication(s) that are the same as other medications prescribed for you. Read the directions carefully, and ask your doctor or other care provider to review them with you.

## 2018-09-25 NOTE — PROGRESS NOTES
Please review lab orders sign and close encounter. Mary DEMARCO      Pvl 10/1/18    Diabetes ck 10/8/18

## 2018-10-01 DIAGNOSIS — E78.5 HYPERLIPIDEMIA LDL GOAL <100: ICD-10-CM

## 2018-10-01 DIAGNOSIS — I10 HYPERTENSION GOAL BP (BLOOD PRESSURE) < 140/90: ICD-10-CM

## 2018-10-01 LAB
ANION GAP SERPL CALCULATED.3IONS-SCNC: 5 MMOL/L (ref 3–14)
BUN SERPL-MCNC: 13 MG/DL (ref 7–30)
CALCIUM SERPL-MCNC: 9 MG/DL (ref 8.5–10.1)
CHLORIDE SERPL-SCNC: 104 MMOL/L (ref 94–109)
CHOLEST SERPL-MCNC: 135 MG/DL
CO2 SERPL-SCNC: 30 MMOL/L (ref 20–32)
CREAT SERPL-MCNC: 0.95 MG/DL (ref 0.66–1.25)
GFR SERPL CREATININE-BSD FRML MDRD: 84 ML/MIN/1.7M2
GLUCOSE SERPL-MCNC: 203 MG/DL (ref 70–99)
HDLC SERPL-MCNC: 42 MG/DL
LDLC SERPL CALC-MCNC: 77 MG/DL
NONHDLC SERPL-MCNC: 93 MG/DL
POTASSIUM SERPL-SCNC: 4.2 MMOL/L (ref 3.4–5.3)
SODIUM SERPL-SCNC: 139 MMOL/L (ref 133–144)
TRIGL SERPL-MCNC: 79 MG/DL

## 2018-10-01 PROCEDURE — 80061 LIPID PANEL: CPT | Performed by: FAMILY MEDICINE

## 2018-10-01 PROCEDURE — 36415 COLL VENOUS BLD VENIPUNCTURE: CPT | Performed by: FAMILY MEDICINE

## 2018-10-01 PROCEDURE — 80048 BASIC METABOLIC PNL TOTAL CA: CPT | Performed by: FAMILY MEDICINE

## 2018-10-02 ENCOUNTER — THERAPY VISIT (OUTPATIENT)
Dept: OCCUPATIONAL THERAPY | Facility: CLINIC | Age: 53
End: 2018-10-02
Payer: COMMERCIAL

## 2018-10-02 DIAGNOSIS — M25.641 STIFFNESS OF RIGHT HAND JOINT: ICD-10-CM

## 2018-10-02 DIAGNOSIS — M79.641 PAIN OF RIGHT HAND: ICD-10-CM

## 2018-10-02 DIAGNOSIS — Z09 SURGICAL FOLLOW-UP CARE: ICD-10-CM

## 2018-10-02 PROCEDURE — 97112 NEUROMUSCULAR REEDUCATION: CPT | Mod: GO | Performed by: OCCUPATIONAL THERAPIST

## 2018-10-02 PROCEDURE — 97110 THERAPEUTIC EXERCISES: CPT | Mod: GO | Performed by: OCCUPATIONAL THERAPIST

## 2018-10-02 PROCEDURE — 97140 MANUAL THERAPY 1/> REGIONS: CPT | Mod: GO | Performed by: OCCUPATIONAL THERAPIST

## 2018-10-02 NOTE — PROGRESS NOTES
SOAP note objective information for 10/2/2018.  Finger Edema  Circumference:  (Measured in cm)  Edema 10/2/2018   side right   Index P1 7.8   Long P1 7.9   Ring P1 7.2   Small P1 6.6     Please refer to the daily flowsheet for treatment today, total treatment time and time spent performing 1:1 timed codes.       Home Exercise Program:  OTC wrist orthosis  Tendon glides  Wrist AROM  Ice  Heat  Massage to extensors and flexors  Functional use, as much as able    Next Visit:  US  Ktape?  Wrist PROM  Functional ax  MFR  Strengthening

## 2018-10-04 ENCOUNTER — THERAPY VISIT (OUTPATIENT)
Dept: OCCUPATIONAL THERAPY | Facility: CLINIC | Age: 53
End: 2018-10-04
Payer: COMMERCIAL

## 2018-10-04 DIAGNOSIS — Z09 SURGICAL FOLLOW-UP CARE: ICD-10-CM

## 2018-10-04 DIAGNOSIS — M79.641 PAIN OF RIGHT HAND: ICD-10-CM

## 2018-10-04 DIAGNOSIS — M25.641 STIFFNESS OF RIGHT HAND JOINT: ICD-10-CM

## 2018-10-04 PROCEDURE — 97110 THERAPEUTIC EXERCISES: CPT | Mod: GO | Performed by: OCCUPATIONAL THERAPIST

## 2018-10-04 PROCEDURE — 97112 NEUROMUSCULAR REEDUCATION: CPT | Mod: GO | Performed by: OCCUPATIONAL THERAPIST

## 2018-10-04 PROCEDURE — 97140 MANUAL THERAPY 1/> REGIONS: CPT | Mod: GO | Performed by: OCCUPATIONAL THERAPIST

## 2018-10-08 ENCOUNTER — OFFICE VISIT (OUTPATIENT)
Dept: FAMILY MEDICINE | Facility: CLINIC | Age: 53
End: 2018-10-08
Payer: COMMERCIAL

## 2018-10-08 VITALS
HEIGHT: 70 IN | OXYGEN SATURATION: 98 % | BODY MASS INDEX: 36.22 KG/M2 | WEIGHT: 253 LBS | SYSTOLIC BLOOD PRESSURE: 153 MMHG | HEART RATE: 97 BPM | TEMPERATURE: 97.6 F | DIASTOLIC BLOOD PRESSURE: 80 MMHG | RESPIRATION RATE: 16 BRPM

## 2018-10-08 DIAGNOSIS — E66.01 MORBID OBESITY DUE TO EXCESS CALORIES (H): ICD-10-CM

## 2018-10-08 DIAGNOSIS — Z12.11 SPECIAL SCREENING FOR MALIGNANT NEOPLASMS, COLON: Primary | ICD-10-CM

## 2018-10-08 LAB — HBA1C MFR BLD: 8.8 % (ref 0–5.6)

## 2018-10-08 PROCEDURE — 36415 COLL VENOUS BLD VENIPUNCTURE: CPT | Performed by: FAMILY MEDICINE

## 2018-10-08 PROCEDURE — 83036 HEMOGLOBIN GLYCOSYLATED A1C: CPT | Performed by: FAMILY MEDICINE

## 2018-10-08 PROCEDURE — 99214 OFFICE O/P EST MOD 30 MIN: CPT | Performed by: FAMILY MEDICINE

## 2018-10-08 ASSESSMENT — PAIN SCALES - GENERAL: PAINLEVEL: NO PAIN (0)

## 2018-10-08 NOTE — MR AVS SNAPSHOT
After Visit Summary   10/8/2018    Wei Wolf    MRN: [id number]           Patient Information     Date Of Birth          1965        Visit Information        Provider Department      10/8/2018 5:45 PM Yobany Bernal MD [facility]        Today's Diagnoses     Special screening for malignant neoplasms, colon    -  1    Uncontrolled type 2 diabetes mellitus without complication, without long-term current use of insulin (H)        Morbid obesity due to excess calories (H)           Follow-ups after your visit        Additional Services     DIABETES EDUCATOR REFERRAL       DIABETES SELF MANAGEMENT TRAINING (DSMT)      Your provider has referred you to Diabetes Education: FMG: Diabetes Education - All [facility] [phone]   [email or web address]     If an urgent visit is needed or A1C is above 12, Care Team to call the Diabetes  Education Team at [phone] or send an In Basket message to the Diabetes Education Pool (P DIAB ED-PATIENT CARE).    A  will call you to make your appointment. If it has been more than 3 business days since your referral was placed, please call the above phone number to schedule.    Type of training and number of hours: New Diagnosis: Initial group DSMT - 10 hours.      Diabetes Type: Pre-Diabetes - Patient to call [phone] for Pre-Diabetes Class        Diabetes Education Topics: Comprehensive Knowledge Assessment and Instruction    Special Educational Needs Requiring Individual DSMT: None      Please be aware that coverage of these services is subject to the terms and limitations of your health insurance plan.  Call member services at your health plan to determine Diabetes Self-Management Training (Codes  and ) and Medical Nutrition Therapy (Codes 46625 and 33881) benefits and ask which blood glucose monitor brands are covered by your plan.  Please bring the  following with you to your appointment:    (1)  List of current medications   (2)  List of Blood Glucose Monitor brands that are covered by your insurance plan  (3)  Blood Glucose Monitor and log book  (4)   Food records for the 3 days prior to your visit    The Certified Diabetes Educator may make diabetes medication adjustments per the CDE Protocol and Collaborative Practice Agreement.            GASTROENTEROLOGY ADULT REF PROCEDURE ONLY       Last Lab Result: Creatinine (mg/dL)       Date                     Value                 10/01/2018               0.95             ----------  There is no height or weight on file to calculate BMI.      Patient will be contacted to schedule procedure.     Please be aware that coverage of these services is subject to the terms and limitations of your health insurance plan.  Call member services at your health plan with any benefit or coverage questions.  Any procedures must be performed at a [place] facility OR coordinated by your clinic's referral office.    Please bring the following with you to your appointment:    (1) Any X-Rays, CTs or MRIs which have been performed.  Contact the facility where they were done to arrange for  prior to your scheduled appointment.    (2) List of current medications   (3) This referral request   (4) Any documents/labs given to you for this referral                  Follow-up notes from your care team     Return in about 3 months (around 1/8/2019).      Your next 10 appointments already scheduled     Oct 11, 2018  8:00 AM MALIT   CONOR Hand with Myrna Negron OT    [facility] ([facility])    [address] [place] [address] [id number] [phone]            Oct 18, 2018  7:00 AM MALIT   CONOR Hand with Myrna Negron OT    [facility] ([facility])    [address] [place] [address] [id number] [phone]            Oct 30, 2018  " 7:30 AM CDT   CONOR Hand with Myrna Negron OT   [facility] ([facility])    [address] [facility] [address] [phone]              Who to contact     If you have questions or need follow up information about today's clinic visit or your schedule please contact [facility] directly at [phone].  Normal or non-critical lab and imaging results will be communicated to you by [employer], letter or phone within 4 business days after the clinic has received the results. If you do not hear from us within 7 days, please contact the clinic through [employer] or phone. If you have a critical or abnormal lab result, we will notify you by phone as soon as possible.  Submit refill requests through [employer] or call your pharmacy and they will forward the refill request to us. Please allow 3 business days for your refill to be completed.          Additional Information About Your Visit        [employer] Information     [employer] gives you secure access to your electronic health record. If you see a primary care provider, you can also send messages to your care team and make appointments. If you have questions, please call your primary care clinic.  If you do not have a primary care provider, please call [phone] and they will assist you.        Care EveryWhere ID     This is your Care EveryWhere ID. This could be used by other organizations to access your [place] medical records  [id number]        Your Vitals Were     Pulse Temperature Respirations Height Pulse Oximetry BMI (Body Mass Index)    97 97.6  F (36.4  C) (Oral) 16 5' 10\" (1.778 m) 98% 36.3 kg/m2       Blood Pressure from Last 3 Encounters:   10/08/18 153/80   03/06/18 134/84   12/21/17 132/68    Weight from Last 3 Encounters:   10/08/18 253 lb (114.8 kg)   09/20/18 280 lb (127 kg)   08/02/18 277 lb (125.6 kg)              We Performed the Following     **A1C FUTURE anytime     DIABETES " EDUCATOR REFERRAL     GASTROENTEROLOGY ADULT REF PROCEDURE ONLY        Primary Care Provider Office Phone # Fax #    Yobany Srini Bernal [phone] [address] [facility] [address]        Equal Access to Services     FINA MOSER : Hadii aad ku hadkinzao Soomaali, waaxda luqadaha, qaybta kaalmada adeashayada, ray logann lilli kovacs laotiliaelías criselda. So [facility] [phone].    ATENCIÓN: Si habla español, tiene a richardson disposición servicios gratuitos de asistencia lingüística. Llame al [phone].    We comply with applicable federal civil rights laws and [place] laws. We do not discriminate on the basis of race, color, national origin, age, disability, sex, sexual orientation, or gender identity.            Thank you!     Thank you for choosing [facility]  for your care. Our goal is always to provide you with excellent care. Hearing back from our patients is one way we can continue to improve our services. Please take a few minutes to complete the written survey that you may receive in the mail after your visit with us. Thank you!             Your Updated Medication List - Protect others around you: Learn how to safely use, store and throw away your medicines at www.disposemymeds.org.          This list is accurate as of 10/8/18  6:20 PM.  Always use your most recent med list.                   Brand Name Dispense Instructions for use Diagnosis    ASPIRIN NOT PRESCRIBED    INTENTIONAL    1 each    continuous prn Antiplatelet medication not prescribed intentionally due to GI Issues / Ulcer Disease        blood glucose monitoring lancets     102 each    Use to test blood sugar 2 times daily or as directed.  Ok to substitute alternative if insurance prefers.    Uncontrolled type 2 diabetes mellitus without complication, without long-term current use of insulin (H)       * blood glucose monitoring test strip    no brand specified    3 Box    1 strip by In Vitro route daily  accucheck compact plus test strips.  Testing once daily    Uncontrolled type 2 diabetes mellitus without complication, without long-term current use of insulin (H)       * blood glucose monitoring test strip    ACCU-CHEK GUIDE    100 strip    Use to test blood sugar 2 times daily or as directed.    Uncontrolled type 2 diabetes mellitus without complication, without long-term current use of insulin (H)       glipiZIDE 10 MG 24 hr tablet    glipiZIDE XL    90 tablet    One tablet daily    Uncontrolled type 2 diabetes mellitus without complication, without long-term current use of insulin (H)       insulin pen needle 32G X 4 MM    BD GABRIELA U/F    100 each    Use 1 daily as directed.    Uncontrolled type 2 diabetes mellitus without complication, without long-term current use of insulin (H)       liraglutide 18 MG/3ML soln    VICTOZA    27 mL    Inject 1.8 mg Subcutaneous daily    Uncontrolled type 2 diabetes mellitus without complication, without long-term current use of insulin (H)       metFORMIN 500 MG 24 hr tablet    GLUCOPHAGE-XR    360 tablet    Take 4 tablets (2,000 mg) by mouth daily (with dinner)    Uncontrolled type 2 diabetes mellitus without complication, without long-term current use of insulin (H)       methylPREDNISolone 4 MG tablet    MEDROL DOSEPAK    21 tablet    Follow package instructions    Right wrist pain       order for DME     1 Units    Equipment being ordered: wrist splint    Left wrist pain       simvastatin 10 MG tablet    ZOCOR    90 tablet    Take 1 tablet (10 mg) by mouth At Bedtime    High cholesterol       * Notice:  This list has 2 medication(s) that are the same as other medications prescribed for you. Read the directions carefully, and ask your doctor or other care provider to review them with you.

## 2018-10-08 NOTE — PROGRESS NOTES
SUBJECTIVE:  Wei Wolf presents today for follow up of DIABETES MELLITUS .       Patient glucose self monitoring: one time daily, once daily.  Blood glucose averages: 160  Symptoms of low blood sugar (hypoglycemia)? n  Problems taking medications regularly? n   Side effects? n  What are you doing for exercise outside of work or your daily activities? Walking and lost 30 lbs.  Reviewed health maintenance  Patient Active Problem List   Diagnosis     HYPERLIPIDEMIA LDL GOAL <100     Hypertension goal BP (blood pressure) < 140/90     Obesity     SAI (obstructive sleep apnea)     Morbid obesity (H)     Uncontrolled diabetes mellitus type 2 without complications (H)     Surgical follow-up care     Pain of right hand     Stiffness of right hand joint       Smokes n  PHQ-2  Over the last two weeks- Have you been bothered by little interest or pleasure in doing things?  No  Over the last two weeks- Have you been been feeling down, depressed, or hopeless?  No    BP:   BP Readings from Last 3 Encounters:   10/08/18 153/80   03/06/18 134/84   12/21/17 132/68       Lab Results   Component Value Date    A1C 8.8 10/08/2018    A1C 8.6 06/25/2018    A1C 8.8 11/30/2017    A1C 8.0 03/15/2017    A1C 7.6 10/28/2016      normal foot exam    Recent Labs   Lab Test  10/01/18   0728  11/30/17   1111   05/27/15   1652  07/21/14   0915   CHOL  135  173   < >  156  134   HDL  42  41   < >  37*  28*   LDL  77  115*   < >  95  81   TRIG  79  83   < >  121  126   CHOLHDLRATIO   --    --    --   4.2  4.8    < > = values in this interval not displayed.       Wt Readings from Last 3 Encounters:   10/08/18 253 lb (114.8 kg)   09/20/18 280 lb (127 kg)   08/02/18 277 lb (125.6 kg)     Asa is included in med list    Current Outpatient Prescriptions   Medication Sig Dispense Refill     ASPIRIN NOT PRESCRIBED, INTENTIONAL, continuous prn Antiplatelet medication not prescribed intentionally due to GI Issues / Ulcer Disease 1 each 0     blood  "glucose monitoring (ACCU-CHEK FASTCLIX) lancets Use to test blood sugar 2 times daily or as directed.  Ok to substitute alternative if insurance prefers. 102 each 11     blood glucose monitoring (ACCU-CHEK GUIDE) test strip Use to test blood sugar 2 times daily or as directed. 100 strip 11     blood glucose monitoring (NO BRAND SPECIFIED) test strip 1 strip by In Vitro route daily accucheck compact plus test strips.  Testing once daily 3 Box 3     glipiZIDE (GLIPIZIDE XL) 10 MG 24 hr tablet One tablet daily 90 tablet 0     insulin pen needle (BD GABRIELA U/F) 32G X 4 MM Use 1 daily as directed. 100 each 11     liraglutide (VICTOZA) 18 MG/3ML soln Inject 1.8 mg Subcutaneous daily 27 mL 0     metFORMIN (GLUCOPHAGE-XR) 500 MG 24 hr tablet Take 4 tablets (2,000 mg) by mouth daily (with dinner) 360 tablet 0     methylPREDNISolone (MEDROL DOSEPAK) 4 MG tablet Follow package instructions 21 tablet 0     order for DME Equipment being ordered: wrist splint 1 Units 0     simvastatin (ZOCOR) 10 MG tablet Take 1 tablet (10 mg) by mouth At Bedtime 90 tablet 0       Histories reviewed and updated in Epic.  EXAM:  Vitals: /80  Pulse 97  Temp 97.6  F (36.4  C) (Oral)  Resp 16  Ht 5' 10\" (1.778 m)  Wt 253 lb (114.8 kg)  SpO2 98%  BMI 36.3 kg/m2  BMIE= Body mass index is 36.3 kg/(m^2).  GENERAL APPEARANCE: alert and no acute distress  PSYCH: mentation appears normal and affect normal/bright  RESP: lungs clear to auscultation - no rales, rhonchi or wheezes  CV: regular rate and rhythm, normal S1 S2, no S3 or S4 and no murmur, click or rub -  EXT: no cyanosis or edema in lower extremities  SKIN: no venous stasis changes    ICD-10-CM    1. Uncontrolled type 2 diabetes mellitus without complication, without long-term current use of insulin (H) E11.65 **A1C FUTURE anytime   2. Morbid obesity due to excess calories (H) E66.01     PLAN:Follow up in 3 months        "

## 2018-10-08 NOTE — NURSING NOTE
"Chief Complaint   Patient presents with     Diabetes       Initial /80  Pulse 97  Temp 97.6  F (36.4  C) (Oral)  Resp 16  Ht 5' 10\" (1.778 m)  Wt 253 lb (114.8 kg)  SpO2 98%  BMI 36.3 kg/m2 Estimated body mass index is 36.3 kg/(m^2) as calculated from the following:    Height as of this encounter: 5' 10\" (1.778 m).    Weight as of this encounter: 253 lb (114.8 kg).  Medication Reconciliation: complete  Charleen Iniguez M.A.    "

## 2018-10-11 ENCOUNTER — THERAPY VISIT (OUTPATIENT)
Dept: OCCUPATIONAL THERAPY | Facility: CLINIC | Age: 53
End: 2018-10-11
Payer: COMMERCIAL

## 2018-10-11 DIAGNOSIS — Z09 SURGICAL FOLLOW-UP CARE: ICD-10-CM

## 2018-10-11 DIAGNOSIS — M79.641 PAIN OF RIGHT HAND: ICD-10-CM

## 2018-10-11 DIAGNOSIS — M25.641 STIFFNESS OF RIGHT HAND JOINT: ICD-10-CM

## 2018-10-11 PROCEDURE — 97112 NEUROMUSCULAR REEDUCATION: CPT | Mod: GO | Performed by: OCCUPATIONAL THERAPIST

## 2018-10-11 PROCEDURE — 97110 THERAPEUTIC EXERCISES: CPT | Mod: GO | Performed by: OCCUPATIONAL THERAPIST

## 2018-10-11 PROCEDURE — 97140 MANUAL THERAPY 1/> REGIONS: CPT | Mod: GO | Performed by: OCCUPATIONAL THERAPIST

## 2018-10-11 NOTE — MR AVS SNAPSHOT
After Visit Summary   10/11/2018    Wei Wolf    MRN: [id number]           Patient Information     Date Of Birth          1965        Visit Information        Provider Department      10/11/2018 8:00 AM Myrna Negron OT [facility]        Today's Diagnoses     Stiffness of right hand joint        Surgical follow-up care        Pain of right hand           Follow-ups after your visit        Your next 10 appointments already scheduled     Oct 18, 2018  7:00 AM CDT   CONOR Hand with LEVI Vasquez [facility] ([facility])    [address] [place] [id number] [phone]            Oct 19, 2018   Procedure with Mason Duenas MD   [facility]    [address]  [facility] [address]   [phone]            Oct 30, 2018  7:30 AM CDT   CONOR Hand with LEVI Vasquez [facility] ([facility])    [address] [place] [address] [phone]              Who to contact     If you have questions or need follow up information about today's clinic visit or your schedule please contact [facility] directly at [phone].  Normal or non-critical lab and imaging results will be communicated to you by MyChart, letter or phone within 4 business days after the clinic has received the results. If you do not hear from us within 7 days, please contact the clinic through MyChart or phone. If you have a critical or abnormal lab result, we will notify you by phone as soon as possible.  Submit refill requests through [employer] or call your pharmacy and they will forward the refill request to us. Please allow 3 business days for your refill to be completed.          Additional Information About Your Visit        [employer] - your [employer] Information     [employer] gives you secure access to your electronic health record. If you see  a primary care provider, you can also send messages to your care team and make appointments. If you have questions, please call your primary care clinic.  If you do not have a primary care provider, please call [phone] and they will assist you.        Care EveryWhere ID     This is your Care EveryWhere ID. This could be used by other organizations to access your [place] medical records  [id number]         Blood Pressure from Last 3 Encounters:   10/08/18 153/80   03/06/18 134/84   12/21/17 132/68    Weight from Last 3 Encounters:   10/08/18 114.8 kg (253 lb)   09/20/18 127 kg (280 lb)   08/02/18 125.6 kg (277 lb)              We Performed the Following     MANUAL THER TECH,1+REGIONS,EA 15 MIN     NEUROMUSCULAR RE-EDUCATION     THERAPEUTIC EXERCISES        Primary Care Provider Office Phone # Fax #    Yobany Bernal [phone] [address] [facility] [address]        Equal Access to Services     RYAN [facility] : Hadii aad ku hadasho Soomaali, waaxda luqadaha, qaybta kaalmada adeegyada, waxay idiin hayaan adeeg normaaragarry la'lev . So [facility] [phone].    ATENCIÓN: Si habla español, tiene a richardson disposición servicios gratuitos de asistencia lingüística. LlSt. [facility] [phone].    We comply with applicable federal civil rights laws and [place] laws. We do not discriminate on the basis of race, color, national origin, age, disability, sex, sexual orientation, or gender identity.            Thank you!     Thank you for choosing  [facility]  for your care. Our goal is always to provide you with excellent care. Hearing back from our patients is one way we can continue to improve our services. Please take a few minutes to complete the written survey that you may receive in the mail after your visit with us. Thank you!             Your Updated Medication List - Protect others around you: Learn how to safely use, store and throw away your medicines at www.disposemymeds.org.           This list is accurate as of 10/11/18  8:35 AM.  Always use your most recent med list.                   Brand Name Dispense Instructions for use Diagnosis    ASPIRIN NOT PRESCRIBED    INTENTIONAL    1 each    continuous prn Antiplatelet medication not prescribed intentionally due to GI Issues / Ulcer Disease        blood glucose monitoring lancets     102 each    Use to test blood sugar 2 times daily or as directed.  Ok to substitute alternative if insurance prefers.    Uncontrolled type 2 diabetes mellitus without complication, without long-term current use of insulin (H)       * blood glucose monitoring test strip    no brand specified    3 Box    1 strip by In Vitro route daily accucheck compact plus test strips.  Testing once daily    Uncontrolled type 2 diabetes mellitus without complication, without long-term current use of insulin (H)       * blood glucose monitoring test strip    ACCU-CHEK GUIDE    100 strip    Use to test blood sugar 2 times daily or as directed.    Uncontrolled type 2 diabetes mellitus without complication, without long-term current use of insulin (H)       glipiZIDE 10 MG 24 hr tablet    glipiZIDE XL    90 tablet    One tablet daily    Uncontrolled type 2 diabetes mellitus without complication, without long-term current use of insulin (H)       insulin pen needle 32G X 4 MM    BD GABRIELA U/F    100 each    Use 1 daily as directed.    Uncontrolled type 2 diabetes mellitus without complication, without long-term current use of insulin (H)       liraglutide 18 MG/3ML soln    VICTOZA    27 mL    Inject 1.8 mg Subcutaneous daily    Uncontrolled type 2 diabetes mellitus without complication, without long-term current use of insulin (H)       metFORMIN 500 MG 24 hr tablet    GLUCOPHAGE-XR    360 tablet    Take 4 tablets (2,000 mg) by mouth daily (with dinner)    Uncontrolled type 2 diabetes mellitus without complication, without long-term current use of insulin (H)       methylPREDNISolone 4  MG tablet    MEDROL DOSEPAK    21 tablet    Follow package instructions    Right wrist pain       order for DME     1 Units    Equipment being ordered: wrist splint    Left wrist pain       simvastatin 10 MG tablet    ZOCOR    90 tablet    Take 1 tablet (10 mg) by mouth At Bedtime    High cholesterol       * Notice:  This list has 2 medication(s) that are the same as other medications prescribed for you. Read the directions carefully, and ask your doctor or other care provider to review them with you.

## 2018-10-18 ENCOUNTER — THERAPY VISIT (OUTPATIENT)
Dept: OCCUPATIONAL THERAPY | Facility: CLINIC | Age: 53
End: 2018-10-18
Payer: COMMERCIAL

## 2018-10-18 DIAGNOSIS — Z09 SURGICAL FOLLOW-UP CARE: ICD-10-CM

## 2018-10-18 DIAGNOSIS — M79.641 PAIN OF RIGHT HAND: ICD-10-CM

## 2018-10-18 DIAGNOSIS — M25.641 STIFFNESS OF RIGHT HAND JOINT: ICD-10-CM

## 2018-10-18 PROCEDURE — 97140 MANUAL THERAPY 1/> REGIONS: CPT | Mod: GO | Performed by: OCCUPATIONAL THERAPIST

## 2018-10-18 PROCEDURE — 97112 NEUROMUSCULAR REEDUCATION: CPT | Mod: GO | Performed by: OCCUPATIONAL THERAPIST

## 2018-10-18 PROCEDURE — 97110 THERAPEUTIC EXERCISES: CPT | Mod: GO | Performed by: OCCUPATIONAL THERAPIST

## 2018-10-18 NOTE — MR AVS SNAPSHOT
After Visit Summary   10/18/2018    Wei Wolf    MRN: [id number]           Patient Information     Date Of Birth          1965        Visit Information        Provider Department      10/18/2018 7:00 AM Myrna Negron OT [facility]        Today's Diagnoses     Pain of right hand        Surgical follow-up care        Stiffness of right hand joint           Follow-ups after your visit        Your next 10 appointments already scheduled     Oct 19, 2018   Procedure with Mason Duenas MD   [facility] (--)    [address]  [facility] [address] [phone]            Oct 30, 2018  7:30 AM CDT   [facility] Hand with [facility] ([facility])    [address]  4th Floor  [facility] [address] [phone]            Oct 30, 2018  2:00 PM CDT   Diabetes Education with AN DIABETIC ED RESOURCE   [facility] ([facility])    [address] [facility] [address] [phone]              Who to contact     If you have questions or need follow up information about today's clinic visit or your schedule please contact [facility] directly at [phone].  Normal or non-critical lab and imaging results will be communicated to you by AVOBhart, letter or phone within 4 business days after the clinic has received the results. If you do not hear from us within 7 days, please contact the clinic through AVOBhart or phone. If you have a critical or abnormal lab result, we will notify you by phone as soon as possible.  Submit refill requests through [employer] or call your pharmacy and they will forward the refill request to us. Please allow 3 business days for your refill to be completed.          Additional Information About Your Visit        Current Media Information     [employer] gives you secure access to your electronic health record. If you see  a primary care provider, you can also send messages to your care team and make appointments. If you have questions, please call your primary care clinic.  If you do not have a primary care provider, please call [phone] and they will assist you.        Care EveryWhere ID     This is your Care EveryWhere ID. This could be used by other organizations to access your [facility] medical records  [id number]         Blood Pressure from Last 3 Encounters:   10/08/18 153/80   03/06/18 134/84   12/21/17 132/68    Weight from Last 3 Encounters:   10/08/18 114.8 kg (253 lb)   09/20/18 127 kg (280 lb)   08/02/18 125.6 kg (277 lb)              We Performed the Following     MANUAL THER TECH,1+REGIONS,EA 15 MIN     NEUROMUSCULAR RE-EDUCATION     THERAPEUTIC EXERCISES        Primary Care Provider Office Phone # Fax #    Yobany Bernal [phone] [address] [address] [address]        Equal Access to Services     RYAN [facility] : Hadii aad ku hadasho Soomaali, waaxda luqadaha, qaybta kaalmada adeegyada, waxay idiin hayaan adeeg normaaragarry la'lev . So [facility] [phone].    ATENCIÓN: Si habla español, tiene a richardson disposición servicios gratuitos de asistencia lingüística. Ll[facility] [phone].    We comply with applicable federal civil rights laws and [place] laws. We do not discriminate on the basis of race, color, national origin, age, disability, sex, sexual orientation, or gender identity.            Thank you!     Thank you for choosing  [facility]  for your care. Our goal is always to provide you with excellent care. Hearing back from our patients is one way we can continue to improve our services. Please take a few minutes to complete the written survey that you may receive in the mail after your visit with us. Thank you!             Your Updated Medication List - Protect others around you: Learn how to safely use, store and throw away your medicines at www.disposemymeds.org.           This list is accurate as of 10/18/18  7:35 AM.  Always use your most recent med list.                   Brand Name Dispense Instructions for use Diagnosis    ASPIRIN NOT PRESCRIBED    INTENTIONAL    1 each    continuous prn Antiplatelet medication not prescribed intentionally due to GI Issues / Ulcer Disease        blood glucose monitoring lancets     102 each    Use to test blood sugar 2 times daily or as directed.  Ok to substitute alternative if insurance prefers.    Uncontrolled type 2 diabetes mellitus without complication, without long-term current use of insulin (H)       * blood glucose monitoring test strip    no brand specified    3 Box    1 strip by In Vitro route daily accucheck compact plus test strips.  Testing once daily    Uncontrolled type 2 diabetes mellitus without complication, without long-term current use of insulin (H)       * blood glucose monitoring test strip    ACCU-CHEK GUIDE    100 strip    Use to test blood sugar 2 times daily or as directed.    Uncontrolled type 2 diabetes mellitus without complication, without long-term current use of insulin (H)       glipiZIDE 10 MG 24 hr tablet    glipiZIDE XL    90 tablet    One tablet daily    Uncontrolled type 2 diabetes mellitus without complication, without long-term current use of insulin (H)       insulin pen needle 32G X 4 MM    BD GABRIELA U/F    100 each    Use 1 daily as directed.    Uncontrolled type 2 diabetes mellitus without complication, without long-term current use of insulin (H)       liraglutide 18 MG/3ML soln    VICTOZA    27 mL    Inject 1.8 mg Subcutaneous daily    Uncontrolled type 2 diabetes mellitus without complication, without long-term current use of insulin (H)       metFORMIN 500 MG 24 hr tablet    GLUCOPHAGE-XR    360 tablet    Take 4 tablets (2,000 mg) by mouth daily (with dinner)    Uncontrolled type 2 diabetes mellitus without complication, without long-term current use of insulin (H)       methylPREDNISolone 4  MG tablet    MEDROL DOSEPAK    21 tablet    Follow package instructions    Right wrist pain       order for DME     1 Units    Equipment being ordered: wrist splint    Left wrist pain       simvastatin 10 MG tablet    ZOCOR    90 tablet    Take 1 tablet (10 mg) by mouth At Bedtime    High cholesterol       * Notice:  This list has 2 medication(s) that are the same as other medications prescribed for you. Read the directions carefully, and ask your doctor or other care provider to review them with you.

## 2018-10-18 NOTE — PROGRESS NOTES
SOAP note objective information for 10/18/2018.  ROM:  Pain Report:  - none    + mild    ++ moderate    +++ severe     Wrist  1/11/2018 7/12/18 8/13/18 8/25/18 10/11/18   AROM (PROM) R L  R     Extension  After Tx 45 + 60 38++ 45 42+ 45++  47++   Flexion 50 + 65 20+ 37 35+ 29++   RD 10 20 12+ 10+ 11+ 11++   UD 30 30 18++ 22++ 28+ 28+   Supination 60 90 52++ 65 71+ 67   Pronation 70 85 90 75 88+ 85     ROM:  Pain Report:  - none    + mild    ++ moderate    +++ severe     Index Finger 10/18/2018   AROM(PROM) right   MCP -3/57   PIP -13/85   DIP -7/46   BEAN 165   Please refer to the daily flowsheet for treatment today, total treatment time and time spent performing 1:1 timed codes.       Home Exercise Program:  OTC wrist orthosis  Tendon glides  Wrist AROM  Ice  Heat  Massage to extensors and flexors  Functional use, as much as able    Next Visit:  US  Ktape?  Wrist PROM  Functional ax  MFR  Strengthening

## 2018-10-19 ENCOUNTER — HOSPITAL ENCOUNTER (OUTPATIENT)
Facility: AMBULATORY SURGERY CENTER | Age: 53
Discharge: HOME | End: 2018-10-19
Attending: SURGERY | Admitting: SURGERY
Payer: COMMERCIAL

## 2018-10-19 ENCOUNTER — SURGERY (OUTPATIENT)
Age: 53
End: 2018-10-19

## 2018-10-19 VITALS
DIASTOLIC BLOOD PRESSURE: 72 MMHG | RESPIRATION RATE: 18 BRPM | OXYGEN SATURATION: 97 % | TEMPERATURE: 96.7 F | SYSTOLIC BLOOD PRESSURE: 132 MMHG

## 2018-10-19 LAB
COLONOSCOPY: NORMAL
GLUCOSE BLDC GLUCOMTR-MCNC: 167 MG/DL (ref 70–99)

## 2018-10-19 PROCEDURE — G8907 PT DOC NO EVENTS ON DISCHARG: HCPCS

## 2018-10-19 PROCEDURE — G0121 COLON CA SCRN NOT HI RSK IND: HCPCS | Performed by: SURGERY

## 2018-10-19 PROCEDURE — G8918 PT W/O PREOP ORDER IV AB PRO: HCPCS

## 2018-10-19 PROCEDURE — 45378 DIAGNOSTIC COLONOSCOPY: CPT

## 2018-10-19 PROCEDURE — 99152 MOD SED SAME PHYS/QHP 5/>YRS: CPT | Mod: 59 | Performed by: SURGERY

## 2018-10-19 RX ORDER — ONDANSETRON 2 MG/ML
4 INJECTION INTRAMUSCULAR; INTRAVENOUS
Status: DISCONTINUED | OUTPATIENT
Start: 2018-10-19 | End: 2018-10-20 | Stop reason: HOSPADM

## 2018-10-19 RX ORDER — FENTANYL CITRATE 50 UG/ML
INJECTION, SOLUTION INTRAMUSCULAR; INTRAVENOUS PRN
Status: DISCONTINUED | OUTPATIENT
Start: 2018-10-19 | End: 2018-10-19 | Stop reason: HOSPADM

## 2018-10-19 RX ORDER — LIDOCAINE 40 MG/G
CREAM TOPICAL
Status: DISCONTINUED | OUTPATIENT
Start: 2018-10-19 | End: 2018-10-20 | Stop reason: HOSPADM

## 2018-10-19 RX ADMIN — FENTANYL CITRATE 50 MCG: 50 INJECTION, SOLUTION INTRAMUSCULAR; INTRAVENOUS at 11:48

## 2018-10-19 RX ADMIN — FENTANYL CITRATE 100 MCG: 50 INJECTION, SOLUTION INTRAMUSCULAR; INTRAVENOUS at 11:46

## 2018-10-30 ENCOUNTER — ALLIED HEALTH/NURSE VISIT (OUTPATIENT)
Dept: EDUCATION SERVICES | Facility: CLINIC | Age: 53
End: 2018-10-30
Payer: COMMERCIAL

## 2018-10-30 ENCOUNTER — TELEPHONE (OUTPATIENT)
Dept: FAMILY MEDICINE | Facility: CLINIC | Age: 53
End: 2018-10-30

## 2018-10-30 ENCOUNTER — THERAPY VISIT (OUTPATIENT)
Dept: OCCUPATIONAL THERAPY | Facility: CLINIC | Age: 53
End: 2018-10-30
Payer: COMMERCIAL

## 2018-10-30 DIAGNOSIS — Z09 SURGICAL FOLLOW-UP CARE: ICD-10-CM

## 2018-10-30 DIAGNOSIS — E11.9 DIABETES MELLITUS WITHOUT COMPLICATION (H): ICD-10-CM

## 2018-10-30 DIAGNOSIS — M25.641 STIFFNESS OF RIGHT HAND JOINT: ICD-10-CM

## 2018-10-30 DIAGNOSIS — M79.641 PAIN OF RIGHT HAND: ICD-10-CM

## 2018-10-30 PROCEDURE — 97110 THERAPEUTIC EXERCISES: CPT | Mod: GO | Performed by: OCCUPATIONAL THERAPIST

## 2018-10-30 PROCEDURE — 99207 ZZC DROP WITH A PROCEDURE: CPT

## 2018-10-30 PROCEDURE — 97112 NEUROMUSCULAR REEDUCATION: CPT | Mod: GO | Performed by: OCCUPATIONAL THERAPIST

## 2018-10-30 PROCEDURE — 97140 MANUAL THERAPY 1/> REGIONS: CPT | Mod: GO | Performed by: OCCUPATIONAL THERAPIST

## 2018-10-30 PROCEDURE — G0108 DIAB MANAGE TRN  PER INDIV: HCPCS

## 2018-10-30 RX ORDER — LANCETS
EACH MISCELLANEOUS
Qty: 102 EACH | Refills: 11 | Status: SHIPPED | OUTPATIENT
Start: 2018-10-30 | End: 2023-08-31

## 2018-10-30 NOTE — PROGRESS NOTES
"Diabetes Self-Management Education & Support    Diabetes Education Self Management & Training    SUBJECTIVE/OBJECTIVE:  Presents for: Follow-up  Accompanied by: Self  Diabetes education in the past 24mo: Yes  Focus of Visit: Monitoring, Taking Medication, Healthy Eating  Diabetes type: Type 2  Disease course: Getting harder to manage  Other concerns:: None  Cultural Influences/Ethnic Background:  American      Diabetes Symptoms & Complications  Blurred vision: No  Fatigue: No  Neuropathy: No  Foot ulcerations: No  Polydipsia: No  Polyphagia: No  Polyuria: No  Visual change: No  Weakness: No  Weight loss: Yes  Slow healing wounds: No  Recent Infection(s): No  Symptom course: Stable  Weight trend: Decreasing steadily  Autonomic neuropathy: No  CVA: No  Heart disease: No  Nephropathy: No  Peripheral neuropathy: No  Peripheral Vascular Disease: No  Retinopathy: No  Sexual dysfunction: No    Patient Problem List and Family Medical History reviewed for relevant medical history, current medical status, and diabetes risk factors.    Vitals:  There were no vitals taken for this visit.  Estimated body mass index is 36.3 kg/(m^2) as calculated from the following:    Height as of 10/8/18: 1.778 m (5' 10\").    Weight as of 10/8/18: 114.8 kg (253 lb).   Last 3 BP:   BP Readings from Last 3 Encounters:   10/19/18 132/72   10/08/18 153/80   03/06/18 134/84       History   Smoking Status     Never Smoker   Smokeless Tobacco     Never Used       Labs:  Lab Results   Component Value Date    A1C 8.8 10/08/2018     Lab Results   Component Value Date     10/01/2018     Lab Results   Component Value Date    LDL 77 10/01/2018     HDL Cholesterol   Date Value Ref Range Status   10/01/2018 42 >39 mg/dL Final   ]  GFR Estimate   Date Value Ref Range Status   10/01/2018 84 >60 mL/min/1.7m2 Final     Comment:     Non  GFR Calc     GFR Estimate If Black   Date Value Ref Range Status   10/01/2018 >90 >60 mL/min/1.7m2 Final "     Comment:      GFR Calc     Lab Results   Component Value Date    CR 0.95 10/01/2018     No results found for: MICROALBUMIN    Healthy Eating  Cultural/Orthodox diet restrictions?: No  Patient on a regular basis: Eats 3 meals a day  Meal planning: Smaller portions, Plate planning method  Meals include: Breakfast, Lunch, Dinner  Breakfast: protein bar  Lunch: sandwich, chips  Dinner: varies carbs, proteins and some veggies,   Snacks: oranges,   Beverages: Water, Diet soda    Being Active  Being Active Assessed Today: Yes  Exercise:: Yes (walking more at work and stairs)  Days per week of moderate to strenuous exercise (like a brisk walk): 5  On average, minutes per day of exercise at this level: 60  How intense was your typical exercise? : Moderate (like brisk walking)  Exercise Minutes per Week: 300  Barrier to exercise: None    Monitoring  Monitoring Assessed Today: Yes  Did patient bring glucose meter to appointment? : No  Blood Glucose Meter: Accu-check  Home Glucose (Sugar) Monitoring: Never  Blood glucose trend: No change  Low Glucose Range (mg/dL): 70-90  High Glucose Range (mg/dL): >200  Overall Range (mg/dL): 180-200    Did not bring in meter    Taking Medications  Diabetes Medication(s)     Biguanides Sig    metFORMIN (GLUCOPHAGE-XR) 500 MG 24 hr tablet Take 4 tablets (2,000 mg) by mouth daily (with dinner)    Incretin Mimetic Agents (GLP-1 Receptor Agonists) Sig    Semaglutide 0.25 or 0.5 MG/DOSE SOPN Inject 0.25 mg Subcutaneous once a week 0.25 mg weekly X 4 wks, then on week 5 increase to 0.5 mg weekly          Taking Medication Assessed Today: Yes  Current Treatments: Non-insulin Injectables, Oral Agent (monotherapy)  Problems taking diabetes medications regularly?: No  Diabetes medication side effects?: No  Treatment Compliance: All of the time    Problem Solving  Hypoglycemia Frequency: Rarely  Medical alert: No  Severe weather/disaster plan for diabetes management?: No  DKA  prevention plan?: No  Sick day plan for diabetes management?: (P) No    Hypoglycemia symptoms  Confusion: No  Dizziness or Light-Headedness: No  Headaches: No  Hunger: No  Mood changes: No  Nervousness/Anxiety: No  Sleepiness: No  Speech difficulty: No  Sweats: No  Tremors: No    Hypoglycemia Complications  Blackouts: No  Hospitalization: No  Nocturnal hypoglycemia: No  Required assistance: No  Required glucagon injection: No  Seizures: No    Reducing Risks  Reducing Risks Assessed Today: Yes  Diabetes Risks: Age over 45 years, Family History  CAD Risks: Diabetes Mellitus, Obesity, Male sex  Has dilated eye exam at least once a year?: Yes  Sees dentist every 6 months?: Yes  Sees podiatrist (foot doctor)?: No    Healthy Coping  Healthy Coping Assessed Today: Yes  Emotional response to diabetes: Acceptance  Informal Support system:: Children, Spouse  Stage of change: MAINTENANCE (Working to maintain change, with risk of relapse)  Difficulty affording diabetes management supplies?: No  Patient Activation Measure Survey Score:  ANA Score (Last Two) 9/27/2011   ANA Raw Score 39   Activation Score 56.4   ANA Level 3       ASSESSMENT:  Wei comes today as his A1C has gone up. He is not checking his BG, and not following the meal plan that has been taught to him.  He did stop Glipizide.  Will switch him from Victoza to Ozempic and ask him to set a goal of eating breakfast everyday.    Goals Addressed as of 10/30/2018 at 2:40 PM       Healthy Eating (pt-stated)     Added 10/30/18 by Lorena Borrego RN           My Goal: I will eat breakfast everyday before going to work.     What I need to meet my goal: 1. Have yogurt and cheese or meat or egg  2. 2 protein bars  3. Belvita bars  4. PB and 2 pieces of toast.    I plan to meet my goal by this date: 12/31/2018              Patient's most recent   Lab Results   Component Value Date    A1C 8.8 10/08/2018    is not meeting goal of <7.0    INTERVENTION:   Diabetes knowledge and  skills assessment:     Patient is knowledgeable in diabetes management concepts related to: Healthy Eating, Being Active, Monitoring and Taking Medication    Patient needs further education on the following diabetes management concepts: Healthy Eating, Monitoring, Taking Medication and Healthy Coping    Based on learning assessment above, most appropriate setting for further diabetes education would be: Individual setting.    Education provided today on:  AADE Self-Care Behaviors:  Diabetes Pathophysiology  Healthy Eating: carbohydrate counting, consistency in amount, composition, and timing of food intake, weight reduction, heart healthy diet, eating out, portion control, plate planning method and label reading  Monitoring: purpose, proper technique, log and interpret results, individual blood glucose targets and frequency of monitoring  Taking Medication: action of prescribed medication, drawing up, administering and storing injectable diabetes medications, proper site selection and rotation for injections, side effects of prescribed medications and when to take medications  Healthy Coping: recognize feelings about diagnosis and benefits of making appropriate lifestyle changes    Opportunities for ongoing education and support in diabetes-self management were discussed.    Pt verbalized understanding of concepts discussed and recommendations provided today.       Education Materials Provided:  Medication Information on Ozempic    PLAN:  See Patient Instructions for co-developed, patient-stated behavior change goals.  AVS printed and provided to patient today. See Follow-Up section for recommended follow-up.    Luciana Borrego RN/ÁNGEL Rojas Diabetes Educator    Time Spent: 60 minutes  Encounter Type: Individual    Any diabetes medication dose changes were made via the CDE Protocol and Collaborative Practice Agreement with the patient's primary care provider. A copy of this encounter was shared with the provider.

## 2018-10-30 NOTE — PATIENT INSTRUCTIONS
Diabetes Support Resources:  Check your BG every morning.  See your breakfast goal  Metformin  mg take 4 pills with dinner  Ozempic, 0.25 mg start the day after last dose of Victoza, weekly X 4 wks  Week 5 increase to 0.5 mg weekly     Bring blood glucose meter and logbook with you to all doctor and follow-up appointments.     [facility] for the [facility] Area:  For Your Diabetes Education and Nutrition Appointments Call:  [phone]   For Diabetes Education or Nutrition Related Questions:   Phone: [phone]  E-mail: [email or web address]  Fax: [phone]   If you need a medication refill please contact your pharmacy. Please allow 3 business days for your refills to be completed.    Instructions for emailing the Diabetes Educators    If you need to communicate a non-urgent message to a Diabetes Educator via email, please send to [email or web address].    Please follow the following email guidelines:    Subject line: Secure: your clinic name (example: Secure: [facility])  In the email please include: First name, middle initial, last name and date of birth.    We will be in touch with you within one (1) business day.

## 2018-10-30 NOTE — MR AVS SNAPSHOT
After Visit Summary   10/30/2018    Wei Wolf    MRN: [id number]           Patient Information     Date Of Birth          1965        Visit Information        Provider Department      10/30/2018 2:00 PM AN DIABETIC ED RESOURCE [facility]        Today's Diagnoses     Uncontrolled diabetes mellitus type 2 without complications (H)    -  1    Uncontrolled type 2 diabetes mellitus without complication, without long-term current use of insulin (H)          Care Instructions    Diabetes Support Resources:  Check your BG every morning.  See your breakfast goal  Metformin  mg take 4 pills with dinner  Ozempic, 0.25 mg start the day after last dose of Victoza, weekly X 4 wks  Week 5 increase to 0.5 mg weekly     Bring blood glucose meter and logbook with you to all doctor and follow-up appointments.     [facility] for the [facility]:  For Your Diabetes Education and Nutrition Appointments Call:  [phone]   For Diabetes Education or Nutrition Related Questions:   Phone: [phone]  E-mail: [email or web address]  Fax: [phone]   If you need a medication refill please contact your pharmacy. Please allow 3 business days for your refills to be completed.    Instructions for emailing the Diabetes Educators    If you need to communicate a non-urgent message to a Diabetes Educator via email, please send to [email or web address].    Please follow the following email guidelines:    Subject line: Secure: your clinic name (example: Secure: [facility])  In the email please include: First name, middle initial, last name and date of birth.    We will be in touch with you within one (1) business day.             Follow-ups after your visit        Follow-up notes from your care team     Return in about 6 weeks (around 12/14/2018) for evaluate BG numbers.      Your next 10 appointments already scheduled     Nov 20, 2018  7:30  AM CST   CONOR Hand with Mynra Negron OT   [facility] ([facility])    [address]  4th Floor  [facility] [address] [phone]            Dec 14, 2018  8:30 AM CST   Diabetes Education with AN DIABETIC ED RESOURCE   [facility] ([facility])    [address] [facility] [address] [phone]              Who to contact     If you have questions or need follow up information about today's clinic visit or your schedule please contact [facility] directly at [phone].  Normal or non-critical lab and imaging results will be communicated to you by Social Sh[employer]art, letter or phone within 4 business days after the clinic has received the results. If you do not hear from us within 7 days, please contact the clinic through [employer] or phone. If you have a critical or abnormal lab result, we will notify you by phone as soon as possible.  Submit refill requests through Dreampod or call your pharmacy and they will forward the refill request to us. Please allow 3 business days for your refill to be completed.          Additional Information About Your Visit        Dreampod Information     Dre[employer]od gives you secure access to your electronic health record. If you see a primary care provider, you can also send messages to your care team and make appointments. If you have questions, please call your primary care clinic.  If you do not have a primary care provider, please call [phone] and they will assist you.        Care EveryWhere ID     This is your Care EveryWhere ID. This could be used by other organizations to access your [facility] medical records  [id number]         Blood Pressure from Last 3 Encounters:   10/19/18 132/72   10/08/18 153/80   03/06/18 134/84    Weight from Last 3 Encounters:   10/08/18 114.8 kg (253 lb)   09/20/18 127 kg (280 lb)   08/02/18 125.6 kg (277 lb)              Today, you had  the following     No orders found for display         Today's Medication Changes          These changes are accurate as of 10/30/18  2:44 PM.  If you have any questions, ask your nurse or doctor.               Start taking these medicines.        Dose/Directions    Semaglutide 0.25 or 0.5 MG/DOSE Sopn   Used for:  Uncontrolled diabetes mellitus type 2 without complications (H)        Dose:  0.25 mg   Inject 0.25 mg Subcutaneous once a week 0.25 mg weekly X 4 wks, then on week 5 increase to 0.5 mg weekly   Quantity:  6 mL   Refills:  1         Stop taking these medicines if you haven't already. Please contact your care team if you have questions.     liraglutide 18 MG/3ML soln   Commonly known as:  VICTOZA                Where to get your medicines      These medications were sent to [id number] - [place] - [address] [address] [facility] [address]  [address] [address] [address]    Hours:  test fax successful 4/5/04  Phone:  [phone]     blood glucose monitoring lancets    blood glucose monitoring test strip         These medications were sent to [facility] [place]  [place] - [address]  [address] [address]     Phone:  [phone]     Semaglutide 0.25 or 0.5 MG/DOSE Sopn                Primary Care Provider Office Phone # Fax #    Yobany Bernal [phone] [address] [facility] [address]        Goals        General    Healthy Eating (pt-stated)     Notes - Note created  10/30/2018  2:39 PM by Lorena Borrego, RN    My Goal: I will eat breakfast everyday before going to work.     What I need to meet my goal: 1. Have yogurt and cheese or meat or egg  2. 2 protein bars  3. Belvita bars  4. PB and 2 pieces of toast.    I plan to meet my goal by this date: 12/31/2018          Equal Access to Services     FINA MOSER AH: Manav Campo, lazara oliveros, ray mercer  normamayankgarry cottrellRubenlev ah. So [facility] [phone].    ATENCIÓN: Si habla ramu, tiene a richardson disposición servicios gratuitos de asistencia lingüística. Caio al [phone].    We comply with applicable federal civil rights laws and [place] laws. We do not discriminate on the basis of race, color, national origin, age, disability, sex, sexual orientation, or gender identity.            Thank you!     Thank you for choosing [place] [facility]  for your care. Our goal is always to provide you with excellent care. Hearing back from our patients is one way we can continue to improve our services. Please take a few minutes to complete the written survey that you may receive in the mail after your visit with us. Thank you!             Your Updated Medication List - Protect others around you: Learn how to safely use, store and throw away your medicines at www.disposemymeds.org.          This list is accurate as of 10/30/18  2:44 PM.  Always use your most recent med list.                   Brand Name Dispense Instructions for use Diagnosis    ASPIRIN NOT PRESCRIBED    INTENTIONAL    1 each    continuous prn Antiplatelet medication not prescribed intentionally due to GI Issues / Ulcer Disease        blood glucose monitoring lancets     102 each    Use to test blood sugar 2 times daily or as directed.  Ok to substitute alternative if insurance prefers.    Uncontrolled type 2 diabetes mellitus without complication, without long-term current use of insulin (H)       * blood glucose monitoring test strip    no brand specified    3 Box    1 strip by In Vitro route daily accucheck compact plus test strips.  Testing once daily    Uncontrolled type 2 diabetes mellitus without complication, without long-term current use of insulin (H)       * blood glucose monitoring test strip    ACCU-CHEK GUIDE    100 strip    Use to test blood sugar 2 times daily or as directed.    Uncontrolled type 2 diabetes mellitus without  complication, without long-term current use of insulin (H)       insulin pen needle 32G X 4 MM    BD GABRIELA U/F    100 each    Use 1 daily as directed.    Uncontrolled type 2 diabetes mellitus without complication, without long-term current use of insulin (H)       metFORMIN 500 MG 24 hr tablet    GLUCOPHAGE-XR    360 tablet    Take 4 tablets (2,000 mg) by mouth daily (with dinner)    Uncontrolled type 2 diabetes mellitus without complication, without long-term current use of insulin (H)       order for DME     1 Units    Equipment being ordered: wrist splint    Left wrist pain       Semaglutide 0.25 or 0.5 MG/DOSE Sopn     6 mL    Inject 0.25 mg Subcutaneous once a week 0.25 mg weekly X 4 wks, then on week 5 increase to 0.5 mg weekly    Uncontrolled diabetes mellitus type 2 without complications (H)       simvastatin 10 MG tablet    ZOCOR    90 tablet    Take 1 tablet (10 mg) by mouth At Bedtime    High cholesterol       * Notice:  This list has 2 medication(s) that are the same as other medications prescribed for you. Read the directions carefully, and ask your doctor or other care provider to review them with you.

## 2018-10-30 NOTE — TELEPHONE ENCOUNTER
Pharmacist from [facility] calling to get a verbal for the Semaglutide medication order they received this afternoon from . Pharmacist says script was faxed over but not signed. Please call to advise. Thank you

## 2018-10-30 NOTE — MR AVS SNAPSHOT
After Visit Summary   10/30/2018    Wei Wolf    MRN: [id number]           Patient Information     Date Of Birth          1965        Visit Information        Provider Department      10/30/2018 7:30 AM Myrna Negron OT [facility]        Today's Diagnoses     Stiffness of right hand joint        Surgical follow-up care        Pain of right hand           Follow-ups after your visit        Your next 10 appointments already scheduled     Oct 30, 2018  2:00 PM CDT   Diabetes Education with AN DIABETIC ED RESOURCE   [facility] ([facility])    [address] [facility] [address]   [phone]            Nov 20, 2018  7:30 AM CST   [facility] Hand with [facility]I [facility] ([facility])    [address]  4th Floor  [facility] [address] [phone]              Who to contact     If you have questions or need follow up information about today's clinic visit or your schedule please contact [facility] directly at [phone].  Normal or non-critical lab and imaging results will be communicated to you by "[employer]hart, letter or phone within 4 business days after the clinic has received the results. If you do not hear from us within 7 days, please contact the clinic through Bubokt or phone. If you have a critical or abnormal lab result, we will notify you by phone as soon as possible.  Submit refill requests through ChoicePass or call your pharmacy and they will forward the refill request to us. Please allow 3 business days for your refill to be completed.          Additional Information About Your Visit        "[employer]hart Information     ChoicePass gives you secure access to your electronic health record. If you see a primary care provider, you can also send messages to your care team and make appointments. If you have questions, please call your primary care clinic.  If you do  not have a primary care provider, please call [phone] and they will assist you.        Care EveryWhere ID     This is your Care EveryWhere ID. This could be used by other organizations to access your [facility] medical records  [id number]         Blood Pressure from Last 3 Encounters:   10/19/18 132/72   10/08/18 153/80   03/06/18 134/84    Weight from Last 3 Encounters:   10/08/18 114.8 kg (253 lb)   09/20/18 127 kg (280 lb)   08/02/18 125.6 kg (277 lb)              We Performed the Following     MANUAL THER TECH,1+REGIONS,EA 15 MIN     NEUROMUSCULAR RE-EDUCATION     THERAPEUTIC EXERCISES        Primary Care Provider Office Phone # Fax #    Yobany Bernal [phone] [address] [facility] [address]        Equal Access to Services     [facility]: Hadii aad ku hadasho Soomaali, waaxda luqadaha, qaybta kaalmada adeegyada, ray holbrook hayaan lilli raines . So [facility] [phone].    ATENCIÓN: Si habla español, tiene a richardson disposición servicios gratuitos de asistencia lingüística. Llame al [phone].    We comply with applicable federal civil rights laws and [place] laws. We do not discriminate on the basis of race, color, national origin, age, disability, sex, sexual orientation, or gender identity.            Thank you!     Thank you for choosing [facility]  for your care. Our goal is always to provide you with excellent care. Hearing back from our patients is one way we can continue to improve our services. Please take a few minutes to complete the written survey that you may receive in the mail after your visit with us. Thank you!             Your Updated Medication List - Protect others around you: Learn how to safely use, store and throw away your medicines at www.disposemymeds.org.          This list is accurate as of 10/30/18 10:53 AM.  Always use your most recent med list.                   Brand Name Dispense Instructions for use Diagnosis     ASPIRIN NOT PRESCRIBED    INTENTIONAL    1 each    continuous prn Antiplatelet medication not prescribed intentionally due to GI Issues / Ulcer Disease        blood glucose monitoring lancets     102 each    Use to test blood sugar 2 times daily or as directed.  Ok to substitute alternative if insurance prefers.    Uncontrolled type 2 diabetes mellitus without complication, without long-term current use of insulin (H)       * blood glucose monitoring test strip    no brand specified    3 Box    1 strip by In Vitro route daily accucheck compact plus test strips.  Testing once daily    Uncontrolled type 2 diabetes mellitus without complication, without long-term current use of insulin (H)       * blood glucose monitoring test strip    ACCU-CHEK GUIDE    100 strip    Use to test blood sugar 2 times daily or as directed.    Uncontrolled type 2 diabetes mellitus without complication, without long-term current use of insulin (H)       glipiZIDE 10 MG 24 hr tablet    glipiZIDE XL    90 tablet    One tablet daily    Uncontrolled type 2 diabetes mellitus without complication, without long-term current use of insulin (H)       insulin pen needle 32G X 4 MM    BD GABRIELA U/F    100 each    Use 1 daily as directed.    Uncontrolled type 2 diabetes mellitus without complication, without long-term current use of insulin (H)       liraglutide 18 MG/3ML soln    VICTOZA    27 mL    Inject 1.8 mg Subcutaneous daily    Uncontrolled type 2 diabetes mellitus without complication, without long-term current use of insulin (H)       metFORMIN 500 MG 24 hr tablet    GLUCOPHAGE-XR    360 tablet    Take 4 tablets (2,000 mg) by mouth daily (with dinner)    Uncontrolled type 2 diabetes mellitus without complication, without long-term current use of insulin (H)       methylPREDNISolone 4 MG tablet    MEDROL DOSEPAK    21 tablet    Follow package instructions    Right wrist pain       order for DME     1 Units    Equipment being ordered: wrist splint     Left wrist pain       simvastatin 10 MG tablet    ZOCOR    90 tablet    Take 1 tablet (10 mg) by mouth At Bedtime    High cholesterol       * Notice:  This list has 2 medication(s) that are the same as other medications prescribed for you. Read the directions carefully, and ask your doctor or other care provider to review them with you.

## 2018-10-30 NOTE — PROGRESS NOTES
"Hand Therapy Progress Note    Current Date:  10/30/2018    Diagnosis: Pain of finger of right hand, Surgical follow-up care  DOS:  12/21/17  Procedure:   Right Index Finger Metacarpal Bone Cyst Curettage with                                               Allograft from Distal Radius   Post:  > 6 months  Referring MD: Lydia Corrales MD     Reporting period is 9/25/18 to 10/30/2018    Subjective:   Subjective changes noted by patient:  \"It's been doing better this week.\"  Functional changes noted by patient:  Improvement in Self Care Tasks (dressing, eating, bathing, hygiene/toileting)  Patient has noted adverse reaction to:  None    Objective:  Observation: Color/Temperature:     [x]    Normal  []    Abnormal    PAIN 1/11/2018 7/12/18 8/13/18 9/25/18 10/30/18   Location Right  wrist and hand Radial and ulnar wrist Volar ulnar wrist  Volar wrist Volar wrist   Radiates no no no no no   Worse d/n daytime daytime Daytime and laying in bed at night daytime daytime   Frequency activity dependant intermittent constant intermittent intermittent   Exacerbated by movement Wrist extension, rotation Flexing wrist, pronation Extension, supination, pronation All wrist motion   Relieves rest Rest, pain medication Rest, ibuprofen Rest, ibuprofen rest   At rest 0-10/10 0/10 0/10 0/10 0/10 0/10   At worst 7/10 6/10 4/10 - 6/10 after bending it too far 6/10 3/10   Sleep disruption?   no yes no no no   Progression Gradually getting better. Slow improvement Slowly improving improving improving     ROM:  Pain Report:  - none    + mild    ++ moderate    +++ severe     Wrist  1/11/2018 7/12/18 8/13/18 8/25/18 10/30/18   AROM (PROM) R L  R     Extension 45 + 60 38++ 45 42+ 42+   Flexion 50 + 65 20+ 37 35+ 30+   RD 10 20 12+ 10+ 11+ 7+   UD 30 30 18++ 22++ 28+ 25+   Supination 60 90 52++ 65 71+ 61+   Pronation 70 85 90 75 88+ 88+       HAND 1/11/2018 7/12/18 8/23/18 9/25/18 10/30/18   AROM(PROM)        Index MP 15/45 -7/52 -2/66 -5/56 -19/54 "   PIP 20/90 -15/82 -13/90 -6/88 -13/80   DIP 0/45 0/45 0/45 -4/46 -11/48   BEAN 145 157  175 139   Long MP 10/50 -13/67 -6/70 -5/68 -7/65   PIP 0/85 -5/84 0/80 0/81 0/80   DIP 55 -5/69 0/56 0/61 0/49   BEAN 180 197  205 187   Ring MP 10/50 -7/65 0/70 0/63 0/55   PIP 0/84 0/80 0/85 0/83 -4/80   DIP 0/55 0/58 0/60 0/55 0/50   BEAN 179 196  201 181   Small MP 3/55 0/67 0/73 0/73 0/58   PIP 0/90 -10/77 -7/83 0/83 -6/80   DIP 0/55 0/59 -9/60 -4/62 0/55   BEAN 197 193  214 187     Strength:   (Measured in pounds)  Pain Report:  - none    + mild    ++ moderate    +++ severe     7/12/2018 8/13/18 9/25/18 10/30/18   Trials left right R     1  2  3 119  115  109 35+++  20  19 44++  32  35 56++  47++  51++ 54  52++  43++   Average: 114 25 37 51 50     Lat Pinch  7/12/2018 8/13/18 9/25/18 10/30/18   Trials left right R     1  2  3 25  27  27 26  25  25 25  24  25 22  25  26 25  27  27   Average: 26 25 25 24 26     3 Pt Pinch  7/12/2018 8/13/18 9/25/18 10/30/18   Trials left right R     1  2  3 26  25  25 16+  17+  14+ 19  21  18 20  19  20 18  17  18   Average: 25 16 19 20 18   Please refer to the daily flowsheet for treatment today, total treatment time and time spent performing 1:1 timed codes.      Assessment:  Response to therapy has been improvement to:  ROM of Fingers: All Planes  Strength:   and pinch  Pain:  frequency is less, intensity of pain is decreased, duration of pain is decreased and less tender over affected area    Overall Assessment:  Patient's symptoms are resolving.  Patient is progressing well and is ready to decrease frequency of treatment in the clinic.  STG/LTG:  STGoals have been reviewed and progress or achievement has occurred;  see goal sheet for details and updates.    Plan:  Frequency/Duration:  Recommend continuing to see patient  1x/3 weeks  for 3 months  Appropriateness of Rx I have re-evaluated this patient and find that the nature, scope, duration and intensity of the therapy is  appropriate for the medical condition of the patient.  Recommendations for Continued Therapy  Continue with current POC.    Home Exercise Program:  OTC wrist orthosis  Tendon glides  Wrist AROM  Ice  Heat  Massage to extensors and flexors  Functional use, as much as able    Next Visit:  US  Ktape?  Wrist PROM  Functional ax  MFR  Strengthening

## 2018-11-01 ENCOUNTER — TRANSFERRED RECORDS (OUTPATIENT)
Dept: HEALTH INFORMATION MANAGEMENT | Facility: CLINIC | Age: 53
End: 2018-11-01

## 2018-11-01 ENCOUNTER — TELEPHONE (OUTPATIENT)
Dept: FAMILY MEDICINE | Facility: CLINIC | Age: 53
End: 2018-11-01

## 2018-11-01 RX ORDER — LIRAGLUTIDE 6 MG/ML
1.2 INJECTION SUBCUTANEOUS DAILY
Qty: 18 ML | Refills: 1 | Status: SHIPPED | OUTPATIENT
Start: 2018-11-01 | End: 2019-01-21 | Stop reason: ALTCHOICE

## 2018-11-01 NOTE — TELEPHONE ENCOUNTER
Pharmacy calling received a script for victoza, script is not signed need verbal ok. Please call Caller informed that calls received after 3pm may not be returned same day.

## 2018-11-08 ENCOUNTER — MYC MEDICAL ADVICE (OUTPATIENT)
Dept: OTHER | Age: 53
End: 2018-11-08

## 2018-11-12 NOTE — TELEPHONE ENCOUNTER
"Wei sent in a 36Kr message about \"the nuew medication\".    Called to clarify-   He says he was on Victoza, Metformin, and Glipizide.   Discontinued the glipizide on 10/23 per recommendation and BG readings have increased dramatically.    Wei says that the new medication is Ozempic, the suggestion was to discontinue the Victoza and try Ozempic instead for improved results.    He did  the Ozempic and started the 0.25 mg dose. Wonders if he must continue on the small dose since he was already on Victoza and body is used to the med class.   Discussed he may be able to increase sooner, but asked him to send in numbers again in 2 weeks to see how the med is working.   Can better assess at that time. He is agreeable.    He is scheduled for f/u with CDE on 12/14.     Fang Mendez RD, LD, CDE    "

## 2018-11-20 ENCOUNTER — THERAPY VISIT (OUTPATIENT)
Dept: OCCUPATIONAL THERAPY | Facility: CLINIC | Age: 53
End: 2018-11-20
Payer: COMMERCIAL

## 2018-11-20 DIAGNOSIS — M25.641 STIFFNESS OF RIGHT HAND JOINT: ICD-10-CM

## 2018-11-20 DIAGNOSIS — M79.641 PAIN OF RIGHT HAND: ICD-10-CM

## 2018-11-20 DIAGNOSIS — Z09 SURGICAL FOLLOW-UP CARE: ICD-10-CM

## 2018-11-20 PROCEDURE — 97110 THERAPEUTIC EXERCISES: CPT | Mod: GO | Performed by: OCCUPATIONAL THERAPIST

## 2018-11-20 PROCEDURE — 97140 MANUAL THERAPY 1/> REGIONS: CPT | Mod: GO | Performed by: OCCUPATIONAL THERAPIST

## 2018-11-20 PROCEDURE — 97112 NEUROMUSCULAR REEDUCATION: CPT | Mod: GO | Performed by: OCCUPATIONAL THERAPIST

## 2018-12-11 ENCOUNTER — THERAPY VISIT (OUTPATIENT)
Dept: OCCUPATIONAL THERAPY | Facility: CLINIC | Age: 53
End: 2018-12-11
Payer: COMMERCIAL

## 2018-12-11 DIAGNOSIS — M79.641 PAIN OF RIGHT HAND: ICD-10-CM

## 2018-12-11 DIAGNOSIS — M25.641 STIFFNESS OF RIGHT HAND JOINT: ICD-10-CM

## 2018-12-11 DIAGNOSIS — Z09 SURGICAL FOLLOW-UP CARE: ICD-10-CM

## 2018-12-11 PROCEDURE — 97018 PARAFFIN BATH THERAPY: CPT | Mod: GO | Performed by: OCCUPATIONAL THERAPIST

## 2018-12-11 PROCEDURE — 97110 THERAPEUTIC EXERCISES: CPT | Mod: GO | Performed by: OCCUPATIONAL THERAPIST

## 2018-12-11 PROCEDURE — 97140 MANUAL THERAPY 1/> REGIONS: CPT | Mod: GO | Performed by: OCCUPATIONAL THERAPIST

## 2018-12-11 NOTE — PROGRESS NOTES
"Hand Therapy Progress Note    Current Date:  12/11/2018    Diagnosis: Pain of finger of right hand, Surgical follow-up care  DOS:  12/21/17  Procedure:   Right Index Finger Metacarpal Bone Cyst Curettage with                                               Allograft from Distal Radius   Post:  > 6 months  Referring MD: Lydia Corrales MD     Reporting period is 10/3018 to 12/10/2018    Subjective:   Subjective changes noted by patient:  \"Some days it feels good, others it doesn't.  It's stiff today.\"  Functional changes noted by patient:  Improvement in Self Care Tasks (dressing, eating, bathing, hygiene/toileting)  Patient has noted adverse reaction to:  None    Functional Outcome Measure:  Upper Extremity Functional Index Score:  SCORE:   Column Totals: /80: 55   (A lower score indicates greater disability.)    Objective:  Observation: Color/Temperature:     [x]    Normal  []    Abnormal    PAIN 1/11/2018 7/12/18 8/13/18 9/25/18 10/30/18 12/11/18   Location Right  wrist and hand Radial and ulnar wrist Volar ulnar wrist  Volar wrist Volar wrist Volar wrist     Radiates no no no no no no   Worse d/n daytime daytime Daytime and laying in bed at night daytime daytime daytime   Frequency activity dependant intermittent constant intermittent intermittent intermittetn   Exacerbated by movement Wrist extension, rotation Flexing wrist, pronation Extension, supination, pronation All wrist motion Gripping, wrist motions   Relieves rest Rest, pain medication Rest, ibuprofen Rest, ibuprofen rest rest   At rest 0-10/10 0/10 0/10 0/10 0/10 0/10 0/10   At worst 7/10 6/10 4/10 - 6/10 after bending it too far 6/10 3/10 5/10   Sleep disruption?   no yes no no no no   Progression Gradually getting better. Slow improvement Slowly improving improving improving Staying about the same.     ROM:  Pain Report:  - none    + mild    ++ moderate    +++ severe     Wrist  1/11/2018 7/12/18 8/13/18 8/25/18 10/30/18 12/11/18   AROM (PROM) R L  R    "   Extension 45 + 60 38++ 45 42+ 42+ 46++   Flexion 50 + 65 20+ 37 35+ 30+ 30++   RD 10 20 12+ 10+ 11+ 7+ 7+   UD 30 30 18++ 22++ 28+ 25+ 26+   Supination 60 90 52++ 65 71+ 61+ 68++   Pronation 70 85 90 75 88+ 88+ 88++       HAND 1/11/2018 7/12/18 8/23/18 9/25/18 10/30/18 12/11/18   AROM(PROM)         Index MP 15/45 -7/52 -2/66 -5/56 -19/54 -4/69   PIP 20/90 -15/82 -13/90 -6/88 -13/80 -12/84   DIP 0/45 0/45 0/45 -4/46 -11/48 0/52   BEAN 145 157  175 139 189   Long MP 10/50 -13/67 -6/70 -5/68 -7/65 -4/74   PIP 0/85 -5/84 0/80 0/81 0/80 -4/87   DIP 55 -5/69 0/56 0/61 0/49 0/56   BEAN 180 197  205 187 209   Ring MP 10/50 -7/65 0/70 0/63 0/55 -3/73   PIP 0/84 0/80 0/85 0/83 -4/80 -5/95   DIP 0/55 0/58 0/60 0/55 0/50 0/62   BEAN 179 196  201 181 222   Small MP 3/55 0/67 0/73 0/73 0/58 0/85   PIP 0/90 -10/77 -7/83 0/83 -6/80 -15/77   DIP 0/55 0/59 -9/60 -4/62 0/55 0/62   BEAN 197 193  214 187 209     Strength:   (Measured in pounds)  Pain Report:  - none    + mild    ++ moderate    +++ severe     7/12/2018 8/13/18 9/25/18 10/30/18 12/11/18   Trials left right R      1  2  3 119  115  109 35+++  20  19 44++  32  35 56++  47++  51++ 54  52++  43++ 51++  48++  56++   Average: 114 25 37 51 50 52     Lat Pinch  7/12/2018 8/13/18 9/25/18 10/30/18 12/11/18   Trials left right R      1  2  3 25  27  27 26  25  25 25  24  25 22  25  26 25  27  27 25++  25++  25++   Average: 26 25 25 24 26 25     3 Pt Pinch  7/12/2018 8/13/18 9/25/18 10/30/18 12/11/18   Trials left right R      1  2  3 26  25  25 16+  17+  14+ 19  21  18 20  19  20 18  17  18 20  21  20   Average: 25 16 19 20 18 20     Please refer to the daily flowsheet for treatment provided today.     Assessment:  Response to therapy has been improvement to:  ROM of Wrist:  All Planes  Fingers: All Planes  Strength:   and pinch    Overall Assessment:  Patient's symptoms are resolving.  Patient would benefit from continued therapy to achieve rehab potential  STG/LTG:  STGoals  have been reviewed and progress or achievement has occurred;  see goal sheet for details and updates.    Plan:  Frequency/Duration:  Recommend continuing to see patient  1x/3 weeks  for 3 visits  Appropriateness of Rx I have re-evaluated this patient and find that the nature, scope, duration and intensity of the therapy is appropriate for the medical condition of the patient.  Recommendations for Continued Therapy  Additions to Treatment Plan -  Modalities:  Paraffin    Home Exercise Program:  OTC wrist orthosis  Tendon glides  Wrist AROM  Ice  Heat  Massage to extensors and flexors  Functional use, as much as able    Next Visit:  US  Ktape?  Wrist PROM  Functional ax  MFR  Strengthening

## 2018-12-13 ENCOUNTER — MYC MEDICAL ADVICE (OUTPATIENT)
Dept: ORTHOPEDICS | Facility: CLINIC | Age: 53
End: 2018-12-13

## 2018-12-14 ENCOUNTER — ALLIED HEALTH/NURSE VISIT (OUTPATIENT)
Dept: EDUCATION SERVICES | Facility: CLINIC | Age: 53
End: 2018-12-14
Payer: COMMERCIAL

## 2018-12-14 DIAGNOSIS — E11.9 DIABETES MELLITUS, TYPE 2 (H): Primary | ICD-10-CM

## 2018-12-14 DIAGNOSIS — E11.9 DIABETES MELLITUS WITHOUT COMPLICATION (H): ICD-10-CM

## 2018-12-14 PROCEDURE — G0108 DIAB MANAGE TRN  PER INDIV: HCPCS

## 2018-12-14 NOTE — PROGRESS NOTES
"Diabetes Self-Management Education & Support    Diabetes Education Self Management & Training    SUBJECTIVE/OBJECTIVE:  Presents for: Follow-up  Accompanied by: Self  Diabetes education in the past 24mo: Yes  Focus of Visit: Monitoring, Taking Medication, Healthy Eating  Diabetes type: Type 2  Disease course: Getting harder to manage  Transportation concerns: No  Other concerns:: None  Cultural Influences/Ethnic Background:  American      Diabetes Symptoms & Complications  Blurred vision: No  Fatigue: No  Neuropathy: No  Foot ulcerations: No  Polydipsia: No  Polyphagia: No  Polyuria: No  Visual change: No  Weakness: No  Weight loss: Yes  Slow healing wounds: No  Recent Infection(s): No  Symptom course: Stable  Weight trend: Decreasing steadily(lost another 5 lbs)  Autonomic neuropathy: No  CVA: No  Heart disease: No  Nephropathy: No  Peripheral neuropathy: No  Peripheral Vascular Disease: No  Retinopathy: No  Sexual dysfunction: No    Patient Problem List and Family Medical History reviewed for relevant medical history, current medical status, and diabetes risk factors.    Vitals:  There were no vitals taken for this visit.  Estimated body mass index is 36.3 kg/m  as calculated from the following:    Height as of 10/8/18: 1.778 m (5' 10\").    Weight as of 10/8/18: 114.8 kg (253 lb).   Last 3 BP:   BP Readings from Last 3 Encounters:   10/19/18 132/72   10/08/18 153/80   03/06/18 134/84       History   Smoking Status     Never Smoker   Smokeless Tobacco     Never Used       Labs:  Lab Results   Component Value Date    A1C 8.8 10/08/2018     Lab Results   Component Value Date     10/01/2018     Lab Results   Component Value Date    LDL 77 10/01/2018     HDL Cholesterol   Date Value Ref Range Status   10/01/2018 42 >39 mg/dL Final   ]  GFR Estimate   Date Value Ref Range Status   10/01/2018 84 >60 mL/min/1.7m2 Final     Comment:     Non  GFR Calc     GFR Estimate If Black   Date Value Ref Range " Status   10/01/2018 >90 >60 mL/min/1.7m2 Final     Comment:      GFR Calc     Lab Results   Component Value Date    CR 0.95 10/01/2018     No results found for: MICROALBUMIN    Healthy Eating  Cultural/Sikh diet restrictions?: No  Patient on a regular basis: Eats 3 meals a day  Meal planning: Smaller portions, Plate planning method  Meals include: Breakfast, Lunch, Dinner  Breakfast: protein bar or sausage links with Belvita bars  Lunch: sandwich, chips or meat of sorts  Dinner: varies carbs, proteins and some veggies,   Snacks: crackers, cheese.  some days has a bedtime snack  Beverages: Water, Diet soda  Has patient met with a dietitian in the past?: No    Being Active  Being Active Assessed Today: Yes  Exercise:: Yes(walking more at work and stairs)  Days per week of moderate to strenuous exercise (like a brisk walk): 5  How intense was your typical exercise? : Light (like stretching or slow walking)  Barrier to exercise: None    Monitoring  Monitoring Assessed Today: Yes  Did patient bring glucose meter to appointment? : No  Blood Glucose Meter: Accu-check  Home Glucose (Sugar) Monitorin-2 times per day  Blood glucose trend: Increasing steadily  Low Glucose Range (mg/dL): >200  High Glucose Range (mg/dL): >200  Overall Range (mg/dL): >200        Taking Medications  Diabetes Medication(s)     Biguanides Sig    metFORMIN (GLUCOPHAGE-XR) 500 MG 24 hr tablet Take 4 tablets (2,000 mg) by mouth daily (with dinner)    Insulin Sig    insulin glargine U-300 (TOUJEO) 300 UNIT/ML injection Inject 20 Units Subcutaneous At Bedtime    Incretin Mimetic Agents (GLP-1 Receptor Agonists) Sig    liraglutide (VICTOZA) 18 MG/3ML soln Inject 1.2 mg Subcutaneous daily    Semaglutide 0.25 or 0.5 MG/DOSE SOPN Inject 0.25 mg Subcutaneous once a week 0.25 mg weekly X 4 wks, then on week 5 increase to 0.5 mg weekly          Taking Medication Assessed Today: Yes  Current Treatments: Non-insulin Injectables, Oral  Agent (monotherapy)  Problems taking diabetes medications regularly?: No  Diabetes medication side effects?: No  Treatment Compliance: All of the time    Problem Solving  Hypoglycemia Frequency: Rarely  Medical alert: No  Severe weather/disaster plan for diabetes management?: No  DKA prevention plan?: No    Hypoglycemia symptoms  Confusion: No  Dizziness or Light-Headedness: No  Headaches: No  Hunger: No  Mood changes: No  Nervousness/Anxiety: No  Sleepiness: No  Speech difficulty: No  Sweats: No  Tremors: No    Hypoglycemia Complications  Blackouts: No  Hospitalization: No  Nocturnal hypoglycemia: No  Required assistance: No  Required glucagon injection: No  Seizures: No    Reducing Risks  Reducing Risks Assessed Today: Yes  Diabetes Risks: Age over 45 years, Family History  CAD Risks: Diabetes Mellitus, Obesity, Male sex  Has dilated eye exam at least once a year?: Yes  Sees dentist every 6 months?: Yes  Sees podiatrist (foot doctor)?: No    Healthy Coping  Healthy Coping Assessed Today: Yes  Emotional response to diabetes: Acceptance  Informal Support system:: Children, Spouse  Stage of change: MAINTENANCE (Working to maintain change, with risk of relapse)  Difficulty affording diabetes management supplies?: No  Patient Activation Measure Survey Score:  ANA Score (Last Two) 9/27/2011   ANA Raw Score 39   Activation Score 56.4   ANA Level 3       ASSESSMENT:  Wei comes today with BG out of control. He has had steroid injections recently and ever since has had high BG .  He is on Metformin  mg takes 4 pills daily, and Ozempic 0.5 mg just started.  Will place him on Insulin Toujeo for short term to get his BG down, and taper off once to goal.      Goals Addressed as of 12/14/2018 at 9:11 AM       Medication 1 (pt-stated)     Added 12/14/18 by Lorena Borrego RN     My Goal: I will begin short term insulin    What I need to meet my goal: 1.start with 20 units tonight  2. After 4 days if fasting BG is still  > 150, increase by 2 units.  (12/18)  3. After 4 more days if fasting is > 150 increase by 2 units (12/22)  3. Goal to get fasting to 150.    I plan to meet my goal by this date: 1 month            Patient's most recent   Lab Results   Component Value Date    A1C 8.8 10/08/2018    is not meeting goal of <7.0    INTERVENTION:   Diabetes knowledge and skills assessment:     Patient is knowledgeable in diabetes management concepts related to: Healthy Eating, Being Active, Monitoring and Taking Medication    Patient needs further education on the following diabetes management concepts: Healthy Eating, Being Active, Monitoring and Taking Medication    Based on learning assessment above, most appropriate setting for further diabetes education would be: Individual setting.    Education provided today on:  AADE Self-Care Behaviors:  Monitoring: purpose, proper technique, log and interpret results, individual blood glucose targets and frequency of monitoring  Taking Medication: action of prescribed medication, drawing up, administering and storing injectable diabetes medications, proper site selection and rotation for injections, side effects of prescribed medications and when to take medications  Insulin administration technique taught today. Patient verbalized understanding and was able to perform an accurate return demonstration of administration technique.    Opportunities for ongoing education and support in diabetes-self management were discussed.    Pt verbalized understanding of concepts discussed and recommendations provided today.       Education Materials Provided:  No new materials provided today    PLAN:  See Patient Instructions for co-developed, patient-stated behavior change goals.  AVS printed and provided to patient today. See Follow-Up section for recommended follow-up.    Luciana Borrego RN/MALIE  [facility] Educator    Time Spent: 30 minutes  Encounter Type: Individual    Any diabetes medication dose  changes were made via the CDE Protocol and Collaborative Practice Agreement with the patient's primary care provider. A copy of this encounter was shared with the provider.

## 2018-12-14 NOTE — PATIENT INSTRUCTIONS
Diabetes Support Resources:  If you get a shot in the shoulder, increase Toujeo by 20 % then day before and the of and 2 days after. Then go back to the dose you started at    See the goals for titrating insulin.      Bring blood glucose meter and logbook with you to all doctor and follow-up appointments.     [facility] for the [facility] [place] [facility]:  For Your Diabetes Education and Nutrition Appointments Call:  [phone]   For Diabetes Education or Nutrition Related Questions:   Phone: [phone]  E-mail: [email or web address]  Fax: [phone]   If you need a medication refill please contact your pharmacy. Please allow 3 business days for your refills to be completed.    Instructions for emailing the Diabetes Educators    If you need to communicate a non-urgent message to a Diabetes Educator via email, please send to [email or web address].    Please follow the following email guidelines:    Subject line: Secure: your clinic name (example: Secure: [email or web address])  In the email please include: First name, middle initial, last name and date of birth.    We will be in touch with you within one (1) business day.

## 2018-12-17 NOTE — PROGRESS NOTES
"SUBJECTIVE:  Wei Wolf is a 53 year old male who is seen as self referral for right shoulder pain that has been present approximately 10+ years. He has previously had steroid shots in both shoulders at an outside provider for (per patient) right side \"bone spurs\", and left for frozen shoulder. His last shot was about 10 years ago in the right shoulder, which wasn't fully successful. He has had pain for years in the right shoulder.     Present symptoms: Lateral shoulder pain, pain with reaching cross body, pain with overhead activities, pain reaching behind back, positional night pain, pain lifting, +weakness. Denies pain with ADL's.   Associated symptoms: none    Treatment up to this point: Steroid injections 10+ years ago.     Ortho PMH: Extensive history of pain in right hand from bone cyst.     Review of Systems:  Constitutional:  NEGATIVE for fever, chills, change in weight  Integumentary/Skin:  NEGATIVE for worrisome rashes, moles or lesions  Eyes:  NEGATIVE for vision changes or irritation  ENT/Mouth:  NEGATIVE for ear, mouth and throat problems  Resp:  NEGATIVE for significant cough or SOB  Breast:  NEGATIVE for masses, tenderness or discharge  CV:  NEGATIVE for chest pain, palpitations or peripheral edema  GI:  NEGATIVE for nausea, abdominal pain, heartburn, or change in bowel habits  :  Negative   Musculoskeletal:  See HPI above  Neuro:  NEGATIVE for weakness, dizziness or paresthesias  Endocrine:  NEGATIVE for temperature intolerance, skin/hair changes  Heme/allergy/immune:  NEGATIVE for bleeding problems  Psychiatric:  NEGATIVE for changes in mood or affect    Past Medical History:   Past Medical History:   Diagnosis Date     AR (allergic rhinitis)      Diabetes (H)      Hypertension goal BP (blood pressure) < 140/90 12/20/2010     Sleep apnea      Past Surgical History:   Past Surgical History:   Procedure Laterality Date     ABDOMEN SURGERY       COLONOSCOPY WITH CO2 INSUFFLATION N/A " "10/19/2018    Procedure: COLON SCREEN/ ;  Surgeon: Mason Duenas MD;  Location: [facility] OR     EXCISE EXOSTOSIS FINGER(S) Right 12/21/2017    Procedure: EXCISE EXOSTOSIS FINGER(S);  Right Index Finger Metacarpal Bone Cyst Curettage with Allograft from Distal Radius;  Surgeon: Lydia Corrales MD;  Location: [facility] OR     GRAFT BONE TO FINGER Right 12/21/2017    Procedure: GRAFT BONE TO FINGER;;  Surgeon: Lydia Corrales MD;  Location: [facility] OR     NO HISTORY OF SURGERY       Family History:   Family History   Problem Relation Age of Onset     Asthma Mother      Cancer Mother         CERVICAL     Diabetes Mother      Cancer Father         BRAIN     Diabetes Father      Asthma Brother      Social History:   Social History     Tobacco Use     Smoking status: Never Smoker     Smokeless tobacco: Never Used   Substance Use Topics     Alcohol use: No     OBJECTIVE:  Physical Exam:  /82 (BP Location: Left arm, Patient Position: Sitting, Cuff Size: Adult Large)   Pulse 83   Ht 1.778 m (5' 10\")   Wt 114.8 kg (253 lb)   SpO2 99%   BMI 36.30 kg/m    General Appearance: healthy, alert and no distress   Skin: no suspicious lesions or rashes  Neuro: Normal strength and tone, mentation intact and speech normal  Vascular: good pulses, and cappillary refill   Lymph: no lymphadenopathy   Psych:  mentation appears normal and affect normal/bright  Resp: no increased work of breathing    Neck Exam:  Cervical range of motion: Full and does not cause neck pain or reproduce shoulder pain.    Right Shoulder Exam:  Shoulder Inspection: mild infraspinatus muscle atrophy on right side    Tender: proximal bicep tendon and greater tuberosity  Non-tender: AC joint  Range of Motion:   Active: forward flexion: 145, internal rotation: Back pocket   Passive: forward flexion: 140*, external rotation: 15/10, abduction (tested supine): 85. No crepitations.  Strength: forward flexion: 5/5, external rotation: 5/5, Liftoff: Unable " due to limited ROM, Bear hug: intact  Impingement: all grade 2 positive      X-rays:  Obtained 12/18/18 of the RIGHT shoulder: 3-views, reviewed in the office with the patient by myself today and show type 3 acromion with no degenerative changes in the glenohumeral joint or the AC joint.     ASSESSMENT:  1. Impingement syndrome right shoulder  2. Adhesive capsulitis right shoulder    PLAN:  Steroid injection today, subacromial since there is a degree of impingement. Physical therapy ordered. Discussed eventual possibility of surgical intervention if the adhesive capsulitis does not resolve, or acromioplasty if the range of motion normalizes, but impingement symptoms continue.     Procedure Note:   Informed consent obtained. Risks, benefits and complications of the injection were discussed with the patient and the patient elected to proceed. Right shoulder injected into the subacromial space after sterile prep, using 80mg Depomedrol and 4cc local anesthetic.    Return to clinic: in one month to monitor progress.     BLANE Johnson MD  Dept. Orthopedic Surgery  [facility]    This document serves as a record of the services and decisions personally performed and made by Dr. BLANE Johnson MD. It was created on his behalf by Sally Magallon, a trained medical scribe. The creation of this record is based on the provider's personal observations and the statements of the patient. This document has been checked and approved by the attending provider.   Sally Magallon December 18, 2018 9:05 AM

## 2018-12-18 ENCOUNTER — ANCILLARY PROCEDURE (OUTPATIENT)
Dept: GENERAL RADIOLOGY | Facility: CLINIC | Age: 53
End: 2018-12-18
Attending: ORTHOPAEDIC SURGERY
Payer: COMMERCIAL

## 2018-12-18 ENCOUNTER — OFFICE VISIT (OUTPATIENT)
Dept: ORTHOPEDICS | Facility: CLINIC | Age: 53
End: 2018-12-18
Payer: COMMERCIAL

## 2018-12-18 VITALS
WEIGHT: 253 LBS | HEART RATE: 83 BPM | BODY MASS INDEX: 36.22 KG/M2 | OXYGEN SATURATION: 99 % | HEIGHT: 70 IN | DIASTOLIC BLOOD PRESSURE: 82 MMHG | SYSTOLIC BLOOD PRESSURE: 127 MMHG

## 2018-12-18 DIAGNOSIS — M25.511 CHRONIC RIGHT SHOULDER PAIN: ICD-10-CM

## 2018-12-18 DIAGNOSIS — M75.01 ADHESIVE CAPSULITIS OF RIGHT SHOULDER: ICD-10-CM

## 2018-12-18 DIAGNOSIS — M25.511 RIGHT SHOULDER PAIN: ICD-10-CM

## 2018-12-18 DIAGNOSIS — M75.41 IMPINGEMENT SYNDROME OF RIGHT SHOULDER: Primary | ICD-10-CM

## 2018-12-18 DIAGNOSIS — G89.29 CHRONIC RIGHT SHOULDER PAIN: ICD-10-CM

## 2018-12-18 PROCEDURE — 20610 DRAIN/INJ JOINT/BURSA W/O US: CPT | Mod: RT | Performed by: ORTHOPAEDIC SURGERY

## 2018-12-18 PROCEDURE — 99213 OFFICE O/P EST LOW 20 MIN: CPT | Mod: 25 | Performed by: ORTHOPAEDIC SURGERY

## 2018-12-18 PROCEDURE — 73030 X-RAY EXAM OF SHOULDER: CPT | Mod: RT

## 2018-12-18 RX ORDER — LIDOCAINE HYDROCHLORIDE 10 MG/ML
4 INJECTION, SOLUTION INFILTRATION; PERINEURAL
Status: DISCONTINUED | OUTPATIENT
Start: 2018-12-18 | End: 2020-10-09

## 2018-12-18 RX ORDER — METHYLPREDNISOLONE ACETATE 80 MG/ML
80 INJECTION, SUSPENSION INTRA-ARTICULAR; INTRALESIONAL; INTRAMUSCULAR; SOFT TISSUE
Status: DISCONTINUED | OUTPATIENT
Start: 2018-12-18 | End: 2020-10-09

## 2018-12-18 RX ADMIN — METHYLPREDNISOLONE ACETATE 80 MG: 80 INJECTION, SUSPENSION INTRA-ARTICULAR; INTRALESIONAL; INTRAMUSCULAR; SOFT TISSUE at 08:59

## 2018-12-18 RX ADMIN — LIDOCAINE HYDROCHLORIDE 4 ML: 10 INJECTION, SOLUTION INFILTRATION; PERINEURAL at 08:59

## 2018-12-18 ASSESSMENT — MIFFLIN-ST. JEOR: SCORE: 2008.85

## 2018-12-18 NOTE — PROGRESS NOTES
Large Joint Injection/Arthocentesis: R subacromial bursa  Date/Time: 12/18/2018 8:59 AM  Performed by: Ajith Johnson MD  Authorized by: Ajith Johnson MD     Indications:  Pain  Needle Size:  22 G  Guidance: landmark guided    Approach:  Lateral  Location:  Shoulder  Site:  R subacromial bursa  Medications:  80 mg methylPREDNISolone 80 MG/ML; 4 mL lidocaine 1 %  Outcome:  Tolerated well, no immediate complications  Procedure discussed: discussed risks, benefits, and alternatives    Consent Given by:  Patient  Timeout: timeout called immediately prior to procedure    Prep: patient was prepped and draped in usual sterile fashion

## 2018-12-18 NOTE — LETTER
"    12/18/2018         RE: Wei Wolf  [id number] [facility] [id number]        Dear Colleague,    Thank you for referring your patient, Wei Wolf, to the [facility]. Please see a copy of my visit note below.    SUBJECTIVE:  eWi Wolf is a 53 year old male who is seen as self referral for right shoulder pain that has been present approximately 10+ years. He has previously had steroid shots in both shoulders at an outside provider for (per patient) right side \"bone spurs\", and left for frozen shoulder. His last shot was about 10 years ago in the right shoulder, which wasn't fully successful. He has had pain for years in the right shoulder.     Present symptoms: Lateral shoulder pain, pain with reaching cross body, pain with overhead activities, pain reaching behind back, positional night pain, pain lifting, +weakness. Denies pain with ADL's.   Associated symptoms: none    Treatment up to this point: Steroid injections 10+ years ago.     Ortho PMH: Extensive history of pain in right hand from bone cyst.     Review of Systems:  Constitutional:  NEGATIVE for fever, chills, change in weight  Integumentary/Skin:  NEGATIVE for worrisome rashes, moles or lesions  Eyes:  NEGATIVE for vision changes or irritation  ENT/Mouth:  NEGATIVE for ear, mouth and throat problems  Resp:  NEGATIVE for significant cough or SOB  Breast:  NEGATIVE for masses, tenderness or discharge  CV:  NEGATIVE for chest pain, palpitations or peripheral edema  GI:  NEGATIVE for nausea, abdominal pain, heartburn, or change in bowel habits  :  Negative   Musculoskeletal:  See HPI above  Neuro:  NEGATIVE for weakness, dizziness or paresthesias  Endocrine:  NEGATIVE for temperature intolerance, skin/hair changes  Heme/allergy/immune:  NEGATIVE for bleeding problems  Psychiatric:  NEGATIVE for changes in mood or affect    Past Medical History:   Past Medical History:   Diagnosis Date     AR (allergic rhinitis)  " "    Diabetes (H)      Hypertension goal BP (blood pressure) < 140/90 12/20/2010     Sleep apnea      Past Surgical History:   Past Surgical History:   Procedure Laterality Date     ABDOMEN SURGERY       COLONOSCOPY WITH CO2 INSUFFLATION N/A 10/19/2018    Procedure: COLON SCREEN/ ;  Surgeon: Mason Duenas MD;  Location: [facility] OR     EXCISE EXOSTOSIS FINGER(S) Right 12/21/2017    Procedure: EXCISE EXOSTOSIS FINGER(S);  Right Index Finger Metacarpal Bone Cyst Curettage with Allograft from Distal Radius;  Surgeon: Lydia Corrales MD;  Location: [facility] OR     GRAFT BONE TO FINGER Right 12/21/2017    Procedure: GRAFT BONE TO FINGER;;  Surgeon: Lydia Corrales MD;  Location: [facility] OR     NO HISTORY OF SURGERY       Family History:   Family History   Problem Relation Age of Onset     Asthma Mother      Cancer Mother         CERVICAL     Diabetes Mother      Cancer Father         BRAIN     Diabetes Father      Asthma Brother      Social History:   Social History     Tobacco Use     Smoking status: Never Smoker     Smokeless tobacco: Never Used   Substance Use Topics     Alcohol use: No     OBJECTIVE:  Physical Exam:  /82 (BP Location: Left arm, Patient Position: Sitting, Cuff Size: Adult Large)   Pulse 83   Ht 1.778 m (5' 10\")   Wt 114.8 kg (253 lb)   SpO2 99%   BMI 36.30 kg/m     General Appearance: healthy, alert and no distress   Skin: no suspicious lesions or rashes  Neuro: Normal strength and tone, mentation intact and speech normal  Vascular: good pulses, and cappillary refill   Lymph: no lymphadenopathy   Psych:  mentation appears normal and affect normal/bright  Resp: no increased work of breathing    Neck Exam:  Cervical range of motion: Full and does not cause neck pain or reproduce shoulder pain.    Right Shoulder Exam:  Shoulder Inspection: mild infraspinatus muscle atrophy on right side    Tender: proximal bicep tendon and greater tuberosity  Non-tender: AC joint  Range of " Motion:   Active: forward flexion: 145, internal rotation: Back pocket   Passive: forward flexion: 140*, external rotation: 15/10, abduction (tested supine): 85. No crepitations.  Strength: forward flexion: 5/5, external rotation: 5/5, Liftoff: Unable due to limited ROM, Bear hug: intact  Impingement: all grade 2 positive      X-rays:  Obtained 12/18/18 of the RIGHT shoulder: 3-views, reviewed in the office with the patient by myself today and show type 3 acromion with no degenerative changes in the glenohumeral joint or the AC joint.     ASSESSMENT:  1. Impingement syndrome right shoulder  2. Adhesive capsulitis right shoulder    PLAN:  Steroid injection today, subacromial since there is a degree of impingement. Physical therapy ordered. Discussed eventual possibility of surgical intervention if the adhesive capsulitis does not resolve, or acromioplasty if the range of motion normalizes, but impingement symptoms continue.     Procedure Note:   Informed consent obtained. Risks, benefits and complications of the injection were discussed with the patient and the patient elected to proceed. Right shoulder injected into the subacromial space after sterile prep, using 80mg Depomedrol and 4cc local anesthetic.    Return to clinic: in one month to monitor progress.     BLANE Johnson MD  Dept. Orthopedic Surgery  [facility]    This document serves as a record of the services and decisions personally performed and made by Dr. BLANE Johnson MD. It was created on his behalf by Sally Magallon, a trained medical scribe. The creation of this record is based on the provider's personal observations and the statements of the patient. This document has been checked and approved by the attending provider.   Sally Magallon December 18, 2018 9:05 AM    Large Joint Injection/Arthocentesis: R subacromial bursa  Date/Time: 12/18/2018 8:59 AM  Performed by: Ajith Johnson MD  Authorized by: Ajith Johnson MD      Indications:  Pain  Needle Size:  22 G  Guidance: landmark guided    Approach:  Lateral  Location:  Shoulder  Site:  R subacromial bursa  Medications:  80 mg methylPREDNISolone 80 MG/ML; 4 mL lidocaine 1 %  Outcome:  Tolerated well, no immediate complications  Procedure discussed: discussed risks, benefits, and alternatives    Consent Given by:  Patient  Timeout: timeout called immediately prior to procedure    Prep: patient was prepped and draped in usual sterile fashion            Again, thank you for allowing me to participate in the care of your patient.        Sincerely,        Ajith Johnson MD

## 2018-12-24 ENCOUNTER — THERAPY VISIT (OUTPATIENT)
Dept: PHYSICAL THERAPY | Facility: CLINIC | Age: 53
End: 2018-12-24
Payer: COMMERCIAL

## 2018-12-24 DIAGNOSIS — M25.511 CHRONIC RIGHT SHOULDER PAIN: ICD-10-CM

## 2018-12-24 DIAGNOSIS — G89.29 CHRONIC RIGHT SHOULDER PAIN: ICD-10-CM

## 2018-12-24 PROCEDURE — 97161 PT EVAL LOW COMPLEX 20 MIN: CPT | Mod: GP | Performed by: PHYSICAL THERAPIST

## 2018-12-24 PROCEDURE — 97110 THERAPEUTIC EXERCISES: CPT | Mod: GP | Performed by: PHYSICAL THERAPIST

## 2018-12-24 NOTE — PROGRESS NOTES
[facility] Initial Evaluation  Subjective:  [facility]                  [facility]  Physical Therapy Initial Examination/Evaluation  December 24, 2018    Wei Wolf is a 53 year old  male referred to physical therapy by Dr. Johnson for treatment of shoulder pain with Precautions/Restrictions/MD instructions none    KEY PT FINDINGS:  1.  Shoulder stiffness  2.  Good rotator cuff strength  3.  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  Has had several CSI over the years.  Recently had one in the R shoulder.  L shoulder is doing pretty well currently.  DOS none  Previous treatment: CSI  Imaging: xray  Chief Complaint:   Reaching above head, reaching behind back. Patient is RHD.  Pain: best 0 /10, worst 7/10 lateral, deep Described as: sharp Alleviated by: rest Frequency: intermittent Progression of symptoms since initial onset: worsening Time of day when pain is worse: not related  Sleeping: difficulty lying on side    Occupation:   Job duties:  sitting    Current HEP/exercise regimen: walking  Patient's goals are reduce pain     Pertinent PMH: Diabetes Mellitus Type II, overweight   General Health Reported by Patient: Good  Return to MD:  PRN       Objective:  Standing Alignment:    Cervical/Thoracic:  Forward head  Shoulder/UE:  Rounded shoulders                                       Shoulder Evaluation:  ROM:  AROM:    Flexion:  Left:  160    Right:  120    Abduction:  Left: 160   Right:  100    Internal Rotation:  Left:  L5    Right:  To side  External Rotation:  Left:  50    Right:  15                      Strength:    Flexion: Left:5/5    Pain: -    Right: 5/5      Pain:  -    Abduction:  Left: 5/5   Pain:-    Right: 5/5      Pain:-    Internal Rotation:  Left:5/5       Pain:-    Right: 5/5      Pain:-  External Rotation:   Left:5/5      Pain:-   Right:5/5      Pain:-        Elbow Flexion:  Left:5/5      Pain:-    Right:5/5      Pain:-  Elbow Extension:  Left:5/5      Pain:-    Right:5/5      Pain:-    Special Tests:      Right shoulder positive for the following special tests:Impingement  Palpation:  normal      Mobility Tests:      Glenohumeral posterior right:  Hypomobile  Glenohumeral inferior right:  Hypomobile                                             General     ROS    Assessment/Plan:    Patient is a 53 year old male with right side shoulder complaints.    Patient has the following significant findings with corresponding treatment plan.                Diagnosis 1:  Shoulder pain    Pain -  hot/cold therapy, US, electric stimulation, manual therapy, splint/taping/bracing/orthotics, self management, education and home program  Decreased ROM/flexibility - manual therapy and therapeutic exercise  Decreased joint mobility - manual therapy and therapeutic exercise  Decreased proprioception - neuro re-education and therapeutic activities  Impaired muscle performance - neuro re-education  Decreased function - therapeutic activities    Therapy Evaluation Codes:   1) History comprised of:   Personal factors that impact the plan of care:      None.    Comorbidity factors that impact the plan of care are:      Diabetes.     Medications impacting care: None.  2) Examination of Body Systems comprised of:   Body structures and functions that impact the plan of care:      Shoulder.   Activity limitations that impact the plan of care are:      Dressing, Lifting and reaching.  3) Clinical presentation characteristics are:   Stable/Uncomplicated.  4) Decision-Making    Low complexity using standardized patient assessment instrument and/or measureable assessment of functional outcome.  Cumulative Therapy Evaluation is: Low complexity.    Previous and current functional limitations:  (See  Goal Flow Sheet for this information)    Short term and Long term goals: (See Goal Flow Sheet for this information)     Communication ability:  Patient appears to be able to clearly communicate and understand verbal and written communication and follow directions correctly.  Treatment Explanation - The following has been discussed with the patient:   RX ordered/plan of care  Anticipated outcomes  Possible risks and side effects  This patient would benefit from PT intervention to resume normal activities.   Rehab potential is good.    Frequency:  2 X a month, once daily  Duration:  for 3 months  Discharge Plan:  Achieve all LTG.  Independent in home treatment program.  Reach maximal therapeutic benefit.    Please refer to the daily flowsheet for treatment today, total treatment time and time spent performing 1:1 timed codes.

## 2018-12-28 ENCOUNTER — PATIENT OUTREACH (OUTPATIENT)
Dept: EDUCATION SERVICES | Facility: CLINIC | Age: 53
End: 2018-12-28

## 2018-12-28 ENCOUNTER — PATIENT OUTREACH (OUTPATIENT)
Dept: EDUCATION SERVICES | Facility: CLINIC | Age: 53
End: 2018-12-28
Payer: COMMERCIAL

## 2018-12-28 DIAGNOSIS — E11.9 DIABETES MELLITUS, TYPE 2 (H): ICD-10-CM

## 2018-12-28 NOTE — PROGRESS NOTES
Called Wei regarding his BG, he was at 22 units of Toujeo, when he received an injection for his shoulder, he increased to 26 units for 4 days and now back down to 22 units.  Ave. 14 days of BG is 160's, will increase Toujeo to 24 units as a baseline.    Luciana Borrego RN/CDE  [facility]

## 2019-01-09 ENCOUNTER — THERAPY VISIT (OUTPATIENT)
Dept: PHYSICAL THERAPY | Facility: CLINIC | Age: 54
End: 2019-01-09
Payer: COMMERCIAL

## 2019-01-09 DIAGNOSIS — M25.519 SHOULDER PAIN: Primary | ICD-10-CM

## 2019-01-09 PROCEDURE — 97110 THERAPEUTIC EXERCISES: CPT | Mod: GP | Performed by: PHYSICAL THERAPY ASSISTANT

## 2019-01-09 PROCEDURE — 97140 MANUAL THERAPY 1/> REGIONS: CPT | Mod: GP | Performed by: PHYSICAL THERAPY ASSISTANT

## 2019-01-22 RX ORDER — METFORMIN HCL 500 MG
2000 TABLET, EXTENDED RELEASE 24 HR ORAL
Qty: 360 TABLET | Refills: 1 | Status: SHIPPED | OUTPATIENT
Start: 2019-01-22 | End: 2019-03-11 | Stop reason: ALTCHOICE

## 2019-01-23 ENCOUNTER — THERAPY VISIT (OUTPATIENT)
Dept: PHYSICAL THERAPY | Facility: CLINIC | Age: 54
End: 2019-01-23
Payer: COMMERCIAL

## 2019-01-23 DIAGNOSIS — M25.519 SHOULDER PAIN: Primary | ICD-10-CM

## 2019-01-23 PROCEDURE — 97110 THERAPEUTIC EXERCISES: CPT | Mod: GP | Performed by: PHYSICAL THERAPIST

## 2019-01-23 PROCEDURE — 97140 MANUAL THERAPY 1/> REGIONS: CPT | Mod: GP | Performed by: PHYSICAL THERAPIST

## 2019-01-29 ENCOUNTER — THERAPY VISIT (OUTPATIENT)
Dept: OCCUPATIONAL THERAPY | Facility: CLINIC | Age: 54
End: 2019-01-29
Payer: COMMERCIAL

## 2019-01-29 DIAGNOSIS — M25.641 STIFFNESS OF RIGHT HAND JOINT: ICD-10-CM

## 2019-01-29 DIAGNOSIS — Z09 SURGICAL FOLLOW-UP CARE: ICD-10-CM

## 2019-01-29 DIAGNOSIS — M79.641 PAIN OF RIGHT HAND: ICD-10-CM

## 2019-01-29 PROCEDURE — 97035 APP MDLTY 1+ULTRASOUND EA 15: CPT | Mod: GO | Performed by: OCCUPATIONAL THERAPIST

## 2019-01-29 PROCEDURE — 97110 THERAPEUTIC EXERCISES: CPT | Mod: GO | Performed by: OCCUPATIONAL THERAPIST

## 2019-01-29 PROCEDURE — 97140 MANUAL THERAPY 1/> REGIONS: CPT | Mod: GO | Performed by: OCCUPATIONAL THERAPIST

## 2019-01-29 NOTE — PROGRESS NOTES
"Hand Therapy Progress Note    Current Date:  1/29/2019    Diagnosis: Pain of finger of right hand, Surgical follow-up care  DOS:  12/21/17  Procedure:   Right Index Finger Metacarpal Bone Cyst Curettage with                                               Allograft from Distal Radius   Post:  > 6 months  Referring MD: Lydia Corrales MD     Reporting period is 12/11/18 to 1/29/2019    Subjective:   Subjective changes noted by patient:  \"The finger has been bothering me still.\"  Functional changes noted by patient:  Decreased Performance in Self Care Tasks (dressing, eating, bathing, hygiene/toileting), Work Tasks, Recreational Activities, Household Chores and Driving   Patient has noted adverse reaction to:  None    Objective:  Observation: Color/Temperature:     [x]    Normal  []    Abnormal    PAIN 1/11/2018 7/12/18 8/13/18 9/25/18 10/30/18 12/11/18 1/29/19   Location Right  wrist and hand Radial and ulnar wrist Volar ulnar wrist  Volar wrist Volar wrist Volar wrist   R index finger dorsal MP joint   Radiates no no no no no no no   Worse d/n daytime daytime Daytime and laying in bed at night daytime daytime daytime daytime   Frequency activity dependant intermittent constant intermittent intermittent intermittetn intermittent   Exacerbated by movement Wrist extension, rotation Flexing wrist, pronation Extension, supination, pronation All wrist motion Gripping, wrist motions gripping   Relieves rest Rest, pain medication Rest, ibuprofen Rest, ibuprofen rest rest Rest, advil   At rest 0-10/10 0/10 0/10 0/10 0/10 0/10 0/10 0/10   At worst 7/10 6/10 4/10 - 6/10 after bending it too far 6/10 3/10 5/10 6/10   Sleep disruption?   no yes no no no no no   Progression Gradually getting better. Slow improvement Slowly improving improving improving Staying about the same. Staying the same     ROM:  Pain Report:  - none    + mild    ++ moderate    +++ severe     Wrist  1/11/2018 7/12/18 8/13/18 8/25/18 10/30/18 12/11/18 1/29/19 "   AROM (PROM) R L  R       Extension 45 + 60 38++ 45 42+ 42+ 46++ 46+   Flexion 50 + 65 20+ 37 35+ 30+ 30++ 34+   RD 10 20 12+ 10+ 11+ 7+ 7+ 9+   UD 30 30 18++ 22++ 28+ 25+ 26+ 26+   Supination 60 90 52++ 65 71+ 61+ 68++ 70+   Pronation 70 85 90 75 88+ 88+ 88++ 90       HAND 1/11/2018 7/12/18 8/23/18 9/25/18 10/30/18 12/11/18 1/29/19   AROM(PROM)          Index MP 15/45 -7/52 -2/66 -5/56 -19/54 -4/69 -6/58   PIP 20/90 -15/82 -13/90 -6/88 -13/80 -12/84 -8/90   DIP 0/45 0/45 0/45 -4/46 -11/48 0/52 0/63   BEAN 145 157  175 139 189 197   Long MP 10/50 -13/67 -6/70 -5/68 -7/65 -4/74 -7/   PIP 0/85 -5/84 0/80 0/81 0/80 -4/87 0/   DIP 55 -5/69 0/56 0/61 0/49 0/56 0/   BEAN 180 197  205 187 209    Ring MP 10/50 -7/65 0/70 0/63 0/55 -3/73 0/   PIP 0/84 0/80 0/85 0/83 -4/80 -5/95 -2/   DIP 0/55 0/58 0/60 0/55 0/50 0/62 -4/   BEAN 179 196  201 181 222    Small MP 3/55 0/67 0/73 0/73 0/58 0/85 0/   PIP 0/90 -10/77 -7/83 0/83 -6/80 -15/77 0/   DIP 0/55 0/59 -9/60 -4/62 0/55 0/62 -5/   BEAN 197 193  214 187 209      Strength:   (Measured in pounds)  Pain Report:  - none    + mild    ++ moderate    +++ severe     7/12/2018 8/13/18 9/25/18 10/30/18 12/11/18 1/29/19   Trials left right R       1  2  3 119  115  109 35+++  20  19 44++  32  35 56++  47++  51++ 5854  52++  43++ 51++  48++  56++ 59++  56++  58++   Average: 114 25 37 51 50 52 58     Lat Pinch  7/12/2018 8/13/18 9/25/18 10/30/18 12/11/18 1/29/19   Trials left right R       1  2  3 25  27  27 26  25  25 25  24  25 22  25  26 25  27  27 25++  25++  25++ 26  26  28   Average: 26 25 25 24 26 25 27     3 Pt Pinch  7/12/2018 8/13/18 9/25/18 10/30/18 12/11/18 1/29/19   Trials left right R       1  2  3 26  25  25 16+  17+  14+ 19  21  18 20  19  20 18  17  18 20  21  20 21+  20+  21+   Average: 25 16 19 20 18 20 21   Please refer to the daily flowsheet for treatment provided today.     Assessment:  Response to therapy has been improvement to:  ROM of Wrist:  All Planes  Fingers:  All Planes  Strength:   and pinch    Overall Assessment:  Patient's symptoms are resolving.  Patient would benefit from continued therapy to achieve rehab potential  STG/LTG:  STGoals have been reviewed and progress or achievement has occurred;  see goal sheet for details and updates.    Please refer to the daily flowsheet for treatment provided today.     Assessment:  Response to therapy has been improvement to:  Strength:   and pinch  Response to therapy has been lack of progress in:  ROM of index finger    Overall Assessment:  Patient would benefit from continued therapy to achieve rehab potential  STG/LTG:  STGoals have been reviewed and no progress has been made;  see goal sheet for details and changes.    Plan:  Frequency/Duration:  Recommend continuing with the current treatment plan. 1 X week, once daily  for 4 weeks  Appropriateness of Rx I have re-evaluated this patient and find that the nature, scope, duration and intensity of the therapy is appropriate for the medical condition of the patient.  Recommendations for Continued Therapy  Additions to Treatment Plan -  Modalities:  US to index finger MP joint    Home Exercise Program:  Tendon glides  Wrist AROM  Ice  Heat  Massage to extensors and flexors  Functional use, as much as able    Next Visit:  US  Ktape?  Wrist PROM  Functional ax  MFR  Strengthening

## 2019-02-05 ENCOUNTER — THERAPY VISIT (OUTPATIENT)
Dept: OCCUPATIONAL THERAPY | Facility: CLINIC | Age: 54
End: 2019-02-05
Payer: COMMERCIAL

## 2019-02-05 ENCOUNTER — MYC REFILL (OUTPATIENT)
Dept: FAMILY MEDICINE | Facility: CLINIC | Age: 54
End: 2019-02-05

## 2019-02-05 DIAGNOSIS — E78.00 HIGH CHOLESTEROL: ICD-10-CM

## 2019-02-05 DIAGNOSIS — M79.641 PAIN OF RIGHT HAND: ICD-10-CM

## 2019-02-05 DIAGNOSIS — M25.641 STIFFNESS OF RIGHT HAND JOINT: ICD-10-CM

## 2019-02-05 DIAGNOSIS — Z09 SURGICAL FOLLOW-UP CARE: ICD-10-CM

## 2019-02-05 PROCEDURE — 97110 THERAPEUTIC EXERCISES: CPT | Mod: GO | Performed by: OCCUPATIONAL THERAPIST

## 2019-02-05 PROCEDURE — 97140 MANUAL THERAPY 1/> REGIONS: CPT | Mod: GO | Performed by: OCCUPATIONAL THERAPIST

## 2019-02-05 PROCEDURE — 97035 APP MDLTY 1+ULTRASOUND EA 15: CPT | Mod: GO | Performed by: OCCUPATIONAL THERAPIST

## 2019-02-05 RX ORDER — SIMVASTATIN 10 MG
10 TABLET ORAL AT BEDTIME
Qty: 90 TABLET | Refills: 2 | Status: CANCELLED | OUTPATIENT
Start: 2019-02-05

## 2019-02-05 NOTE — PROGRESS NOTES
SOAP note objective information for 2/5/2019.  HAND 1/11/2018 7/12/18 8/23/18 9/25/18 10/30/18 12/11/18 1/29/19 2/5/19   AROM(PROM)           Index MP 15/45 -7/52 -2/66 -5/56 -19/54 -4/69 -6/58 -5/53   PIP 20/90 -15/82 -13/90 -6/88 -13/80 -12/84 -8/90 -19/87   DIP 0/45 0/45 0/45 -4/46 -11/48 0/52 0/63 -5/48   BEAN 145 157  175 139 189 197    Please refer to the daily flowsheet for treatment today, total treatment time and time spent performing 1:1 timed codes.

## 2019-02-06 ENCOUNTER — DOCUMENTATION ONLY (OUTPATIENT)
Dept: CARE COORDINATION | Facility: CLINIC | Age: 54
End: 2019-02-06

## 2019-02-14 ENCOUNTER — ANCILLARY PROCEDURE (OUTPATIENT)
Dept: MRI IMAGING | Facility: CLINIC | Age: 54
End: 2019-02-14
Attending: ORTHOPAEDIC SURGERY
Payer: COMMERCIAL

## 2019-02-14 ENCOUNTER — OFFICE VISIT (OUTPATIENT)
Dept: ORTHOPEDICS | Facility: CLINIC | Age: 54
End: 2019-02-14
Payer: COMMERCIAL

## 2019-02-14 ENCOUNTER — ANCILLARY PROCEDURE (OUTPATIENT)
Dept: GENERAL RADIOLOGY | Facility: CLINIC | Age: 54
End: 2019-02-14
Attending: STUDENT IN AN ORGANIZED HEALTH CARE EDUCATION/TRAINING PROGRAM
Payer: COMMERCIAL

## 2019-02-14 DIAGNOSIS — L72.9 CYST OF SKIN: ICD-10-CM

## 2019-02-14 DIAGNOSIS — L72.9 CYST OF SKIN: Primary | ICD-10-CM

## 2019-02-14 NOTE — NURSING NOTE
Reason For Visit:   Chief Complaint   Patient presents with     RECHECK     right wrist lack of ROM Right index finger pain. Righ index finger Metacarpal Bone Cyst Curettage with Allograft from Distal Radius  DOS: 12/21/17       Primary MD: Yobany Bernal      Age: 53 year old    ?  No      There were no vitals taken for this visit.      Pain Assessment  Patient Currently in Pain: Yes  0-10 Pain Scale: 4(can get up to a 6-7/10)  Primary Pain Location: Finger (Comment which one)(right index finger)  Pain Descriptors: Sharp  Alleviating Factors: NSAIDS, Rest  Aggravating Factors: Movement(bumping)    Hand Dominance Evaluation  Hand Dominance: Right  Pinch force  R hand key force: 11.3 kg (25 lb)  L hand key force: 13.6 kg (30 lb)   force  R hand  level 2 force: 26.3 kg (58 lb)  L hand  level  2 force: 56.7 kg (125 lb)    QuickDASH Assessment  QuickDASH Main 11/16/2017   1.Open a tight or new jar. Mild difficulty   2. Do heavy household chores (e.g., wash walls, floors) Mild difficulty   3. Carry a shopping bag or briefcase. Mild difficulty   4. Wash your back. Mild difficulty   5. Use a knife to cut food. Mild difficulty   6. Recreational activities in which you take some force or impact through your arm, shoulder or hand (e.g., golf, hammering, tennis, etc.). Moderate difficulty   7. During the past week, to what extent has your arm, shoulder or hand problem interfered with your normal social activities with family, friends, neighbours or groups? Slightly   8. During the past week, were you limited in your work or other regular daily activities as a result of your arm, shoulder or hand problem? Slightly limited   9. Arm, shoulder or hand pain. Moderate   10.Tingling (pins and needles) in your arm,shoulder or hand. None   11. During the past week, how much difficulty have you had sleeping because of the pain in your arm, shoulder or hand? ([place] number) No difficulty   Quickdash Ability  Score 25          Current Outpatient Medications   Medication Sig Dispense Refill     ASPIRIN NOT PRESCRIBED, INTENTIONAL, continuous prn Antiplatelet medication not prescribed intentionally due to GI Issues / Ulcer Disease 1 each 0     blood glucose monitoring (ACCU-CHEK FASTCLIX) lancets Use to test blood sugar 2 times daily or as directed.  Ok to substitute alternative if insurance prefers. 102 each 11     blood glucose monitoring (ACCU-CHEK GUIDE) test strip Use to test blood sugar 2 times daily or as directed. 100 strip 11     blood glucose monitoring (NO BRAND SPECIFIED) test strip 1 strip by In Vitro route daily accucheck compact plus test strips.  Testing once daily 3 Box 3     insulin glargine U-300 (TOUJEO) 300 UNIT/ML injection Inject 24 Units Subcutaneous At Bedtime 13.5 mL 0     insulin pen needle (BD GABRIELA U/F) 32G X 4 MM Use 1 daily as directed. 100 each 11     metFORMIN (GLUCOPHAGE-XR) 500 MG 24 hr tablet Take 4 tablets (2,000 mg) by mouth daily (with dinner) 360 tablet 1     order for DME Equipment being ordered: wrist splint 1 Units 0     Semaglutide (OZEMPIC) 0.25 or 0.5 MG/DOSE SOPN Inject 0.25 mg Subcutaneous once a week 2 pen 1     simvastatin (ZOCOR) 10 MG tablet Take 1 tablet (10 mg) by mouth At Bedtime 90 tablet 2       Allergies   Allergen Reactions     Metformin Diarrhea       Kamila Smart, ATC

## 2019-02-14 NOTE — LETTER
2019       RE: Wei Wolf  [address] [facility] [address]     Dear Colleague,    Thank you for referring your patient, Wei Wolf, to the [facility] at [facility]. Please see a copy of my visit note below.    [facility]  Orthopedics  Lydia Corrales MD  2019     Name: Wei Wolf  MRN: [id number]  Age: 53 year old  : 1965  Referring provider: Ajith Johnson    Chief Complaint: Right index finger MP joint pain     History of Present Illness:   Wei Wolf is a 52 year old, right handed male who presents today for follow-up regarding right index finger pain. He is status-post curettage and bone grafting of a cyst in the head of his right index metacarpal on 2018.  Bone graft was taken from his ipsilateral distal radius. I last evaluated him on 0 18, at which time he was still experiencing difficulty with bending his wrist backwards as well as some mild swelling to his right hand. Plan at that time was to continue with physical therapy and follow up as needed. Since that visit, he reports that the pain in his wrist has gotten significantly better.  He has continued to do therapy.  His range of motion in his wrist has not improved significantly.  He reports that initially after his surgery his index finger MP joint pain was significantly better.  It was never 100% improved.  Over the past month or so, it has gotten worse again.  He has noticed swelling.  He reports the pain is similar to when he had the cyst prior to his previous surgery.  He denies any new injury.  Pain is most significant when he is writing, or using his hand for daily activities.  He denies any numbness or tingling.    Review of Systems:   A 10-point review of systems was obtained and is negative except for as noted in the HPI.      Physical Examination:  There were no vitals taken for this visit.  Pinch force  R hand  key force: 11.3 kg (25 lb)  L hand key force: 13.6 kg (30 lb)   force  R hand  level 2 force: 26.3 kg (58 lb)  L hand  level  2 force: 56.7 kg (125 lb)  General: Healthy appearing male. Affect appropriate. Normal gait. Alert and oriented to surroundings.   Right Upper Extremity: Well-healed surgical scar over the dorsum of the right hand.  Nontender over the wrist.  Wrist range of motion for extension 40 degrees, flexion 30 degrees.  Tender to palpation over the index finger MP joint.  Mildly swollen.  Nontender over the remainder of the hand MP joints.  MP joint range of motion is full extension to about 65 degrees of flexion.  He has about a 5 degree PIP joint index finger flexion contracture.  Sensation intact light touch throughout the median/radial/ulnar nerve distribution including the radial and ulnar aspects of the index finger.  Fires EPL/FPL/IO.  Palpable radial pulse, fingers warm and well-perfused.    Imaging:  3 views of the right hand from today's date are reviewed and compared to previous.  There is no evident cyst in the index finger metacarpal head.  The joint surface appears concentric and there is no irregularity.  No significant joint space narrowing.       Assessment:   52-year-old right-hand-dominant male status-post curettage of a right index metacarpal cyst with bone grafting on 12/21/2017.  Initially his index finger pain improved after surgery, but has returned.  No obvious abnormality on plain film imaging.       Plan:   Previously, the patient's metacarpal head cyst was only visualized on MRI.  Given his return of symptoms, we would recommend repeat MRI of the right hand.  We will see him back next week to discuss options for treatment based on the results of the scan.  The patient was in agreement with this plan, and all questions were answered.    The patient was seen and discussed with staff surgeon Dr. Corrales who was in agreement with the above assessment and plan.      Hamzah Rollins MD  PGY-4  Orthopaedic Surgery  [phone]    I have personally examined this patient and have reviewed the clinical presentation and progress note with the resident. I agree with the treatment plan as outlined. The plan was formulated with the resident on the day of the resident's dictation.     Again, thank you for allowing me to participate in the care of your patient.      Sincerely,    Lydia Corrales MD

## 2019-02-14 NOTE — PROGRESS NOTES
[facility]  Orthopedics  Lydia Corrales MD  2019     Name: Wei Wolf  MRN: [id number]  Age: 53 year old  : 1965  Referring provider: Ajith Johnson    Chief Complaint: Right index finger MP joint pain     History of Present Illness:   Wei Wolf is a 52 year old, right handed male who presents today for follow-up regarding right index finger pain. He is status-post curettage and bone grafting of a cyst in the head of his right index metacarpal on 2018.  Bone graft was taken from his ipsilateral distal radius. I last evaluated him on 18, at which time he was still experiencing difficulty with bending his wrist backwards as well as some mild swelling to his right hand. Plan at that time was to continue with physical therapy and follow up as needed. Since that visit, he reports that the pain in his wrist has gotten significantly better.  He has continued to do therapy.  His range of motion in his wrist has not improved significantly.  He reports that initially after his surgery his index finger MP joint pain was significantly better.  It was never 100% improved.  Over the past month or so, it has gotten worse again.  He has noticed swelling.  He reports the pain is similar to when he had the cyst prior to his previous surgery.  He denies any new injury.  Pain is most significant when he is writing, or using his hand for daily activities.  He denies any numbness or tingling.    Review of Systems:   A 10-point review of systems was obtained and is negative except for as noted in the HPI.      Physical Examination:  There were no vitals taken for this visit.  Pinch force  R hand key force: 11.3 kg (25 lb)  L hand key force: 13.6 kg (30 lb)   force  R hand  level 2 force: 26.3 kg (58 lb)  L hand  level  2 force: 56.7 kg (125 lb)  General: Healthy appearing male. Affect appropriate. Normal gait. Alert and oriented to surroundings.   Right Upper Extremity:  Well-healed surgical scar over the dorsum of the right hand.  Nontender over the wrist.  Wrist range of motion for extension 40 degrees, flexion 30 degrees.  Tender to palpation over the index finger MP joint.  Mildly swollen.  Nontender over the remainder of the hand MP joints.  MP joint range of motion is full extension to about 65 degrees of flexion.  He has about a 5 degree PIP joint index finger flexion contracture.  Sensation intact light touch throughout the median/radial/ulnar nerve distribution including the radial and ulnar aspects of the index finger.  Fires EPL/FPL/IO.  Palpable radial pulse, fingers warm and well-perfused.    Imaging:  3 views of the right hand from today's date are reviewed and compared to previous.  There is no evident cyst in the index finger metacarpal head.  The joint surface appears concentric and there is no irregularity.  No significant joint space narrowing.       Assessment:   52-year-old right-hand-dominant male status-post curettage of a right index metacarpal cyst with bone grafting on 12/21/2017.  Initially his index finger pain improved after surgery, but has returned.  No obvious abnormality on plain film imaging.       Plan:   Previously, the patient's metacarpal head cyst was only visualized on MRI.  Given his return of symptoms, we would recommend repeat MRI of the right hand.  We will see him back next week to discuss options for treatment based on the results of the scan.  The patient was in agreement with this plan, and all questions were answered.    The patient was seen and discussed with staff surgeon Dr. Corrales who was in agreement with the above assessment and plan.     Hamzah Rollins MD  PGY-4  Orthopaedic Surgery  [phone]    I have personally examined this patient and have reviewed the clinical presentation and progress note with the resident. I agree with the treatment plan as outlined. The plan was formulated with the resident on the day of the  resident's dictation.   Lydia Corrales MD   Hand and Upper Extremity Specialist  [facility]

## 2019-02-15 ENCOUNTER — TRANSFERRED RECORDS (OUTPATIENT)
Dept: MULTI SPECIALTY CLINIC | Facility: CLINIC | Age: 54
End: 2019-02-15

## 2019-02-15 LAB — RETINOPATHY: NORMAL

## 2019-02-19 ENCOUNTER — THERAPY VISIT (OUTPATIENT)
Dept: PHYSICAL THERAPY | Facility: CLINIC | Age: 54
End: 2019-02-19
Payer: COMMERCIAL

## 2019-02-19 DIAGNOSIS — M25.519 SHOULDER PAIN: Primary | ICD-10-CM

## 2019-02-19 PROCEDURE — 97110 THERAPEUTIC EXERCISES: CPT | Mod: GP | Performed by: PHYSICAL THERAPIST

## 2019-02-19 PROCEDURE — 97140 MANUAL THERAPY 1/> REGIONS: CPT | Mod: GP | Performed by: PHYSICAL THERAPIST

## 2019-02-21 ENCOUNTER — ANCILLARY PROCEDURE (OUTPATIENT)
Dept: GENERAL RADIOLOGY | Facility: CLINIC | Age: 54
End: 2019-02-21
Attending: ORTHOPAEDIC SURGERY
Payer: COMMERCIAL

## 2019-02-21 ENCOUNTER — THERAPY VISIT (OUTPATIENT)
Dept: OCCUPATIONAL THERAPY | Facility: CLINIC | Age: 54
End: 2019-02-21
Payer: COMMERCIAL

## 2019-02-21 ENCOUNTER — OFFICE VISIT (OUTPATIENT)
Dept: ORTHOPEDICS | Facility: CLINIC | Age: 54
End: 2019-02-21
Payer: COMMERCIAL

## 2019-02-21 VITALS — HEIGHT: 70 IN | BODY MASS INDEX: 35.3 KG/M2 | WEIGHT: 246.6 LBS

## 2019-02-21 DIAGNOSIS — M79.641 RIGHT HAND PAIN: Primary | ICD-10-CM

## 2019-02-21 DIAGNOSIS — Z09 SURGICAL FOLLOW-UP CARE: ICD-10-CM

## 2019-02-21 DIAGNOSIS — M79.641 RIGHT HAND PAIN: ICD-10-CM

## 2019-02-21 DIAGNOSIS — M25.641 STIFFNESS OF RIGHT HAND JOINT: ICD-10-CM

## 2019-02-21 DIAGNOSIS — M79.641 PAIN OF RIGHT HAND: ICD-10-CM

## 2019-02-21 PROCEDURE — 97763 ORTHC/PROSTC MGMT SBSQ ENC: CPT | Mod: GO | Performed by: OCCUPATIONAL THERAPIST

## 2019-02-21 RX ORDER — LIDOCAINE HYDROCHLORIDE 10 MG/ML
5 INJECTION, SOLUTION EPIDURAL; INFILTRATION; INTRACAUDAL; PERINEURAL
Status: DISCONTINUED | OUTPATIENT
Start: 2019-02-21 | End: 2020-10-09

## 2019-02-21 RX ORDER — TRIAMCINOLONE ACETONIDE 40 MG/ML
40 INJECTION, SUSPENSION INTRA-ARTICULAR; INTRAMUSCULAR
Status: DISCONTINUED | OUTPATIENT
Start: 2019-02-21 | End: 2020-10-09

## 2019-02-21 RX ADMIN — TRIAMCINOLONE ACETONIDE 40 MG: 40 INJECTION, SUSPENSION INTRA-ARTICULAR; INTRAMUSCULAR at 08:13

## 2019-02-21 RX ADMIN — LIDOCAINE HYDROCHLORIDE 5 ML: 10 INJECTION, SOLUTION EPIDURAL; INFILTRATION; INTRACAUDAL; PERINEURAL at 08:13

## 2019-02-21 ASSESSMENT — MIFFLIN-ST. JEOR: SCORE: 1977.95

## 2019-02-21 NOTE — PROGRESS NOTES
"[facility]  Orthopedics  Lydia Corrales MD  2019     Name: Wei Wolf  MRN: [id number]  Age: 53 year old  : 1965  Referring provider: Referred Self     Chief Complaint: Right index finger MP joint pain     History of Present Illness:   Wei Wolf is a 52 year old, right handed male who presents today for follow-up regarding right index finger pain. Patient is S/P curettage and bone grafting of a cyst in the head of his right index metacarpal on 2017. I last evaluated him on , at which time the pain in his wrist was significantly improved, however his range of motion had not improved and was worsening with associated swelling. Given the return of his symptoms, plan at that time was to repeat an MRI of the hand and follow up in one week. Patient notes feeling most of the pain in the joints of his index finger. He has not had any clicking in the right dex finger as of late, but notes some intermittent clicking in the past.     Review of Systems:   A 10-point review of systems was obtained and is negative except for as noted in the HPI.      Physical Examination:  Ht 1.775 m (5' 9.88\")   Wt 111.9 kg (246 lb 9.6 oz)   BMI 35.50 kg/m     Hand Dominance Evaluation  Hand Dominance: Right  Pinch force  R hand key force: 10.9 kg (24 lb)  L hand key force: 13.6 kg (30 lb)   force  R hand  level 2 force: 22.7 kg (50 lb)  L hand  level  2 force: 59 kg (130 lb)  General: Healthy appearing male. Affect appropriate. Normal gait. Alert and oriented to surroundings.   Right Upper Extremity: Pain and swelling over MCP joint, no pain at rest. Pain with flexion from 0 to 60 degrees, no palpable subluxation of the extender tendon, but reports clicking in the past. Otherwise his distal CMS is full.     Imaging:  MR Hand Right w/o Contrast: Impression:  1. Corresponding to the marker, tear/postsurgical change involving  second MCP ulnar sagittal band, ulnar collateral " ligament, and  extensor indicis with associated radial subluxation of extensor  digitorum of the second digit at this level.   2. Post surgical changes of curettage and bone graft for known cyst in  the head of the second metacarpal. There is partial bone graft  incorporation, but with decreased bone marrow edema/subchondral cysts  remains.  3. Additional cyst/erosions involving the metacarpophalangeal joints  and the fourth proximal interphalangeal joint. Differential  consideration includes underlying inflammatory arthritis given  distribution. Correlate clinically.  Report per radiology.         Assessment:   52-year-old right-hand-dominant male status-post curettage of a right index metacarpal cyst with bone grafting on 12/21/2017, continues to be symptomatic.         Plan:   I discussed the above findings with the patient. We discussed a few different treatment options. The first would be for a splint to support the finger, especially if it is clicking. Another option would be ionophoresis of the finger vs icing the finger at home. Finally, we discussed a corticosteroid injection for the most immediate relief of his symptoms. Patient has had some improvement with injections in the past prior to his surgery, and has decided to proceed with the injection. This was completed as below.  A splint was also made today for him to use at night.     Procedure:  After written consent, verifying site and side, a sterile Chlora- prep was performed for the injection site.  C-arm guidance was used for difficult placement of a 25-gauge needle into the right index MCP joint with 1% Lidocaine.  0.5 mL of Kenalog (20 mg) and 1 mL of lidocaine was injected under fluoroscopic guidance into the joint, and 0.5 mL of Kenalog (20 mg)was injected along the extensor tendon sheath with good relief of pain. Patient tolerated the procedure well.  No complications.  Follow up instructions were given.      Scribe Disclosure:   Cornel KLEIN  fabiana Alarcon serving as a scribe to document services personally performed by Lydia Corrales MD at this visit, based upon the provider's statements to me. All documentation has been reviewed by the aforementioned provider prior to being entered into the official medical record.     Portions of this medical record were completed by a scribe. UPON MY REVIEW AND AUTHENTICATION BY ELECTRONIC SIGNATURE, this confirms (a) I performed the applicable clinical services, and (b) the record is accurate.    Lydia Corrales MD   Hand and Upper Extremity Specialist  [facility]    Small Joint Injection/Arthrocentesis: R index MCP  Date/Time: 2019 8:13 AM  Performed by: Lydia Corrales MD  Authorized by: Lydia Corrales MD     Indications:  Pain  Needle Size:  25 G  Guidance: fluoroscopy    Location:  Index finger  Site:  R index MCP  Medications:  40 mg triamcinolone 40 MG/ML; 5 mL lidocaine (PF) 1 %  Consent Given by:  Patient  Timeout: timeout called immediately prior to procedure    Prep: patient was prepped and draped in usual sterile fashion        [facility]  [address] [place] [id number]  Dept: [phone]  ______________________________________________________________________________    Patient: Wei Wolf   : 1965   MRN: [id number]   2019    INVASIVE PROCEDURE SAFETY CHECKLIST    Date: 2019   Procedure:right index finger extensor tendon MCP joint cortisone injection   Patient Name: Wei Wolf  MRN: [id number]  YOB: 1965    Action: Complete sections as appropriate. Any discrepancy results in a HARD COPY until resolved.     PRE PROCEDURE:  Patient ID verified with 2 identifiers (name and  or MRN): Yes  Procedure and site verified with patient/designee (when able): Yes  Accurate consent documentation in medical record: Yes  H&P (or appropriate assessment) documented in  medical record: Yes  H&P must be up to 20 days prior to procedure and updates within 24 hours of procedure as applicable: Yes  Relevant diagnostic and radiology test results appropriately labeled and displayed as applicable: Yes  Procedure site(s) marked with provider initials: Yes    TIMEOUT:  Time-Out performed immediately prior to starting procedure, including verbal and active participation of all team members addressing the following:Yes  * Correct patient identify  * Confirmed that the correct side and site are marked  * An accurate procedure consent form  * Agreement on the procedure to be done  * Correct patient position  * Relevant images and results are properly labeled and appropriately displayed  * The need to administer antibiotics or fluids for irrigation purposes during the procedure as applicable   * Safety precautions based on patient history or medication use    DURING PROCEDURE: Verification of correct person, site, and procedures any time the responsibility for care of the patient is transferred to another member of the care team.       The following medications were given:         Prior to injection, verified patient identity using patient's name and date of birth.  Due to injection administration, patient instructed to remain in clinic for 15 minutes  afterwards, and to report any adverse reaction to me immediately.    Joint injection was performed.    Medication Name: lidocaine   Drug Amount Wasted:  Yes: 3 mg/ml   Vial/Syringe: Single dose vial  Expiration Date:  7/22        Kamila Smart ATC

## 2019-02-21 NOTE — LETTER
"2019       RE: Wei Wolf  [address] [facility] [address]     Dear Colleague,    Thank you for referring your patient, Wei Wolf, to the [facility] at [facility]. Please see a copy of my visit note below.    [facility]  Orthopedics  Lydia Corrales MD  2019     Name: Wei Wolf  MRN: [id number]  Age: 53 year old  : 1965  Referring provider: Referred Self     Chief Complaint: Right index finger MP joint pain     History of Present Illness:   Wei Wolf is a 52 year old, right handed male who presents today for follow-up regarding right index finger pain. Patient is S/P curettage and bone grafting of a cyst in the head of his right index metacarpal on 2017. I last evaluated him on , at which time the pain in his wrist was significantly improved, however his range of motion had not improved and was worsening with associated swelling. Given the return of his symptoms, plan at that time was to repeat an MRI of the hand and follow up in one week.  Patient notes feeling most of the pain in the joints of his index finger. He has not had any clicking in the right dex finger as of late, but notes some intermittent clicking in the past.     Review of Systems:   A 10-point review of systems was obtained and is negative except for as noted in the HPI.      Physical Examination:  Ht 1.775 m (5' 9.88\")   Wt 111.9 kg (246 lb 9.6 oz)   BMI 35.50 kg/m      Hand Dominance Evaluation  Hand Dominance: Right  Pinch force  R hand key force: 10.9 kg (24 lb)  L hand key force: 13.6 kg (30 lb)   force  R hand  level 2 force: 22.7 kg (50 lb)  L hand  level  2 force: 59 kg (130 lb)  General: Healthy appearing male. Affect appropriate. Normal gait. Alert and oriented to surroundings.   Right Upper Extremity:  Pain and swelling over MCP joint, no pain at rest. Pain with flexion from 0 to 60 " degrees, no palpable subluxation of the extender tendon, but reports clicking in the past. Otherwise his distal CMS is full.     Imaging:  MR Hand Right w/o Contrast: Impression:  1. Corresponding to the marker, tear/postsurgical change involving  second MCP ulnar sagittal band, ulnar collateral ligament, and  extensor indicis with associated radial subluxation of extensor  digitorum of the second digit at this level.   2. Post surgical changes of curettage and bone graft for known cyst in  the head of the second metacarpal. There is partial bone graft  incorporation, but with decreased bone marrow edema/subchondral cysts  remains.  3. Additional cyst/erosions involving the metacarpophalangeal joints  and the fourth proximal interphalangeal joint. Differential  consideration includes underlying inflammatory arthritis given  distribution. Correlate clinically.  Report per radiology.      Assessment:   52-year-old right-hand-dominant male status-post curettage of a right index metacarpal cyst with bone grafting on 12/21/2017, continues to be symptomatic.      Plan:   I discussed the above findings with the patient. We discussed a few different treatment options. The first would be for a splint to support the finger, especially if it is clicking. Another option would be ionophoresis of the finger vs icing the finger at home. Finally, we discussed a corticosteroid injection for the most immediate relief of his symptoms. Patient has had some improvement with injections in the past prior to his surgery, and has decided to proceed with the injection. This was completed as below.  A splint was also made today for him to use at night.     Procedure:  After written consent, verifying site and side, a sterile Chlora- prep was performed for the injection site.  C-arm guidance was used for difficult placement of a 25-gauge needle into the right index MCP joint with 1% Lidocaine.  0.5 mL of Kenalog (20 mg) and 1 mL of lidocaine  was injected under fluoroscopic guidance into the joint, and 0.5 mL of Kenalog (20 mg)was injected along the extensor tendon sheath with good relief of pain. Patient tolerated the procedure well.  No complications.  Follow up instructions were given.      Scribe Disclosure:   I, Cornel Alarcon, am serving as a scribe to document services personally performed by Lydia Corrales MD at this visit, based upon the provider's statements to me. All documentation has been reviewed by the aforementioned provider prior to being entered into the official medical record.     Portions of this medical record were completed by a scribe. UPON MY REVIEW AND AUTHENTICATION BY ELECTRONIC SIGNATURE, this confirms (a) I performed the applicable clinical services, and (b) the record is accurate.    Lydia Corrales MD   Hand and Upper Extremity Specialist  [facility]      Small Joint Injection/Arthrocentesis: R index MCP  Date/Time: 2019 8:13 AM  Performed by: Lydia Corrales MD  Authorized by: Lydia Corrales MD     Indications:  Pain  Needle Size:  25 G  Guidance: fluoroscopy    Location:  Index finger  Site:  R index MCP  Medications:  40 mg triamcinolone 40 MG/ML; 5 mL lidocaine (PF) 1 %  Consent Given by:  Patient  Timeout: timeout called immediately prior to procedure    Prep: patient was prepped and draped in usual sterile fashion        [facility]  [address] [place] [id number]  Dept: [phone]  ______________________________________________________________________________    Patient: Wei Wolf   : 1965   MRN: [id number]   2019    INVASIVE PROCEDURE SAFETY CHECKLIST    Date: 2019   Procedure:right index finger extensor tendon MCP joint cortisone injection   Patient Name: Wei Wolf  MRN: [id number]  YOB: 1965    Action: Complete sections as appropriate. Any discrepancy  results in a HARD COPY until resolved.     PRE PROCEDURE:  Patient ID verified with 2 identifiers (name and  or MRN): Yes  Procedure and site verified with patient/designee (when able): Yes  Accurate consent documentation in medical record: Yes  H&P (or appropriate assessment) documented in medical record: Yes  H&P must be up to 20 days prior to procedure and updates within 24 hours of procedure as applicable: Yes  Relevant diagnostic and radiology test results appropriately labeled and displayed as applicable: Yes  Procedure site(s) marked with provider initials: Yes    TIMEOUT:  Time-Out performed immediately prior to starting procedure, including verbal and active participation of all team members addressing the following:Yes  * Correct patient identify  * Confirmed that the correct side and site are marked  * An accurate procedure consent form  * Agreement on the procedure to be done  * Correct patient position  * Relevant images and results are properly labeled and appropriately displayed  * The need to administer antibiotics or fluids for irrigation purposes during the procedure as applicable   * Safety precautions based on patient history or medication use    DURING PROCEDURE: Verification of correct person, site, and procedures any time the responsibility for care of the patient is transferred to another member of the care team.     The following medications were given:     Prior to injection, verified patient identity using patient's name and date of birth.  Due to injection administration, patient instructed to remain in clinic for 15 minutes  afterwards, and to report any adverse reaction to me immediately.    Joint injection was performed.    Medication Name: lidocaine   Drug Amount Wasted:  Yes: 3 mg/ml   Vial/Syringe: Single dose vial  Expiration Date:      Kamila Smart, ATC

## 2019-02-21 NOTE — NURSING NOTE
"Reason For Visit:   Chief Complaint   Patient presents with     RECHECK     Follow up MRI 2/14/19       Primary MD: Yobany Bernal      Age: 53 year old    ?  No      Ht 1.775 m (5' 9.88\")   Wt 111.9 kg (246 lb 9.6 oz)   BMI 35.50 kg/m        Pain Assessment  Patient Currently in Pain: Yes  0-10 Pain Scale: 4  Primary Pain Location: Hand  Pain Descriptors: Sharp  Alleviating Factors: Rest  Aggravating Factors: Movement    Hand Dominance Evaluation  Hand Dominance: Right  Pinch force  R hand key force: 10.9 kg (24 lb)  L hand key force: 13.6 kg (30 lb)   force  R hand  level 2 force: 22.7 kg (50 lb)  L hand  level  2 force: 59 kg (130 lb)    QuickDASH Assessment  QuickDASH Main 11/16/2017   1.Open a tight or new jar. Mild difficulty   2. Do heavy household chores (e.g., wash walls, floors) Mild difficulty   3. Carry a shopping bag or briefcase. Mild difficulty   4. Wash your back. Mild difficulty   5. Use a knife to cut food. Mild difficulty   6. Recreational activities in which you take some force or impact through your arm, shoulder or hand (e.g., golf, hammering, tennis, etc.). Moderate difficulty   7. During the past week, to what extent has your arm, shoulder or hand problem interfered with your normal social activities with family, friends, neighbours or groups? Slightly   8. During the past week, were you limited in your work or other regular daily activities as a result of your arm, shoulder or hand problem? Slightly limited   9. Arm, shoulder or hand pain. Moderate   10.Tingling (pins and needles) in your arm,shoulder or hand. None   11. During the past week, how much difficulty have you had sleeping because of the pain in your arm, shoulder or hand? ([place] number) No difficulty   Quickdash Ability Score 25          Current Outpatient Medications   Medication Sig Dispense Refill     ASPIRIN NOT PRESCRIBED, INTENTIONAL, continuous prn Antiplatelet medication not prescribed " intentionally due to GI Issues / Ulcer Disease 1 each 0     blood glucose monitoring (ACCU-CHEK FASTCLIX) lancets Use to test blood sugar 2 times daily or as directed.  Ok to substitute alternative if insurance prefers. 102 each 11     blood glucose monitoring (ACCU-CHEK GUIDE) test strip Use to test blood sugar 2 times daily or as directed. 100 strip 11     blood glucose monitoring (NO BRAND SPECIFIED) test strip 1 strip by In Vitro route daily accucheck compact plus test strips.  Testing once daily 3 Box 3     insulin glargine U-300 (TOUJEO) 300 UNIT/ML injection Inject 24 Units Subcutaneous At Bedtime 13.5 mL 0     insulin pen needle (BD GABRIELA U/F) 32G X 4 MM Use 1 daily as directed. 100 each 11     metFORMIN (GLUCOPHAGE-XR) 500 MG 24 hr tablet Take 4 tablets (2,000 mg) by mouth daily (with dinner) 360 tablet 1     order for DME Equipment being ordered: wrist splint 1 Units 0     Semaglutide (OZEMPIC) 0.25 or 0.5 MG/DOSE SOPN Inject 0.25 mg Subcutaneous once a week 2 pen 1     simvastatin (ZOCOR) 10 MG tablet Take 1 tablet (10 mg) by mouth At Bedtime 90 tablet 2       Allergies   Allergen Reactions     Metformin Diarrhea       Kamila Smart, ATC

## 2019-02-21 NOTE — PROGRESS NOTES
SOAP note objective information for 2/21/2019.    Pt. Saw MD today    MRI results  1. Corresponding to the marker, tear/postsurgical change involving  second MCP ulnar sagittal band, ulnar collateral ligament, and  extensor indicis with associated radial subluxation of extensor  digitorum of the second digit at this level.   2. Post surgical changes of curettage and bone graft for known cyst in  the head of the second metacarpal. There is partial bone graft  incorporation, but with decreased bone marrow edema/subchondral cysts  remains.  3. Additional cyst/erosions involving the metacarpophalangeal joints  and the fourth proximal interphalangeal joint. Differential  consideration includes underlying inflammatory arthritis given  distribution. Correlate clinically.  Report per radiology.     Home Exercise Program:  Yoke orthosis with index MCP in extension  Tendon glides  Wrist AROM  Ice  Heat  Massage to extensors and flexors  Functional use, as much as able    Next Visit:  US  Ktape?  Wrist PROM  Functional ax  MFR  Strengthening

## 2019-02-28 ENCOUNTER — TELEPHONE (OUTPATIENT)
Dept: FAMILY MEDICINE | Facility: CLINIC | Age: 54
End: 2019-02-28

## 2019-02-28 NOTE — TELEPHONE ENCOUNTER
Panel Management Review      Patient has the following on his problem list:     Diabetes    ASA: Not Required Not Required     Last A1C  Lab Results   Component Value Date    A1C 8.8 10/08/2018    A1C 8.6 06/25/2018    A1C 8.8 11/30/2017    A1C 8.0 03/15/2017    A1C 7.6 10/28/2016     A1C tested: FAILED    Last LDL:    Lab Results   Component Value Date    CHOL 135 10/01/2018     Lab Results   Component Value Date    HDL 42 10/01/2018     Lab Results   Component Value Date    LDL 77 10/01/2018     Lab Results   Component Value Date    TRIG 79 10/01/2018     Lab Results   Component Value Date    CHOLHDLRATIO 4.2 05/27/2015     Lab Results   Component Value Date    NHDL 93 10/01/2018       Is the patient on a Statin? YES             Is the patient on Aspirin? NO    Medications     HMG CoA Reductase Inhibitors     simvastatin (ZOCOR) 10 MG tablet             Last three blood pressure readings:  BP Readings from Last 3 Encounters:   12/18/18 127/82   10/19/18 132/72   10/08/18 153/80       Date of last diabetes office visit: 10/18/18     Tobacco History:     History   Smoking Status     Never Smoker   Smokeless Tobacco     Never Used           Composite cancer screening  Chart review shows that this patient is due/due soon for the following None  Summary:    Patient is due/failing the following:   None     Action needed:   needs    Type of outreach:    none    Questions for provider review:    None                                                                                                                                    Charleen Iniguez M.A.       Chart routed to n/a .      \

## 2019-03-07 ENCOUNTER — THERAPY VISIT (OUTPATIENT)
Dept: OCCUPATIONAL THERAPY | Facility: CLINIC | Age: 54
End: 2019-03-07
Payer: COMMERCIAL

## 2019-03-07 DIAGNOSIS — Z09 SURGICAL FOLLOW-UP CARE: ICD-10-CM

## 2019-03-07 DIAGNOSIS — M79.641 PAIN OF RIGHT HAND: ICD-10-CM

## 2019-03-07 DIAGNOSIS — M25.641 STIFFNESS OF RIGHT HAND JOINT: Primary | ICD-10-CM

## 2019-03-07 PROCEDURE — 97035 APP MDLTY 1+ULTRASOUND EA 15: CPT | Mod: GO | Performed by: OCCUPATIONAL THERAPIST

## 2019-03-07 PROCEDURE — 97140 MANUAL THERAPY 1/> REGIONS: CPT | Mod: GO | Performed by: OCCUPATIONAL THERAPIST

## 2019-03-07 PROCEDURE — 97110 THERAPEUTIC EXERCISES: CPT | Mod: GO | Performed by: OCCUPATIONAL THERAPIST

## 2019-03-07 NOTE — PROGRESS NOTES
"SOAP note objective information for 3/7/2019.      Diagnosis: Pain of finger of right hand, Surgical follow-up care  DOS:  12/21/17  Procedure:   Right Index Finger Metacarpal Bone Cyst Curettage with                                               Allograft from Distal Radius   Post:  > 6 months  Referring MD: Lydia Corrales MD   RTC: April 18, 2019      HAND 1/11/2018 7/12/18 8/23/18 9/25/18 10/30/18 12/11/18 1/29/19 2/5/19 3/7/19   AROM(PROM)            Index MP 15/45 -7/52 -2/66 -5/56 -19/54 -4/69 -6/58 -5/53 0/55  Tabletop: /65   PIP 20/90 -15/82 -13/90 -6/88 -13/80 -12/84 -8/90 -19/87 20/100  Claw: 90   DIP 0/45 0/45 0/45 -4/46 -11/48 0/52 0/63 -5/48 5/52  Claw: 55   BEAN 145 157  175 139 189 197     Please refer to the daily flowsheet for treatment today, total treatment time and time spent performing 1:1 timed codes.         Home Exercise Program:  Yoke orthosis with index MCP in extension  Tendon glides  Wrist AROM  Ice  Heat  Massage to extensors and flexors  Functional use, as much as able  3/7/2019  Pen roll for intrinsic stretch and activation  Pt notes that he wants to just \"use it \" for strengthening,     Next Visit:  US  Ktape?  Wrist PROM  Functional ax  MFR  Strengthening        "

## 2019-03-11 ENCOUNTER — ALLIED HEALTH/NURSE VISIT (OUTPATIENT)
Dept: EDUCATION SERVICES | Facility: CLINIC | Age: 54
End: 2019-03-11
Payer: COMMERCIAL

## 2019-03-11 DIAGNOSIS — E11.9 DIABETES MELLITUS WITHOUT COMPLICATION (H): ICD-10-CM

## 2019-03-11 LAB — HBA1C MFR BLD: 8 % (ref 0–5.6)

## 2019-03-11 PROCEDURE — G0108 DIAB MANAGE TRN  PER INDIV: HCPCS

## 2019-03-11 PROCEDURE — 83036 HEMOGLOBIN GLYCOSYLATED A1C: CPT | Performed by: FAMILY MEDICINE

## 2019-03-11 PROCEDURE — 36415 COLL VENOUS BLD VENIPUNCTURE: CPT | Performed by: FAMILY MEDICINE

## 2019-03-11 NOTE — PATIENT INSTRUCTIONS
Diabetes Support Resources:  If not satisfied with 0.5 mg of Ozempic, let me know and we can increase to 1 mg weekly     Bring blood glucose meter and logbook with you to all doctor and follow-up appointments.    Diabetes Education Telephone Visit Follow-up:    We realize your time is valuable and your health is important! We offer a convenient Telephone Visit follow up! It s a quick way to check in for a medication dose adjustment without having to come back to clinic as soon.    Telephone Visits are often covered by insurance. Please check with your insurance plan to see if this type of visit is covered. If not, the cost is less expensive than an office visit:      Up to 10 minutes (Code 45854): $30    11-20 minutes (Code 85325): $59    More than 20 minutes (Code 23972): $85    Talk with your Diabetes Educator if you want to learn more.      [facility]:  For Your Diabetes Education and Nutrition Appointments Call:  [phone]   For Diabetes Education or Nutrition Related Questions:   Phone: [phone]  E-mail: [email or web address]  Fax: [phone]   If you need a medication refill please contact your pharmacy. Please allow 3 business days for your refills to be completed.

## 2019-03-11 NOTE — PROGRESS NOTES
"Diabetes Self-Management Education & Support    Diabetes Education Self Management & Training    SUBJECTIVE/OBJECTIVE:  Presents for: Follow-up  Accompanied by: Self  Diabetes education in the past 24mo: Yes  Focus of Visit: Monitoring, Taking Medication, Healthy Eating  Diabetes type: Type 2  Disease course: Improving  How confident are you filling out medical forms by yourself:: Extremely  Diabetes management related comments/concerns: was on ER Metformin and has had bariatric surgery in the past, will change to IR Metformin  Transportation concerns: No  Other concerns:: None  Cultural Influences/Ethnic Background:  American      Diabetes Symptoms & Complications  Blurred vision: No  Fatigue: No  Neuropathy: No  Foot ulcerations: No  Polydipsia: No  Polyphagia: No  Polyuria: No  Visual change: No  Weakness: No  Weight loss: No  Slow healing wounds: No  Recent Infection(s): No  Symptom course: Stable  Weight trend: Stable  Autonomic neuropathy: No  CVA: No  Heart disease: No  Nephropathy: No  Peripheral neuropathy: No  Peripheral Vascular Disease: No  Retinopathy: No  Sexual dysfunction: No    Patient Problem List and Family Medical History reviewed for relevant medical history, current medical status, and diabetes risk factors.    Vitals:  There were no vitals taken for this visit.  Estimated body mass index is 35.5 kg/m  as calculated from the following:    Height as of 2/21/19: 1.775 m (5' 9.88\").    Weight as of 2/21/19: 111.9 kg (246 lb 9.6 oz).   Last 3 BP:   BP Readings from Last 3 Encounters:   12/18/18 127/82   10/19/18 132/72   10/08/18 153/80       History   Smoking Status     Never Smoker   Smokeless Tobacco     Never Used       Labs:  Lab Results   Component Value Date    A1C 8.0 03/11/2019     Lab Results   Component Value Date     10/01/2018     Lab Results   Component Value Date    LDL 77 10/01/2018     HDL Cholesterol   Date Value Ref Range Status   10/01/2018 42 >39 mg/dL Final   ]  GFR " Estimate   Date Value Ref Range Status   10/01/2018 84 >60 mL/min/1.7m2 Final     Comment:     Non  GFR Calc     GFR Estimate If Black   Date Value Ref Range Status   10/01/2018 >90 >60 mL/min/1.7m2 Final     Comment:      GFR Calc     Lab Results   Component Value Date    CR 0.95 10/01/2018     No results found for: MICROALBUMIN    Healthy Eating  Healthy Eating Assessed Today: Yes  Cultural/Yarsanism diet restrictions?: No  Patient on a regular basis: Eats 3 meals a day  Meal planning: Smaller portions  Meals include: Breakfast, Lunch, Dinner, Snacks  Breakfast: egg Mcmuffin and hash brown and diet coke, or burito and diet coke or peanut butter sandwich  Lunch: chicken , or a burger and fries,   Dinner: balanced meal, much better eating.    Snacks: crackers,   Beverages: Water, Diet soda  Has patient met with a dietitian in the past?: No    Being Active  Being Active Assessed Today: Yes  Exercise:: Yes  Days per week of moderate to strenuous exercise (like a brisk walk): 4  On average, minutes per day of exercise at this level: 30  How intense was your typical exercise? : Light (like stretching or slow walking)(walking more at work, and taking the stairs)  Exercise Minutes per Week: 120  Barrier to exercise: Physical limitation, None    Monitoring  Blood Glucose Meter: Accu-check  Home Glucose (Sugar) Monitorin-2 times per day  Blood glucose trend: Decreasing rapidly  Low Glucose Range (mg/dL): <70  High Glucose Range (mg/dL): >200  Overall Range (mg/dL):         Taking Medications  Diabetes Medication(s)     Biguanides       metFORMIN (GLUCOPHAGE) 500 MG tablet    Take 2 tablets (1,000 mg) by mouth 2 times daily (with meals)    Incretin Mimetic Agents (GLP-1 Receptor Agonists)       Semaglutide 0.25 or 0.5 MG/DOSE SOPN    Inject 0.5 mg Subcutaneous once a week          Taking Medication Assessed Today: Yes  Current Treatments: Non-insulin Injectables, Oral Agent (dual  therapy)(Metformin 500 mg takes 1000 mg BID, Ozempic 0.5 mg weekly)  Problems taking diabetes medications regularly?: No  Diabetes medication side effects?: No  Treatment Compliance: All of the time    Problem Solving  Problem Solving Assessed Today: Yes  Hypoglycemia Frequency: Daily  Hypoglycemia Treatment: Candy  Patient carries a carbohydrate source: Yes  Medical alert: No  Severe weather/disaster plan for diabetes management?: No  DKA prevention plan?: No  Sick day plan for diabetes management?: (P) No    Hypoglycemia symptoms  Confusion: No  Dizziness or Light-Headedness: No  Headaches: No  Hunger: No  Mood changes: No  Nervousness/Anxiety: No  Sleepiness: No  Speech difficulty: No  Sweats: Yes  Tremors: No    Hypoglycemia Complications  Blackouts: No  Hospitalization: No  Nocturnal hypoglycemia: Yes  Required assistance: No  Required glucagon injection: No  Seizures: No    Reducing Risks  Reducing Risks Assessed Today: Yes  Diabetes Risks: Family History, Age over 45 years  Has dilated eye exam at least once a year?: No  Sees dentist every 6 months?: Yes  Sees podiatrist (foot doctor)?: No    Healthy Coping  Healthy Coping Assessed Today: Yes  Emotional response to diabetes: Acceptance, Confidence diabetes can be controlled, Ready to learn  Informal Support system:: Children, Spouse  Stage of change: ACTION (Actively working towards change)  Difficulty affording diabetes management supplies?: Yes  Patient Activation Measure Survey Score:  ANA Score (Last Two) 9/27/2011   ANA Raw Score 39   Activation Score 56.4   ANA Level 3       ASSESSMENT:  Wei comes in today to evaluate his medications.  He apparently had bariatric surgery back in 2012 and this did not show up on his problem list , after going through his chart back to 2012, yes indeed, so will need to take him off ER Metformin and start the IR Metformin.  He does wish to stay on Ozempic to help with diet and weight loss.  Will follow up in 3 months  or sooner if needed.    Goals Addressed as of 3/11/2019 at 6:11 PM                 Today      Healthy Eating (pt-stated)   90%    Added 10/30/18 by Lorena Borrego, RN     My Goal: I will eat breakfast everyday before going to work.     What I need to meet my goal: 1. Have yogurt and cheese or meat or egg  2. 2 protein bars  3. Belvita bars  4. PB and 2 pieces of toast.  5 BE SURE TO INCREASE YOUR FRUITS    I plan to meet my goal by this date: 2 MONTHS          Medication 1 (pt-stated)       Added 3/11/19 by Lorena Borrego, RN     My Goal: I will change my medications that are safe    What I need to meet my goal: 1. Change Metformin 500 mg take 2 tabs with breakfast and 2 tabs with dinner  2. Ozempic 0.5 mg weekly, if after 2 months, do not get enough satiety, will increase to 1 mg dose weekly     I plan to meet my goal by this date: 2 months            Patient's most recent   Lab Results   Component Value Date    A1C 8.0 03/11/2019    is not meeting goal of <7.0    INTERVENTION:   Diabetes knowledge and skills assessment:     Patient is knowledgeable in diabetes management concepts related to: Healthy Eating, Being Active, Monitoring and Taking Medication    Patient needs further education on the following diabetes management concepts: Taking Medication    Based on learning assessment above, most appropriate setting for further diabetes education would be: Individual setting.    Education provided today on:  AADE Self-Care Behaviors:  Taking Medication: action of prescribed medication, drawing up, administering and storing injectable diabetes medications, proper site selection and rotation for injections, side effects of prescribed medications and when to take medications  GLP-1 administration technique taught today. Patient verbalized understanding and was able to perform an accurate return demonstration of administration technique. Side effects were discussed, if patient has any abdominal pain, with or without  nausea and/or vomiting, stop medication, call provider, clinic or go to the emergency room.    Opportunities for ongoing education and support in diabetes-self management were discussed.    Pt verbalized understanding of concepts discussed and recommendations provided today.       Education Materials Provided:  No new materials provided today    PLAN:  See Patient Instructions for co-developed, patient-stated behavior change goals.  AVS printed and provided to patient today. See Follow-Up section for recommended follow-up.    Luciana Borrego RN/ÁNGEL Rojas Diabetes Educator    Time Spent: 60 minutes  Encounter Type: Individual    Any diabetes medication dose changes were made via the CDE Protocol and Collaborative Practice Agreement with the patient's primary care provider. A copy of this encounter was shared with the provider.

## 2019-03-20 ENCOUNTER — THERAPY VISIT (OUTPATIENT)
Dept: PHYSICAL THERAPY | Facility: CLINIC | Age: 54
End: 2019-03-20
Payer: COMMERCIAL

## 2019-03-20 DIAGNOSIS — M25.511 SHOULDER PAIN, RIGHT: Primary | ICD-10-CM

## 2019-03-20 PROCEDURE — 97530 THERAPEUTIC ACTIVITIES: CPT | Mod: GP | Performed by: PHYSICAL THERAPIST

## 2019-03-20 PROCEDURE — 97110 THERAPEUTIC EXERCISES: CPT | Mod: GP | Performed by: PHYSICAL THERAPIST

## 2019-03-20 NOTE — PROGRESS NOTES
DISCHARGE REPORT    Progress reporting period is from 12/24/18 to 3/20/19.       SUBJECTIVE  Subjective: Patient states that his shoulder was more painful after [place], but now the pain has resolved.  He feels confident at this point to continue his HEP independently.    Changes in function:  Yes (See Goal flowsheet attached for changes in current functional level)  Adverse reaction to treatment or activity: None    OBJECTIVE  Changes noted in objective findings:  Yes  Objective: R shoulder PROM: flexion 130, abduction 100, ER at 90/90 60, IR at 90/90 20.     ASSESSMENT/PLAN  Updated problem list and treatment plan: Diagnosis 1:  Adhesive Capsulitis    STG/LTGs have been met or progress has been made towards goals:  Yes (See Goal flow sheet completed today.)  Assessment of Progress: The patient's condition is improving.  Self Management Plans:  Patient has been instructed in a home treatment program.  I have re-evaluated this patient and find that the nature, scope, duration and intensity of the therapy is appropriate for the medical condition of the patient.  Wei continues to require the following intervention to meet STG and LTG's:  PT intervention is no longer required to meet STG/LTG.    Recommendations:  This patient is ready to be discharged from therapy and continue their home treatment program.    Please refer to the daily flowsheet for treatment today, total treatment time and time spent performing 1:1 timed codes.

## 2019-03-21 ENCOUNTER — THERAPY VISIT (OUTPATIENT)
Dept: OCCUPATIONAL THERAPY | Facility: CLINIC | Age: 54
End: 2019-03-21
Payer: COMMERCIAL

## 2019-03-21 DIAGNOSIS — M79.641 PAIN OF RIGHT HAND: ICD-10-CM

## 2019-03-21 DIAGNOSIS — Z09 SURGICAL FOLLOW-UP CARE: ICD-10-CM

## 2019-03-21 DIAGNOSIS — M25.641 STIFFNESS OF RIGHT HAND JOINT: ICD-10-CM

## 2019-03-21 PROCEDURE — 97110 THERAPEUTIC EXERCISES: CPT | Mod: GO | Performed by: OCCUPATIONAL THERAPIST

## 2019-03-21 PROCEDURE — 97140 MANUAL THERAPY 1/> REGIONS: CPT | Mod: GO | Performed by: OCCUPATIONAL THERAPIST

## 2019-03-21 NOTE — PROGRESS NOTES
"Hand Therapy Progress Note    Current Date:  3/21/2019    Diagnosis: Pain of finger of right hand, Surgical follow-up care  DOS:  12/21/17  Procedure:   Right Index Finger Metacarpal Bone Cyst Curettage with                                               Allograft from Distal Radius   Post:  > 6 months  Referring MD: Lydia Corrales MD   RTC: April 18, 2019    Reporting period is 1/29/19 to 3/21/2019    Subjective:   Subjective changes noted by patient:  \"It's been doing well!\"  Functional changes noted by patient:  Improvement in Self Care Tasks (dressing, eating, bathing, hygiene/toileting), Work Tasks, Sleep Patterns, Recreational Activities and Driving   Patient has noted adverse reaction to:  None    Objective:  Observation: Color/Temperature:     [x]    Normal  []    Abnormal    PAIN 1/11/2018 7/12/18 8/13/18 9/25/18 10/30/18 12/11/18 1/29/19 3/21/19   Location Right  wrist and hand Radial and ulnar wrist Volar ulnar wrist  Volar wrist Volar wrist Volar wrist   R index finger dorsal MP joint    Radiates no no no no no no no    Worse d/n daytime daytime Daytime and laying in bed at night daytime daytime daytime daytime    Frequency activity dependant intermittent constant intermittent intermittent intermittetn intermittent    Exacerbated by movement Wrist extension, rotation Flexing wrist, pronation Extension, supination, pronation All wrist motion Gripping, wrist motions gripping    Relieves rest Rest, pain medication Rest, ibuprofen Rest, ibuprofen rest rest Rest, advil    At rest 0-10/10 0/10 0/10 0/10 0/10 0/10 0/10 0/10    At worst 7/10 6/10 4/10 - 6/10 after bending it too far 6/10 3/10 5/10 6/10 3/10   Sleep disruption?   no yes no no no no no    Progression Gradually getting better. Slow improvement Slowly improving improving improving Staying about the same. Staying the same      ROM:  Pain Report:  - none    + mild    ++ moderate    +++ severe     Wrist  1/11/2018 7/12/18 8/13/18 8/25/18 10/30/18 " 12/11/18 1/29/19 3/21/19   AROM (PROM) R L  R        Extension 45 + 60 38++ 45 42+ 42+ 46++ 46+ 46   Flexion 50 + 65 20+ 37 35+ 30+ 30++ 34+ 41   RD 10 20 12+ 10+ 11+ 7+ 7+ 9+ 11   UD 30 30 18++ 22++ 28+ 25+ 26+ 26+ 26   Supination 60 90 52++ 65 71+ 61+ 68++ 70+ 70   Pronation 70 85 90 75 88+ 88+ 88++ 90 WNL       HAND 1/11/2018 7/12/18 8/23/18 9/25/18 10/30/18 12/11/18 1/29/19 3/21/19   AROM(PROM)           Index MP 15/45 -7/52 -2/66 -5/56 -19/54 -4/69 -6/58 -6/58   PIP 20/90 -15/82 -13/90 -6/88 -13/80 -12/84 -8/90 -4/94   DIP 0/45 0/45 0/45 -4/46 -11/48 0/52 0/63 0/62   BEAN 145 157  175 139 189 197 204     Strength:   (Measured in pounds)  Pain Report:  - none    + mild    ++ moderate    +++ severe     7/12/2018 8/13/18 9/25/18 10/30/18 12/11/18 1/29/19 3/21/19   Trials left right R        1  2  3 119  115  109 35+++  20  19 44++  32  35 56++  47++  51++ 5854  52++  43++ 51++  48++  56++ 59++  56++  58++ 66+  52+  64+   Average: 114 25 37 51 50 52 58 61     Lat Pinch  7/12/2018 8/13/18 9/25/18 10/30/18 12/11/18 1/29/19   Trials left right R       1  2  3 25  27  27 26  25  25 25  24  25 22  25  26 25  27  27 25++  25++  25++ 26  26  28   Average: 26 25 25 24 26 25 27     3 Pt Pinch  7/12/2018 8/13/18 9/25/18 10/30/18 12/11/18 1/29/19   Trials left right R       1  2  3 26  25  25 16+  17+  14+ 19  21  18 20  19  20 18  17  18 20  21  20 21+  20+  21+   Average: 25 16 19 20 18 20 21   Please refer to the daily flowsheet for treatment provided today.     Assessment:  Response to therapy has been improvement to:  ROM of Fingers: All Planes  Strength:   and pinch  Pain:  frequency is less, intensity of pain is decreased, duration of pain is decreased and less tender over affected area    Overall Assessment:  Patient's symptoms are resolving.  STG/LTG:  STGoals have been reviewed and progress or achievement has occurred;  see goal sheet for details and updates.    Plan:  Frequency/Duration:  discontinue to I  "HEP.  Appropriateness of Rx I have re-evaluated this patient and find that the nature, scope, duration and intensity of the therapy is appropriate for the medical condition of the patient.  Recommendations for Continued Therapy  discontinue to I HEP.    Home Exercise Program:  Yoke orthosis with index MCP in extension  Tendon glides  Wrist AROM  Ice  Heat  Massage to extensors and flexors  Functional use, as much as able  3/7/2019  Pen roll for intrinsic stretch and activation  Pt notes that he wants to just \"use it \" for strengthening,         "

## 2019-05-22 ENCOUNTER — MYC MEDICAL ADVICE (OUTPATIENT)
Dept: OTHER | Age: 54
End: 2019-05-22

## 2019-05-22 NOTE — TELEPHONE ENCOUNTER
Patient is experiencing higher fasting blood sugars since changing from taking glyburide/met pill to just Metformin IR. Given that his fasting numbers are 140-160, would recommend changing to the 1mg Ozempic dose. Please sign pended order if you are in agreement with this change.    Thank you!  Marissa Valdivia RD LD CDE

## 2019-06-03 ENCOUNTER — ALLIED HEALTH/NURSE VISIT (OUTPATIENT)
Dept: EDUCATION SERVICES | Facility: CLINIC | Age: 54
End: 2019-06-03
Payer: COMMERCIAL

## 2019-06-03 DIAGNOSIS — E11.9 DIABETES MELLITUS WITHOUT COMPLICATION (H): Primary | ICD-10-CM

## 2019-06-03 PROCEDURE — G0108 DIAB MANAGE TRN  PER INDIV: HCPCS

## 2019-06-03 PROCEDURE — 99207 ZZC DROP WITH A PROCEDURE: CPT

## 2019-06-03 NOTE — PATIENT INSTRUCTIONS
Diabetes Support Resources:       Bring blood glucose meter and logbook with you to all doctor and follow-up appointments.    Diabetes Education Telephone Visit Follow-up:    We realize your time is valuable and your health is important! We offer a convenient Telephone Visit follow up! It s a quick way to check in for a medication dose adjustment without having to come back to clinic as soon.    Telephone Visits are often covered by insurance. Please check with your insurance plan to see if this type of visit is covered. If not, the cost is less expensive than an office visit:      Up to 10 minutes (Code 89995): $30    11-20 minutes (Code 46247): $59    More than 20 minutes (Code 43535): $85    Talk with your Diabetes Educator if you want to learn more.      [facility]:  For Your Diabetes Education and Nutrition Appointments Call:  [phone]   For Diabetes Education or Nutrition Related Questions:   Phone: [phone]  E-mail: [email or web address]  Fax: [phone]   If you need a medication refill please contact your pharmacy. Please allow 3 business days for your refills to be completed.

## 2019-06-03 NOTE — PROGRESS NOTES
"Diabetes Self-Management Education & Support    Diabetes Education Self Management & Training    SUBJECTIVE/OBJECTIVE:  Presents for: Follow-up  Accompanied by: Self  Diabetes education in the past 24mo: Yes  Focus of Visit: Taking Medication, Being Active  Diabetes type: Type 2  Disease course: Improving  Transportation concerns: No  Other concerns:: None  Cultural Influences/Ethnic Background:  American      Diabetes Symptoms & Complications  Blurred vision: No  Fatigue: No  Neuropathy: No  Foot ulcerations: No  Polydipsia: No  Polyphagia: No  Polyuria: No  Visual change: No  Weakness: No  Weight loss: No  Slow healing wounds: No  Recent Infection(s): No  Symptom course: Stable  Weight trend: Stable  Autonomic neuropathy: No  CVA: No  Heart disease: No  Nephropathy: No  Peripheral neuropathy: No  Peripheral Vascular Disease: No  Retinopathy: No  Sexual dysfunction: No    Patient Problem List and Family Medical History reviewed for relevant medical history, current medical status, and diabetes risk factors.    Vitals:  There were no vitals taken for this visit.  Estimated body mass index is 35.5 kg/m  as calculated from the following:    Height as of 2/21/19: 1.775 m (5' 9.88\").    Weight as of 2/21/19: 111.9 kg (246 lb 9.6 oz).   Last 3 BP:   BP Readings from Last 3 Encounters:   12/18/18 127/82   10/19/18 132/72   10/08/18 153/80       History   Smoking Status     Never Smoker   Smokeless Tobacco     Never Used       Labs:  Lab Results   Component Value Date    A1C 8.0 03/11/2019     Lab Results   Component Value Date     10/01/2018     Lab Results   Component Value Date    LDL 77 10/01/2018     HDL Cholesterol   Date Value Ref Range Status   10/01/2018 42 >39 mg/dL Final   ]  GFR Estimate   Date Value Ref Range Status   10/01/2018 84 >60 mL/min/1.7m2 Final     Comment:     Non  GFR Calc     GFR Estimate If Black   Date Value Ref Range Status   10/01/2018 >90 >60 mL/min/1.7m2 Final     " Comment:      GFR Calc     Lab Results   Component Value Date    CR 0.95 10/01/2018     No results found for: MICROALBUMIN    Healthy Eating  Healthy Eating Assessed Today: Yes  Cultural/Sabianist diet restrictions?: No  Patient on a regular basis: Eats 3 meals a day  Meal planning: Smaller portions  Meals include: Breakfast, Lunch, Dinner, Snacks  Breakfast: burrittos breakfast type with diet coke or water  Lunch: burgers, oranges or blueberries  Dinner: meat and carbs,   Snacks: not to often bedtime snack, cheese and crackers  Beverages: Water, Diet soda  Has patient met with a dietitian in the past?: No    Being Active  Being Active Assessed Today: Yes  Exercise:: Yes  Days per week of moderate to strenuous exercise (like a brisk walk): 7  On average, minutes per day of exercise at this level: 40(stairs now at work)  How intense was your typical exercise? : Light (like stretching or slow walking)  Exercise Minutes per Week: 280  Barrier to exercise: Physical limitation    Monitoring  Blood Glucose Meter: Accu-check  Home Glucose (Sugar) Monitorin-2 times per day  Blood glucose trend: Fluctuating minimally  Low Glucose Range (mg/dL): <70  High Glucose Range (mg/dL): 140-180  Overall Range (mg/dL): 130-140        Taking Medications  Diabetes Medication(s)     Biguanides       metFORMIN (GLUCOPHAGE) 500 MG tablet    Take 2 tablets (1,000 mg) by mouth 2 times daily (with meals)    Incretin Mimetic Agents (GLP-1 Receptor Agonists)       semaglutide (OZEMPIC) 1 MG/DOSE pen    Inject 1 mg Subcutaneous every 7 days          Current Treatments: Non-insulin Injectables, Oral Agent (monotherapy)(Metformin 500 mg takes 2 twice per day, Ozempic 1.0 mg weekly)  Problems taking diabetes medications regularly?: No  Diabetes medication side effects?: Yes(does get some diarrhea with Metformin)  Treatment Compliance: All of the time    Problem Solving  Problem Solving Assessed Today: Yes  Hypoglycemia Frequency:  Rarely  Hypoglycemia Treatment: Candy  Patient carries a carbohydrate source: No  Medical alert: No  Severe weather/disaster plan for diabetes management?: No  DKA prevention plan?: No  Sick day plan for diabetes management?: (P) No    Hypoglycemia symptoms  Confusion: No  Dizziness or Light-Headedness: Yes  Headaches: No  Hunger: No  Mood changes: No  Nervousness/Anxiety: No  Sleepiness: No  Speech difficulty: No  Sweats: Yes  Tremors: No    Hypoglycemia Complications  Blackouts: No  Hospitalization: No  Nocturnal hypoglycemia: Yes  Required assistance: No  Required glucagon injection: No  Seizures: No    Reducing Risks  Reducing Risks Assessed Today: Yes  Diabetes Risks: Age over 45 years  CAD Risks: Diabetes Mellitus, Male sex  Has dilated eye exam at least once a year?: Yes  Sees dentist every 6 months?: Yes  Sees podiatrist (foot doctor)?: No    Healthy Coping  Healthy Coping Assessed Today: Yes  Emotional response to diabetes: Ready to learn  Informal Support system:: Spouse  Stage of change: ACTION (Actively working towards change)  Difficulty affording diabetes management supplies?: Yes  Patient Activation Measure Survey Score:  ANA Score (Last Two) 9/27/2011   ANA Raw Score 39   Activation Score 56.4   ANA Level 3       ASSESSMENT:  Wei has begun Ozempic 1.0 mg weekly on week 2, frustrated with morning BG still high.  Not sure what the rest of the day is , he seems to be eating as instructed, some exercising and in the middle of a move.  He was on Glyburide /Metformin mix and had lows, then discovered he had bariatric surgery and needed to go from ER Metformin to IR Metformin. Will place a sensor on him for 2 wks to see what is going on for 24 hrs.       Goals Addressed as of 6/3/2019 at 9:13 AM       Medication 1 (pt-stated)     Added 3/11/19 by Lorena Borrego, RN     My Goal: I will change my medications that are safe    What I need to meet my goal: 1. Change Metformin 500 mg take 2 tabs with  breakfast and 2 tabs with dinner  2. Ozempic 1.0 mg weekly     I plan to meet my goal by this date: 2 months            Patient's most recent   Lab Results   Component Value Date    A1C 8.0 03/11/2019    is not meeting goal of <7.0    INTERVENTION:   Diabetes knowledge and skills assessment:     Patient is knowledgeable in diabetes management concepts related to: Healthy Eating, Being Active, Monitoring and Taking Medication    Patient needs further education on the following diabetes management concepts: Healthy Eating, Being Active, Monitoring and Taking Medication    Based on learning assessment above, most appropriate setting for further diabetes education would be: Individual setting.    Education provided today on:  AADE Self-Care Behaviors:  Monitoring: purpose, proper technique, log and interpret results, individual blood glucose targets and frequency of monitoring  Taking Medication: action of prescribed medication, drawing up, administering and storing injectable diabetes medications, proper site selection and rotation for injections, side effects of prescribed medications and when to take medications  Placement of sensor    Opportunities for ongoing education and support in diabetes-self management were discussed.    Pt verbalized understanding of concepts discussed and recommendations provided today.       Education Materials Provided:  No new materials provided today    PLAN:  See Patient Instructions for co-developed, patient-stated behavior change goals.  AVS printed and provided to patient today. See Follow-Up section for recommended follow-up.    Luciana Borrego RN/ÁNGEL  [facility] Diabetes Educator    Time Spent: 30 minutes  Encounter Type: Individual    Any diabetes medication dose changes were made via the CDE Protocol and Collaborative Practice Agreement with the patient's primary care provider. A copy of this encounter was shared with the provider.

## 2019-06-13 ENCOUNTER — TELEPHONE (OUTPATIENT)
Dept: FAMILY MEDICINE | Facility: CLINIC | Age: 54
End: 2019-06-13

## 2019-06-13 NOTE — TELEPHONE ENCOUNTER
Panel Management Review      Patient has the following on his problem list:   Diabetes    ASA: Not Required     Last A1C  Lab Results   Component Value Date    A1C 8.0 03/11/2019    A1C 8.8 10/08/2018    A1C 8.6 06/25/2018    A1C 8.8 11/30/2017    A1C 8.0 03/15/2017     A1C tested: Passed    Last LDL:    Lab Results   Component Value Date    CHOL 135 10/01/2018     Lab Results   Component Value Date    HDL 42 10/01/2018     Lab Results   Component Value Date    LDL 77 10/01/2018     Lab Results   Component Value Date    TRIG 79 10/01/2018     Lab Results   Component Value Date    CHOLHDLRATIO 4.2 05/27/2015     Lab Results   Component Value Date    NHDL 93 10/01/2018       Is the patient on a Statin? YES             Is the patient on Aspirin? NO    Medications     HMG CoA Reductase Inhibitors     simvastatin (ZOCOR) 10 MG tablet             Last three blood pressure readings:  BP Readings from Last 3 Encounters:   12/18/18 127/82   10/19/18 132/72   10/08/18 153/80       Date of last diabetes office visit: 06/03/2019     Tobacco History:     History   Smoking Status     Never Smoker   Smokeless Tobacco     Never Used           Composite cancer screening  Chart review shows that this patient is due/due soon for the following none  Summary:    Patient is due/failing the following:   Just seen    Action needed:   None     Type of outreach:    none needed    Questions for provider review:    None                                                                                                                                    Charleen Iniguez M.A.       Chart routed to n/a .

## 2019-06-17 ENCOUNTER — TELEPHONE (OUTPATIENT)
Dept: FAMILY MEDICINE | Facility: CLINIC | Age: 54
End: 2019-06-17

## 2019-07-02 ENCOUNTER — ALLIED HEALTH/NURSE VISIT (OUTPATIENT)
Dept: EDUCATION SERVICES | Facility: CLINIC | Age: 54
End: 2019-07-02
Payer: COMMERCIAL

## 2019-07-02 DIAGNOSIS — E11.9 DIABETES MELLITUS WITHOUT COMPLICATION (H): ICD-10-CM

## 2019-07-02 PROCEDURE — 99207 ZZC DROP WITH A PROCEDURE: CPT

## 2019-07-02 PROCEDURE — G0108 DIAB MANAGE TRN  PER INDIV: HCPCS

## 2019-07-02 NOTE — PATIENT INSTRUCTIONS
Diabetes Support Resources:  Look at your medications and let me know which meds are covered under your plan     Bring blood glucose meter and logbook with you to all doctor and follow-up appointments.    Diabetes Education Telephone Visit Follow-up:    We realize your time is valuable and your health is important! We offer a convenient Telephone Visit follow up! It s a quick way to check in for a medication dose adjustment without having to come back to clinic as soon.    Telephone Visits are often covered by insurance. Please check with your insurance plan to see if this type of visit is covered. If not, the cost is less expensive than an office visit:      Up to 10 minutes (Code 32177): $30    11-20 minutes (Code 18443): $59    More than 20 minutes (Code 05710): $85    Talk with your Diabetes Educator if you want to learn more.      [facility]:  For Your Diabetes Education and Nutrition Appointments Call:  [phone]   For Diabetes Education or Nutrition Related Questions:   Phone: [phone]  E-mail: [email or web address]  Fax: [phone]   If you need a medication refill please contact your pharmacy. Please allow 3 business days for your refills to be completed.

## 2019-07-02 NOTE — PROGRESS NOTES
LibrePro Continuous Glucose Monitor Interpretation     Patient History:   1. Type of Diabetes: Type 2 diabetes  2. Duration of diabetes or year of diagnosis:   3. Current treatment regimen (include all diabetes medications, dose & dosing frequency/timing):   yes:     Diabetes Medication(s)     Biguanides       metFORMIN (GLUCOPHAGE) 500 MG tablet    Take 2 tablets (1,000 mg) by mouth 2 times daily (with meals)    Insulin       insulin glargine U-300 (TOUJEO MAX SOLOSTAR) 300 UNIT/ML injection    Inject 10 Units Subcutaneous At Bedtime    Incretin Mimetic Agents (GLP-1 Receptor Agonists)       semaglutide (OZEMPIC) 1 MG/DOSE pen    Inject 1 mg Subcutaneous every 7 days      , Oral Medications: Metformin - Dose: 500 mg takes 2 pills , Time: breakfast and supper, Injectable Medications: Ozempic  - Dose: 1 mg , Time: weekly  *Abbreviated insulin dose documentation key: Insulin Trade Name (ilosukmot-povmp-nwvhxd-bedtime) - i.e. Humalog 5-5-5-0 (Humalog 5 units at breakfast, 5 units at lunch, and 5 units at dinner).  4. Most Recent A1c Result:    Lab Results   Component Value Date    A1C 8.0 2019     5. Indication/s for LibrePro study: Unexplained fluctuations in glucose values.    Statistics:   1. Sensor worn for 13 days.  2. Glucose excursions:   Percent in target is: 43%  Percent above target is: 57%  Percent below target is: 0%  3. Estimated average glucose: 202 mg/dL                                Data evaluation:   1. Sensor modal day evaluation shows the followin. Consistent day-to-day patterns noted: pattern of daytime hyperglycemia. Post meals within the hr.  2. Low glucose events: 0    Patient's Logbook shows the following:   Carbohydrate counting is: accurate  Medication and/or insulin dosing is: inaccurate     Assessment and Plan:  Recommend that patient begin Toujeo U300 @ 10 units insulin       Luciana Borrego RN/ÁNGEL  [facility] Diabetes Educator    Time Spent: 30 minutes  Any diabetes medication dose  changes were made via the CDE Protocol and Collaborative Practice Agreement with the patient's primary care provider. A copy of this encounter was shared with the provider.

## 2019-09-05 NOTE — LETTER
7/2/2019         RE: Wei Wolf  [address] [facility] [address]        Dear Dr. Bernal,  Please see scanned continuous glucose monitoring (CGM) reports, interpretation and recommendations. As a provider, you can bill for a non face-to-face interpretation of the sensor report. If you feel it is appropriate, please create a 'Documentation Only' encounter noting the interpretation and your recommended plan and bill for this glucose sensor interpretation using code 17599.          Thank you for referring your patient, Wei Wolf, to the [facility]. Please see a copy of my visit note below.    LibrePro Continuous Glucose Monitor Interpretation     Patient History:   1. Type of Diabetes: Type 2 diabetes  2. Duration of diabetes or year of diagnosis:   3. Current treatment regimen (include all diabetes medications, dose & dosing frequency/timing):   yes:     Diabetes Medication(s)     Biguanides       metFORMIN (GLUCOPHAGE) 500 MG tablet    Take 2 tablets (1,000 mg) by mouth 2 times daily (with meals)    Insulin       insulin glargine U-300 (TOUJEO MAX SOLOSTAR) 300 UNIT/ML injection    Inject 10 Units Subcutaneous At Bedtime    Incretin Mimetic Agents (GLP-1 Receptor Agonists)       semaglutide (OZEMPIC) 1 MG/DOSE pen    Inject 1 mg Subcutaneous every 7 days      , Oral Medications: Metformin - Dose: 500 mg takes 2 pills , Time: breakfast and supper, Injectable Medications: Ozempic  - Dose: 1 mg , Time: weekly  *Abbreviated insulin dose documentation key: Insulin Trade Name (fvxkixnjv-lvoyf-lovxte-bedtime) - i.e. Humalog 5-5-5-0 (Humalog 5 units at breakfast, 5 units at lunch, and 5 units at dinner).  4. Most Recent A1c Result:    Lab Results   Component Value Date    A1C 8.0 03/11/2019     5. Indication/s for LibrePro study: Unexplained fluctuations in glucose values.    Statistics:   1. Sensor worn for 13 days.  2. Glucose excursions:   Percent in target is:  43%  Percent above target is: 57%  Percent below target is: 0%  3. Estimated average glucose: 202 mg/dL                                Data evaluation:   1. Sensor modal day evaluation shows the followin. Consistent day-to-day patterns noted: pattern of daytime hyperglycemia. Post meals within the hr.  2. Low glucose events: 0    Patient's Logbook shows the following:   Carbohydrate counting is: accurate  Medication and/or insulin dosing is: inaccurate     Assessment and Plan:  Recommend that patient begin Toujeo U300 @ 10 units insulin       Luciana Borrego RN/ÁNGEL [facility] Diabetes Educator    Time Spent: 30 minutes  Any diabetes medication dose changes were made via the CDE Protocol and Collaborative Practice Agreement with the patient's primary care provider. A copy of this encounter was shared with the provider.     none

## 2019-09-09 ENCOUNTER — ALLIED HEALTH/NURSE VISIT (OUTPATIENT)
Dept: EDUCATION SERVICES | Facility: CLINIC | Age: 54
End: 2019-09-09
Payer: COMMERCIAL

## 2019-09-09 DIAGNOSIS — E11.9 DIABETES MELLITUS WITHOUT COMPLICATION (H): Primary | ICD-10-CM

## 2019-09-09 PROCEDURE — G0108 DIAB MANAGE TRN  PER INDIV: HCPCS

## 2019-09-09 PROCEDURE — 99207 ZZC DROP WITH A PROCEDURE: CPT

## 2019-09-09 NOTE — PATIENT INSTRUCTIONS
Diabetes Support Resources:  Starting Tresiba at 14 units once done with toujeo     Bring blood glucose meter and logbook with you to all doctor and follow-up appointments.    Diabetes Education Telephone Visit Follow-up:    We realize your time is valuable and your health is important! We offer a convenient Telephone Visit follow up! It s a quick way to check in for a medication dose adjustment without having to come back to clinic as soon.    Telephone Visits are often covered by insurance. Please check with your insurance plan to see if this type of visit is covered. If not, the cost is less expensive than an office visit:      Up to 10 minutes (Code 20246): $30    11-20 minutes (Code 59688): $59    More than 20 minutes (Code 74484): $85    Talk with your Diabetes Educator if you want to learn more.      [facility]:  For Your Diabetes Education and Nutrition Appointments Call:  [phone]   For Diabetes Education or Nutrition Related Questions:   Phone: [phone]  E-mail: [email or web address]  Fax: [phone]   If you need a medication refill please contact your pharmacy. Please allow 3 business days for your refills to be completed.

## 2019-09-13 ENCOUNTER — TELEPHONE (OUTPATIENT)
Dept: FAMILY MEDICINE | Facility: CLINIC | Age: 54
End: 2019-09-13

## 2019-09-13 NOTE — TELEPHONE ENCOUNTER
Panel Management Review      Patient has the following on his problem list:     Diabetes    ASA: Not Required     Last A1C  Lab Results   Component Value Date    A1C 8.0 03/11/2019    A1C 8.8 10/08/2018    A1C 8.6 06/25/2018    A1C 8.8 11/30/2017    A1C 8.0 03/15/2017     A1C tested: Passed    Last LDL:    Lab Results   Component Value Date    CHOL 135 10/01/2018     Lab Results   Component Value Date    HDL 42 10/01/2018     Lab Results   Component Value Date    LDL 77 10/01/2018     Lab Results   Component Value Date    TRIG 79 10/01/2018     Lab Results   Component Value Date    CHOLHDLRATIO 4.2 05/27/2015     Lab Results   Component Value Date    NHDL 93 10/01/2018       Is the patient on a Statin? YES             Is the patient on Aspirin? NO    Medications     HMG CoA Reductase Inhibitors     simvastatin (ZOCOR) 10 MG tablet       Salicylates     ASPIRIN NOT PRESCRIBED, INTENTIONAL,             Last three blood pressure readings:  BP Readings from Last 3 Encounters:   12/18/18 127/82   10/19/18 132/72   10/08/18 153/80       Date of last diabetes office visit: unsure     Tobacco History:     History   Smoking Status     Never Smoker   Smokeless Tobacco     Never Used           Composite cancer screening  Chart review shows that this patient is due/due soon for the following None  Summary:    Patient is due/failing the following:   A1C    Action needed:   Patient needs office visit for diabetic check .    Type of outreach:    Sent [employer] message.    Questions for provider review:    None                                                                                                                                  Charleen Iniguez M.A.       Chart routed to n/a .

## 2019-09-23 DIAGNOSIS — M79.644 PAIN OF FINGER OF RIGHT HAND: Primary | ICD-10-CM

## 2019-09-26 ENCOUNTER — OFFICE VISIT (OUTPATIENT)
Dept: ORTHOPEDICS | Facility: CLINIC | Age: 54
End: 2019-09-26
Payer: COMMERCIAL

## 2019-09-26 ENCOUNTER — ANCILLARY PROCEDURE (OUTPATIENT)
Dept: GENERAL RADIOLOGY | Facility: CLINIC | Age: 54
End: 2019-09-26
Attending: ORTHOPAEDIC SURGERY
Payer: COMMERCIAL

## 2019-09-26 DIAGNOSIS — M79.644 PAIN OF FINGER OF RIGHT HAND: Primary | ICD-10-CM

## 2019-09-26 DIAGNOSIS — M79.644 PAIN OF FINGER OF RIGHT HAND: ICD-10-CM

## 2019-09-26 RX ORDER — LIDOCAINE HYDROCHLORIDE 10 MG/ML
2 INJECTION, SOLUTION EPIDURAL; INFILTRATION; INTRACAUDAL; PERINEURAL
Status: DISCONTINUED | OUTPATIENT
Start: 2019-09-26 | End: 2020-10-09

## 2019-09-26 RX ORDER — TRIAMCINOLONE ACETONIDE 40 MG/ML
40 INJECTION, SUSPENSION INTRA-ARTICULAR; INTRAMUSCULAR
Status: DISCONTINUED | OUTPATIENT
Start: 2019-09-26 | End: 2020-10-09

## 2019-09-26 RX ADMIN — TRIAMCINOLONE ACETONIDE 40 MG: 40 INJECTION, SUSPENSION INTRA-ARTICULAR; INTRAMUSCULAR at 10:50

## 2019-09-26 RX ADMIN — LIDOCAINE HYDROCHLORIDE 2 ML: 10 INJECTION, SOLUTION EPIDURAL; INFILTRATION; INTRACAUDAL; PERINEURAL at 10:50

## 2019-09-26 NOTE — PROGRESS NOTES
[facility]  Orthopedics  Lydia Corrales MD  2019     Name: Wei Wolf  MRN: [id number]  Age: 53 year old  : 1965  Referring provider: Referred Self     Chief Complaint: Follow Up (Right index finger MP joint pain. Patient is requesting an injection. )     History of Present Illness:   Wei Wolf is a 53 year old, right handed male who presents today for follow-up regarding right index finger pain.  The patient had curettage and bone grafting of a cyst in the right index metacarpal head on 17.  I last saw him on 19, at which time he continued to be symptomatic.  Steroid injection was done at that time and he was provided a splint to use at night.    That injection gave him 100% pain relief until 1 month ago.  Currently he rates his pain at 4/10 if he does increased activity.  He comes in today requesting repeat steroid injection.    Review of Systems:   A 10-point review of systems was obtained and is negative except for as noted in the HPI.     Physical Examination:  General: Healthy appearing male. Affect appropriate. Normal gait. Alert and oriented to surroundings.   Right Upper Extremity:   Wound clean and dry, range of motion full. Refers pain to MCP joint of right index finger     Assessment:   53 year old, right handed male with ongoing right index MCP joint pain after curettage of right index metacarpal cyst with bone grafting.      Plan:   - Repeat steroid injection today.    Procedure:   PROCEDURE:  After written consent, verifying site and side, a sterile Chlora- prep was performed for the injection site.  C-arm guidance was used for difficult placement of a 25-gauge needle into the right index MCP joint with 1% Lidocaine.  1 mL of Kenalog (40 mg) and 1 mL of lidocaine was injected under fluoroscopic guidance with good relief of pain. Patient tolerated the procedure well.  No complications.  Follow up instructions were given.   I performed the  injection.    Lydia Corrales MD   Hand and Upper Extremity Specialist  [facility]      Small Joint Injection/Arthrocentesis: R index MCP  Date/Time: 2019 10:50 AM  Performed by: Lydia Corrales MD  Authorized by: Lydia Corrales MD   Indications:  Pain  Needle Size:  25 G  Guidance: fluoroscopy       Location:  Index finger    Site:  R index MCP                    Medications:  40 mg triamcinolone 40 MG/ML; 2 mL lidocaine (PF) 1 %        Outcome:  Tolerated well, no immediate complications  Procedure discussed: discussed risks, benefits, and alternatives    Consent Given by:  Patient  Timeout: timeout called immediately prior to procedure    Prep: patient was prepped and draped in usual sterile fashion          [facility]  [address] [address]  [phone]  Dept: [phone]  ______________________________________________________________________________    Patient: Wei Wolf   : 1965   MRN: [id number]   2019    INVASIVE PROCEDURE SAFETY CHECKLIST    Date: 2019   Procedure:Right index finger MCP joint injection  Patient Name: Wei Wolf  MRN: [id number]  YOB: 1965    Action: Complete sections as appropriate. Any discrepancy results in a HARD COPY until resolved.     PRE PROCEDURE:  Patient ID verified with 2 identifiers (name and  or MRN): Yes  Procedure and site verified with patient/designee (when able): Yes  Accurate consent documentation in medical record: Yes  H&P (or appropriate assessment) documented in medical record: Yes  H&P must be up to 20 days prior to procedure and updates within 24 hours of procedure as applicable: Yes  Relevant diagnostic and radiology test results appropriately labeled and displayed as applicable: Yes  Procedure site(s) marked with provider initials: Yes    TIMEOUT:  Time-Out performed immediately prior to starting  procedure, including verbal and active participation of all team members addressing the following:Yes  * Correct patient identify  * Confirmed that the correct side and site are marked  * An accurate procedure consent form  * Agreement on the procedure to be done  * Correct patient position  * Relevant images and results are properly labeled and appropriately displayed  * The need to administer antibiotics or fluids for irrigation purposes during the procedure as applicable   * Safety precautions based on patient history or medication use    DURING PROCEDURE: Verification of correct person, site, and procedures any time the responsibility for care of the patient is transferred to another member of the care team.       The following medications were given:         Prior to injection, verified patient identity using patient's name and date of birth.  Due to injection administration, patient instructed to remain in clinic for 15 minutes  afterwards, and to report any adverse reaction to me immediately.    Joint injection was performed.    Medication Name: lidocaine  Drug Amount Wasted:  Yes: 3 mg/ml   Vial/Syringe: Single dose vial  Expiration Date:  1/1/23        Kamila Smart ATC        Scribe Disclosure:  Jessica KLEIN, am serving as a scribe to document services personally performed by Lydia Corrales MD at this visit, based upon the provider's statements to me. All documentation has been reviewed by the aforementioned provider prior to being entered into the official medical record.

## 2019-09-26 NOTE — LETTER
2019       RE: Wei Wolf  [address] [facility] [address]     Dear Colleague,    Thank you for referring your patient, Wei Wolf, to the [facility]THOP[facility] at [facility]. Please see a copy of my visit note below.    [facility]  Orthopedics  Lydia Corrales MD  2019     Name: Wei Wolf  MRN: [id number]  Age: 53 year old  : 1965  Referring provider: Referred Self     Chief Complaint: Follow Up (Right index finger MP joint pain. Patient is requesting an injection. )     History of Present Illness:   Wei Wolf is a 53 year old, right handed male who presents today for follow-up regarding right index finger pain.  The patient had curettage and bone grafting of a cyst in the right index metacarpal head on 17.  I last saw him on 19, at which time he continued to be symptomatic.  Steroid injection was done at that time and he was provided a splint to use at night.    That injection gave him 100% pain relief until 1 month ago.  Currently he rates his pain at 4/10 if he does increased activity.  He comes in today requesting repeat steroid injection.    Review of Systems:   A 10-point review of systems was obtained and is negative except for as noted in the HPI.     Physical Examination:  General: Healthy appearing male. Affect appropriate. Normal gait. Alert and oriented to surroundings.   Right Upper Extremity:   Wound clean and dry, range of motion full. Refers pain to MCP joint of right index finger     Assessment:   53 year old, right handed male with ongoing right index MCP joint pain after curettage of right index metacarpal cyst with bone grafting.      Plan:   - Repeat steroid injection today.    Procedure:   PROCEDURE:  After written consent, verifying site and side, a sterile Chlora- prep was performed for the injection site.  C-arm guidance was used for difficult placement of a 25-gauge  needle into the right index MCP joint with 1% Lidocaine.  1 mL of Kenalog (40 mg) and 1 mL of lidocaine was injected under fluoroscopic guidance with good relief of pain. Patient tolerated the procedure well.  No complications.  Follow up instructions were given.   I performed the injection.    Lydia Corrales MD   Hand and Upper Extremity Specialist  [facility]      Small Joint Injection/Arthrocentesis: R index MCP  Date/Time: 2019 10:50 AM  Performed by: Lydia Corrales MD  Authorized by: Lydia Corrales MD   Indications:  Pain  Needle Size:  25 G  Guidance: fluoroscopy       Location:  Index finger    Site:  R index MCP                    Medications:  40 mg triamcinolone 40 MG/ML; 2 mL lidocaine (PF) 1 %        Outcome:  Tolerated well, no immediate complications  Procedure discussed: discussed risks, benefits, and alternatives    Consent Given by:  Patient  Timeout: timeout called immediately prior to procedure    Prep: patient was prepped and draped in usual sterile fashion          [facility]  [address] [place] [id number] [phone]  Dept: [phone]  ______________________________________________________________________________    Patient: Wei Wolf   : 1965   MRN: [id number]   2019    INVASIVE PROCEDURE SAFETY CHECKLIST    Date: 2019   Procedure:Right index finger MCP joint injection  Patient Name: Wei Wolf  MRN: [id number]  YOB: 1965    Action: Complete sections as appropriate. Any discrepancy results in a HARD COPY until resolved.     PRE PROCEDURE:  Patient ID verified with 2 identifiers (name and  or MRN): Yes  Procedure and site verified with patient/designee (when able): Yes  Accurate consent documentation in medical record: Yes  H&P (or appropriate assessment) documented in medical record: Yes  H&P must be up to 20 days prior to procedure and  updates within 24 hours of procedure as applicable: Yes  Relevant diagnostic and radiology test results appropriately labeled and displayed as applicable: Yes  Procedure site(s) marked with provider initials: Yes    TIMEOUT:  Time-Out performed immediately prior to starting procedure, including verbal and active participation of all team members addressing the following:Yes  * Correct patient identify  * Confirmed that the correct side and site are marked  * An accurate procedure consent form  * Agreement on the procedure to be done  * Correct patient position  * Relevant images and results are properly labeled and appropriately displayed  * The need to administer antibiotics or fluids for irrigation purposes during the procedure as applicable   * Safety precautions based on patient history or medication use    DURING PROCEDURE: Verification of correct person, site, and procedures any time the responsibility for care of the patient is transferred to another member of the care team.       The following medications were given:     Prior to injection, verified patient identity using patient's name and date of birth.  Due to injection administration, patient instructed to remain in clinic for 15 minutes  afterwards, and to report any adverse reaction to me immediately.    Joint injection was performed.    Medication Name: lidocaine  Drug Amount Wasted:  Yes: 3 mg/ml   Vial/Syringe: Single dose vial  Expiration Date:  1/1/23    Kamila Smart ATC        Scribe Disclosure:  I, Jessica Maier, am serving as a scribe to document services personally performed by Lydia Corrales MD at this visit, based upon the provider's statements to me. All documentation has been reviewed by the aforementioned provider prior to being entered into the official medical record.     Again, thank you for allowing me to participate in the care of your patient.      Sincerely,    Lydia Corrales MD

## 2019-09-26 NOTE — NURSING NOTE
Reason For Visit:   Chief Complaint   Patient presents with     Follow Up     Right index finger MP joint pain. Patient is requesting an injection.        Primary MD: Yobany Bernal    Age: 53 year old      Pain Assessment  Patient Currently in Pain: Denies    Hand Dominance Evaluation  Hand Dominance: Right          QuickDASH Assessment  QuickDASH Main 11/16/2017   1.Open a tight or new jar. Mild difficulty   2. Do heavy household chores (e.g., wash walls, floors) Mild difficulty   3. Carry a shopping bag or briefcase. Mild difficulty   4. Wash your back. Mild difficulty   5. Use a knife to cut food. Mild difficulty   6. Recreational activities in which you take some force or impact through your arm, shoulder or hand (e.g., golf, hammering, tennis, etc.). Moderate difficulty   7. During the past week, to what extent has your arm, shoulder or hand problem interfered with your normal social activities with family, friends, neighbours or groups? Slightly   8. During the past week, were you limited in your work or other regular daily activities as a result of your arm, shoulder or hand problem? Slightly limited   9. Arm, shoulder or hand pain. Moderate   10.Tingling (pins and needles) in your arm,shoulder or hand. None   11. During the past week, how much difficulty have you had sleeping because of the pain in your arm, shoulder or hand? ([place] number) No difficulty   Quickdash Ability Score 25          Current Outpatient Medications   Medication Sig Dispense Refill     ASPIRIN NOT PRESCRIBED, INTENTIONAL, continuous prn Antiplatelet medication not prescribed intentionally due to GI Issues / Ulcer Disease 1 each 0     blood glucose (ACCU-CHEK GUIDE) test strip Use to test blood sugar 2 times daily or as directed. 100 strip 11     blood glucose monitoring (ACCU-CHEK FASTCLIX) lancets Use to test blood sugar 2 times daily or as directed.  Ok to substitute alternative if insurance prefers. 102 each 11     insulin  degludec (TRESIBA) 100 UNIT/ML pen 14 units every morning 45 mL 3     order for DME Equipment being ordered: wrist splint 1 Units 0     semaglutide (OZEMPIC) 1 MG/DOSE pen Inject 1 mg Subcutaneous every 7 days 9 mL 3     simvastatin (ZOCOR) 10 MG tablet Take 1 tablet (10 mg) by mouth At Bedtime 90 tablet 2       Allergies   Allergen Reactions     Pollen Extract      Metformin Diarrhea       Dulce Aleman LPN

## 2019-10-10 ENCOUNTER — MYC MEDICAL ADVICE (OUTPATIENT)
Dept: OTHER | Age: 54
End: 2019-10-10

## 2019-10-10 NOTE — TELEPHONE ENCOUNTER
Diabetes Follow-up    Subjective/Objective:    Wei Wolf sent in blood glucose log for review. Last date of communication was: 9/9/2019.    Diabetes is being managed with   Diabetes Medications   Diabetes Medication(s)     Insulin       insulin degludec (TRESIBA) 100 UNIT/ML pen    14 units every morning    Incretin Mimetic Agents (GLP-1 Receptor Agonists)       semaglutide (OZEMPIC) 1 MG/DOSE pen    Inject 1 mg Subcutaneous every 7 days          BG/Food Log:     Luciana Lr I hadn't sent over how my numbers have been. Since the change to the different insulin, my numbers have averaged 135 in the morning. Highest was 150 lowest was 105. 90% of the time it is 135. Do you have a suggestion of who I should switch to for my doctor, with Dr. Madrigal basically retiring? Thanks, Wei Wolf     Assessment:  Patient states that 90% of the time his fasting BG is 135 mg/dl.  Lowest fasting reading has been 105 mg/dl and highest 150 mg/dl.    Plan/Response:  See Patient Instructions for co-developed, patient-stated behavior change goals.  Recommend increase to insulin - 14-0-0-0 --> 15-0-0-0    Bushra Barillas, RD, LD, CDE    Any diabetes medication dose changes were made via the CDE Protocol and Collaborative Practice Agreement with the patient's referring provider. A copy of this encounter was shared with the provider.

## 2019-11-12 ENCOUNTER — MYC REFILL (OUTPATIENT)
Dept: FAMILY MEDICINE | Facility: CLINIC | Age: 54
End: 2019-11-12

## 2019-11-12 DIAGNOSIS — E78.00 HIGH CHOLESTEROL: ICD-10-CM

## 2019-11-12 RX ORDER — SIMVASTATIN 10 MG
10 TABLET ORAL AT BEDTIME
Qty: 30 TABLET | Refills: 0 | Status: SHIPPED | OUTPATIENT
Start: 2019-11-12 | End: 2020-01-22

## 2019-12-09 ENCOUNTER — ALLIED HEALTH/NURSE VISIT (OUTPATIENT)
Dept: EDUCATION SERVICES | Facility: CLINIC | Age: 54
End: 2019-12-09
Payer: COMMERCIAL

## 2019-12-09 ENCOUNTER — DOCUMENTATION ONLY (OUTPATIENT)
Dept: LAB | Facility: CLINIC | Age: 54
End: 2019-12-09

## 2019-12-09 DIAGNOSIS — E78.5 HYPERLIPIDEMIA LDL GOAL <100: Primary | ICD-10-CM

## 2019-12-09 DIAGNOSIS — I10 HYPERTENSION GOAL BP (BLOOD PRESSURE) < 140/90: ICD-10-CM

## 2019-12-09 DIAGNOSIS — E11.9 DIABETES MELLITUS WITHOUT COMPLICATION (H): ICD-10-CM

## 2019-12-09 PROCEDURE — 99207 ZZC DROP WITH A PROCEDURE: CPT

## 2019-12-09 PROCEDURE — G0108 DIAB MANAGE TRN  PER INDIV: HCPCS

## 2019-12-09 NOTE — LETTER
"    12/9/2019         RE: Wei Wolf  [address] [facility] [address]        Dear Dr. Brian,  Wei is planning to establish care with you, he will need labs done, hoping to get those done before he sees you.  He is a post bariatric patient about 12 yrs. Or so now.      Thank you for referring your patient, Wei Wolf, to the [facility]. Please see a copy of my visit note below.    Diabetes Self-Management Education & Support    Diabetes Education Self Management & Training    SUBJECTIVE/OBJECTIVE:  Presents for: Follow-up  Accompanied by: Self  Diabetes education in the past 24mo: Yes  Focus of Visit: Healthy Eating  Diabetes type: Type 2  Disease course: Stable  Diabetes management related comments/concerns: stress eating causing issues  Transportation concerns: No  Other concerns:: None  Cultural Influences/Ethnic Background:  American      Diabetes Symptoms & Complications  Blurred vision: No  Fatigue: No  Neuropathy: No  Foot ulcerations: No  Polydipsia: No  Polyphagia: No  Polyuria: No  Visual change: No  Weakness: No  Weight loss: No  Slow healing wounds: No  Recent Infection(s): No  Symptom course: Stable  Weight trend: Fluctuating minimally  Autonomic neuropathy: No  CVA: No  Heart disease: No  Nephropathy: No  Peripheral neuropathy: No  Peripheral Vascular Disease: No  Retinopathy: No  Sexual dysfunction: No    Patient Problem List and Family Medical History reviewed for relevant medical history, current medical status, and diabetes risk factors.    Vitals:  There were no vitals taken for this visit.  Estimated body mass index is 35.5 kg/m  as calculated from the following:    Height as of 2/21/19: 1.775 m (5' 9.88\").    Weight as of 2/21/19: 111.9 kg (246 lb 9.6 oz).   Last 3 BP:   BP Readings from Last 3 Encounters:   12/18/18 127/82   10/19/18 132/72   10/08/18 153/80       History   Smoking Status     Never Smoker   Smokeless Tobacco     Never Used " "      Labs:  Lab Results   Component Value Date    A1C 8.0 03/11/2019     Lab Results   Component Value Date     10/01/2018     Lab Results   Component Value Date    LDL 77 10/01/2018     HDL Cholesterol   Date Value Ref Range Status   10/01/2018 42 >39 mg/dL Final   ]  GFR Estimate   Date Value Ref Range Status   10/01/2018 84 >60 mL/min/1.7m2 Final     Comment:     Non  GFR Calc     GFR Estimate If Black   Date Value Ref Range Status   10/01/2018 >90 >60 mL/min/1.7m2 Final     Comment:      GFR Calc     Lab Results   Component Value Date    CR 0.95 10/01/2018     No results found for: MICROALBUMIN    Healthy Eating  Cultural/Orthodoxy diet restrictions?: No  Patient on a regular basis: Eats 3 meals a day  Meal planning: Smaller portions  Meals include: Breakfast, Lunch, Dinner, Snacks  Breakfast: twan cuong sausage biscuit,   Lunch: terriaky chicken, or a shrimp egg role  Dinner: meat potatoes and veggies  Snacks: chips when stressed,   Other: work on the chex mix, and cheese and crackers, add more trail mix  Beverages: Water, Diet soda  Has patient met with a dietitian in the past?: No    Being Active  Being Active Assessed Today: Yes  Exercise:: Yes(stairs when at work)  Days per week of moderate to strenuous exercise (like a brisk walk): 5  On average, minutes per day of exercise at this level: 30  How intense was your typical exercise? : Moderate (like brisk walking)  Exercise Minutes per Week: 150  Barrier to exercise: Physical limitation    Monitoring  Monitoring Assessed Today: Yes  Did patient bring glucose meter to appointment? : No  Blood Glucose Meter: Accu-check  Home Glucose (Sugar) Monitoring: 3-4 times per week  Blood glucose trend: No change  Low Glucose Range (mg/dL): 130-140  High Glucose Range (mg/dL): 140-180  Overall Range (mg/dL): 140-180    Did not bring today, ave am 150's and later in the day 180\"s.    Taking Medications  Diabetes Medication(s)     " Insulin       insulin degludec (TRESIBA) 100 UNIT/ML pen    18 units every morning    Incretin Mimetic Agents (GLP-1 Receptor Agonists)       semaglutide (OZEMPIC) 1 MG/DOSE pen    Inject 1 mg Subcutaneous every 7 days          Taking Medication Assessed Today: Yes  Current Treatments: Insulin Injections, Non-insulin Injectables  Dose schedule: pre-breakfast  Given by: Patient  Injection/Infusion sites: Abdomen  Problems taking diabetes medications regularly?: No  Diabetes medication side effects?: No  Treatment Compliance: All of the time    Problem Solving  Hypoglycemia Frequency: Never  Patient carries a carbohydrate source: No  Medical alert: No  Severe weather/disaster plan for diabetes management?: No  DKA prevention plan?: No  Sick day plan for diabetes management?: (P) No              Reducing Risks  CAD Risks: Diabetes Mellitus, Obesity, Stress  Has dilated eye exam at least once a year?: No  Sees dentist every 6 months?: Yes  Sees podiatrist (foot doctor)?: No    Healthy Coping  Healthy Coping Assessed Today: Yes  Emotional response to diabetes: Ready to learn  Informal Support system:: Spouse  Stage of change: ACTION (Actively working towards change)  Difficulty affording diabetes management supplies?: Yes  Patient Activation Measure Survey Score:  ANA Score (Last Two) 9/27/2011   ANA Raw Score 39   Activation Score 56.4   ANA Level 3       ASSESSMENT:  Wei is here and concerned with his weight gain, he is very stressed today and does binge eating to help with stress.  We did discuss some ideas for helping the stress and foods to choose.  He tends not to eat much during the day, and then hungry at night time.  I did encourage more fruits and higher fiber foods.  Some days he is so stressed and does not feel satisfied with meals.  Currently on Ozempic and Tresiba,  Will be following up with Dr. Brian as Dr. Bernal is retired. He is post bariatric surgery about 10 yrs ago , so not tolerating  Metformin .     Goals Addressed as of 12/9/2019 at 9:22 AM       Medication 1 (pt-stated)     Added 3/11/19 by Lorena Borrego RN     My Goal: I will change my medications that are safe    What I need to meet my goal: 1. Tresiba increase to 18 units daily  2. Ozempic 1.0 mg weekly  4.  In one month,  send me your numbers.       I plan to meet my goal by this date: 2 months            Patient's most recent   Lab Results   Component Value Date    A1C 8.0 03/11/2019    is not meeting goal of <7.0    INTERVENTION:   Diabetes knowledge and skills assessment:     Patient is knowledgeable in diabetes management concepts related to: Healthy Eating, Being Active, Monitoring and Taking Medication    Patient needs further education on the following diabetes management concepts: Healthy Eating and Being Active    Based on learning assessment above, most appropriate setting for further diabetes education would be: Individual setting.    Education provided today on:  AADE Self-Care Behaviors:  Healthy Eating: carbohydrate counting, consistency in amount, composition, and timing of food intake, weight reduction, heart healthy diet, eating out, portion control, plate planning method, label reading and higher fibers, he has a list of foods  Being Active: relationship to blood glucose and describe appropriate activity program    Opportunities for ongoing education and support in diabetes-self management were discussed.    Pt verbalized understanding of concepts discussed and recommendations provided today.       Education Materials Provided:  No new materials provided today    PLAN:  See Patient Instructions for co-developed, patient-stated behavior change goals.  AVS printed and provided to patient today. See Follow-Up section for recommended follow-up.  Follow up with Dr. Brian and get labs caught up and another A1C, he is to increase his fibers in his diet as well.    Luciana Borrego RN/MALIE  [facility] Diabetes Educator    Time  Spent: 30 minutes  Encounter Type: Individual    Any diabetes medication dose changes were made via the CDE Protocol and Collaborative Practice Agreement with the patient's primary care provider. A copy of this encounter was shared with the provider.

## 2019-12-09 NOTE — PROGRESS NOTES
"Diabetes Self-Management Education & Support    Diabetes Education Self Management & Training    SUBJECTIVE/OBJECTIVE:  Presents for: Follow-up  Accompanied by: Self  Diabetes education in the past 24mo: Yes  Focus of Visit: Healthy Eating  Diabetes type: Type 2  Disease course: Stable  Diabetes management related comments/concerns: stress eating causing issues  Transportation concerns: No  Other concerns:: None  Cultural Influences/Ethnic Background:  American      Diabetes Symptoms & Complications  Blurred vision: No  Fatigue: No  Neuropathy: No  Foot ulcerations: No  Polydipsia: No  Polyphagia: No  Polyuria: No  Visual change: No  Weakness: No  Weight loss: No  Slow healing wounds: No  Recent Infection(s): No  Symptom course: Stable  Weight trend: Fluctuating minimally  Autonomic neuropathy: No  CVA: No  Heart disease: No  Nephropathy: No  Peripheral neuropathy: No  Peripheral Vascular Disease: No  Retinopathy: No  Sexual dysfunction: No    Patient Problem List and Family Medical History reviewed for relevant medical history, current medical status, and diabetes risk factors.    Vitals:  There were no vitals taken for this visit.  Estimated body mass index is 35.5 kg/m  as calculated from the following:    Height as of 2/21/19: 1.775 m (5' 9.88\").    Weight as of 2/21/19: 111.9 kg (246 lb 9.6 oz).   Last 3 BP:   BP Readings from Last 3 Encounters:   12/18/18 127/82   10/19/18 132/72   10/08/18 153/80       History   Smoking Status     Never Smoker   Smokeless Tobacco     Never Used       Labs:  Lab Results   Component Value Date    A1C 8.0 03/11/2019     Lab Results   Component Value Date     10/01/2018     Lab Results   Component Value Date    LDL 77 10/01/2018     HDL Cholesterol   Date Value Ref Range Status   10/01/2018 42 >39 mg/dL Final   ]  GFR Estimate   Date Value Ref Range Status   10/01/2018 84 >60 mL/min/1.7m2 Final     Comment:     Non  GFR Calc     GFR Estimate If Black   Date " "Value Ref Range Status   10/01/2018 >90 >60 mL/min/1.7m2 Final     Comment:      GFR Calc     Lab Results   Component Value Date    CR 0.95 10/01/2018     No results found for: MICROALBUMIN    Healthy Eating  Cultural/Lutheran diet restrictions?: No  Patient on a regular basis: Eats 3 meals a day  Meal planning: Smaller portions  Meals include: Breakfast, Lunch, Dinner, Snacks  Breakfast: twan cuong sausage biscuit,   Lunch: terriaky chicken, or a shrimp egg role  Dinner: meat potatoes and veggies  Snacks: chips when stressed,   Other: work on the chex mix, and cheese and crackers, add more trail mix  Beverages: Water, Diet soda  Has patient met with a dietitian in the past?: No    Being Active  Being Active Assessed Today: Yes  Exercise:: Yes(stairs when at work)  Days per week of moderate to strenuous exercise (like a brisk walk): 5  On average, minutes per day of exercise at this level: 30  How intense was your typical exercise? : Moderate (like brisk walking)  Exercise Minutes per Week: 150  Barrier to exercise: Physical limitation    Monitoring  Monitoring Assessed Today: Yes  Did patient bring glucose meter to appointment? : No  Blood Glucose Meter: Accu-check  Home Glucose (Sugar) Monitoring: 3-4 times per week  Blood glucose trend: No change  Low Glucose Range (mg/dL): 130-140  High Glucose Range (mg/dL): 140-180  Overall Range (mg/dL): 140-180    Did not bring today, ave am 150's and later in the day 180\"s.    Taking Medications  Diabetes Medication(s)     Insulin       insulin degludec (TRESIBA) 100 UNIT/ML pen    18 units every morning    Incretin Mimetic Agents (GLP-1 Receptor Agonists)       semaglutide (OZEMPIC) 1 MG/DOSE pen    Inject 1 mg Subcutaneous every 7 days          Taking Medication Assessed Today: Yes  Current Treatments: Insulin Injections, Non-insulin Injectables  Dose schedule: pre-breakfast  Given by: Patient  Injection/Infusion sites: Abdomen  Problems taking diabetes " medications regularly?: No  Diabetes medication side effects?: No  Treatment Compliance: All of the time    Problem Solving  Hypoglycemia Frequency: Never  Patient carries a carbohydrate source: No  Medical alert: No  Severe weather/disaster plan for diabetes management?: No  DKA prevention plan?: No  Sick day plan for diabetes management?: (P) No              Reducing Risks  CAD Risks: Diabetes Mellitus, Obesity, Stress  Has dilated eye exam at least once a year?: No  Sees dentist every 6 months?: Yes  Sees podiatrist (foot doctor)?: No    Healthy Coping  Healthy Coping Assessed Today: Yes  Emotional response to diabetes: Ready to learn  Informal Support system:: Spouse  Stage of change: ACTION (Actively working towards change)  Difficulty affording diabetes management supplies?: Yes  Patient Activation Measure Survey Score:  ANA Score (Last Two) 9/27/2011   ANA Raw Score 39   Activation Score 56.4   ANA Level 3       ASSESSMENT:  Wei is here and concerned with his weight gain, he is very stressed today and does binge eating to help with stress.  We did discuss some ideas for helping the stress and foods to choose.  He tends not to eat much during the day, and then hungry at night time.  I did encourage more fruits and higher fiber foods.  Some days he is so stressed and does not feel satisfied with meals.  Currently on Ozempic and Tresiba,  Will be following up with Dr. Brian as Dr. Bernal is retired. He is post bariatric surgery about 10 yrs ago , so not tolerating Metformin .     Goals Addressed as of 12/9/2019 at 9:22 AM       Medication 1 (pt-stated)     Added 3/11/19 by Lorena Borrego RN     My Goal: I will change my medications that are safe    What I need to meet my goal: 1. Tresiba increase to 18 units daily  2. Ozempic 1.0 mg weekly  4.  In one month,  send me your numbers.       I plan to meet my goal by this date: 2 months            Patient's most recent   Lab Results   Component Value Date     A1C 8.0 03/11/2019    is not meeting goal of <7.0    INTERVENTION:   Diabetes knowledge and skills assessment:     Patient is knowledgeable in diabetes management concepts related to: Healthy Eating, Being Active, Monitoring and Taking Medication    Patient needs further education on the following diabetes management concepts: Healthy Eating and Being Active    Based on learning assessment above, most appropriate setting for further diabetes education would be: Individual setting.    Education provided today on:  AADE Self-Care Behaviors:  Healthy Eating: carbohydrate counting, consistency in amount, composition, and timing of food intake, weight reduction, heart healthy diet, eating out, portion control, plate planning method, label reading and higher fibers, he has a list of foods  Being Active: relationship to blood glucose and describe appropriate activity program    Opportunities for ongoing education and support in diabetes-self management were discussed.    Pt verbalized understanding of concepts discussed and recommendations provided today.       Education Materials Provided:  No new materials provided today    PLAN:  See Patient Instructions for co-developed, patient-stated behavior change goals.  AVS printed and provided to patient today. See Follow-Up section for recommended follow-up.  Follow up with Dr. Brian and get labs caught up and another A1C, he is to increase his fibers in his diet as well.    Luciana Borrego RN/ÁNGEL  [facility] Diabetes Educator    Time Spent: 30 minutes  Encounter Type: Individual    Any diabetes medication dose changes were made via the CDE Protocol and Collaborative Practice Agreement with the patient's primary care provider. A copy of this encounter was shared with the provider.

## 2019-12-09 NOTE — PATIENT INSTRUCTIONS
Diabetes Support Resources:  Plan for more fiber foods, granola bars and nuts and chex mix, veggies,      Bring blood glucose meter and logbook with you to all doctor and follow-up appointments.    Diabetes Education Telephone Visit Follow-up:    We realize your time is valuable and your health is important! We offer a convenient Telephone Visit follow up! It s a quick way to check in for a medication dose adjustment without having to come back to clinic as soon.    Telephone Visits are often covered by insurance. Please check with your insurance plan to see if this type of visit is covered. If not, the cost is less expensive than an office visit:      Up to 10 minutes (Code 48831): $30    11-20 minutes (Code 49533): $59    More than 20 minutes (Code 35941): $85    Talk with your Diabetes Educator if you want to learn more.      [facility]:  For Your Diabetes Education and Nutrition Appointments Call:  [phone]   For Diabetes Education or Nutrition Related Questions:   Phone: [phone]  E-mail: [email or web address]  Fax: [phone]   If you need a medication refill please contact your pharmacy. Please allow 3 business days for your refills to be completed.

## 2019-12-10 NOTE — PROGRESS NOTES
Pt has a PV-lab appointment on 12.12.19 at 0815 with no orders.  Please review pended order and place any additional future orders if necessary.      Thank you,  Vicki Vazquez MLT  Lab

## 2019-12-10 NOTE — PROGRESS NOTES
Please review lab orders sign and close encounter. Sagrario Bell MA/NILAM    Establish care 12/17/19

## 2019-12-12 DIAGNOSIS — E78.5 HYPERLIPIDEMIA LDL GOAL <100: ICD-10-CM

## 2019-12-12 DIAGNOSIS — I10 HYPERTENSION GOAL BP (BLOOD PRESSURE) < 140/90: ICD-10-CM

## 2019-12-12 LAB
ANION GAP SERPL CALCULATED.3IONS-SCNC: 5 MMOL/L (ref 3–14)
BUN SERPL-MCNC: 17 MG/DL (ref 7–30)
CALCIUM SERPL-MCNC: 9.2 MG/DL (ref 8.5–10.1)
CHLORIDE SERPL-SCNC: 105 MMOL/L (ref 94–109)
CHOLEST SERPL-MCNC: 130 MG/DL
CO2 SERPL-SCNC: 30 MMOL/L (ref 20–32)
CREAT SERPL-MCNC: 0.85 MG/DL (ref 0.66–1.25)
CREAT UR-MCNC: 184 MG/DL
GFR SERPL CREATININE-BSD FRML MDRD: >90 ML/MIN/{1.73_M2}
GLUCOSE SERPL-MCNC: 142 MG/DL (ref 70–99)
HBA1C MFR BLD: 8.1 % (ref 0–5.6)
HDLC SERPL-MCNC: 49 MG/DL
LDLC SERPL CALC-MCNC: 70 MG/DL
MICROALBUMIN UR-MCNC: 11 MG/L
MICROALBUMIN/CREAT UR: 6.03 MG/G CR (ref 0–17)
NONHDLC SERPL-MCNC: 81 MG/DL
POTASSIUM SERPL-SCNC: 4.3 MMOL/L (ref 3.4–5.3)
SODIUM SERPL-SCNC: 140 MMOL/L (ref 133–144)
TRIGL SERPL-MCNC: 56 MG/DL
TSH SERPL DL<=0.005 MIU/L-ACNC: 1.96 MU/L (ref 0.4–4)

## 2019-12-12 PROCEDURE — 80048 BASIC METABOLIC PNL TOTAL CA: CPT | Performed by: FAMILY MEDICINE

## 2019-12-12 PROCEDURE — 83036 HEMOGLOBIN GLYCOSYLATED A1C: CPT | Performed by: FAMILY MEDICINE

## 2019-12-12 PROCEDURE — 84443 ASSAY THYROID STIM HORMONE: CPT | Performed by: FAMILY MEDICINE

## 2019-12-12 PROCEDURE — 36415 COLL VENOUS BLD VENIPUNCTURE: CPT | Performed by: FAMILY MEDICINE

## 2019-12-12 PROCEDURE — 82043 UR ALBUMIN QUANTITATIVE: CPT | Performed by: FAMILY MEDICINE

## 2019-12-12 PROCEDURE — 80061 LIPID PANEL: CPT | Performed by: FAMILY MEDICINE

## 2019-12-17 ENCOUNTER — OFFICE VISIT (OUTPATIENT)
Dept: FAMILY MEDICINE | Facility: CLINIC | Age: 54
End: 2019-12-17
Payer: COMMERCIAL

## 2019-12-17 VITALS
HEART RATE: 75 BPM | BODY MASS INDEX: 33.98 KG/M2 | TEMPERATURE: 98.1 F | SYSTOLIC BLOOD PRESSURE: 138 MMHG | WEIGHT: 236 LBS | OXYGEN SATURATION: 100 % | DIASTOLIC BLOOD PRESSURE: 82 MMHG

## 2019-12-17 DIAGNOSIS — I10 HYPERTENSION GOAL BP (BLOOD PRESSURE) < 140/90: ICD-10-CM

## 2019-12-17 DIAGNOSIS — E66.01 MORBID OBESITY (H): ICD-10-CM

## 2019-12-17 DIAGNOSIS — Z23 NEED FOR PROPHYLACTIC VACCINATION AND INOCULATION AGAINST INFLUENZA: ICD-10-CM

## 2019-12-17 DIAGNOSIS — E78.5 HYPERLIPIDEMIA LDL GOAL <100: ICD-10-CM

## 2019-12-17 PROCEDURE — 90471 IMMUNIZATION ADMIN: CPT | Performed by: FAMILY MEDICINE

## 2019-12-17 PROCEDURE — 90682 RIV4 VACC RECOMBINANT DNA IM: CPT | Performed by: FAMILY MEDICINE

## 2019-12-17 PROCEDURE — 99214 OFFICE O/P EST MOD 30 MIN: CPT | Mod: 25 | Performed by: FAMILY MEDICINE

## 2019-12-17 RX ORDER — AMLODIPINE BESYLATE 2.5 MG/1
2.5 TABLET ORAL DAILY
Qty: 30 TABLET | Refills: 3 | Status: SHIPPED | OUTPATIENT
Start: 2019-12-17 | End: 2020-03-06

## 2019-12-17 NOTE — PROGRESS NOTES
Subjective     Wei Wolf is a 54 year old male who presents to clinic today for the following health issues:    HPI   Diabetes Follow-up    How often are you checking your blood sugar? One time daily  What time of day are you checking your blood sugars (select all that apply)?  Before meals  Have you had any blood sugars above 200?  No  Have you had any blood sugars below 70?  No    What symptoms do you notice when your blood sugar is low?  None    What concerns do you have today about your diabetes? None     Do you have any of these symptoms? (Select all that apply)  No numbness or tingling in feet.  No redness, sores or blisters on feet.  No complaints of excessive thirst.  No reports of blurry vision.  No significant changes to weight.     Have you had a diabetic eye exam in the last 12 months? No, patient has one scheduled for March    Diabetes Management Resources      Lab Results   Component Value Date    A1C 8.1 12/12/2019    A1C 8.0 03/11/2019    A1C 8.8 10/08/2018    A1C 8.6 06/25/2018    A1C 8.8 11/30/2017     Went up on Tresiba form 16 units  to 18 units.   On Ozempic weekly  Next eye exam sheduled in 03/2019  Wt Readings from Last 4 Encounters:   12/17/19 107 kg (236 lb)   02/21/19 111.9 kg (246 lb 9.6 oz)   12/18/18 114.8 kg (253 lb)   10/08/18 114.8 kg (253 lb)       Hyperlipidemia Follow-Up      Are you regularly taking any medication or supplement to lower your cholesterol?   Yes- Simvastatin    Are you having muscle aches or other side effects that you think could be caused by your cholesterol lowering medication?  No      The 10-year ASCVD risk score (Sury SANYA Jr., et al., 2013) is: 6.2%    Values used to calculate the score:      Age: 54 years      Sex: Male      Is Non- : No      Diabetic: Yes      Tobacco smoker: No      Systolic Blood Pressure: 138 mmHg      Is BP treated: Yes      HDL Cholesterol: 49 mg/dL      Total Cholesterol: 130 mg/dL    Hypertension  Follow-up      Do you check your blood pressure regularly outside of the clinic? No     Are you following a low salt diet? No    Are your blood pressures ever more than 140 on the top number (systolic) OR more   than 90 on the bottom number (diastolic), for example 140/90? n/a    BP Readings from Last 2 Encounters:   12/17/19 138/82   12/18/18 127/82     Hemoglobin A1C (%)   Date Value   12/12/2019 8.1 (H)   03/11/2019 8.0 (H)     LDL Cholesterol Calculated (mg/dL)   Date Value   12/12/2019 70   10/01/2018 77           Patient Active Problem List   Diagnosis     HYPERLIPIDEMIA LDL GOAL <100     Hypertension goal BP (blood pressure) < 140/90     Obesity     SAI (obstructive sleep apnea)     Morbid obesity (H)     Uncontrolled diabetes mellitus type 2 without complications (H)     Past Surgical History:   Procedure Laterality Date     ABDOMEN SURGERY       COLONOSCOPY WITH CO2 INSUFFLATION N/A 10/19/2018    Procedure: COLON SCREEN/ ;  Surgeon: Mason Duenas MD;  Location: [facility] OR     EXCISE EXOSTOSIS FINGER(S) Right 12/21/2017    Procedure: EXCISE EXOSTOSIS FINGER(S);  Right Index Finger Metacarpal Bone Cyst Curettage with Allograft from Distal Radius;  Surgeon: Lydia Corrales MD;  Location: [facility] OR     GRAFT BONE TO FINGER Right 12/21/2017    Procedure: GRAFT BONE TO FINGER;;  Surgeon: Lydia Corrales MD;  Location: [facility] OR     NO HISTORY OF SURGERY         Social History     Tobacco Use     Smoking status: Never Smoker     Smokeless tobacco: Never Used   Substance Use Topics     Alcohol use: No     Family History   Problem Relation Age of Onset     Asthma Mother      Cancer Mother         CERVICAL     Diabetes Mother      Cancer Father         BRAIN     Diabetes Father      Asthma Brother      Asthma Brother          Current Outpatient Medications   Medication Sig Dispense Refill     amLODIPine (NORVASC) 2.5 MG tablet Take 1 tablet (2.5 mg) by mouth daily 30 tablet 3     ASPIRIN NOT  PRESCRIBED, INTENTIONAL, continuous prn Antiplatelet medication not prescribed intentionally due to GI Issues / Ulcer Disease 1 each 0     blood glucose (ACCU-CHEK GUIDE) test strip Use to test blood sugar 2 times daily or as directed. 100 strip 11     blood glucose monitoring (ACCU-CHEK FASTCLIX) lancets Use to test blood sugar 2 times daily or as directed.  Ok to substitute alternative if insurance prefers. 102 each 11     insulin degludec (TRESIBA) 100 UNIT/ML pen 18 units every morning 45 mL 3     order for DME Equipment being ordered: wrist splint 1 Units 0     semaglutide (OZEMPIC) 1 MG/DOSE pen Inject 1 mg Subcutaneous every 7 days 9 mL 3     simvastatin (ZOCOR) 10 MG tablet Take 1 tablet (10 mg) by mouth At Bedtime 30 tablet 0     Allergies   Allergen Reactions     Pollen Extract      Metformin Diarrhea     Recent Labs   Lab Test 12/12/19  0811 03/11/19  1110 10/08/18  1748 10/01/18  0728  11/30/17  1111  12/12/12  0833   A1C 8.1* 8.0* 8.8*  --    < > 8.8*   < > 9.0*   LDL 70  --   --  77  --  115*   < > 114   HDL 49  --   --  42  --  41   < > 35*   TRIG 56  --   --  79  --  83   < > 144   ALT  --   --   --   --   --   --   --  39   CR 0.85  --   --  0.95   < >  --    < > 0.82   GFRESTIMATED >90  --   --  84   < >  --    < > >90   GFRESTBLACK >90  --   --  >90   < >  --    < > >90   POTASSIUM 4.3  --   --  4.2   < >  --    < > 4.3   TSH 1.96  --   --   --   --  1.56   < > 2.00    < > = values in this interval not displayed.      BP Readings from Last 3 Encounters:   12/17/19 138/82   12/18/18 127/82   10/19/18 132/72    Wt Readings from Last 3 Encounters:   12/17/19 107 kg (236 lb)   02/21/19 111.9 kg (246 lb 9.6 oz)   12/18/18 114.8 kg (253 lb)                    Reviewed and updated as needed this visit by Provider  Tobacco         Review of Systems   ROS COMP: Constitutional, HEENT, cardiovascular, pulmonary, gi and gu systems are negative, except as otherwise noted.      Objective    /82   Pulse  75   Temp 98.1  F (36.7  C) (Oral)   Wt 107 kg (236 lb)   SpO2 100%   BMI 33.98 kg/m    Body mass index is 33.98 kg/m .  Physical Exam   GENERAL: healthy, alert and no distress  NECK: no adenopathy, no asymmetry, masses, or scars and thyroid normal to palpation  RESP: lungs clear to auscultation - no rales, rhonchi or wheezes  CV: regular rate and rhythm, normal S1 S2, no S3 or S4, no murmur, click or rub, no peripheral edema and peripheral pulses strong  MS: no gross musculoskeletal defects noted, no edema    Labs reviewed in Epic        Assessment & Plan     1. Uncontrolled diabetes mellitus type 2 without complications (H)  Patient with history of gastric bypass surgery.  Last A1c is 8.1.  Patient is follow-up with diabetic educator.  He is currently on Tresiba and Ozempic.  Tresiba just increased from 16 to 18 units daily.  I think this will help to improve his A1c.  Patient denies any current concern.  We will continue the current medications.  Repeat A1c in 3 months    Lab Results   Component Value Date    A1C 8.1 12/12/2019    A1C 8.0 03/11/2019    A1C 8.8 10/08/2018    A1C 8.6 06/25/2018    A1C 8.8 11/30/2017       - amLODIPine (NORVASC) 2.5 MG tablet; Take 1 tablet (2.5 mg) by mouth daily  Dispense: 30 tablet; Refill: 3    2. Hypertension goal BP (blood pressure) < 140/90  Patient was on Norvasc 10 mg but he stopped it for unclear reason  Started Norvasc 2.5 mg.  Return to the clinic in 2 weeks for ancillary blood pressure check.  If blood pressure still elevated will go up on Norvasc to 5 mg.  Discussed low-salt diet and exercise    3. Morbid obesity (H)  Discussed elimination sugar from diet and exercise    4. Hyperlipidemia LDL goal <100  Continue Zocor 10 mg  The 10-year ASCVD risk score (Minneotatim SEGUNDO Jr., et al., 2013) is: 6.2%    Values used to calculate the score:      Age: 54 years      Sex: Male      Is Non- : No      Diabetic: Yes      Tobacco smoker: No      Systolic Blood  "Pressure: 138 mmHg      Is BP treated: Yes      HDL Cholesterol: 49 mg/dL      Total Cholesterol: 130 mg/dL      5. Need for prophylactic vaccination and inoculation against influenza    - INFLUENZA QUAD, RECOMBINANT, P-FREE (RIV4) (FLUBLOCK) [70286]  - Vaccine Administration, Initial [51336]     BMI:   Estimated body mass index is 33.98 kg/m  as calculated from the following:    Height as of 2/21/19: 1.775 m (5' 9.88\").    Weight as of this encounter: 107 kg (236 lb).   Weight management plan: Discussed healthy diet and exercise guidelines        Work on weight loss  Regular exercise    Return in about 6 months (around 6/17/2020).    Radha Brian MD  [facility]    "

## 2019-12-31 ENCOUNTER — ALLIED HEALTH/NURSE VISIT (OUTPATIENT)
Dept: NURSING | Facility: CLINIC | Age: 54
End: 2019-12-31
Payer: COMMERCIAL

## 2019-12-31 VITALS — SYSTOLIC BLOOD PRESSURE: 116 MMHG | OXYGEN SATURATION: 100 % | DIASTOLIC BLOOD PRESSURE: 79 MMHG | HEART RATE: 78 BPM

## 2019-12-31 DIAGNOSIS — I10 HYPERTENSION GOAL BP (BLOOD PRESSURE) < 140/90: Primary | ICD-10-CM

## 2019-12-31 PROCEDURE — 99207 ZZC NO CHARGE NURSE ONLY: CPT

## 2019-12-31 NOTE — PROGRESS NOTES
Wei Wolf is a 54 year old patient who comes in today for a Blood Pressure check.  Initial BP:  /79   Pulse 78   SpO2 100%      78  Disposition: results routed to provider      BP Readings from Last 3 Encounters:   12/31/19 116/79   12/17/19 138/82   12/18/18 127/82     Glory Weinberg MA

## 2020-01-21 DIAGNOSIS — E78.00 HIGH CHOLESTEROL: ICD-10-CM

## 2020-01-22 RX ORDER — SIMVASTATIN 10 MG
TABLET ORAL
Qty: 90 TABLET | Refills: 1 | Status: SHIPPED | OUTPATIENT
Start: 2020-01-22 | End: 2020-06-29

## 2020-01-22 NOTE — TELEPHONE ENCOUNTER
Prescription approved per [facility].    Ankita An RN

## 2020-02-10 ENCOUNTER — ALLIED HEALTH/NURSE VISIT (OUTPATIENT)
Dept: EDUCATION SERVICES | Facility: CLINIC | Age: 55
End: 2020-02-10
Payer: COMMERCIAL

## 2020-02-10 DIAGNOSIS — E11.9 DIABETES MELLITUS WITHOUT COMPLICATION (H): ICD-10-CM

## 2020-02-10 PROCEDURE — G0108 DIAB MANAGE TRN  PER INDIV: HCPCS

## 2020-02-10 PROCEDURE — 99207 ZZC DROP WITH A PROCEDURE: CPT

## 2020-02-10 RX ORDER — PIOGLITAZONEHYDROCHLORIDE 15 MG/1
15 TABLET ORAL DAILY
Qty: 30 TABLET | Refills: 1 | Status: SHIPPED | OUTPATIENT
Start: 2020-02-10 | End: 2020-03-06

## 2020-02-10 NOTE — PROGRESS NOTES
"Diabetes Self-Management Education & Support    Presents for: Follow-up    SUBJECTIVE/OBJECTIVE:  Presents for: Follow-up  Accompanied by: Self  Diabetes education in the past 24mo: Yes  Diabetes type: Type 2  Disease course: Stable  Transportation concerns: No  Difficulty affording diabetes medication?: Sometimes  Difficulty affording diabetes testing supplies?: No  Other concerns:: None  Cultural Influences/Ethnic Background:  American      Diabetes Symptoms & Complications:  Fatigue: No  Polydipsia: No  Polyphagia: No  Polyuria: No  Visual change: No  Slow healing wounds: No  Symptom course: Stable  Weight trend: Stable  Complications assessed today?: Yes  Autonomic neuropathy: No  CVA: No  Heart disease: No  Nephropathy: No  Peripheral neuropathy: No  Peripheral Vascular Disease: No  Retinopathy: No  Sexual dysfunction: No    Patient Problem List and Family Medical History reviewed for relevant medical history, current medical status, and diabetes risk factors.    Vitals:  There were no vitals taken for this visit.  Estimated body mass index is 33.98 kg/m  as calculated from the following:    Height as of 2/21/19: 1.775 m (5' 9.88\").    Weight as of 12/17/19: 107 kg (236 lb).   Last 3 BP:   BP Readings from Last 3 Encounters:   12/31/19 116/79   12/17/19 138/82   12/18/18 127/82       History   Smoking Status     Never Smoker   Smokeless Tobacco     Never Used       Labs:  Lab Results   Component Value Date    A1C 8.1 12/12/2019     Lab Results   Component Value Date     12/12/2019     Lab Results   Component Value Date    LDL 70 12/12/2019     HDL Cholesterol   Date Value Ref Range Status   12/12/2019 49 >39 mg/dL Final   ]  GFR Estimate   Date Value Ref Range Status   12/12/2019 >90 >60 mL/min/[1.73_m2] Final     Comment:     Non  GFR Calc  Starting 12/18/2018, serum creatinine based estimated GFR (eGFR) will be   calculated using the Chronic Kidney Disease Epidemiology Collaboration "   (CKD-EPI) equation.       GFR Estimate If Black   Date Value Ref Range Status   12/12/2019 >90 >60 mL/min/[1.73_m2] Final     Comment:      GFR Calc  Starting 12/18/2018, serum creatinine based estimated GFR (eGFR) will be   calculated using the Chronic Kidney Disease Epidemiology Collaboration   (CKD-EPI) equation.       Lab Results   Component Value Date    CR 0.85 12/12/2019     No results found for: MICROALBUMIN    Healthy Eating:  Healthy Eating Assessed Today: Yes  Cultural/Adventist diet restrictions?: No  Meal planning/habits: Avoiding sweets, Smaller portions  How many times a week on average do you eat food made away from home (restaurant/take-out)?: 2  Meals include: Breakfast, Lunch, Dinner, Evening Snack  Beverages: Water, Diet soda    Being Active:  Being Active Assessed Today: Yes  Exercise:: Yes  Days per week of moderate to strenuous exercise (like a brisk walk): 3  On average, minutes per day of exercise at this level: 20  How intense was your typical exercise? : Moderate (like brisk walking)  Exercise Minutes per Week: 60  Barrier to exercise: Physical limitation    Monitoring:  Blood Glucose Meter: ContourNext  Times checking blood sugar at home (number): 1  Times checking blood sugar at home (per): Day  Blood glucose trend: No change        The low BG was Wei' wifes' number    Taking Medications:  Diabetes Medication(s)     Insulin       insulin degludec (TRESIBA) 100 UNIT/ML pen    18 units every morning    Insulin Sensitizing Agents       pioglitazone (ACTOS) 15 MG tablet    Take 1 tablet (15 mg) by mouth daily    Incretin Mimetic Agents (GLP-1 Receptor Agonists)       semaglutide (OZEMPIC) 1 MG/DOSE pen    Inject 1 mg Subcutaneous every 7 days          Current Treatments: Diet, Insulin Injections, Non-insulin Injectables  Given by: Patient    Problem Solving:  Problem Solving Assessed Today: Yes  Is the patient at risk for hypoglycemia?: Yes  Hypoglycemia Frequency:  Never              Reducing Risks:  CAD Risks: Diabetes Mellitus, Obesity, Stress  Has dilated eye exam at least once a year?: No  Sees dentist every 6 months?: Yes  Feet checked by healthcare provider in the last year?: Yes    Healthy Coping:  Informal Support system:: Children, Spouse  Stage of change: ACTION (Actively working towards change)  Support resources: None  Patient Activation Measure Survey Score:  ANA Score (Last Two) 9/27/2011   ANA Raw Score 39   Activation Score 56.4   ANA Level 3       Diabetes knowledge and skills assessment:   Patient is knowledgeable in diabetes management concepts related to: Healthy Eating, Being Active, Monitoring and Taking Medication    Patient needs further education on the following diabetes management concepts: Taking Medication    Based on learning assessment above, most appropriate setting for further diabetes education would be: Individual setting.      INTERVENTIONS:    Education provided today on:  AADE Self-Care Behaviors:  Taking Medication: action of prescribed medication, drawing up, administering and storing injectable diabetes medications, proper site selection and rotation for injections, side effects of prescribed medications and when to take medications    Opportunities for ongoing education and support in diabetes-self management were discussed.    Pt verbalized understanding of concepts discussed and recommendations provided today.       Education Materials Provided:  No new materials provided today      ASSESSMENT: Wei is in financial issue for the next few months.  Will use up the medications he has at home, and we are going to trial Actos again to see if he can get better numbers and not have to use insulin.  Actos is only 5.00 per month for him now.    Goals Addressed as of 2/10/2020 at 9:20 AM       Medication 1 (pt-stated)     Added 3/11/19 by Lorena Borrego RN     My Goal: I will change my medications that are safe    What I need to meet my  goal: 1. Tresiba increase to 20 units daily  (when using up Toujeo go to 24 units daily)  2. Ozempic 1.0 mg weekly  3. When using the Victoza , begin at 1.2 mg daily X 7 days then increase to 1.8 mg daily  4.  Begin Actos 15 mg daily      I plan to meet my goal by this date: 2 months              Patient's most recent   Lab Results   Component Value Date    A1C 8.1 12/12/2019    is not meeting goal of <7.0    PLAN  See Patient Instructions for co-developed, patient-stated behavior change goals.  AVS printed and provided to patient today. See Follow-Up section for recommended follow-up.    Luciana Borrego RN/ÁNGEL Rojas Diabetes Educator    Time Spent: 30 minutes  Encounter Type: Individual    Any diabetes medication dose changes were made via the CDE Protocol and Collaborative Practice Agreement with the patient's primary care provider. A copy of this encounter was shared with the provider.

## 2020-02-10 NOTE — PATIENT INSTRUCTIONS
Diabetes Support Resources:  Send in numbers in 3 wks.  And will see if we need to increase Actos.  Goal is to see about getting off of insulin  Try to get in more mornings while making medication changes and then 2 hrs after a meal     Bring blood glucose meter and logbook with you to all doctor and follow-up appointments.    Diabetes Education Telephone Visit Follow-up:    We realize your time is valuable and your health is important! We offer a convenient Telephone Visit follow up! It s a quick way to check in for a medication dose adjustment without having to come back to clinic as soon.    Telephone Visits are often covered by insurance. Please check with your insurance plan to see if this type of visit is covered. If not, the cost is less expensive than an office visit:      Up to 10 minutes (Code 44767): $30    11-20 minutes (Code 25419): $59    More than 20 minutes (Code 16013): $85    Talk with your Diabetes Educator if you want to learn more.      [facility]:  For Your Diabetes Education and Nutrition Appointments Call:  [phone]   For Diabetes Education or Nutrition Related Questions:   Phone: [phone]  E-mail: [email or web address]  Fax: [phone]   If you need a medication refill please contact your pharmacy. Please allow 3 business days for your refills to be completed.

## 2020-02-11 ENCOUNTER — TELEPHONE (OUTPATIENT)
Dept: FAMILY MEDICINE | Facility: CLINIC | Age: 55
End: 2020-02-11

## 2020-02-11 NOTE — TELEPHONE ENCOUNTER
Patient Quality Outreach      Summary:    Patient is due/failing the following:   A1C and Eye Exam and Hypertension follow-up visit    Type of outreach:    None, per last office visit 12/17/19, follow up in 6 months    Questions for provider review:    None                                                                                   Patient has the following on his problem list:     Diabetes    Last A1C:  Lab Results   Component Value Date    A1C 8.1 12/12/2019    A1C 8.0 03/11/2019       Last LDL:    Lab Results   Component Value Date    LDL 70 12/12/2019       Is the patient on a Statin? YES             Is the patient on Aspirin? NO    Medications     HMG CoA Reductase Inhibitors     simvastatin (ZOCOR) 10 MG tablet       Salicylates     ASPIRIN NOT PRESCRIBED, INTENTIONAL,             Last three blood pressure readings:  BP Readings from Last 3 Encounters:   12/31/19 116/79   12/17/19 138/82   12/18/18 127/82            Tobacco Use      Smoking status: Never Smoker      Smokeless tobacco: Never Used          Hypertension   Last three blood pressure readings:  BP Readings from Last 3 Encounters:   12/31/19 116/79   12/17/19 138/82   12/18/18 127/82     Blood pressure: Passed    HTN Guidelines:  ? 139/89     **Start Working phrase here:**                                                 [facility]ac[facility] Mckeon [facility]     Chart routed to closed.

## 2020-02-23 ENCOUNTER — HEALTH MAINTENANCE LETTER (OUTPATIENT)
Age: 55
End: 2020-02-23

## 2020-03-03 ENCOUNTER — TELEPHONE (OUTPATIENT)
Dept: FAMILY MEDICINE | Facility: CLINIC | Age: 55
End: 2020-03-03

## 2020-03-06 ENCOUNTER — MYC REFILL (OUTPATIENT)
Dept: FAMILY MEDICINE | Facility: CLINIC | Age: 55
End: 2020-03-06

## 2020-03-06 RX ORDER — AMLODIPINE BESYLATE 2.5 MG/1
2.5 TABLET ORAL DAILY
Qty: 90 TABLET | Refills: 0 | Status: SHIPPED | OUTPATIENT
Start: 2020-03-06 | End: 2020-06-30

## 2020-03-06 RX ORDER — PIOGLITAZONEHYDROCHLORIDE 15 MG/1
30 TABLET ORAL DAILY
Qty: 30 TABLET | Refills: 1 | Status: SHIPPED | OUTPATIENT
Start: 2020-03-06 | End: 2020-09-25

## 2020-03-09 ENCOUNTER — MYC REFILL (OUTPATIENT)
Dept: FAMILY MEDICINE | Facility: CLINIC | Age: 55
End: 2020-03-09

## 2020-03-09 RX ORDER — AMLODIPINE BESYLATE 2.5 MG/1
2.5 TABLET ORAL DAILY
Qty: 90 TABLET | Refills: 0 | Status: CANCELLED | OUTPATIENT
Start: 2020-03-09

## 2020-03-10 RX ORDER — BLOOD SUGAR DIAGNOSTIC
STRIP MISCELLANEOUS
Qty: 100 STRIP | Refills: 1 | Status: SHIPPED | OUTPATIENT
Start: 2020-03-10 | End: 2022-07-21

## 2020-04-13 ENCOUNTER — MYC REFILL (OUTPATIENT)
Dept: FAMILY MEDICINE | Facility: CLINIC | Age: 55
End: 2020-04-13

## 2020-04-13 RX ORDER — PIOGLITAZONEHYDROCHLORIDE 15 MG/1
30 TABLET ORAL DAILY
Qty: 30 TABLET | Refills: 1 | Status: CANCELLED | OUTPATIENT
Start: 2020-04-13

## 2020-04-13 RX ORDER — PIOGLITAZONEHYDROCHLORIDE 30 MG/1
30 TABLET ORAL DAILY
Qty: 90 TABLET | Refills: 0 | Status: SHIPPED | OUTPATIENT
Start: 2020-04-13 | End: 2020-06-30

## 2020-04-13 NOTE — TELEPHONE ENCOUNTER
Have pended the corrected dose of actos for provider to sign.  Have also pended lab orders for A1c (due now) and alt/ast.  actos refill request; is wanting the prescription sent as actos 30mg; one tab daily (Current prescription is 15mg take 2 tabs daily)  Last OV Dr. Brian 12/17/19; was to have A1c rechecked in 3 months; office visit in 6 months  Saw diabetic educator 2/10/20.  She switched med at this time.   Lab Results   Component Value Date    ALT 39 12/12/2012     AST   Date Value Ref Range Status   12/12/2012 25 0 - 45 U/L Final

## 2020-06-03 DIAGNOSIS — M79.644 PAIN OF FINGER OF RIGHT HAND: Primary | ICD-10-CM

## 2020-06-04 ENCOUNTER — OFFICE VISIT (OUTPATIENT)
Dept: ORTHOPEDICS | Facility: CLINIC | Age: 55
End: 2020-06-04
Payer: COMMERCIAL

## 2020-06-04 ENCOUNTER — ANCILLARY PROCEDURE (OUTPATIENT)
Dept: GENERAL RADIOLOGY | Facility: CLINIC | Age: 55
End: 2020-06-04
Attending: ORTHOPAEDIC SURGERY
Payer: COMMERCIAL

## 2020-06-04 DIAGNOSIS — M79.644 PAIN OF FINGER OF RIGHT HAND: Primary | ICD-10-CM

## 2020-06-04 DIAGNOSIS — M79.644 PAIN OF FINGER OF RIGHT HAND: ICD-10-CM

## 2020-06-04 NOTE — NURSING NOTE
[facility]  [address] [place] [address]  [phone]  Dept: [phone]  ______________________________________________________________________________    Patient: Wei Wolf   : 1965   MRN: [id number]   2020    INVASIVE PROCEDURE SAFETY CHECKLIST    Date: 2020   Procedure: Right index MCP joint cortisone injection  Patient Name: Wei Wolf  MRN: [id number]  YOB: 1965    Action: Complete sections as appropriate. Any discrepancy results in a HARD COPY until resolved.     PRE PROCEDURE:  Patient ID verified with 2 identifiers (name and  or MRN): Yes  Procedure and site verified with patient/designee (when able): Yes  Accurate consent documentation in medical record: Yes  H&P (or appropriate assessment) documented in medical record: Yes  H&P must be up to 20 days prior to procedure and updates within 24 hours of procedure as applicable: NA  Relevant diagnostic and radiology test results appropriately labeled and displayed as applicable: Yes  Procedure site(s) marked with provider initials: NA    TIMEOUT:  Time-Out performed immediately prior to starting procedure, including verbal and active participation of all team members addressing the following:Yes  * Correct patient identify  * Confirmed that the correct side and site are marked  * An accurate procedure consent form  * Agreement on the procedure to be done  * Correct patient position  * Relevant images and results are properly labeled and appropriately displayed  * The need to administer antibiotics or fluids for irrigation purposes during the procedure as applicable   * Safety precautions based on patient history or medication use    DURING PROCEDURE: Verification of correct person, site, and procedures any time the responsibility for care of the patient is transferred to another member of the care team.       The following medications were given:          Prior to injection, verified patient identity using patient's name and date of birth.  Due to injection administration, patient instructed to remain in clinic for 15 minutes  afterwards, and to report any adverse reaction to me immediately.      Medication Name: Lidocaine 1%, 50 mg per 5 ml  Drug Amount Wasted:  Yes: 3  Vial/Syringe: Multi dose vial  Expiration Date:  02/22  NDC 69454-572-68     Medication Name: Kenalog 40 mg per ml  Drug Amount Wasted:  None.  Vial/Syringe: Single dose vial  Expiration Date:  11/2021  NDC: [address]-7  Scribed by Dulce lAeman LPN for Dr. Corrales on June 4, 2020  based on the provider's   statements to me.     Dulce Aleman LPN

## 2020-06-04 NOTE — NURSING NOTE
Reason For Visit:   Chief Complaint   Patient presents with     RECHECK     cortisone injection in R index finger      Primary MD: Radha Brian    Age: 54 year old    ?  No    There were no vitals taken for this visit.    Pain Assessment  Patient Currently in Pain: Yes  0-10 Pain Scale: 2  Primary Pain Location: Finger (Comment which one)(right index finger)  Aggravating Factors: Movement, Bending    QuickDASH Assessment  QuickDASH Main 11/16/2017   1.Open a tight or new jar. Mild difficulty   2. Do heavy household chores (e.g., wash walls, floors) Mild difficulty   3. Carry a shopping bag or briefcase. Mild difficulty   4. Wash your back. Mild difficulty   5. Use a knife to cut food. Mild difficulty   6. Recreational activities in which you take some force or impact through your arm, shoulder or hand (e.g., golf, hammering, tennis, etc.). Moderate difficulty   7. During the past week, to what extent has your arm, shoulder or hand problem interfered with your normal social activities with family, friends, neighbours or groups? Slightly   8. During the past week, were you limited in your work or other regular daily activities as a result of your arm, shoulder or hand problem? Slightly limited   9. Arm, shoulder or hand pain. Moderate   10.Tingling (pins and needles) in your arm,shoulder or hand. None   11. During the past week, how much difficulty have you had sleeping because of the pain in your arm, shoulder or hand? ([place] number) No difficulty   Quickdash Ability Score 25      Current Outpatient Medications   Medication Sig Dispense Refill     amLODIPine (NORVASC) 2.5 MG tablet Take 1 tablet (2.5 mg) by mouth daily 90 tablet 0     ASPIRIN NOT PRESCRIBED, INTENTIONAL, continuous prn Antiplatelet medication not prescribed intentionally due to GI Issues / Ulcer Disease 1 each 0     blood glucose (ACCU-CHEK GUIDE) test strip Use to test blood sugar 2 times daily or as directed. 100 strip 1     blood  glucose monitoring (ACCU-CHEK FASTCLIX) lancets Use to test blood sugar 2 times daily or as directed.  Ok to substitute alternative if insurance prefers. 102 each 11     insulin degludec (TRESIBA) 100 UNIT/ML pen 18 units every morning 45 mL 3     liraglutide (VICTOZA) 18 MG/3ML solution Inject 1.8 mg Subcutaneous daily 9 mL 1     order for DME Equipment being ordered: wrist splint 1 Units 0     pioglitazone (ACTOS) 30 MG tablet Take 1 tablet (30 mg) by mouth daily 90 tablet 0     simvastatin (ZOCOR) 10 MG tablet TAKE ONE TABLET BY MOUTH AT BEDTIME 90 tablet 1     pioglitazone (ACTOS) 15 MG tablet Take 2 tablets (30 mg) by mouth daily 30 tablet 1     Allergies   Allergen Reactions     Pollen Extract      Metformin Diarrhea     Darcy Fonseca ATC

## 2020-06-04 NOTE — LETTER
"6/4/2020     RE: Wei Wolf  [address] [facility] [address]    Dear Colleague,    Thank you for referring your patient, Wei Wolf, to the [facility]. Please see a copy of my visit note below.       Chief Complaint: Right index finger MP joint pain     History of Present Illness:   Wei Wolf is a 52 year old, right handed male who presents today for follow-up regarding right index finger pain. Patient is S/P curettage and bone grafting of a cyst in the head of his right index metacarpal on 12/21/2017. I last evaluated him on September 26, 2019, at which time  he had an injection on the index finger MCP joint which gave him good relief.  He comes in requesting a repeat injection today.     Review of Systems:   A 10-point review of systems was obtained and is negative except for as noted in the HPI.      Physical Examination:  Ht 1.775 m (5' 9.88\")   Wt 111.9 kg (246 lb 9.6 oz)   BMI 35.50 kg/m     Hand Dominance Evaluation  Hand Dominance: Right    General: Healthy appearing male. Affect appropriate. Normal gait. Alert and oriented to surroundings.   Right Upper Extremity: Pain and swelling over MCP joint, no pain at rest. Pain with flexion from 0 to 60 degrees, no palpable subluxation of the extender tendon, but reports clicking in the past. Otherwise his distal CMS is full.   The MCP joint is quite stiff and becomes painful with terminal range of motion.          Assessment:   52-year-old right-hand-dominant male status-post curettage of a right index metacarpal cyst with bone grafting on 12/21/2017, continues to be symptomatic.   Has had good relief with previous injections and requests a repeat injection today.        Plan:   I discussed the  treatment options with the patient.  He would like a repeat injection.  Fluoroscopy is not available today so we will do this without fluoroscopy     Procedure:  After written consent, verifying site and side, a sterile " Chlora- prep was performed for the injection site. A 25-gauge needle into the right index MCP joint with 1% Lidocaine.  0.5 mL of Kenalog (20 mg) and 1 mL of lidocaine was injected into the MPC joint. Patient tolerated the procedure well.  No complications.  Follow up instructions were given.          Lydia Corrales MD   Hand and Upper Extremity Specialist  [facility]

## 2020-06-11 NOTE — PROGRESS NOTES
"   Chief Complaint: Right index finger MP joint pain     History of Present Illness:   Wei Wolf is a 52 year old, right handed male who presents today for follow-up regarding right index finger pain. Patient is S/P curettage and bone grafting of a cyst in the head of his right index metacarpal on 12/21/2017. I last evaluated him on September 26, 2019, at which time  he had an injection on the index finger MCP joint which gave him good relief.  He comes in requesting a repeat injection today.     Review of Systems:   A 10-point review of systems was obtained and is negative except for as noted in the HPI.      Physical Examination:  Ht 1.775 m (5' 9.88\")   Wt 111.9 kg (246 lb 9.6 oz)   BMI 35.50 kg/m     Hand Dominance Evaluation  Hand Dominance: Right    General: Healthy appearing male. Affect appropriate. Normal gait. Alert and oriented to surroundings.   Right Upper Extremity: Pain and swelling over MCP joint, no pain at rest. Pain with flexion from 0 to 60 degrees, no palpable subluxation of the extender tendon, but reports clicking in the past. Otherwise his distal CMS is full.   The MCP joint is quite stiff and becomes painful with terminal range of motion.          Assessment:   52-year-old right-hand-dominant male status-post curettage of a right index metacarpal cyst with bone grafting on 12/21/2017, continues to be symptomatic.   Has had good relief with previous injections and requests a repeat injection today.        Plan:   I discussed the  treatment options with the patient.  He would like a repeat injection.  Fluoroscopy is not available today so we will do this without fluoroscopy     Procedure:  After written consent, verifying site and side, a sterile Chlora- prep was performed for the injection site. A 25-gauge needle into the right index MCP joint with 1% Lidocaine.  0.5 mL of Kenalog (20 mg) and 1 mL of lidocaine was injected into the MPC joint. Patient tolerated the procedure well. "  No complications.  Follow up instructions were given.          Lydia Corrales MD   Hand and Upper Extremity Specialist  [facility]

## 2020-06-29 ENCOUNTER — MYC REFILL (OUTPATIENT)
Dept: FAMILY MEDICINE | Facility: CLINIC | Age: 55
End: 2020-06-29

## 2020-06-29 DIAGNOSIS — E78.00 HIGH CHOLESTEROL: ICD-10-CM

## 2020-06-29 DIAGNOSIS — Z79.4 TYPE 2 DIABETES MELLITUS WITHOUT COMPLICATION, WITH LONG-TERM CURRENT USE OF INSULIN (H): Primary | ICD-10-CM

## 2020-06-29 DIAGNOSIS — E11.9 TYPE 2 DIABETES MELLITUS WITHOUT COMPLICATION, WITH LONG-TERM CURRENT USE OF INSULIN (H): Primary | ICD-10-CM

## 2020-06-30 RX ORDER — SIMVASTATIN 10 MG
10 TABLET ORAL AT BEDTIME
Qty: 90 TABLET | Refills: 1 | Status: SHIPPED | OUTPATIENT
Start: 2020-06-30 | End: 2021-01-14

## 2020-06-30 RX ORDER — AMLODIPINE BESYLATE 2.5 MG/1
2.5 TABLET ORAL DAILY
Qty: 90 TABLET | Refills: 1 | Status: SHIPPED | OUTPATIENT
Start: 2020-06-30 | End: 2020-09-24

## 2020-06-30 RX ORDER — PIOGLITAZONEHYDROCHLORIDE 30 MG/1
30 TABLET ORAL DAILY
Qty: 90 TABLET | Refills: 0 | Status: SHIPPED | OUTPATIENT
Start: 2020-06-30 | End: 2020-10-06

## 2020-06-30 NOTE — TELEPHONE ENCOUNTER
Amlodipine, actos, and simvastatin refill requests  Last OV Dr. Brian: 12/17/19 for his diabetes  2/10/20 diabetic ed appointment  Hemoglobin A1C   Date Value Ref Range Status   12/12/2019 8.1 (H) 0 - 5.6 % Final     Comment:     Normal <5.7% Prediabetes 5.7-6.4%  Diabetes 6.5% or higher - adopted from ADA   consensus guidelines.     03/11/2019 8.0 (H) 0 - 5.6 % Final     Comment:     Normal <5.7% Prediabetes 5.7-6.4%  Diabetes 6.5% or higher - adopted from ADA   consensus guidelines.     10/08/2018 8.8 (H) 0 - 5.6 % Final     Comment:     Normal <5.7% Prediabetes 5.7-6.4%  Diabetes 6.5% or higher - adopted from ADA   consensus guidelines.       BP Readings from Last 3 Encounters:   12/31/19 116/79   12/17/19 138/82   12/18/18 127/82     Recent Labs   Lab Test 12/12/19  0811 10/01/18  0728  05/27/15  1652 07/21/14  0915   CHOL 130 135   < > 156 134   HDL 49 42   < > 37* 28*   LDL 70 77   < > 95 81   TRIG 56 79   < > 121 126   CHOLHDLRATIO  --   --   --  4.2 4.8    < > = values in this interval not displayed.

## 2020-09-17 DIAGNOSIS — E11.65 TYPE 2 DIABETES MELLITUS WITH HYPERGLYCEMIA (H): ICD-10-CM

## 2020-09-17 RX ORDER — SEMAGLUTIDE 1.34 MG/ML
INJECTION, SOLUTION SUBCUTANEOUS
Qty: 9 ML | Refills: 3 | OUTPATIENT
Start: 2020-09-17

## 2020-09-24 ENCOUNTER — MYC REFILL (OUTPATIENT)
Dept: FAMILY MEDICINE | Facility: CLINIC | Age: 55
End: 2020-09-24

## 2020-09-24 DIAGNOSIS — E78.00 HIGH CHOLESTEROL: ICD-10-CM

## 2020-09-25 ENCOUNTER — MYC MEDICAL ADVICE (OUTPATIENT)
Dept: FAMILY MEDICINE | Facility: CLINIC | Age: 55
End: 2020-09-25

## 2020-09-25 DIAGNOSIS — Z79.4 TYPE 2 DIABETES MELLITUS WITHOUT COMPLICATION, WITH LONG-TERM CURRENT USE OF INSULIN (H): Primary | ICD-10-CM

## 2020-09-25 DIAGNOSIS — E11.9 TYPE 2 DIABETES MELLITUS WITHOUT COMPLICATION, WITH LONG-TERM CURRENT USE OF INSULIN (H): Primary | ICD-10-CM

## 2020-09-25 RX ORDER — AMLODIPINE BESYLATE 2.5 MG/1
2.5 TABLET ORAL DAILY
Qty: 90 TABLET | Refills: 0 | Status: SHIPPED | OUTPATIENT
Start: 2020-09-25 | End: 2020-12-24

## 2020-09-25 RX ORDER — SEMAGLUTIDE 1.34 MG/ML
1 INJECTION, SOLUTION SUBCUTANEOUS
Qty: 9 ML | Refills: 0 | Status: SHIPPED | OUTPATIENT
Start: 2020-09-25 | End: 2020-12-24

## 2020-09-25 NOTE — TELEPHONE ENCOUNTER
"Routing refill request to provider for review/approval because:  Drug not active on patient's medication list - see below for pt explanation.    Pt requests refill of Ozempic (order pended as last ordered on 9/9/19).  Pt states, \"At one point I had switched to Victoza, but switched back when Ozempic was cheaper, and I prefer the once a week injection...\"    Pt is scheduled for lab-only appointment on 10/7/20, and a 40 min Video Visit with PCP on 10/9/20.  Next 5 appointments (look out 90 days)    Oct 07, 2020  3:15 PM CDT  [facility]  [facility] [address] [facility] [address] [phone]        Gerri Villagomez, DEEPIKAN, RN  "

## 2020-09-25 NOTE — TELEPHONE ENCOUNTER
See additional 9/25/20 MyChart encounter. Additional message is more complete. Closing duplicate similar message.  DEEPIKA IsbellN, RN

## 2020-10-03 NOTE — PROGRESS NOTES
"Wei Wolf is a 54 year old male who is being evaluated via a billable video visit.      The patient has been notified of following:     \"This video visit will be conducted via a call between you and your physician/provider. We have found that certain health care needs can be provided without the need for an in-person physical exam.  This service lets us provide the care you need with a video conversation.  If a prescription is necessary we can send it directly to your pharmacy.  If lab work is needed we can place an order for that and you can then stop by our lab to have the test done at a later time.    Video visits are billed at different rates depending on your insurance coverage.  Please reach out to your insurance provider with any questions.    If during the course of the call the physician/provider feels a video visit is not appropriate, you will not be charged for this service.\"    Patient has given verbal consent for Video visit? Yes  How would you like to obtain your AVS? MyChart  If you are dropped from the video visit, the video invite should be resent to: Text to cell phone: [phone], back up will try to get into virtual weighting room but reception is not the best.  Will anyone else be joining your video visit? No      Subjective     Wei Wolf is a 54 year old male who presents today via video visit for the following health issues:    HPI      Go over lab results    Diabetes Follow-up    How often are you checking your blood sugar? One time daily  What time of day are you checking your blood sugars (select all that apply)?  Before meals  Have you had any blood sugars above 200?  No  Have you had any blood sugars below 70?  No    What symptoms do you notice when your blood sugar is low?  None    What concerns do you have today about your diabetes? None     Do you have any of these symptoms? (Select all that apply)  No numbness or tingling in feet.  No redness, sores or blisters on " feet.  No complaints of excessive thirst.  No reports of blurry vision.  No significant changes to weight.    Have you had a diabetic eye exam in the last 12 months? No    Supposed to have eye exam [place] 03/2020 clinic was closed due to covid pandemic   Currently taking Ozempic   Actos 30 mg   Tresiba 20 units   BP Readings from Last 2 Encounters:   12/31/19 116/79   12/17/19 138/82     Hemoglobin A1C (%)   Date Value   10/07/2020 6.7 (H)   12/12/2019 8.1 (H)     LDL Cholesterol Calculated (mg/dL)   Date Value   10/07/2020 63   12/12/2019 70               Hyperlipidemia Follow-Up      Are you regularly taking any medication or supplement to lower your cholesterol?   Yes- Simvastatin 10 mg     Are you having muscle aches or other side effects that you think could be caused by your cholesterol lowering medication?  No  The ASCVD Risk score (Sury SEGUNDO Jr., et al., 2013) failed to calculate for the following reasons:    The valid total cholesterol range is 130 to 320 mg/dL    Hypertension Follow-up      Do you check your blood pressure regularly outside of the clinic? No     Are you following a low salt diet? No    Are your blood pressures ever more than 140 on the top number (systolic) OR more   than 90 on the bottom number (diastolic), for example 140/90? No    Currently taking Norvasc 2.5 mg daily       Obesity:  Wt Readings from Last 4 Encounters:   12/17/19 107 kg (236 lb)   02/21/19 111.9 kg (246 lb 9.6 oz)   12/18/18 114.8 kg (253 lb)   10/08/18 114.8 kg (253 lb)   currently 245 lbs         Video Start Time: 7:03 AM        Review of Systems   Constitutional, HEENT, cardiovascular, pulmonary, gi and gu systems are negative, except as otherwise noted.      Objective           Vitals:  No vitals were obtained today due to virtual visit.    Physical Exam     GENERAL: Healthy, alert and no distress  EYES: Eyes grossly normal to inspection.  No discharge or erythema, or obvious scleral/conjunctival abnormalities.  RESP:  No audible wheeze, cough, or visible cyanosis.  No visible retractions or increased work of breathing.    SKIN: Visible skin clear. No significant rash, abnormal pigmentation or lesions.  NEURO: Cranial nerves grossly intact.  Mentation and speech appropriate for age.  PSYCH: Mentation appears normal, affect normal/bright, judgement and insight intact, normal speech and appearance well-groomed.            Assessment & Plan     Type 2 diabetes mellitus without complication, with long-term current use of insulin (H)  Diabetes is controlled    Continue current medication  -Ozempic 1 mg weekly  -Tresiba 20 units daily  -Actos 30 mg   Lab Results   Component Value Date    A1C 6.7 10/07/2020    A1C 8.1 12/12/2019    A1C 8.0 03/11/2019    A1C 8.8 10/08/2018    A1C 8.6 06/25/2018     Patient is compliant with medication.  No side effects reported.  He is going to schedule eye exam with his ophthalmologist.  Morbid obesity (H)  Per patient his current weight is 245 pounds   he is motivated to lose weight and is working on it  Informed patient that Ozempic should help with weight loss.  Offered referral to weight management program, patient not interested at this time, he will try on his own to lose weight.  Discussed diet and exercise  Wt Readings from Last 4 Encounters:   12/17/19 107 kg (236 lb)   02/21/19 111.9 kg (246 lb 9.6 oz)   12/18/18 114.8 kg (253 lb)   10/08/18 114.8 kg (253 lb)         Hypertension goal BP (blood pressure) < 140/90  Currently taking Norvasc 2.5 mg.  Patient does not check blood pressure at home.  BP Readings from Last 3 Encounters:   12/31/19 116/79   12/17/19 138/82   12/18/18 127/82       Hyperlipidemia LDL goal <100  Chronic stable problem.  Patient is compliant with medication.  No side effects reported.  Continue simvastatin 10 mg.            Return in about 6 months (around 4/9/2021).  Patient verbalized understanding and agreed on the plan of care.  All questions answered.  Radha Brian,  MD  [facility]      Video-Visit Details    Type of service:  Video Visit    Video End Time:7:18 AM    Originating Location (pt. Location): Home    Distant Location (provider location):  [facility]     Platform used for Video Visit: [employer]

## 2020-10-05 ENCOUNTER — DOCUMENTATION ONLY (OUTPATIENT)
Dept: LAB | Facility: CLINIC | Age: 55
End: 2020-10-05

## 2020-10-05 DIAGNOSIS — E78.5 HYPERLIPIDEMIA LDL GOAL <100: ICD-10-CM

## 2020-10-05 DIAGNOSIS — E11.65 TYPE 2 DIABETES MELLITUS WITH HYPERGLYCEMIA, WITHOUT LONG-TERM CURRENT USE OF INSULIN (H): ICD-10-CM

## 2020-10-05 DIAGNOSIS — I10 HYPERTENSION GOAL BP (BLOOD PRESSURE) < 140/90: Primary | ICD-10-CM

## 2020-10-05 NOTE — PROGRESS NOTES
Your patient has a lab appointment on 10/7/20 for pre-visit labs. At this time there are only orders placed for a a1c. The patient seems to think they need top fast for this appointment and was upset because he could only get in at 3:15pm (according to the appt notes). I do not think he is due for fasting labs but I would like to verify with Dr. Brian. If patient does not need any other orders, especially fasting labs please send him a my chart message and let him know he does not need to fast for his lab appointment. Thank you.  [facility]    Maria T Spencer CMA (Lab)

## 2020-10-05 NOTE — PROGRESS NOTES
To Provider,  Does the patient need to have a Lipid panel done yet?I don't see that he does. Please advise. See message from the lab as well.  Thank you,  Dayna Pope TC

## 2020-10-05 NOTE — PROGRESS NOTES
Called the patient @ [phone]. I have rescheduled the patient's lab appt. to 9 am on 10/7/2020.  Dayna Pope TC

## 2020-10-06 ENCOUNTER — MYC REFILL (OUTPATIENT)
Dept: FAMILY MEDICINE | Facility: CLINIC | Age: 55
End: 2020-10-06

## 2020-10-06 DIAGNOSIS — E11.9 TYPE 2 DIABETES MELLITUS WITHOUT COMPLICATION, WITH LONG-TERM CURRENT USE OF INSULIN (H): ICD-10-CM

## 2020-10-06 DIAGNOSIS — Z79.4 TYPE 2 DIABETES MELLITUS WITHOUT COMPLICATION, WITH LONG-TERM CURRENT USE OF INSULIN (H): ICD-10-CM

## 2020-10-07 DIAGNOSIS — I10 HYPERTENSION GOAL BP (BLOOD PRESSURE) < 140/90: ICD-10-CM

## 2020-10-07 DIAGNOSIS — E11.65 TYPE 2 DIABETES MELLITUS WITH HYPERGLYCEMIA, WITHOUT LONG-TERM CURRENT USE OF INSULIN (H): ICD-10-CM

## 2020-10-07 DIAGNOSIS — E78.5 HYPERLIPIDEMIA LDL GOAL <100: ICD-10-CM

## 2020-10-07 LAB
ANION GAP SERPL CALCULATED.3IONS-SCNC: 6 MMOL/L (ref 3–14)
BUN SERPL-MCNC: 16 MG/DL (ref 7–30)
CALCIUM SERPL-MCNC: 8.9 MG/DL (ref 8.5–10.1)
CHLORIDE SERPL-SCNC: 107 MMOL/L (ref 94–109)
CHOLEST SERPL-MCNC: 121 MG/DL
CO2 SERPL-SCNC: 26 MMOL/L (ref 20–32)
CREAT SERPL-MCNC: 0.93 MG/DL (ref 0.66–1.25)
GFR SERPL CREATININE-BSD FRML MDRD: >90 ML/MIN/{1.73_M2}
GLUCOSE SERPL-MCNC: 102 MG/DL (ref 70–99)
HBA1C MFR BLD: 6.7 % (ref 0–5.6)
HDLC SERPL-MCNC: 48 MG/DL
LDLC SERPL CALC-MCNC: 63 MG/DL
NONHDLC SERPL-MCNC: 73 MG/DL
POTASSIUM SERPL-SCNC: 4 MMOL/L (ref 3.4–5.3)
SODIUM SERPL-SCNC: 139 MMOL/L (ref 133–144)
TRIGL SERPL-MCNC: 49 MG/DL
TSH SERPL DL<=0.005 MIU/L-ACNC: 1.45 MU/L (ref 0.4–4)

## 2020-10-07 PROCEDURE — 83036 HEMOGLOBIN GLYCOSYLATED A1C: CPT | Performed by: FAMILY MEDICINE

## 2020-10-07 PROCEDURE — 80061 LIPID PANEL: CPT | Performed by: FAMILY MEDICINE

## 2020-10-07 PROCEDURE — 84443 ASSAY THYROID STIM HORMONE: CPT | Performed by: FAMILY MEDICINE

## 2020-10-07 PROCEDURE — 80048 BASIC METABOLIC PNL TOTAL CA: CPT | Performed by: FAMILY MEDICINE

## 2020-10-07 RX ORDER — PIOGLITAZONEHYDROCHLORIDE 30 MG/1
30 TABLET ORAL DAILY
Qty: 90 TABLET | Refills: 0 | Status: SHIPPED | OUTPATIENT
Start: 2020-10-07 | End: 2020-12-24

## 2020-10-09 ENCOUNTER — VIRTUAL VISIT (OUTPATIENT)
Dept: FAMILY MEDICINE | Facility: CLINIC | Age: 55
End: 2020-10-09
Payer: COMMERCIAL

## 2020-10-09 DIAGNOSIS — E66.01 MORBID OBESITY (H): ICD-10-CM

## 2020-10-09 DIAGNOSIS — E11.9 TYPE 2 DIABETES MELLITUS WITHOUT COMPLICATION, WITH LONG-TERM CURRENT USE OF INSULIN (H): Primary | ICD-10-CM

## 2020-10-09 DIAGNOSIS — Z79.4 TYPE 2 DIABETES MELLITUS WITHOUT COMPLICATION, WITH LONG-TERM CURRENT USE OF INSULIN (H): Primary | ICD-10-CM

## 2020-10-09 DIAGNOSIS — E78.5 HYPERLIPIDEMIA LDL GOAL <100: ICD-10-CM

## 2020-10-09 DIAGNOSIS — I10 HYPERTENSION GOAL BP (BLOOD PRESSURE) < 140/90: ICD-10-CM

## 2020-10-09 PROCEDURE — 99214 OFFICE O/P EST MOD 30 MIN: CPT | Mod: 95 | Performed by: FAMILY MEDICINE

## 2020-11-03 NOTE — PROGRESS NOTES
"SUBJECTIVE:  Wei Wolf is here for follow up of his  1. Impingement syndrome right shoulder  2. Adhesive capsulitis right shoulder    I saw him in 12/18/2018, and a steroid injection subacromial since there is a degree of impingement. Physical therapy ordered. Discussed eventual possibility of surgical intervention if the adhesive capsulitis does not resolve, or acromioplasty if the range of motion normalizes, but impingement symptoms continue.   Length of effectiveness:  Of the SA injection: 50% of pain for 3-4 months , including overhead activities      Treatment up to this point: prior to that injection in 2018:  shoulder pain that has been present approximately 10+ years. He has previously had steroid shots in both shoulders at an outside provider for (per patient) right side \"bone spurs\", and left for frozen shoulder. His last shot was about 10 years ago in the right shoulder, which wasn't fully successful. He has had pain for years in the right shoulder.     Review of Systems:  Constitutional/General: Negative for fever, chills, change in weight  Integumentary/Skin: Negative for worrisome rashes, moles, or lesions  Neuro: Negative for weakness, dizziness, or paresthesias   Psychiatric: negative for changes in mood or affect    OBJECTIVE:  Physical Exam:  /74 (BP Location: Left arm, Patient Position: Sitting, Cuff Size: Adult Large)   Pulse 85   SpO2 97%   General Appearance: healthy, alert and no distress   Skin: no suspicious lesions or rashes  Neuro: Normal strength and tone, mentation intact and speech normal  Vascular: good pulses, and capillary refill   Lymph: no lymphadenopathy   Psych:  mentation appears normal and affect normal/bright  Resp: no increased work of breathing    Right Shoulder Exam:  Shoulder Inspection: no swelling, bruising, discoloration, or obvious deformity or asymmetry  no atrophy    Range of Motion:   Active: forward flexion: 125, internal rotation: back " pocket   Passive: forward flexion: 125*, external rotation: 25/15. Abduction 80, IR 20  Strength: forward flexion: 5/5, painful, external rotation: 5/5, Liftoff: unable,   Impingement: all grade 2 positive    Obtained 12/18/18 of the RIGHT shoulder: 3-views,  type 3 acromion with no degenerative changes in the glenohumeral joint or the AC joint.     ASSESSMENT:  Encounter Diagnoses   Name Primary?     Impingement syndrome of right shoulder Yes     Adhesive capsulitis of right shoulder    rotator cuff tendonitis.    PLAN:  It's unusual that he would still have frozen shoulder 2 years later.  He wanted to repeat the injection, but wanted image guidance.  We will send him for glenohumeral joint AND subacromial injections under image guidance.  physical therapy reordered.  If not improved in 6 weeks then consider MRI.      BLANE Johnson MD  Dept. Orthopedic Surgery  [facility]

## 2020-11-04 ENCOUNTER — OFFICE VISIT (OUTPATIENT)
Dept: ORTHOPEDICS | Facility: CLINIC | Age: 55
End: 2020-11-04
Payer: COMMERCIAL

## 2020-11-04 VITALS — HEART RATE: 85 BPM | OXYGEN SATURATION: 97 % | DIASTOLIC BLOOD PRESSURE: 74 MMHG | SYSTOLIC BLOOD PRESSURE: 135 MMHG

## 2020-11-04 DIAGNOSIS — M75.01 ADHESIVE CAPSULITIS OF RIGHT SHOULDER: ICD-10-CM

## 2020-11-04 DIAGNOSIS — M75.41 IMPINGEMENT SYNDROME OF RIGHT SHOULDER: Primary | ICD-10-CM

## 2020-11-04 PROCEDURE — 99213 OFFICE O/P EST LOW 20 MIN: CPT | Performed by: ORTHOPAEDIC SURGERY

## 2020-11-04 ASSESSMENT — PAIN SCALES - GENERAL: PAINLEVEL: MODERATE PAIN (5)

## 2020-11-04 NOTE — LETTER
"    11/4/2020         RE: Wei Wolf  [address] [facility] [address]        Dear Colleague,    Thank you for referring your patient, Wei Wolf, to the [facility]. Please see a copy of my visit note below.    SUBJECTIVE:  Wei Wolf is here for follow up of his  1. Impingement syndrome right shoulder  2. Adhesive capsulitis right shoulder    I saw him in 12/18/2018, and a steroid injection subacromial since there is a degree of impingement. Physical therapy ordered. Discussed eventual possibility of surgical intervention if the adhesive capsulitis does not resolve, or acromioplasty if the range of motion normalizes, but impingement symptoms continue.   Length of effectiveness:  Of the SA injection: 50% of pain for 3-4 months , including overhead activities      Treatment up to this point: prior to that injection in 2018:  shoulder pain that has been present approximately 10+ years. He has previously had steroid shots in both shoulders at an outside provider for (per patient) right side \"bone spurs\", and left for frozen shoulder. His last shot was about 10 years ago in the right shoulder, which wasn't fully successful. He has had pain for years in the right shoulder.     Review of Systems:  Constitutional/General: Negative for fever, chills, change in weight  Integumentary/Skin: Negative for worrisome rashes, moles, or lesions  Neuro: Negative for weakness, dizziness, or paresthesias   Psychiatric: negative for changes in mood or affect    OBJECTIVE:  Physical Exam:  /74 (BP Location: Left arm, Patient Position: Sitting, Cuff Size: Adult Large)   Pulse 85   SpO2 97%   General Appearance: healthy, alert and no distress   Skin: no suspicious lesions or rashes  Neuro: Normal strength and tone, mentation intact and speech normal  Vascular: good pulses, and capillary refill   Lymph: no lymphadenopathy   Psych:  mentation appears normal and affect " normal/bright  Resp: no increased work of breathing    Right Shoulder Exam:  Shoulder Inspection: no swelling, bruising, discoloration, or obvious deformity or asymmetry  no atrophy    Range of Motion:   Active: forward flexion: 125, internal rotation: back pocket   Passive: forward flexion: 125*, external rotation: 25/15. Abduction 80, IR 20  Strength: forward flexion: 5/5, painful, external rotation: 5/5, Liftoff: unable,   Impingement: all grade 2 positive    Obtained 12/18/18 of the RIGHT shoulder: 3-views,  type 3 acromion with no degenerative changes in the glenohumeral joint or the AC joint.     ASSESSMENT:  Encounter Diagnoses   Name Primary?     Impingement syndrome of right shoulder Yes     Adhesive capsulitis of right shoulder    rotator cuff tendonitis.    PLAN:  It's unusual that he would still have frozen shoulder 2 years later.  He wanted to repeat the injection, but wanted image guidance.  We will send him for glenohumeral joint AND subacromial injections under image guidance.  physical therapy reordered.  If not improved in 6 weeks then consider MRI.      BLANE Johnson MD  Dept. Orthopedic Surgery  [facility]        Again, thank you for allowing me to participate in the care of your patient.        Sincerely,        Ajith Johnson MD

## 2020-11-05 ENCOUNTER — TELEPHONE (OUTPATIENT)
Dept: ORTHOPEDICS | Facility: CLINIC | Age: 55
End: 2020-11-05

## 2020-11-05 NOTE — TELEPHONE ENCOUNTER
Reason for call:  Other   Patient called regarding (reason for call): Patient is currently scheduled with   Dr. Corrales  11/19/2020 9:45 am.     Additional comments: Patient is wondering if he can also get a right shoulder injection at the same time? Please advise patient.     Phone number to reach patient:  Cell number on file:    Telephone Information:   Mobile [phone]       Best Time:  anytime    Can we leave a detailed message on this number?  YES    Travel screening: Not Applicable     Scott Sheffield on 11/5/2020 at 10:25 AM

## 2020-11-05 NOTE — TELEPHONE ENCOUNTER
Spoke with patient. He would like to know if another provider would do a shoulder injection on the same day as Dr Corrales appointment on 3-19-20.  See office visit notes from Dr Johnson yesterday for recommendation.  Message sent to shoulder team RN. Nataliia Lee, RN

## 2020-11-06 NOTE — TELEPHONE ENCOUNTER
Appointment scheduled 11/19/20 at 0830 in the [facility] orth[facility] walk-in clinic.  Patient agreeable with this plan.

## 2020-11-10 NOTE — TELEPHONE ENCOUNTER
DIAGNOSIS: Right Shoulder GH joint and Subacromial injections under US referred by  (pt is aware that only one will likely be done at apt   APPOINTMENT DATE: 11.19.20   NOTES STATUS DETAILS   OFFICE NOTE from referring provider Internal 11.4.20 SENG Johnson [facility]   OFFICE NOTE from other specialist N/A    DISCHARGE SUMMARY from hospital N/A    DISCHARGE REPORT from the ER N/A    OPERATIVE REPORT N/A    MEDICATION LIST Internal    MRI N/A    CT SCAN Internal 12.18.18 R shoulder   XRAYS (IMAGES & REPORTS) N/A

## 2020-11-13 DIAGNOSIS — M79.644 PAIN OF FINGER OF RIGHT HAND: Primary | ICD-10-CM

## 2020-11-18 DIAGNOSIS — M25.511 RIGHT SHOULDER PAIN: Primary | ICD-10-CM

## 2020-11-19 ENCOUNTER — OFFICE VISIT (OUTPATIENT)
Dept: ORTHOPEDICS | Facility: CLINIC | Age: 55
End: 2020-11-19
Payer: COMMERCIAL

## 2020-11-19 ENCOUNTER — ANCILLARY PROCEDURE (OUTPATIENT)
Dept: GENERAL RADIOLOGY | Facility: CLINIC | Age: 55
End: 2020-11-19
Attending: FAMILY MEDICINE
Payer: COMMERCIAL

## 2020-11-19 ENCOUNTER — PRE VISIT (OUTPATIENT)
Dept: ORTHOPEDICS | Facility: CLINIC | Age: 55
End: 2020-11-19

## 2020-11-19 ENCOUNTER — ANCILLARY PROCEDURE (OUTPATIENT)
Dept: GENERAL RADIOLOGY | Facility: CLINIC | Age: 55
End: 2020-11-19
Attending: ORTHOPAEDIC SURGERY
Payer: COMMERCIAL

## 2020-11-19 VITALS — BODY MASS INDEX: 37.94 KG/M2 | RESPIRATION RATE: 16 BRPM | HEIGHT: 70 IN | WEIGHT: 265 LBS

## 2020-11-19 DIAGNOSIS — G89.29 CHRONIC RIGHT SHOULDER PAIN: Primary | ICD-10-CM

## 2020-11-19 DIAGNOSIS — M25.511 RIGHT SHOULDER PAIN: ICD-10-CM

## 2020-11-19 DIAGNOSIS — M25.511 CHRONIC RIGHT SHOULDER PAIN: Primary | ICD-10-CM

## 2020-11-19 DIAGNOSIS — M79.644 PAIN OF FINGER OF RIGHT HAND: ICD-10-CM

## 2020-11-19 DIAGNOSIS — M79.644 PAIN OF FINGER OF RIGHT HAND: Primary | ICD-10-CM

## 2020-11-19 PROCEDURE — 20611 DRAIN/INJ JOINT/BURSA W/US: CPT | Mod: RT | Performed by: FAMILY MEDICINE

## 2020-11-19 PROCEDURE — 99207 PR DROP WITH A PROCEDURE: CPT | Performed by: ORTHOPAEDIC SURGERY

## 2020-11-19 PROCEDURE — 99207 PR DROP WITH A PROCEDURE: CPT | Performed by: FAMILY MEDICINE

## 2020-11-19 PROCEDURE — 73030 X-RAY EXAM OF SHOULDER: CPT | Mod: RT | Performed by: RADIOLOGY

## 2020-11-19 PROCEDURE — 20600 DRAIN/INJ JOINT/BURSA W/O US: CPT | Mod: RT | Performed by: ORTHOPAEDIC SURGERY

## 2020-11-19 RX ORDER — LIDOCAINE HYDROCHLORIDE 10 MG/ML
7 INJECTION, SOLUTION EPIDURAL; INFILTRATION; INTRACAUDAL; PERINEURAL
Status: DISCONTINUED | OUTPATIENT
Start: 2020-11-19 | End: 2023-07-10

## 2020-11-19 RX ORDER — TRIAMCINOLONE ACETONIDE 40 MG/ML
40 INJECTION, SUSPENSION INTRA-ARTICULAR; INTRAMUSCULAR
Status: DISCONTINUED | OUTPATIENT
Start: 2020-11-19 | End: 2023-07-10

## 2020-11-19 RX ORDER — LIDOCAINE HYDROCHLORIDE 10 MG/ML
2 INJECTION, SOLUTION EPIDURAL; INFILTRATION; INTRACAUDAL; PERINEURAL
Status: DISCONTINUED | OUTPATIENT
Start: 2020-11-19 | End: 2023-07-10

## 2020-11-19 RX ORDER — METHYLPREDNISOLONE ACETATE 40 MG/ML
40 INJECTION, SUSPENSION INTRA-ARTICULAR; INTRALESIONAL; INTRAMUSCULAR; SOFT TISSUE
Status: DISCONTINUED | OUTPATIENT
Start: 2020-11-19 | End: 2023-07-10

## 2020-11-19 RX ADMIN — TRIAMCINOLONE ACETONIDE 40 MG: 40 INJECTION, SUSPENSION INTRA-ARTICULAR; INTRAMUSCULAR at 11:05

## 2020-11-19 RX ADMIN — LIDOCAINE HYDROCHLORIDE 7 ML: 10 INJECTION, SOLUTION EPIDURAL; INFILTRATION; INTRACAUDAL; PERINEURAL at 09:29

## 2020-11-19 RX ADMIN — METHYLPREDNISOLONE ACETATE 40 MG: 40 INJECTION, SUSPENSION INTRA-ARTICULAR; INTRALESIONAL; INTRAMUSCULAR; SOFT TISSUE at 09:29

## 2020-11-19 RX ADMIN — LIDOCAINE HYDROCHLORIDE 2 ML: 10 INJECTION, SOLUTION EPIDURAL; INFILTRATION; INTRACAUDAL; PERINEURAL at 11:05

## 2020-11-19 ASSESSMENT — PAIN SCALES - GENERAL: PAINLEVEL: MILD PAIN (2)

## 2020-11-19 ASSESSMENT — MIFFLIN-ST. JEOR: SCORE: 2065.77

## 2020-11-19 NOTE — NURSING NOTE
Reason For Visit:   Chief Complaint   Patient presents with     Right Hand - RECHECK     Follow Up     right hand pain recheck. pain startedback up x 1 month        Primary MD: Radha Brian  Ref. MD:      Age: 54 year old    ?  No      There were no vitals taken for this visit.                      QuickDASH Assessment  QuickDASH Main 11/16/2017   1.Open a tight or new jar. Mild difficulty   2. Do heavy household chores (e.g., wash walls, floors) Mild difficulty   3. Carry a shopping bag or briefcase. Mild difficulty   4. Wash your back. Mild difficulty   5. Use a knife to cut food. Mild difficulty   6. Recreational activities in which you take some force or impact through your arm, shoulder or hand (e.g., golf, hammering, tennis, etc.). Moderate difficulty   7. During the past week, to what extent has your arm, shoulder or hand problem interfered with your normal social activities with family, friends, neighbours or groups? Slightly   8. During the past week, were you limited in your work or other regular daily activities as a result of your arm, shoulder or hand problem? Slightly limited   9. Arm, shoulder or hand pain. Moderate   10.Tingling (pins and needles) in your arm,shoulder or hand. None   11. During the past week, how much difficulty have you had sleeping because of the pain in your arm, shoulder or hand? ([place] number) No difficulty   Quickdash Ability Score 25          Current Outpatient Medications   Medication Sig Dispense Refill     amLODIPine (NORVASC) 2.5 MG tablet Take 1 tablet (2.5 mg) by mouth daily 90 tablet 0     ASPIRIN NOT PRESCRIBED, INTENTIONAL, continuous prn Antiplatelet medication not prescribed intentionally due to GI Issues / Ulcer Disease 1 each 0     blood glucose (ACCU-CHEK GUIDE) test strip Use to test blood sugar 2 times daily or as directed. 100 strip 1     blood glucose monitoring (ACCU-CHEK FASTCLIX) lancets Use to test blood sugar 2 times daily or as directed.   Ok to substitute alternative if insurance prefers. 102 each 11     insulin degludec (TRESIBA) 100 UNIT/ML pen 18 units every morning 45 mL 3     pioglitazone (ACTOS) 30 MG tablet Take 1 tablet (30 mg) by mouth daily 90 tablet 0     semaglutide (OZEMPIC, 1 MG/DOSE,) 2 MG/1.5ML pen Inject 1 mg Subcutaneous every 7 days 9 mL 0     simvastatin (ZOCOR) 10 MG tablet Take 1 tablet (10 mg) by mouth At Bedtime TAKE ONE TABLET BY MOUTH AT BEDTIME 90 tablet 1       Allergies   Allergen Reactions     Pollen Extract      Metformin Diarrhea       Scott Mota, CMA

## 2020-11-19 NOTE — LETTER
11/19/2020         RE: Wei Wolf  [address] [facility] [address]        Dear Colleague,    Thank you for referring your patient, Wei Wolf, to the [facility] [place]. Please see a copy of my visit note below.    Hand / Upper Extremity Injection/Arthrocentesis    Date/Time: 11/19/2020 11:05 AM  Performed by: Lydia Corrales MD  Authorized by: Lydia Corrales MD     Condition: osteoarthritis    Location:  Index finger   Location comment:  Right    Medications:  40 mg triamcinolone 40 MG/ML; 2 mL lidocaine (PF) 1 %  Outcome:  Tolerated well, no immediate complications  Procedure discussed: discussed risks, benefits, and alternatives    Consent Given by:  Patient  Timeout: timeout called immediately prior to procedure    Prep: patient was prepped and draped in usual sterile fashion        Office Visit    November 19, 2020  [facility]   Lydia Corrales MD  Orthopaedic Surgery  Pain of finger of right hand  Dx  RECHECK   ; Referred by Self, Referred, MD  Reason for Visit   Progress Notes  Lydia Corrales MD (Physician)     Orthopedics        Chief Complaint: Right index finger MP joint pain     History of Present Illness:   Wei Wolf is a 52 year old, right handed male who presents today for follow-up regarding right index finger pain. Patient is S/P curettage and bone grafting of a cyst in the head of his right index metacarpal on 12/21/2017. I last evaluated him on June 4, 2020 at which time  he had an injection on the index finger MCP joint which gave him good relief.  He comes in requesting a repeat injection today.  He describes his pain as 2 or 3 on a scale from 0-10.  When the injection was working it was 0 with only occasional aching     Review of Systems:   A 10-point review of systems was obtained and is negative except for as noted in the HPI.      Physical Examination:  Ht 1.775 m (5'  "9.88\")   Wt 111.9 kg (246 lb 9.6 oz)   BMI 35.50 kg/m     Hand Dominance Evaluation  Hand Dominance: Right     General: Healthy appearing male. Affect appropriate. Normal gait. Alert and oriented to surroundings.   Right Upper Extremity: Pain and swelling over MCP joint, no pain at rest. Pain with flexion from 0 to 60 degrees, no palpable subluxation of the extender tendon, but reports clicking in the past. Otherwise his distal CMS is full.   The MCP joint is quite stiff and becomes painful with terminal range of motion.           Assessment:   52-year-old right-hand-dominant male status-post curettage of a right index metacarpal cyst with bone grafting on 12/21/2017, continues to be symptomatic.   Has had good relief with previous injections and requests a repeat injection today.        Plan:   I discussed the  treatment options with the patient.  He would like a repeat injection.  This will be done under fluoroscopy     Procedure:      PROCEDURE:  After written consent, verifying site and side, a sterile Chlora- prep was performed for the injection site.  C-arm guidance was used for difficult placement of a 25-gauge needle into the index finger MCP joint with 1% Lidocaine.  1 mL of Kenalog (40 mg) and 1 mL of lidocaine was injected under fluoroscopic guidance with good relief of pain. Patient tolerated the procedure well.  No complications.  Follow up instructions were given.  Patient will be in contact with our office for follow-up when his pain returns and he is desiring further treatment questions were answered best my ability     Lydia Corrales MD   Hand and Upper Extremity Specialist  [facility]          "

## 2020-11-19 NOTE — NURSING NOTE
[facility] [address] [address]  [phone]  Dept: [phone]  ______________________________________________________________________________    Patient: Wei Wolf   : 1965   MRN: [id number]   2020    INVASIVE PROCEDURE SAFETY CHECKLIST    Date: 2020   Procedure:Right index finger MTP cortisone injection  Patient Name: Wei Wolf  MRN: [id number]  YOB: 1965    Action: Complete sections as appropriate. Any discrepancy results in a HARD COPY until resolved.     PRE PROCEDURE:  Patient ID verified with 2 identifiers (name and  or MRN): Yes  Procedure and site verified with patient/designee (when able): Yes  Accurate consent documentation in medical record: Yes  H&P (or appropriate assessment) documented in medical record: Yes  H&P must be up to 20 days prior to procedure and updates within 24 hours of procedure as applicable: NA  Relevant diagnostic and radiology test results appropriately labeled and displayed as applicable: Yes  Procedure site(s) marked with provider initials: NA    TIMEOUT:  Time-Out performed immediately prior to starting procedure, including verbal and active participation of all team members addressing the following:Yes  * Correct patient identify  * Confirmed that the correct side and site are marked  * An accurate procedure consent form  * Agreement on the procedure to be done  * Correct patient position  * Relevant images and results are properly labeled and appropriately displayed  * The need to administer antibiotics or fluids for irrigation purposes during the procedure as applicable   * Safety precautions based on patient history or medication use    DURING PROCEDURE: Verification of correct person, site, and procedures any time the responsibility for care of the patient is transferred to another member of the care team.       The following  medications were given:         Prior to injection, verified patient identity using patient's name and date of birth.  Due to injection administration, patient instructed to remain in clinic for 15 minutes  afterwards, and to report any adverse reaction to me immediately.        Medication Name: Lidocaine 1%, 50 mg per 5 ml   NDC: 48302-734-40  Drug Amount Wasted:  Yes, 3 ml  Vial/Syringe: Multi dose vial  Expiration Date:  04/24    Medication Name: Kenalog, 40 mg per 1 ml   NDC: [address]-2  Drug Amount Wasted:  None.  Vial/Syringe: Single dose vial  Expiration Date:  06/2022    Scribed by Dulce Aleman LPN for Dr. Corrales on November 19, 2020 based on the provider's statements to me.     Dulce Aleman LPN

## 2020-11-19 NOTE — PROGRESS NOTES
"[facility] Sports Medicine      Patient is a 54 year old male who presents to the office today for: right shoulder pain. Referred by Dr. Johnson for consideration of subacromial and glenohumeral injections.  Has previously had subacromial injection which she did not find particularly helpful in 2018.  Is having persistent symptoms.  Pain particularly worse with reaching across body and behind back.      Past Medical History, Current Medications, and Allergies are reviewed in the electronic medical record as appropriate.     ROS: Pertinent items are noted in HPI.  Constitutional: negative for fevers, chills and malaise  Cardiovascular: negative for dyspnea, fatigue, lower extremity edema  Integument/breast: negative for rash, skin lesion(s) and skin color change  Neurological: negative for paresthesia and weakness      EXAM:Resp 16   Ht 1.79 m (5' 10.47\")   Wt 120.2 kg (265 lb)   BMI 37.52 kg/m      General: Alert, pleasant, no distress  Right shoulder: Has some stiffness in flexion and external rotation compared to contralateral side.  Pain with cross body adduction, Saaved[place].  Strength intact.    Imagin view x-rays of the right shoulder performed and reviewed independently demonstrating mild glenohumeral osteoarthritis, enthesophytes in the greater tuberosity.  See EMR for formal radiology report.      Assessment: Patient is a 54 year old male with persistent right shoulder pain as well as stiffness.  Has some elements of both impingement and intra-articular pathology.  Referred by Dr. Johnson for subacromial and/or glenohumeral injections.    Recommendations:   Reviewed imaging and assessment with the patient in detail  Discussed her options including a glenohumeral or subacromial injections or both.  After discussion of the risks and benefits of these injections the patient would like to pursued a glenohumeral injection only today.  If his pain is unimproved or only partially improved after 2 weeks " he will contact us and we will consider a subacromial injection at that time.  See procedure note for details.  Routine postinjection instructions were given.    Arben Araya MD

## 2020-11-19 NOTE — NURSING NOTE
SPORTS & ORTHOPEDIC WALK-IN VISIT 11/19/2020    Primary Care Physician: Dr. Brian    Reason for visit:     What part of your body is injured / painful?  right shoulder    What caused the injury /pain? No inciting event     How long ago did your injury occur or pain begin? several years ago, roughly 12 years ago.     What are your most bothersome symptoms? Pain    How would you characterize your symptom?  sharp    What makes your symptoms better? Nothing    What makes your symptoms worse? Other: Reaching above head    Have you been previously seen for this problem? Yes, had Pt 2 years ago. Saw Dr Johnson in [place].      Medical History:    Any recent changes to your medical history? No    Any new medication prescribed since last visit? No    Have you had surgery on this body part before? No    Social History:    Occupation:      Handedness: Right    Exercise: 3-4 days/week    Review of Systems:    Do you have fever, chills, weight loss? No    Do you have any vision problems? No    Do you have any chest pain or edema? No    Do you have any shortness of breath or wheezing?  No    Do you have stomach problems? No    Do you have any numbness or focal weakness? No    Do you have diabetes? Yes    Do you have problems with bleeding or clotting? No    Do you have an rashes or other skin lesions? No      Darcy Fonseca ATC

## 2020-11-19 NOTE — PROGRESS NOTES
Large Joint Injection/Arthocentesis: R glenohumeral joint    Date/Time: 11/19/2020 9:29 AM  Performed by: Arben Araya MD  Authorized by: Arben Araya MD     Indications:  Pain  Needle Size:  22 G  Guidance: ultrasound    Location:  Shoulder      Site:  R glenohumeral joint  Medications:  40 mg methylPREDNISolone 40 MG/ML; 7 mL lidocaine (PF) 1 %  Outcome:  Tolerated well, no immediate complications  Procedure discussed: discussed risks, benefits, and alternatives    Consent Given by:  Patient  Timeout: timeout called immediately prior to procedure    Prep: patient was prepped and draped in usual sterile fashion         PROCEDURE: Ultrasound Guided Right Glehohumeral Injection   The patient was apprised of the risks and the benefits of the procedure written consent was signed by the patient.   The patient was positioned seated in a chair.  The posterior glenohumeral joint was identified with ultrasound and marked with a pen.  This area was then cleaned with a chlorhexidine swab.  Anesthesia of the skin was obtained with 3mL 1% lidocaine.  Next using direct and continuous ultrasound guidance with sterile technique a 22-gauge needle was introduced into the glenohumeral joint and a solution of 40 mg methylprednisolone and 4mL 1% lidocaine was injected and seen flowing into the joint space.  Images were captured and saved to the permanent record.  The patient tolerated the procedure well and there were no immediate complications.  Patient was instructed to ice the shoulder upon leaving the clinic and refrain from overuse for the next 2 days.  Follow-up promptly for any increase in pain, swelling, redness or warmth from the injection site.  Otherwise routine postinjection instructions were given.    Arben Araya MD          [facility] SPORTS MEDICINE [address] [id number] [phone]  Dept:  [phone]  ______________________________________________________________________________    Patient: Wei Wolf   : 1965   MRN: [id number]   2020    INVASIVE PROCEDURE SAFETY CHECKLIST    Date: 2020   Procedure: Right shoulder glenohumeral injection   Patient Name: Wei Wolf  MRN: [id number]  YOB: 1965    Action: Complete sections as appropriate. Any discrepancy results in a HARD COPY until resolved.     PRE PROCEDURE:  Patient ID verified with 2 identifiers (name and  or MRN): Yes  Procedure and site verified with patient/designee (when able): Yes  Accurate consent documentation in medical record: Yes  H&P (or appropriate assessment) documented in medical record: Yes  H&P must be up to 20 days prior to procedure and updates within 24 hours of procedure as applicable: Yes  Relevant diagnostic and radiology test results appropriately labeled and displayed as applicable: Yes  Procedure site(s) marked with provider initials: Yes    TIMEOUT:  Time-Out performed immediately prior to starting procedure, including verbal and active participation of all team members addressing the following:Yes  * Correct patient identify  * Confirmed that the correct side and site are marked  * An accurate procedure consent form  * Agreement on the procedure to be done  * Correct patient position  * Relevant images and results are properly labeled and appropriately displayed  * The need to administer antibiotics or fluids for irrigation purposes during the procedure as applicable   * Safety precautions based on patient history or medication use    DURING PROCEDURE: Verification of correct person, site, and procedures any time the responsibility for care of the patient is transferred to another member of the care team.       The following medications were given:         Prior to injection, verified patient identity using patient's name and date of birth.  Due to injection  administration, patient instructed to remain in clinic for 15 minutes  afterwards, and to report any adverse reaction to me immediately.    Joint injection was performed.    Medication Name: Lidocaine 1%   NDC 90195-269-92  Drug Amount Wasted:  Yes: 12 mg/ml   Vial/Syringe: Single dose vial  Expiration Date:  8/2023    Medication Name: Depo-Medrol   NDC 4454-8804-18  Drug Amount Wasted:  None.  Vial/Syringe: Single dose vial  Expiration Date:  07/2021      Scribed by Darcy Fonseca ATC for Dr. Araya on November 19, 2020 at 9:00am based on the provider's statements to me.     Darcy Fonseca

## 2020-11-19 NOTE — PROGRESS NOTES
"Hand / Upper Extremity Injection/Arthrocentesis    Date/Time: 11/19/2020 11:05 AM  Performed by: Lydia Corrales MD  Authorized by: Lydia Corrales MD     Condition: osteoarthritis    Location:  Index finger   Location comment:  Right    Medications:  40 mg triamcinolone 40 MG/ML; 2 mL lidocaine (PF) 1 %  Outcome:  Tolerated well, no immediate complications  Procedure discussed: discussed risks, benefits, and alternatives    Consent Given by:  Patient  Timeout: timeout called immediately prior to procedure    Prep: patient was prepped and draped in usual sterile fashion        Office Visit    November 19, 2020  [facility]   Lydia Corrales MD  Orthopaedic Surgery  Pain of finger of right hand  Dx  RECHECK   ; Referred by Self, Jennifer, MD  Reason for Visit   Progress Notes  Lydia Corrales MD (Physician)     Orthopedics        Chief Complaint: Right index finger MP joint pain     History of Present Illness:   Wei Wolf is a 52 year old, right handed male who presents today for follow-up regarding right index finger pain. Patient is S/P curettage and bone grafting of a cyst in the head of his right index metacarpal on 12/21/2017. I last evaluated him on June 4, 2020 at which time  he had an injection on the index finger MCP joint which gave him good relief.  He comes in requesting a repeat injection today.  He describes his pain as 2 or 3 on a scale from 0-10.  When the injection was working it was 0 with only occasional aching     Review of Systems:   A 10-point review of systems was obtained and is negative except for as noted in the HPI.      Physical Examination:  Ht 1.775 m (5' 9.88\")   Wt 111.9 kg (246 lb 9.6 oz)   BMI 35.50 kg/m     Hand Dominance Evaluation  Hand Dominance: Right     General: Healthy appearing male. Affect appropriate. Normal gait. Alert and oriented to surroundings.   Right Upper Extremity: Pain and swelling over MCP joint, " no pain at rest. Pain with flexion from 0 to 60 degrees, no palpable subluxation of the extender tendon, but reports clicking in the past. Otherwise his distal CMS is full.   The MCP joint is quite stiff and becomes painful with terminal range of motion.           Assessment:   52-year-old right-hand-dominant male status-post curettage of a right index metacarpal cyst with bone grafting on 12/21/2017, continues to be symptomatic.   Has had good relief with previous injections and requests a repeat injection today.        Plan:   I discussed the  treatment options with the patient.  He would like a repeat injection.  This will be done under fluoroscopy     Procedure:      PROCEDURE:  After written consent, verifying site and side, a sterile Chlora- prep was performed for the injection site.  C-arm guidance was used for difficult placement of a 25-gauge needle into the index finger MCP joint with 1% Lidocaine.  1 mL of Kenalog (40 mg) and 1 mL of lidocaine was injected under fluoroscopic guidance with good relief of pain. Patient tolerated the procedure well.  No complications.  Follow up instructions were given.  Patient will be in contact with our office for follow-up when his pain returns and he is desiring further treatment questions were answered best my ability     Lydia Corrales MD   Hand and Upper Extremity Specialist  [facility]

## 2020-12-24 ENCOUNTER — MYC REFILL (OUTPATIENT)
Dept: FAMILY MEDICINE | Facility: CLINIC | Age: 55
End: 2020-12-24

## 2020-12-24 DIAGNOSIS — Z79.4 TYPE 2 DIABETES MELLITUS WITHOUT COMPLICATION, WITH LONG-TERM CURRENT USE OF INSULIN (H): ICD-10-CM

## 2020-12-24 DIAGNOSIS — E11.9 TYPE 2 DIABETES MELLITUS WITHOUT COMPLICATION, WITH LONG-TERM CURRENT USE OF INSULIN (H): ICD-10-CM

## 2020-12-24 DIAGNOSIS — E78.00 HIGH CHOLESTEROL: ICD-10-CM

## 2020-12-24 RX ORDER — AMLODIPINE BESYLATE 2.5 MG/1
2.5 TABLET ORAL DAILY
Qty: 90 TABLET | Refills: 3 | Status: SHIPPED | OUTPATIENT
Start: 2020-12-24 | End: 2021-12-22

## 2020-12-24 RX ORDER — PIOGLITAZONEHYDROCHLORIDE 30 MG/1
30 TABLET ORAL DAILY
Qty: 90 TABLET | Refills: 0 | Status: SHIPPED | OUTPATIENT
Start: 2020-12-24 | End: 2021-04-13

## 2020-12-24 RX ORDER — SEMAGLUTIDE 1.34 MG/ML
1 INJECTION, SOLUTION SUBCUTANEOUS
Qty: 9 ML | Refills: 0 | Status: SHIPPED | OUTPATIENT
Start: 2020-12-24 | End: 2021-03-18

## 2020-12-24 NOTE — TELEPHONE ENCOUNTER
Routing refill request to provider for review/approval because:  Labs not current:    AST   Date Value Ref Range Status   12/12/2012 25 0 - 45 U/L Final       Lab Results   Component Value Date    ALT 39 12/12/2012         Alma POEN, RN, CPN

## 2021-01-13 DIAGNOSIS — E78.00 HIGH CHOLESTEROL: ICD-10-CM

## 2021-01-14 RX ORDER — SIMVASTATIN 10 MG
10 TABLET ORAL AT BEDTIME
Qty: 90 TABLET | Refills: 2 | Status: SHIPPED | OUTPATIENT
Start: 2021-01-14 | End: 2021-09-22

## 2021-01-14 NOTE — TELEPHONE ENCOUNTER
Simvastatin refill request  Labs done in Oct; had virtual visit with pcp in Oct.  To MD for refill needs; flags interaction with amlodipine.  rn unable to refill.

## 2021-03-15 DIAGNOSIS — Z79.4 TYPE 2 DIABETES MELLITUS WITHOUT COMPLICATION, WITH LONG-TERM CURRENT USE OF INSULIN (H): ICD-10-CM

## 2021-03-15 DIAGNOSIS — E11.9 TYPE 2 DIABETES MELLITUS WITHOUT COMPLICATION, WITH LONG-TERM CURRENT USE OF INSULIN (H): ICD-10-CM

## 2021-03-18 ENCOUNTER — MYC MEDICAL ADVICE (OUTPATIENT)
Dept: FAMILY MEDICINE | Facility: CLINIC | Age: 56
End: 2021-03-18

## 2021-03-18 RX ORDER — SEMAGLUTIDE 1.34 MG/ML
1 INJECTION, SOLUTION SUBCUTANEOUS
Qty: 9 ML | Refills: 0 | Status: SHIPPED | OUTPATIENT
Start: 2021-03-18 | End: 2021-06-01

## 2021-03-26 ENCOUNTER — IMMUNIZATION (OUTPATIENT)
Dept: NURSING | Facility: CLINIC | Age: 56
End: 2021-03-26
Payer: COMMERCIAL

## 2021-03-26 PROCEDURE — 91300 PR COVID VAC PFIZER DIL RECON 30 MCG/0.3 ML IM: CPT

## 2021-03-26 PROCEDURE — 0001A PR COVID VAC PFIZER DIL RECON 30 MCG/0.3 ML IM: CPT

## 2021-04-08 DIAGNOSIS — E11.9 TYPE 2 DIABETES MELLITUS WITHOUT COMPLICATION, WITH LONG-TERM CURRENT USE OF INSULIN (H): ICD-10-CM

## 2021-04-08 DIAGNOSIS — Z79.4 TYPE 2 DIABETES MELLITUS WITHOUT COMPLICATION, WITH LONG-TERM CURRENT USE OF INSULIN (H): ICD-10-CM

## 2021-04-08 NOTE — TELEPHONE ENCOUNTER
Routing refill request to provider for review/approval because:  Labs not current:  ALT, AST, A1C    Karin POEN, RN

## 2021-04-11 ENCOUNTER — HEALTH MAINTENANCE LETTER (OUTPATIENT)
Age: 56
End: 2021-04-11

## 2021-04-13 RX ORDER — PIOGLITAZONEHYDROCHLORIDE 30 MG/1
30 TABLET ORAL DAILY
Qty: 90 TABLET | Refills: 0 | Status: SHIPPED | OUTPATIENT
Start: 2021-04-13 | End: 2021-07-14

## 2021-04-14 DIAGNOSIS — E11.65 TYPE 2 DIABETES MELLITUS WITH HYPERGLYCEMIA (H): ICD-10-CM

## 2021-04-14 LAB
ALT SERPL W P-5'-P-CCNC: 28 U/L (ref 0–70)
AST SERPL W P-5'-P-CCNC: 16 U/L (ref 0–45)
HBA1C MFR BLD: 7 % (ref 0–5.6)

## 2021-04-14 PROCEDURE — 84450 TRANSFERASE (AST) (SGOT): CPT | Performed by: FAMILY MEDICINE

## 2021-04-14 PROCEDURE — 84460 ALANINE AMINO (ALT) (SGPT): CPT | Performed by: FAMILY MEDICINE

## 2021-04-14 PROCEDURE — 36415 COLL VENOUS BLD VENIPUNCTURE: CPT | Performed by: FAMILY MEDICINE

## 2021-04-14 PROCEDURE — 83036 HEMOGLOBIN GLYCOSYLATED A1C: CPT | Performed by: FAMILY MEDICINE

## 2021-04-16 ENCOUNTER — IMMUNIZATION (OUTPATIENT)
Dept: NURSING | Facility: CLINIC | Age: 56
End: 2021-04-16
Attending: FAMILY MEDICINE
Payer: COMMERCIAL

## 2021-04-16 PROCEDURE — 0002A PR COVID VAC PFIZER DIL RECON 30 MCG/0.3 ML IM: CPT

## 2021-04-16 PROCEDURE — 91300 PR COVID VAC PFIZER DIL RECON 30 MCG/0.3 ML IM: CPT

## 2021-04-28 DIAGNOSIS — M79.644 PAIN OF FINGER OF RIGHT HAND: Primary | ICD-10-CM

## 2021-05-06 ENCOUNTER — ANCILLARY PROCEDURE (OUTPATIENT)
Dept: GENERAL RADIOLOGY | Facility: CLINIC | Age: 56
End: 2021-05-06
Attending: ORTHOPAEDIC SURGERY
Payer: COMMERCIAL

## 2021-05-06 ENCOUNTER — OFFICE VISIT (OUTPATIENT)
Dept: ORTHOPEDICS | Facility: CLINIC | Age: 56
End: 2021-05-06
Payer: COMMERCIAL

## 2021-05-06 DIAGNOSIS — M15.2 DEGENERATIVE ARTHRITIS OF PROXIMAL INTERPHALANGEAL JOINT OF INDEX FINGER OF RIGHT HAND: Primary | ICD-10-CM

## 2021-05-06 DIAGNOSIS — M79.644 PAIN OF FINGER OF RIGHT HAND: ICD-10-CM

## 2021-05-06 PROCEDURE — 20600 DRAIN/INJ JOINT/BURSA W/O US: CPT | Mod: RT | Performed by: ORTHOPAEDIC SURGERY

## 2021-05-06 PROCEDURE — 99207 PR DROP WITH A PROCEDURE: CPT | Performed by: ORTHOPAEDIC SURGERY

## 2021-05-06 PROCEDURE — 77002 NEEDLE LOCALIZATION BY XRAY: CPT | Performed by: ORTHOPAEDIC SURGERY

## 2021-05-06 RX ORDER — LIDOCAINE HYDROCHLORIDE 10 MG/ML
2 INJECTION, SOLUTION EPIDURAL; INFILTRATION; INTRACAUDAL; PERINEURAL
Status: DISCONTINUED | OUTPATIENT
Start: 2021-05-06 | End: 2023-07-10

## 2021-05-06 RX ORDER — TRIAMCINOLONE ACETONIDE 40 MG/ML
40 INJECTION, SUSPENSION INTRA-ARTICULAR; INTRAMUSCULAR
Status: DISCONTINUED | OUTPATIENT
Start: 2021-05-06 | End: 2023-07-10

## 2021-05-06 RX ADMIN — TRIAMCINOLONE ACETONIDE 40 MG: 40 INJECTION, SUSPENSION INTRA-ARTICULAR; INTRAMUSCULAR at 12:31

## 2021-05-06 RX ADMIN — LIDOCAINE HYDROCHLORIDE 2 ML: 10 INJECTION, SOLUTION EPIDURAL; INFILTRATION; INTRACAUDAL; PERINEURAL at 12:31

## 2021-05-06 NOTE — PROGRESS NOTES
Hand / Upper Extremity Injection/Arthrocentesis: R index MCP    Date/Time: 2021 12:31 PM  Performed by: Lydia Corrales MD  Authorized by: Lydia Corrales MD     Needle Size:  25 G  Guidance: fluoroscopy    Condition: osteoarthritis    Location:  Index finger  Site:  R index MCP    Medications:  40 mg triamcinolone 40 MG/ML; 2 mL lidocaine (PF) 1 %  Outcome:  Tolerated well, no immediate complications  Procedure discussed: discussed risks, benefits, and alternatives    Consent Given by:  Patient  Timeout: timeout called immediately prior to procedure    Prep: patient was prepped and draped in usual sterile fashion        [facility] ORTHOPEDIC [address] [id number] [phone]  Dept: [phone]  ______________________________________________________________________________    Patient: Wei Wolf   : 1965   MRN: [id number]   May 6, 2021    INVASIVE PROCEDURE SAFETY CHECKLIST    Date: 2021   Procedure: Right index finger MCP join steroid injection  Patient Name: Wei Wolf  MRN: [id number]  YOB: 1965    Action: Complete sections as appropriate. Any discrepancy results in a HARD COPY until resolved.     PRE PROCEDURE:  Patient ID verified with 2 identifiers (name and  or MRN): Yes  Procedure and site verified with patient/designee (when able): Yes  Accurate consent documentation in medical record: Yes  H&P (or appropriate assessment) documented in medical record: Yes  H&P must be up to 20 days prior to procedure and updates within 24 hours of procedure as applicable: Yes  Relevant diagnostic and radiology test results appropriately labeled and displayed as applicable: Yes  Procedure site(s) marked with provider initials: Yes    TIMEOUT:  Time-Out performed immediately prior to starting procedure, including verbal and active participation of all team members addressing the  following:Yes  * Correct patient identify  * Confirmed that the correct side and site are marked  * An accurate procedure consent form  * Agreement on the procedure to be done  * Correct patient position  * Relevant images and results are properly labeled and appropriately displayed  * The need to administer antibiotics or fluids for irrigation purposes during the procedure as applicable   * Safety precautions based on patient history or medication use    DURING PROCEDURE: Verification of correct person, site, and procedures any time the responsibility for care of the patient is transferred to another member of the care team.       The following medications were given:         Prior to injection, verified patient identity using patient's name and date of birth.  Due to injection administration, patient instructed to remain in clinic for 15 minutes  afterwards, and to report any adverse reaction to me immediately.    Joint injection was performed.    Medication Name: Kenalog NDC: [address]-7  Drug Amount Wasted:  None.  Vial/Syringe: Single dose vial  Expiration Date:  10/1/22    Medication Name: Lidocaine NDC: 41308-656-76    Scribed by Allegra Angelo ATC for Dr. Corrales on May 6, 2021 at 11:30 am based on the provider's statements to me.     Allegra Angelo ATC    Patient was last seen on November 19, 2020.  He had a cortisone injection of the left index finger MCP joint with excellent relief of pain.  He is now starting to work outdoors again and starting to get early symptoms.  He comes in requesting a repeat injection today.  On examination he had mild to moderate synovitis.  He had pain localized to the MCP joint.  His range of motion has improved and is now nearly 65 degrees of flexion with full extension.  Risk benefits alternatives of intra-articular cortisone injection were discussed and he wishes to proceed today.        PROCEDURE:  After written consent, verifying site and side, a sterile Chlora- prep  was performed for the injection site.  C-arm guidance was used for difficult intra-articular placement placement of a 25-gauge needle into the index finger metacarpal phalangeal joint joint with 1% Lidocaine.  1 mL of Kenalog (40 mg) and 1 mL of lidocaine was injected under fluoroscopic guidance with good relief of pain. Patient tolerated the procedure well.  No complications.  Follow up instructions were given.      Patient would like to be seen back in 6 to 7 months for possible repeat injection, should his symptoms recur.    Lydia Corrales MD   Hand and Upper Extremity Specialist  [facility]

## 2021-05-06 NOTE — LETTER
2021         RE: Wei Wolf  [address] [facility] [address]        Dear Colleague,    Thank you for referring your patient, Wei Wolf, to the [facility] ORTHOPED[facility]. Please see a copy of my visit note below.    Hand / Upper Extremity Injection/Arthrocentesis: R index MCP    Date/Time: 2021 12:31 PM  Performed by: Lydia Corrales MD  Authorized by: Lydia Corrales MD     Needle Size:  25 G  Guidance: fluoroscopy    Condition: osteoarthritis    Location:  Index finger  Site:  R index MCP    Medications:  40 mg triamcinolone 40 MG/ML; 2 mL lidocaine (PF) 1 %  Outcome:  Tolerated well, no immediate complications  Procedure discussed: discussed risks, benefits, and alternatives    Consent Given by:  Patient  Timeout: timeout called immediately prior to procedure    Prep: patient was prepped and draped in usual sterile fashion        [address]  [address] [facility] [id number] [phone]  Dept: [phone]  ______________________________________________________________________________    Patient: Wei Wolf   : 1965   MRN: [id number]   May 6, 2021    INVASIVE PROCEDURE SAFETY CHECKLIST    Date: 2021   Procedure: Right index finger MCP join steroid injection  Patient Name: Wei Wolf  MRN: [id number]  YOB: 1965    Action: Complete sections as appropriate. Any discrepancy results in a HARD COPY until resolved.     PRE PROCEDURE:  Patient ID verified with 2 identifiers (name and  or MRN): Yes  Procedure and site verified with patient/designee (when able): Yes  Accurate consent documentation in medical record: Yes  H&P (or appropriate assessment) documented in medical record: Yes  H&P must be up to 20 days prior to procedure and updates within 24 hours of procedure as applicable: Yes  Relevant diagnostic and radiology test results  appropriately labeled and displayed as applicable: Yes  Procedure site(s) marked with provider initials: Yes    TIMEOUT:  Time-Out performed immediately prior to starting procedure, including verbal and active participation of all team members addressing the following:Yes  * Correct patient identify  * Confirmed that the correct side and site are marked  * An accurate procedure consent form  * Agreement on the procedure to be done  * Correct patient position  * Relevant images and results are properly labeled and appropriately displayed  * The need to administer antibiotics or fluids for irrigation purposes during the procedure as applicable   * Safety precautions based on patient history or medication use    DURING PROCEDURE: Verification of correct person, site, and procedures any time the responsibility for care of the patient is transferred to another member of the care team.       The following medications were given:         Prior to injection, verified patient identity using patient's name and date of birth.  Due to injection administration, patient instructed to remain in clinic for 15 minutes  afterwards, and to report any adverse reaction to me immediately.    Joint injection was performed.    Medication Name: Kenalog NDC: [address]-6  Drug Amount Wasted:  None.  Vial/Syringe: Single dose vial  Expiration Date:  10/1/22    Medication Name: Lidocaine NDC: 83269-516-80    Scribed by Allegra Angelo ATC for Dr. Corrales on May 6, 2021 at 11:30 am based on the provider's statements to me.     Allegra Angelo ATC    Patient was last seen on November 19, 2020.  He had a cortisone injection of the left index finger MCP joint with excellent relief of pain.  He is now starting to work outdoors again and starting to get early symptoms.  He comes in requesting a repeat injection today.  On examination he had mild to moderate synovitis.  He had pain localized to the MCP joint.  His range of motion has improved and is  now nearly 65 degrees of flexion with full extension.  Risk benefits alternatives of intra-articular cortisone injection were discussed and he wishes to proceed today.        PROCEDURE:  After written consent, verifying site and side, a sterile Chlora- prep was performed for the injection site.  C-arm guidance was used for difficult intra-articular placement placement of a 25-gauge needle into the index finger metacarpal phalangeal joint joint with 1% Lidocaine.  1 mL of Kenalog (40 mg) and 1 mL of lidocaine was injected under fluoroscopic guidance with good relief of pain. Patient tolerated the procedure well.  No complications.  Follow up instructions were given.      Patient would like to be seen back in 6 to 7 months for possible repeat injection, should his symptoms recur.    Lydia Corrales MD   Hand and Upper Extremity Specialist  [facility]

## 2021-05-06 NOTE — NURSING NOTE
Reason For Visit:   Chief Complaint   Patient presents with     RECHECK     5 month follow up cortisone injection - Right index finger MTP cortisone injection       Primary MD: Radha Brian    Age: 55 year old    ?  No      There were no vitals taken for this visit.      Pain Assessment  Patient Currently in Pain: Yes  0-10 Pain Scale: 5  Primary Pain Location: Finger (Comment which one)(Right index finger)               QuickDASH Assessment  QuickDASH Main 11/16/2017   1.Open a tight or new jar. Mild difficulty   2. Do heavy household chores (e.g., wash walls, floors) Mild difficulty   3. Carry a shopping bag or briefcase. Mild difficulty   4. Wash your back. Mild difficulty   5. Use a knife to cut food. Mild difficulty   6. Recreational activities in which you take some force or impact through your arm, shoulder or hand (e.g., golf, hammering, tennis, etc.). Moderate difficulty   7. During the past week, to what extent has your arm, shoulder or hand problem interfered with your normal social activities with family, friends, neighbours or groups? Slightly   8. During the past week, were you limited in your work or other regular daily activities as a result of your arm, shoulder or hand problem? Slightly limited   9. Arm, shoulder or hand pain. Moderate   10.Tingling (pins and needles) in your arm,shoulder or hand. None   11. During the past week, how much difficulty have you had sleeping because of the pain in your arm, shoulder or hand? ([place] number) No difficulty   Quickdash Ability Score 25          Current Outpatient Medications   Medication Sig Dispense Refill     amLODIPine (NORVASC) 2.5 MG tablet Take 1 tablet (2.5 mg) by mouth daily 90 tablet 3     ASPIRIN NOT PRESCRIBED, INTENTIONAL, continuous prn Antiplatelet medication not prescribed intentionally due to GI Issues / Ulcer Disease 1 each 0     blood glucose (ACCU-CHEK GUIDE) test strip Use to test blood sugar 2 times daily or as directed.  100 strip 1     blood glucose monitoring (ACCU-CHEK FASTCLIX) lancets Use to test blood sugar 2 times daily or as directed.  Ok to substitute alternative if insurance prefers. 102 each 11     pioglitazone (ACTOS) 30 MG tablet Take 1 tablet (30 mg) by mouth daily 90 tablet 0     semaglutide (OZEMPIC, 1 MG/DOSE,) 2 MG/1.5ML pen Inject 1 mg Subcutaneous every 7 days 9 mL 0     simvastatin (ZOCOR) 10 MG tablet Take 1 tablet (10 mg) by mouth At Bedtime TAKE ONE TABLET BY MOUTH AT BEDTIME 90 tablet 2     TRESIBA FLEXTOUCH 100 UNIT/ML pen INJECT 18 UNITS UNDER THE SKIN EVERY MORNING 30 mL 0       Allergies   Allergen Reactions     Pollen Extract      Metformin Diarrhea       Allegra Angelo, ATC

## 2021-06-01 ENCOUNTER — MYC REFILL (OUTPATIENT)
Dept: FAMILY MEDICINE | Facility: CLINIC | Age: 56
End: 2021-06-01

## 2021-06-01 DIAGNOSIS — E11.9 TYPE 2 DIABETES MELLITUS WITHOUT COMPLICATION, WITH LONG-TERM CURRENT USE OF INSULIN (H): ICD-10-CM

## 2021-06-01 DIAGNOSIS — Z79.4 TYPE 2 DIABETES MELLITUS WITHOUT COMPLICATION, WITH LONG-TERM CURRENT USE OF INSULIN (H): ICD-10-CM

## 2021-06-02 RX ORDER — SEMAGLUTIDE 1.34 MG/ML
1 INJECTION, SOLUTION SUBCUTANEOUS
Qty: 9 ML | Refills: 0 | Status: SHIPPED | OUTPATIENT
Start: 2021-06-02 | End: 2021-09-15

## 2021-06-04 ENCOUNTER — VIRTUAL VISIT (OUTPATIENT)
Dept: FAMILY MEDICINE | Facility: CLINIC | Age: 56
End: 2021-06-04
Payer: COMMERCIAL

## 2021-06-04 DIAGNOSIS — Z79.4 TYPE 2 DIABETES MELLITUS WITHOUT COMPLICATION, WITH LONG-TERM CURRENT USE OF INSULIN (H): Primary | ICD-10-CM

## 2021-06-04 DIAGNOSIS — E11.9 TYPE 2 DIABETES MELLITUS WITHOUT COMPLICATION, WITH LONG-TERM CURRENT USE OF INSULIN (H): Primary | ICD-10-CM

## 2021-06-04 DIAGNOSIS — I10 HYPERTENSION GOAL BP (BLOOD PRESSURE) < 140/90: ICD-10-CM

## 2021-06-04 DIAGNOSIS — E78.5 HYPERLIPIDEMIA LDL GOAL <100: ICD-10-CM

## 2021-06-04 PROCEDURE — 99213 OFFICE O/P EST LOW 20 MIN: CPT | Mod: 95 | Performed by: FAMILY MEDICINE

## 2021-06-04 NOTE — PROGRESS NOTES
"Wei is a 55 year old who is being evaluated via a billable video visit.      How would you like to obtain your AVS? MyChart  If the video visit is dropped, the invitation should be resent by: Text to cell phone: [phone]  Will anyone else be joining your video visit? No      Video Start Time: 2:00 PM    Assessment & Plan     Type 2 diabetes mellitus without complication, with long-term current use of insulin (H)  Doing well   Continue current meds   Tresiba 22 units  Actos   Ozempic   Lab Results   Component Value Date    A1C 7.0 04/14/2021    A1C 6.7 10/07/2020    A1C 8.1 12/12/2019    A1C 8.0 03/11/2019    A1C 8.8 10/08/2018       - **A1C FUTURE 1yr; Future  - Basic metabolic panel  (Ca, Cl, CO2, Creat, Gluc, K, Na, BUN); Future  - Albumin Random Urine Quantitative with Creat Ratio; Future    Hypertension goal BP (blood pressure) < 140/90  Well controlled on current meds no side effects  Continue current meds     Hyperlipidemia LDL goal <100  Chronic stable problem   Continue statin   - Lipid panel reflex to direct LDL Fasting; Future             BMI:   Estimated body mass index is 37.52 kg/m  as calculated from the following:    Height as of 11/19/20: 1.79 m (5' 10.47\").    Weight as of 11/19/20: 120.2 kg (265 lb).       Work on weight loss  Regular exercise    Return in about 4 months (around 10/4/2021) for Follow up, in person, Routine preventive.    Radha Brian MD  [facility]    Subjective   Wei is a 55 year old who presents for the following health issues     HPI     Diabetes Follow-up    How often are you checking your blood sugar? One time daily  What time of day are you checking your blood sugars (select all that apply)?  Before meals  Have you had any blood sugars above 200?  No  Have you had any blood sugars below 70?  No    What symptoms do you notice when your blood sugar is low?  None    What concerns do you have today about your diabetes? None     Do you have any " of these symptoms? (Select all that apply)  No numbness or tingling in feet.  No redness, sores or blisters on feet.  No complaints of excessive thirst.  No reports of blurry vision.  No significant changes to weight.    Have you had a diabetic eye exam in the last 12 months? No    Taking Tresiba to 22     Lab Results   Component Value Date    A1C 7.0 04/14/2021    A1C 6.7 10/07/2020    A1C 8.1 12/12/2019    A1C 8.0 03/11/2019    A1C 8.8 10/08/2018       Wt Readings from Last 4 Encounters:   11/19/20 120.2 kg (265 lb)   12/17/19 107 kg (236 lb)   02/21/19 111.9 kg (246 lb 9.6 oz)   12/18/18 114.8 kg (253 lb)               Hyperlipidemia Follow-Up      Are you regularly taking any medication or supplement to lower your cholesterol?   Yes- simvastatin    Are you having muscle aches or other side effects that you think could be caused by your cholesterol lowering medication?  No  The ASCVD Risk score (Hays SANYA Jr., et al., 2013) failed to calculate for the following reasons:    The valid total cholesterol range is 130 to 320 mg/dL    Hypertension Follow-up      Do you check your blood pressure regularly outside of the clinic? No     Are you following a low salt diet? No    Are your blood pressures ever more than 140 on the top number (systolic) OR more   than 90 on the bottom number (diastolic), for example 140/90? n/a    BP Readings from Last 2 Encounters:   11/04/20 135/74   12/31/19 116/79     Hemoglobin A1C (%)   Date Value   04/14/2021 7.0 (H)   10/07/2020 6.7 (H)     LDL Cholesterol Calculated (mg/dL)   Date Value   10/07/2020 63   12/12/2019 70           Review of Systems   Constitutional, HEENT, cardiovascular, pulmonary, gi and gu systems are negative, except as otherwise noted.      Objective           Vitals:  No vitals were obtained today due to virtual visit.    Physical Exam   GENERAL: Healthy, alert and no distress  EYES: Eyes grossly normal to inspection.  No discharge or erythema, or obvious  scleral/conjunctival abnormalities.  RESP: No audible wheeze, cough, or visible cyanosis.  No visible retractions or increased work of breathing.    SKIN: Visible skin clear. No significant rash, abnormal pigmentation or lesions.  NEURO: Cranial nerves grossly intact.  Mentation and speech appropriate for age.  PSYCH: Mentation appears normal, affect normal/bright, judgement and insight intact, normal speech and appearance well-groomed.                Video-Visit Details    Type of service:  Video Visit    Video End Time:2:08 PM    Originating Location (pt. Location): Home    Distant Location (provider location):  [facility]     Platform used for Video Visit: ZowPow

## 2021-07-12 DIAGNOSIS — Z79.4 TYPE 2 DIABETES MELLITUS WITHOUT COMPLICATION, WITH LONG-TERM CURRENT USE OF INSULIN (H): ICD-10-CM

## 2021-07-12 DIAGNOSIS — E11.9 TYPE 2 DIABETES MELLITUS WITHOUT COMPLICATION, WITH LONG-TERM CURRENT USE OF INSULIN (H): ICD-10-CM

## 2021-07-14 RX ORDER — PIOGLITAZONEHYDROCHLORIDE 30 MG/1
30 TABLET ORAL DAILY
Qty: 90 TABLET | Refills: 0 | Status: SHIPPED | OUTPATIENT
Start: 2021-07-14 | End: 2021-09-21

## 2021-07-14 NOTE — TELEPHONE ENCOUNTER
Patient here for follow up and evaluation. Patient has worsening dyspnea, will arrange home oxygen, order sleep study and pulmonary referral.  Will renew medications for pain. I discussed the patient's case with the resident. I agree with the resident's finding and plan, as documented in today's note.   Patient's information was accessed in the [place] Prescription Monitoring Program prior to prescribing controlled substance.       Dionisio Hadley M.D.      Prescription approved per [facility] Refill Protocol.    Tracee Glaser RN  [facility]

## 2021-09-22 DIAGNOSIS — E78.00 HIGH CHOLESTEROL: ICD-10-CM

## 2021-09-22 RX ORDER — SIMVASTATIN 10 MG
10 TABLET ORAL AT BEDTIME
Qty: 90 TABLET | Refills: 0 | Status: SHIPPED | OUTPATIENT
Start: 2021-09-22 | End: 2021-12-05

## 2021-10-14 ENCOUNTER — LAB (OUTPATIENT)
Dept: LAB | Facility: CLINIC | Age: 56
End: 2021-10-14
Payer: COMMERCIAL

## 2021-10-14 DIAGNOSIS — Z79.4 TYPE 2 DIABETES MELLITUS WITHOUT COMPLICATION, WITH LONG-TERM CURRENT USE OF INSULIN (H): ICD-10-CM

## 2021-10-14 DIAGNOSIS — E11.9 TYPE 2 DIABETES MELLITUS WITHOUT COMPLICATION, WITH LONG-TERM CURRENT USE OF INSULIN (H): ICD-10-CM

## 2021-10-14 DIAGNOSIS — E78.5 HYPERLIPIDEMIA LDL GOAL <100: ICD-10-CM

## 2021-10-14 LAB
ANION GAP SERPL CALCULATED.3IONS-SCNC: 7 MMOL/L (ref 3–14)
BUN SERPL-MCNC: 19 MG/DL (ref 7–30)
CALCIUM SERPL-MCNC: 9.4 MG/DL (ref 8.5–10.1)
CHLORIDE BLD-SCNC: 104 MMOL/L (ref 94–109)
CHOLEST SERPL-MCNC: 124 MG/DL
CO2 SERPL-SCNC: 27 MMOL/L (ref 20–32)
CREAT SERPL-MCNC: 0.82 MG/DL (ref 0.66–1.25)
CREAT UR-MCNC: 56 MG/DL
FASTING STATUS PATIENT QL REPORTED: YES
GFR SERPL CREATININE-BSD FRML MDRD: >90 ML/MIN/1.73M2
GLUCOSE BLD-MCNC: 147 MG/DL (ref 70–99)
HBA1C MFR BLD: 7 % (ref 0–5.6)
HDLC SERPL-MCNC: 37 MG/DL
LDLC SERPL CALC-MCNC: 68 MG/DL
MICROALBUMIN UR-MCNC: <5 MG/L
MICROALBUMIN/CREAT UR: NORMAL MG/G{CREAT}
NONHDLC SERPL-MCNC: 87 MG/DL
POTASSIUM BLD-SCNC: 3.9 MMOL/L (ref 3.4–5.3)
SODIUM SERPL-SCNC: 138 MMOL/L (ref 133–144)
TRIGL SERPL-MCNC: 94 MG/DL

## 2021-10-14 PROCEDURE — 36415 COLL VENOUS BLD VENIPUNCTURE: CPT

## 2021-10-14 PROCEDURE — 83036 HEMOGLOBIN GLYCOSYLATED A1C: CPT

## 2021-10-14 PROCEDURE — 82043 UR ALBUMIN QUANTITATIVE: CPT

## 2021-10-14 PROCEDURE — 80061 LIPID PANEL: CPT

## 2021-10-14 PROCEDURE — 80048 BASIC METABOLIC PNL TOTAL CA: CPT

## 2021-10-21 ENCOUNTER — MYC REFILL (OUTPATIENT)
Dept: FAMILY MEDICINE | Facility: CLINIC | Age: 56
End: 2021-10-21

## 2021-10-21 ENCOUNTER — MYC MEDICAL ADVICE (OUTPATIENT)
Dept: FAMILY MEDICINE | Facility: CLINIC | Age: 56
End: 2021-10-21

## 2021-10-21 DIAGNOSIS — Z79.4 TYPE 2 DIABETES MELLITUS WITHOUT COMPLICATION, WITH LONG-TERM CURRENT USE OF INSULIN (H): ICD-10-CM

## 2021-10-21 DIAGNOSIS — E11.9 TYPE 2 DIABETES MELLITUS WITHOUT COMPLICATION, WITH LONG-TERM CURRENT USE OF INSULIN (H): ICD-10-CM

## 2021-10-21 RX ORDER — INSULIN DEGLUDEC 100 U/ML
INJECTION, SOLUTION SUBCUTANEOUS
Qty: 30 ML | Refills: 0 | Status: SHIPPED | OUTPATIENT
Start: 2021-10-21 | End: 2021-11-02

## 2021-11-02 ENCOUNTER — OFFICE VISIT (OUTPATIENT)
Dept: FAMILY MEDICINE | Facility: CLINIC | Age: 56
End: 2021-11-02
Payer: COMMERCIAL

## 2021-11-02 VITALS
SYSTOLIC BLOOD PRESSURE: 135 MMHG | HEIGHT: 70 IN | HEART RATE: 85 BPM | OXYGEN SATURATION: 97 % | TEMPERATURE: 97.6 F | DIASTOLIC BLOOD PRESSURE: 80 MMHG | BODY MASS INDEX: 38.8 KG/M2 | WEIGHT: 271 LBS

## 2021-11-02 DIAGNOSIS — E66.01 MORBID OBESITY (H): ICD-10-CM

## 2021-11-02 DIAGNOSIS — E11.65 TYPE 2 DIABETES MELLITUS WITH HYPERGLYCEMIA, WITH LONG-TERM CURRENT USE OF INSULIN (H): Primary | ICD-10-CM

## 2021-11-02 DIAGNOSIS — Z79.4 TYPE 2 DIABETES MELLITUS WITH HYPERGLYCEMIA, WITH LONG-TERM CURRENT USE OF INSULIN (H): Primary | ICD-10-CM

## 2021-11-02 PROCEDURE — 99213 OFFICE O/P EST LOW 20 MIN: CPT | Performed by: FAMILY MEDICINE

## 2021-11-02 RX ORDER — INSULIN DEGLUDEC 100 U/ML
INJECTION, SOLUTION SUBCUTANEOUS
Qty: 30 ML | Refills: 0
Start: 2021-11-02 | End: 2021-12-05

## 2021-11-02 ASSESSMENT — MIFFLIN-ST. JEOR: SCORE: 2080.5

## 2021-11-02 NOTE — PROGRESS NOTES
"  Assessment & Plan     Type 2 diabetes mellitus with hyperglycemia, with long-term current use of insulin (H)   I had a long discussion with the patient about his concerns. Diabetes is controlled   Current medications: Tresiba, Ozempic, Actos  I recommend the following :  1. Patient education: In depth discussion and education was provided about the assessment and implications of each of the below recommendations for management. Patient indicated readiness to learn, all questions were answered and understanding of material presented was confirmed.  2. -Increase exercise as tolerated.   3. Avoid high glycemic index diet , Increasae exericise as tolerated.  4. ASA  taking due to history of gastric bypass surgery  5. -BP:  Well controlled  6. -Lipids:  On statin therapy  7. -Microalbumin not present  8. -Eyes: last eye exam   he is going to make an appointment for diabetic eye exam  9. -Smoking: none.  10. Follow up:   6 months      - insulin degludec (TRESIBA FLEXTOUCH) 100 UNIT/ML pen; INJECT 24 UNITS UNDER THE SKIN EVERY MORNING    Morbid obesity (H)  Body mass index is 38.88 kg/m .  Wt Readings from Last 4 Encounters:   11/02/21 122.9 kg (271 lb)   11/19/20 120.2 kg (265 lb)   12/17/19 107 kg (236 lb)   02/21/19 111.9 kg (246 lb 9.6 oz)     Obesity contributing to other comorbidities include diabetes and hypertension.  Discussed diet and exercise             BMI:   Estimated body mass index is 38.88 kg/m  as calculated from the following:    Height as of this encounter: 1.778 m (5' 10\").    Weight as of this encounter: 122.9 kg (271 lb).           Return in about 6 months (around 5/2/2022).    Radha Brian MD  [facility] PAULETTE Carvajal is a 55 year old who presents for the following health issues     History of Present Illness       Diabetes:   He presents for follow up of diabetes.  He is checking home blood glucose a few times a month. He checks blood glucose before meals.  " "Blood glucose is never over 200 and sometimes over 70. When his blood glucose is low, the patient is asymptomatic for confusion, blurred vision, lethargy and reports not feeling dizzy, shaky, or weak.  He has no concerns regarding his diabetes at this time.  He is having weight gain. The patient has had a diabetic eye exam in the last 12 months. Eye exam performed on 02/15/2019. Location of last eye exam [facility].        He eats 0-1 servings of fruits and vegetables daily.He consumes 0 sweetened beverage(s) daily.He exercises with enough effort to increase his heart rate 20 to 29 minutes per day.  He exercises with enough effort to increase his heart rate 5 days per week.   He is taking medications regularly.     Wt Readings from Last 4 Encounters:   11/02/21 122.9 kg (271 lb)   11/19/20 120.2 kg (265 lb)   12/17/19 107 kg (236 lb)   02/21/19 111.9 kg (246 lb 9.6 oz)             Review of Systems   Constitutional, HEENT, cardiovascular, pulmonary, gi and gu systems are negative, except as otherwise noted.      Objective    /76   Pulse 85   Temp 97.6  F (36.4  C) (Tympanic)   Ht 1.778 m (5' 10\")   Wt 122.9 kg (271 lb)   SpO2 97%   BMI 38.88 kg/m    Body mass index is 38.88 kg/m .  Physical Exam  Vitals and nursing note reviewed.   Constitutional:       General: He is not in acute distress.     Appearance: He is not ill-appearing, toxic-appearing or diaphoretic.   Cardiovascular:      Pulses:           Dorsalis pedis pulses are 2+ on the right side and 2+ on the left side.        Posterior tibial pulses are 2+ on the right side.   Musculoskeletal:      Right foot: Normal range of motion. No deformity, bunion, Charcot foot, foot drop or prominent metatarsal heads.      Left foot: Normal range of motion. No deformity, bunion, Charcot foot, foot drop or prominent metatarsal heads.   Feet:      Right foot:      Protective Sensation: 8 sites tested. 8 sites sensed.      Skin integrity: No ulcer, " blister, skin breakdown, erythema, warmth, callus, dry skin or fissure.      Left foot:      Protective Sensation: 8 sites tested.      Skin integrity: No ulcer, blister, skin breakdown, erythema, warmth, callus, dry skin or fissure.

## 2021-11-17 DIAGNOSIS — M15.2 DEGENERATIVE ARTHRITIS OF PROXIMAL INTERPHALANGEAL JOINT OF INDEX FINGER OF RIGHT HAND: Primary | ICD-10-CM

## 2021-12-07 DIAGNOSIS — E11.9 TYPE 2 DIABETES MELLITUS WITHOUT COMPLICATION, WITH LONG-TERM CURRENT USE OF INSULIN (H): Primary | ICD-10-CM

## 2021-12-07 DIAGNOSIS — Z79.4 TYPE 2 DIABETES MELLITUS WITHOUT COMPLICATION, WITH LONG-TERM CURRENT USE OF INSULIN (H): Primary | ICD-10-CM

## 2021-12-07 RX ORDER — SEMAGLUTIDE 1.34 MG/ML
1 INJECTION, SOLUTION SUBCUTANEOUS
Qty: 9 ML | Refills: 3 | Status: SHIPPED | OUTPATIENT
Start: 2021-12-07 | End: 2022-10-27

## 2021-12-09 ENCOUNTER — OFFICE VISIT (OUTPATIENT)
Dept: ORTHOPEDICS | Facility: CLINIC | Age: 56
End: 2021-12-09
Payer: COMMERCIAL

## 2021-12-09 ENCOUNTER — ANCILLARY PROCEDURE (OUTPATIENT)
Dept: GENERAL RADIOLOGY | Facility: CLINIC | Age: 56
End: 2021-12-09
Attending: ORTHOPAEDIC SURGERY
Payer: COMMERCIAL

## 2021-12-09 DIAGNOSIS — M15.2 DEGENERATIVE ARTHRITIS OF PROXIMAL INTERPHALANGEAL JOINT OF INDEX FINGER OF RIGHT HAND: Primary | ICD-10-CM

## 2021-12-09 DIAGNOSIS — M15.2 DEGENERATIVE ARTHRITIS OF PROXIMAL INTERPHALANGEAL JOINT OF INDEX FINGER OF RIGHT HAND: ICD-10-CM

## 2021-12-09 PROCEDURE — 20600 DRAIN/INJ JOINT/BURSA W/O US: CPT | Mod: RT | Performed by: ORTHOPAEDIC SURGERY

## 2021-12-09 PROCEDURE — 99207 PR DROP WITH A PROCEDURE: CPT | Performed by: ORTHOPAEDIC SURGERY

## 2021-12-09 PROCEDURE — 77002 NEEDLE LOCALIZATION BY XRAY: CPT | Performed by: ORTHOPAEDIC SURGERY

## 2021-12-09 RX ADMIN — TRIAMCINOLONE ACETONIDE 40 MG: 40 INJECTION, SUSPENSION INTRA-ARTICULAR; INTRAMUSCULAR at 08:35

## 2021-12-09 RX ADMIN — LIDOCAINE HYDROCHLORIDE 2 ML: 10 INJECTION, SOLUTION EPIDURAL; INFILTRATION; INTRACAUDAL; PERINEURAL at 08:35

## 2021-12-09 NOTE — LETTER
12/9/2021         RE: Wei Wolf  [address] [facility] [address]        Dear Colleague,    Thank you for referring your patient, Wei Wolf, to the [facility] [place]. Please see a copy of my visit note below.    Date of Service: Dec 9, 2021    Chief Complaint:   Chief Complaint   Patient presents with     RECHECK     7 month follow up right index finger cortisone injection       History of Present Illness:  Wei Wolf is a 55 year old male who presents today in follow-up for left index finger pain. The patient was last seen on 05/06/2021 for right index finger osteoarthritis and underwent a right index finger MCP joint corticosteroid injection under fluoroscopy guidance. The injection provided 3-4 months of relief but his pain has returned, rated 7/10 at its worst with regular use. Patient continues to fish through the winter and that is particularly aggravating for his right index finger. He also notes a skin lesion just proximal to his usual injection site that bleeds intermittently. He was advised to see his primary care provider for this in the past.      Past medical, surgical, and social history were reviewed and updated as needed.    Physical examination:  The patient is well-developed, well nourished and in on acute distress. The patient is alert and oriented to the surroundings. Behavior is appropriate to the surroundings. Extra-ocular motions are intact. Respirations appear unlabored.     Examination of the right upper extremity reveals skin to be clean, dry and intact. Swelling over the right index MCP with full extension and full flexion of the joint.    Fingers appear well-perfused with good capillary refill    Assessment:   Right index MCP arthritis.     Plan:    The patient comes in today requesting a repeat injection. Due to relief in the past I think this is reasonable. Risks and benefits of injection were reviewed. Patient will  follow-up as needed.     Procedure:   After written consent, verifying site and side, a sterile Chlora- prep was performed for the injection site.  C-arm guidance was used for intraarticular placement of a 25-gauge needle with 2 mL 1% Lidocaine and 40 mg of Kenalog was injected under fluoroscopic guidance with good relief of pain. Patient tolerated the procedure well.  No complications.  Follow up instructions were given.     Lydia Corrales MD   Hand and Upper Extremity Specialist  [facility]    Scribe Disclosure:  Good KLEIN, am serving as a scribe to document services personally performed by Lydia Corrales MD at this visit, based upon the provider's statements to me. All documentation has been reviewed by the aforementioned provider prior to being entered into the official medical record.     Good KLEIN, a scribe, prepared the chart for today's encounter.     Hand / Upper Extremity Injection/Arthrocentesis: R index MCP    Date/Time: 2021 8:35 AM  Performed by: Lydia Corrales MD  Authorized by: Lydia Corrales MD     Needle Size:  25 G  Guidance: fluoroscopy    Condition: osteoarthritis    Location:  Index finger  Site:  R index MCP    Medications:  40 mg triamcinolone 40 MG/ML; 2 mL lidocaine (PF) 1 %  Outcome:  Tolerated well, no immediate complications  Procedure discussed: discussed risks, benefits, and alternatives    Consent Given by:  Patient  Timeout: timeout called immediately prior to procedure    Prep: patient was prepped and draped in usual sterile fashion        [facility] [address] [id number] [phone]  Dept: [phone]  ______________________________________________________________________________    Patient: Wei Wolf   : 1965   MRN: [id number]   2021    INVASIVE PROCEDURE SAFETY CHECKLIST    Date: 2021   Procedure: Right  index finger MCP joint cortisone injection  Patient Name: Wei Wolf  MRN: [id number]  YOB: 1965    Action: Complete sections as appropriate. Any discrepancy results in a HARD COPY until resolved.     PRE PROCEDURE:  Patient ID verified with 2 identifiers (name and  or MRN): Yes  Procedure and site verified with patient/designee (when able): Yes  Accurate consent documentation in medical record: Yes  H&P (or appropriate assessment) documented in medical record: Yes  H&P must be up to 20 days prior to procedure and updates within 24 hours of procedure as applicable: Yes  Relevant diagnostic and radiology test results appropriately labeled and displayed as applicable: Yes  Procedure site(s) marked with provider initials: Yes    TIMEOUT:  Time-Out performed immediately prior to starting procedure, including verbal and active participation of all team members addressing the following:Yes  * Correct patient identify  * Confirmed that the correct side and site are marked  * An accurate procedure consent form  * Agreement on the procedure to be done  * Correct patient position  * Relevant images and results are properly labeled and appropriately displayed  * The need to administer antibiotics or fluids for irrigation purposes during the procedure as applicable   * Safety precautions based on patient history or medication use    DURING PROCEDURE: Verification of correct person, site, and procedures any time the responsibility for care of the patient is transferred to another member of the care team.       The following medications were given:         Prior to injection, verified patient identity using patient's name and date of birth.  Due to injection administration, patient instructed to remain in clinic for 15 minutes  afterwards, and to report any adverse reaction to me immediately.    Joint injection was performed.    Medication Name: Kenalog NDC: [address]-0  Drug Amount Wasted:   None.  Vial/Syringe: Single dose vial  Expiration Date:  1/1/23    Medication Name: Lidocaine NDC: 64831-136-83    Scribed by Allegra Angelo ATC for Dr. Corrales on December 9, 2021 at 7:00 am based on the provider's statements to me.     Allegra Angelo ATC

## 2021-12-09 NOTE — PROGRESS NOTES
Hand / Upper Extremity Injection/Arthrocentesis: R index MCP    Date/Time: 2021 8:35 AM  Performed by: Lydia Corrales MD  Authorized by: Lydia Corrales MD     Needle Size:  25 G  Guidance: fluoroscopy    Condition: osteoarthritis    Location:  Index finger  Site:  R index MCP    Medications:  40 mg triamcinolone 40 MG/ML; 2 mL lidocaine (PF) 1 %  Outcome:  Tolerated well, no immediate complications  Procedure discussed: discussed risks, benefits, and alternatives    Consent Given by:  Patient  Timeout: timeout called immediately prior to procedure    Prep: patient was prepped and draped in usual sterile fashion        [facility] [address] [id number] [phone]  Dept: [phone]  ______________________________________________________________________________    Patient: Wei Wolf   : 1965   MRN: [id number]   2021    INVASIVE PROCEDURE SAFETY CHECKLIST    Date: 2021   Procedure: Right index finger MCP joint cortisone injection  Patient Name: Wei Wolf  MRN: [id number]  YOB: 1965    Action: Complete sections as appropriate. Any discrepancy results in a HARD COPY until resolved.     PRE PROCEDURE:  Patient ID verified with 2 identifiers (name and  or MRN): Yes  Procedure and site verified with patient/designee (when able): Yes  Accurate consent documentation in medical record: Yes  H&P (or appropriate assessment) documented in medical record: Yes  H&P must be up to 20 days prior to procedure and updates within 24 hours of procedure as applicable: Yes  Relevant diagnostic and radiology test results appropriately labeled and displayed as applicable: Yes  Procedure site(s) marked with provider initials: Yes    TIMEOUT:  Time-Out performed immediately prior to starting procedure, including verbal and active participation of all team members addressing the  following:Yes  * Correct patient identify  * Confirmed that the correct side and site are marked  * An accurate procedure consent form  * Agreement on the procedure to be done  * Correct patient position  * Relevant images and results are properly labeled and appropriately displayed  * The need to administer antibiotics or fluids for irrigation purposes during the procedure as applicable   * Safety precautions based on patient history or medication use    DURING PROCEDURE: Verification of correct person, site, and procedures any time the responsibility for care of the patient is transferred to another member of the care team.       The following medications were given:         Prior to injection, verified patient identity using patient's name and date of birth.  Due to injection administration, patient instructed to remain in clinic for 15 minutes  afterwards, and to report any adverse reaction to me immediately.    Joint injection was performed.    Medication Name: Kenalog NDC: [address]-6  Drug Amount Wasted:  None.  Vial/Syringe: Single dose vial  Expiration Date:  1/1/23    Medication Name: Lidocaine NDC: 94892-029-24    Scribed by Allegra Angelo ATC for Dr. Corrales on December 9, 2021 at 7:00 am based on the provider's statements to me.     Allegra Angelo ATC

## 2021-12-09 NOTE — NURSING NOTE
Reason For Visit:   Chief Complaint   Patient presents with     RECHECK     7 month follow up right index finger cortisone injection       Primary MD: Radha Brian    Age: 55 year old    ?  No      There were no vitals taken for this visit.      Pain Assessment  Patient Currently in Pain: Yes  0-10 Pain Scale: 4  Primary Pain Location: Finger (Comment which one) (Right index)               QuickDASH Assessment  QuickDASH Main 11/16/2017   1.Open a tight or new jar. Mild difficulty   2. Do heavy household chores (e.g., wash walls, floors) Mild difficulty   3. Carry a shopping bag or briefcase. Mild difficulty   4. Wash your back. Mild difficulty   5. Use a knife to cut food. Mild difficulty   6. Recreational activities in which you take some force or impact through your arm, shoulder or hand (e.g., golf, hammering, tennis, etc.). Moderate difficulty   7. During the past week, to what extent has your arm, shoulder or hand problem interfered with your normal social activities with family, friends, neighbours or groups? Slightly   8. During the past week, were you limited in your work or other regular daily activities as a result of your arm, shoulder or hand problem? Slightly limited   9. Arm, shoulder or hand pain. Moderate   10.Tingling (pins and needles) in your arm,shoulder or hand. None   11. During the past week, how much difficulty have you had sleeping because of the pain in your arm, shoulder or hand? ([place] number) No difficulty   Quickdash Ability Score 25          Current Outpatient Medications   Medication Sig Dispense Refill     amLODIPine (NORVASC) 2.5 MG tablet Take 1 tablet (2.5 mg) by mouth daily 90 tablet 3     ASPIRIN NOT PRESCRIBED, INTENTIONAL, continuous prn Antiplatelet medication not prescribed intentionally due to GI Issues / Ulcer Disease 1 each 0     blood glucose (ACCU-CHEK GUIDE) test strip Use to test blood sugar 2 times daily or as directed. 100 strip 1     blood glucose  monitoring (ACCU-CHEK FASTCLIX) lancets Use to test blood sugar 2 times daily or as directed.  Ok to substitute alternative if insurance prefers. 102 each 11     insulin degludec (TRESIBA FLEXTOUCH) 100 UNIT/ML pen INJECT 24 UNITS UNDER THE SKIN EVERY MORNING 30 mL 0     pioglitazone (ACTOS) 30 MG tablet Take 1 tablet (30 mg) by mouth daily 90 tablet 1     Semaglutide, 1 MG/DOSE, (OZEMPIC, 1 MG/DOSE,) 4 MG/3ML SOPN Inject 1 mg Subcutaneous every 7 days 9 mL 3     simvastatin (ZOCOR) 10 MG tablet Take 1 tablet (10 mg) by mouth At Bedtime TAKE ONE TABLET BY MOUTH AT BEDTIME 90 tablet 2       Allergies   Allergen Reactions     Pollen Extract      Metformin Diarrhea       Allegra Angelo, ATC

## 2021-12-09 NOTE — PROGRESS NOTES
Date of Service: Dec 9, 2021    Chief Complaint:   Chief Complaint   Patient presents with     RECHECK     7 month follow up right index finger cortisone injection       History of Present Illness:  Wei Wolf is a 55 year old male who presents today in follow-up for left index finger pain. The patient was last seen on 05/06/2021 for right index finger osteoarthritis and underwent a right index finger MCP joint corticosteroid injection under fluoroscopy guidance. The injection provided 3-4 months of relief but his pain has returned, rated 7/10 at its worst with regular use. Patient continues to fish through the winter and that is particularly aggravating for his right index finger. He also notes a skin lesion just proximal to his usual injection site that bleeds intermittently. He was advised to see his primary care provider for this in the past.      Past medical, surgical, and social history were reviewed and updated as needed.    Physical examination:  The patient is well-developed, well nourished and in on acute distress. The patient is alert and oriented to the surroundings. Behavior is appropriate to the surroundings. Extra-ocular motions are intact. Respirations appear unlabored.     Examination of the right upper extremity reveals skin to be clean, dry and intact. Swelling over the right index MCP with full extension and full flexion of the joint.    Fingers appear well-perfused with good capillary refill    Assessment:   Right index MCP arthritis.     Plan:    The patient comes in today requesting a repeat injection. Due to relief in the past I think this is reasonable. Risks and benefits of injection were reviewed. Patient will follow-up as needed.     Procedure:   After written consent, verifying site and side, a sterile Chlora- prep was performed for the injection site.  C-arm guidance was used for intraarticular placement of a 25-gauge needle with 2 mL 1% Lidocaine and 40 mg of Kenalog was  injected under fluoroscopic guidance with good relief of pain. Patient tolerated the procedure well.  No complications.  Follow up instructions were given.     Lydia Corrales MD   Hand and Upper Extremity Specialist  [facility]    Scribe Disclosure:  IGood, am serving as a scribe to document services personally performed by Lydia Corrales MD at this visit, based upon the provider's statements to me. All documentation has been reviewed by the aforementioned provider prior to being entered into the official medical record.     Good KLEIN, a scribe, prepared the chart for today's encounter.

## 2021-12-10 RX ORDER — LIDOCAINE HYDROCHLORIDE 10 MG/ML
2 INJECTION, SOLUTION EPIDURAL; INFILTRATION; INTRACAUDAL; PERINEURAL
Status: DISCONTINUED | OUTPATIENT
Start: 2021-12-09 | End: 2023-07-10

## 2021-12-10 RX ORDER — TRIAMCINOLONE ACETONIDE 40 MG/ML
40 INJECTION, SUSPENSION INTRA-ARTICULAR; INTRAMUSCULAR
Status: DISCONTINUED | OUTPATIENT
Start: 2021-12-09 | End: 2023-07-10

## 2021-12-22 DIAGNOSIS — E78.00 HIGH CHOLESTEROL: ICD-10-CM

## 2021-12-22 RX ORDER — AMLODIPINE BESYLATE 2.5 MG/1
2.5 TABLET ORAL DAILY
Qty: 90 TABLET | Refills: 0 | Status: SHIPPED | OUTPATIENT
Start: 2021-12-22 | End: 2022-03-29

## 2021-12-28 ENCOUNTER — ANCILLARY PROCEDURE (OUTPATIENT)
Dept: GENERAL RADIOLOGY | Facility: CLINIC | Age: 56
End: 2021-12-28
Attending: FAMILY MEDICINE
Payer: COMMERCIAL

## 2021-12-28 ENCOUNTER — OFFICE VISIT (OUTPATIENT)
Dept: FAMILY MEDICINE | Facility: CLINIC | Age: 56
End: 2021-12-28
Payer: COMMERCIAL

## 2021-12-28 VITALS
HEART RATE: 83 BPM | WEIGHT: 275.8 LBS | SYSTOLIC BLOOD PRESSURE: 122 MMHG | RESPIRATION RATE: 22 BRPM | TEMPERATURE: 97.7 F | OXYGEN SATURATION: 99 % | DIASTOLIC BLOOD PRESSURE: 74 MMHG | BODY MASS INDEX: 39.57 KG/M2

## 2021-12-28 DIAGNOSIS — M25.531 RIGHT WRIST PAIN: Primary | ICD-10-CM

## 2021-12-28 DIAGNOSIS — M25.531 RIGHT WRIST PAIN: ICD-10-CM

## 2021-12-28 PROCEDURE — 73110 X-RAY EXAM OF WRIST: CPT | Mod: RT | Performed by: RADIOLOGY

## 2021-12-28 PROCEDURE — 99213 OFFICE O/P EST LOW 20 MIN: CPT | Performed by: FAMILY MEDICINE

## 2021-12-28 ASSESSMENT — PAIN SCALES - GENERAL: PAINLEVEL: EXTREME PAIN (8)

## 2021-12-28 NOTE — PROGRESS NOTES
Assessment & Plan     Right wrist pain  Reviewed x-ray with patient, no acute finding.  Discussed with patient do not see any fracture.  Discussed with patient when I have the final report he will be notified.  In the meantime advised with RICE therapy, I wrapped his wrist with an ACE bandage.  He does see an orthopedic on a regular basis.  I discussed with him if symptom does not improve, in the next 1-2 weeks,  follow-up with his orthopedic  - XR Wrist Right G/E 3 Views; Future      Work on weight loss  Regular exercise    Return in about 2 weeks (around 1/11/2022).    Estrella Gonzalez MD  [facility]    Nancy Carvajal is a 56 year old who presents for the following health issues:  Patient is right-handed dominant, he reports he fell yesterday while he was shoveling snow, injured his right wrist,  He has no pain with his finger, good range of motion with his fingers.  No loss of strength or sensation, in the fingers  Tenderness at the wrist area, swelling.  No other injury    Pain History:  When did you first notice your pain? - Less than 1 week   Have you seen anyone else for your pain? No  Where in your body do you have pain? Musculoskeletal problem/pain  Onset/Duration: yesterday  Description  Location: wrist - right  Joint Swelling: YES  Redness: YES  Pain: YES  Warmth: yes, slightly  Intensity:  moderate, severe  Progression of Symptoms:  worsening and constant  Accompanying signs and symptoms:   Fevers: no  Numbness/tingling/weakness: YES- weakness  History  Trauma to the area: YES- fell  Recent illness:  no  Previous similar problem: no  Previous evaluation:  no  Precipitating or alleviating factors:  Aggravating factors include: lifting  Therapies tried and outcome: Ibuprofen; helps a bit      Review of Systems   Constitutional, HEENT, cardiovascular, pulmonary, gi and gu systems are negative, except as otherwise noted.      Objective    There were no vitals taken for  this visit.  There is no height or weight on file to calculate BMI.  Physical Exam   GENERAL: healthy, alert and no distress  MS: right hand and wrist exam:  Swelling at the wrist area, tender at the distal radius area.  Decreased range of motion at the wrist.  Positive radial pulses,  Good sensation, good capillary refills.  Good range of motion with all his fingers.  Good capillary refills, good sensation.  Good strength.  No tenderness at the fingers, or hand.  NEURO: Normal strength and tone, mentation intact and speech normal  PSYCH: mentation appears normal, affect normal/bright    Orders Placed This Encounter   Procedures     XR Wrist Right G/E 3 Views       Estrella Gonzalez MD

## 2022-03-15 NOTE — TELEPHONE ENCOUNTER
DIAGNOSIS: My hips hurt and hoping you can take a look and see what options I have   APPOINTMENT DATE: 3.21.22   NOTES STATUS DETAILS   MEDICATION LIST Internal

## 2022-03-18 DIAGNOSIS — M25.551 BILATERAL HIP PAIN: Primary | ICD-10-CM

## 2022-03-18 DIAGNOSIS — M25.552 BILATERAL HIP PAIN: Primary | ICD-10-CM

## 2022-03-21 ENCOUNTER — OFFICE VISIT (OUTPATIENT)
Dept: ORTHOPEDICS | Facility: CLINIC | Age: 57
End: 2022-03-21
Payer: COMMERCIAL

## 2022-03-21 ENCOUNTER — ANCILLARY PROCEDURE (OUTPATIENT)
Dept: GENERAL RADIOLOGY | Facility: CLINIC | Age: 57
End: 2022-03-21
Attending: FAMILY MEDICINE
Payer: COMMERCIAL

## 2022-03-21 DIAGNOSIS — M25.551 BILATERAL HIP PAIN: Primary | ICD-10-CM

## 2022-03-21 DIAGNOSIS — M25.552 BILATERAL HIP PAIN: ICD-10-CM

## 2022-03-21 DIAGNOSIS — M25.552 BILATERAL HIP PAIN: Primary | ICD-10-CM

## 2022-03-21 DIAGNOSIS — M25.551 BILATERAL HIP PAIN: ICD-10-CM

## 2022-03-21 PROCEDURE — 73523 X-RAY EXAM HIPS BI 5/> VIEWS: CPT | Performed by: RADIOLOGY

## 2022-03-21 PROCEDURE — 99214 OFFICE O/P EST MOD 30 MIN: CPT | Performed by: FAMILY MEDICINE

## 2022-03-21 NOTE — PROGRESS NOTES
[facility]  SPORTS & ORTHOPEDIC CLINIC VISIT     Mar 21, 2022        ASSESSMENT & PLAN    56-year-old with bilateral hip pain likely due to mild degenerative joint disease and impingement.    Reviewed imaging and assessment with patient in detail  I recommend initial course of physical therapy.  May also consider steroid injection as needed  Discussed use of anti-inflammatory as needed.    Arben Araya MD  [facility] SPORTS MEDICINE [facility]    -----  Chief Complaint   Patient presents with     Pelvis - Pain       SUBJECTIVE  Wei Wolf is a/an 56 year old male who is seen as a self referral for evaluation of  Bilateral hip pain.     The patient is seen by themselves.  The patient is Right handed    Onset: 3 years(s) ago. Reports insidious onset without acute precipitating event.  Location of Pain: bilateral anterior hip   Worsened by: Walking up hill, stairs, sitting for extended periods   Better with: Nothing   Treatments tried: no treatment tried to date  Associated symptoms: no distal numbness or tingling; denies swelling or warmth    Bilateral hip pain worse on the right   2-3 years and building and getting worse   Insidious onset of pain and stiffness   Does not like to walk due to symptoms   Inside the groin-- not sharp, dull, achy and feels stiff   No shooting pains  No popping, clicking, catching or any other symptoms    Uphill and stairs make things worse additionally sitting makes it stiff once he stands again   If he does a lot the night time pain is a lot worse   Takes Advil for pain control  First time being seen for this     Orthopedic/Surgical history: NO  Social History/Occupation:      REVIEW OF SYSTEMS:    Do you have fever, chills, weight loss? No    Do you have any vision problems? No    Do you have any chest pain or edema? No    Do you have any shortness of breath or wheezing?  No    Do you have stomach problems? No    Do you  have any numbness or focal weakness? No     Do you have diabetes? Yes, Type 2     Do you have problems with bleeding or clotting? No    Do you have an rashes or other skin lesions? No    OBJECTIVE:  There were no vitals taken for this visit.     Exam:  Patient is alert, in no acute distress, pleasant and conversational.    bilateral Hip:  Supine PROM:  Flexion: Approximately 115 , no tenderness.  External rotation: approximately 60 , with pain in groin bilaterally  Internal rotation: Approximately 0  on right, 15 degrees on left, with pain bilaterall    Strength Testing:  Hip flexion: 5/5.  Hip adduction: 5/5.  Hip abduction: 4+/5.  No subjective pain reported with strength testing.     Palpation:  negative tenderness to palpation over the greater trochanter.  negative tenderness to palpation over psoas  negative tenderness to palpation over ASIS  negative tenderness to palpation over Iliac crest  negative tenderness to palpation along the piriformis.  negative tenderness to palpation of the SI joint    Special Tests:  negative Carlin's test.   negative SEAN's test  positive  FADIR's test  positive  Scour test  negative Reported pain with resisted hip flexion to opposite shoulder     Neurovascularly intact in bilateral lower extremities      RADIOLOGY:    2 view xrays of bilateral hips and pelvis performed and reviewed independently demonstrating mild djd bilaterally, no acute findings. See EMR for formal radiology report.

## 2022-03-29 ENCOUNTER — MYC REFILL (OUTPATIENT)
Dept: FAMILY MEDICINE | Facility: CLINIC | Age: 57
End: 2022-03-29
Payer: COMMERCIAL

## 2022-03-29 DIAGNOSIS — E78.00 HIGH CHOLESTEROL: ICD-10-CM

## 2022-03-30 RX ORDER — AMLODIPINE BESYLATE 2.5 MG/1
2.5 TABLET ORAL DAILY
Qty: 90 TABLET | Refills: 0 | Status: SHIPPED | OUTPATIENT
Start: 2022-03-30 | End: 2022-06-27

## 2022-04-06 ENCOUNTER — THERAPY VISIT (OUTPATIENT)
Dept: PHYSICAL THERAPY | Facility: CLINIC | Age: 57
End: 2022-04-06
Attending: FAMILY MEDICINE
Payer: COMMERCIAL

## 2022-04-06 DIAGNOSIS — M25.552 BILATERAL HIP PAIN: ICD-10-CM

## 2022-04-06 DIAGNOSIS — M25.551 BILATERAL HIP PAIN: ICD-10-CM

## 2022-04-06 PROCEDURE — 97140 MANUAL THERAPY 1/> REGIONS: CPT | Mod: GP | Performed by: PHYSICAL THERAPIST

## 2022-04-06 PROCEDURE — 97161 PT EVAL LOW COMPLEX 20 MIN: CPT | Mod: GP | Performed by: PHYSICAL THERAPIST

## 2022-04-06 PROCEDURE — 97110 THERAPEUTIC EXERCISES: CPT | Mod: GP | Performed by: PHYSICAL THERAPIST

## 2022-04-06 ASSESSMENT — ACTIVITIES OF DAILY LIVING (ADL)
SITTING_FOR_15_MINUTES: SLIGHT DIFFICULTY
TWISTING/PIVOTING_ON_INVOLVED_LEG: MODERATE DIFFICULTY
ROLLING_OVER_IN_BED: MODERATE DIFFICULTY
WALKING_15_MINUTES_OR_GREATER: MODERATE DIFFICULTY
WALKING_APPROXIMATELY_10_MINUTES: MODERATE DIFFICULTY
HOS_ADL_HIGHEST_POTENTIAL_SCORE: 68
STEPPING_UP_AND_DOWN_CURBS: SLIGHT DIFFICULTY
HOS_ADL_COUNT: 17
STANDING_FOR_15_MINUTES: MODERATE DIFFICULTY
LIGHT_TO_MODERATE_WORK: MODERATE DIFFICULTY
WALKING_DOWN_STEEP_HILLS: SLIGHT DIFFICULTY
WALKING_UP_STEEP_HILLS: MODERATE DIFFICULTY
HOS_ADL_SCORE(%): 57.35
DEEP_SQUATTING: MODERATE DIFFICULTY
GETTING_INTO_AND_OUT_OF_A_BATHTUB: SLIGHT DIFFICULTY
PUTTING_ON_SOCKS_AND_SHOES: EXTREME DIFFICULTY
HOW_WOULD_YOU_RATE_YOUR_CURRENT_LEVEL_OF_FUNCTION_DURING_YOUR_USUAL_ACTIVITIES_OF_DAILY_LIVING_FROM_0_TO_100_WITH_100_BEING_YOUR_LEVEL_OF_FUNCTION_PRIOR_TO_YOUR_HIP_PROBLEM_AND_0_BEING_THE_INABILITY_TO_PERFORM_ANY_OF_YOUR_USUAL_DAILY_ACTIVITIES?: 65
HEAVY_WORK: MODERATE DIFFICULTY
HOS_ADL_ITEM_SCORE_TOTAL: 39
GETTING_INTO_AND_OUT_OF_AN_AVERAGE_CAR: SLIGHT DIFFICULTY
GOING_DOWN_1_FLIGHT_OF_STAIRS: SLIGHT DIFFICULTY
GOING_UP_1_FLIGHT_OF_STAIRS: MODERATE DIFFICULTY
WALKING_INITIALLY: MODERATE DIFFICULTY
RECREATIONAL_ACTIVITIES: MODERATE DIFFICULTY

## 2022-04-06 NOTE — PROGRESS NOTES
Physical Therapy Initial Evaluation  Subjective:  The history is provided by the patient.   Patient Health History  Wei Wolf being seen for Arthritis in hips.     Date of Onset: chronic    HPI: Documentation: unknown, OA??    Pain is reported as 3/10 on pain scale.  General health as reported by patient is fair.  Pertinent medical history includes: diabetes, overweight and sleep disorder/apnea.     Medical allergies: none.       Current medications:  High blood pressure medication.       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                  Therapist Generated HPI Evaluation  Problem details: Pt describes ongoing bilat hip pain. Rt>Left, groin pain mostly.  .         Affected Side: bilat Hips     This is a chronic condition.  Condition occurred with:  Insidious onset.  Where condition occurred: at home.  Site of Pain: bilat groin.  Pain is described as aching and sharp and is constant.  Pain radiates to:  No radiation.   Since onset symptoms are gradually worsening.  Associated symptoms:  Loss of motion/stiffness. Symptoms are exacerbated by bending, sitting, other and walking (stairs, transfers )  and relieved by nothing.  Special tests included:  X-ray.    Barriers include:  None as reported by patient.                        Objective:  System         Lumbar/SI Evaluation                      SI joint/Sacrum:          Left negative at:    Ilium Ventromedial  Right positive at:    Ilium Ventromedial    Sacral conclusion right:  Posterior inominate, locked, upslip and outflare                                  Hip Evaluation  HIP AROM:    Flexion: Left: 120    Right:  120    Extension: Left: 0    Right:  0  Abduction: Left:  30    Right:  30              Hip PROM:          Adduction: Left: 10    Right: 0  Internal Rotation: Left: 20    Right: 5                Hip Strength:  Hip Strength:    Left:    Normal  Right:  Normal                          Hip Special Testing:    Left hip positive  for the following special tests:  Alistair and Carlin's   Right hip positive for the following special tests:  Alistair and Carlin's    Hip Palpation:    Left hip tenderness present at:   Adductors and Abductors  Right hip tenderness present at:  Adductors and Abductors             General     ROS    Assessment/Plan:    Patient is a 56 year old male with both sides hip complaints.    Patient has the following significant findings with corresponding treatment plan.                Diagnosis 1:  bilat hip pain   Pain -  hot/cold therapy, manual therapy, self management and home program  Decreased ROM/flexibility - manual therapy, therapeutic exercise and home program  Decreased joint mobility - manual therapy, therapeutic exercise and home program  Impaired muscle performance - neuro re-education  Decreased function - therapeutic activities    Therapy Evaluation Codes:   1) History comprised of:   Personal factors that impact the plan of care:      Coping style, Overall behavior pattern and Past/current experiences.    Comorbidity factors that impact the plan of care are:      Diabetes, Osteoarthritis and Overweight.     Medications impacting care: None.  2) Examination of Body Systems comprised of:   Body structures and functions that impact the plan of care:      Hip.   Activity limitations that impact the plan of care are:      Bending, Dressing, Lifting, Sitting, Squatting/kneeling, Stairs and Walking.  3) Clinical presentation characteristics are:   Stable/Uncomplicated.  4) Decision-Making    Low complexity using standardized patient assessment instrument and/or measureable assessment of functional outcome.  Cumulative Therapy Evaluation is: Low complexity.    Previous and current functional limitations:  (See Goal Flow Sheet for this information)    Short term and Long term goals: (See Goal Flow Sheet for this information)     Communication ability:  Patient appears to be able to clearly communicate and understand verbal  No difficulties and written communication and follow directions correctly.  Treatment Explanation - The following has been discussed with the patient:   RX ordered/plan of care  Anticipated outcomes  Possible risks and side effects  This patient would benefit from PT intervention to resume normal activities.   Rehab potential is good.    Frequency:  1 X week, once daily every other week   Duration:  for 10 weeks  Discharge Plan:  Achieve all LTG.  Independent in home treatment program.  Reach maximal therapeutic benefit.    Please refer to the daily flowsheet for treatment today, total treatment time and time spent performing 1:1 timed codes.

## 2022-05-02 NOTE — PROGRESS NOTES
Date of Service: May 5, 2022    Chief Complaint:   Chief Complaint   Patient presents with     RECHECK     5 mo Follow-up right index finger injection under fluoro, requesting repeat        History of Present Illness:  Wei Wolf is a 56 year old male who presents today in follow-up for right hand pain. The patient was last seen on 12/09/2021 for right index finger osteoarthritis. He underwent a guided corticosteroid injection at that time. Patient reports the injection provided 5 months of relief and pain has just started to return. 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 He is requesting an injection to get back to activities. He does note that he bruises more easily in the skin over the injection site, but he has had no skin breakdown or bleeding from that area.     Past medical, surgical, and social history were reviewed and updated as needed.    Physical examination:  The patient is well-developed, well nourished and in no acute distress. The patient is alert and oriented to the surroundings. Behavior is appropriate to the surroundings. Extra-ocular motions are intact. Respirations appear unlabored.     Examination of the right upper extremity reveals skin to be clean, dry and intact. Right index subcutaneous wasting dorsally. Full range of motion but pain with flexion and extension of the index MCP joint. Fingers well perfused.      Force:  R hand pincher force: 11.3 kg (25 lb)  R hand  level 2 force: 35.8 kg (79 lb) (some pain)  L hand pincher force: 11.8 kg (26 lb)  L hand  level  2 force: 49.9 kg (110 lb    Imaging:  No new imaging.     Assessment:   Right index MCP arthritis.     Plan:    We discussed his current condition and the risks and benefits of the treatment options available to him at this time. Due to significant relief from injections in the past and goal to return to activities, I think it is reasonable to repeat an injection today. The patient voiced  understanding of the information discussed and all questions were answered. Follow-up as needed.      Procedure:   After written consent, verifying site and side, a sterile Chlora- prep was performed for the injection site.  C-arm guidance was used for intraarticular placement of a 25-gauge needle with 2 mL 1% Lidocaine and 40 mg of Kenalog was injected under fluoroscopic guidance from the radial side with good relief of pain. Patient tolerated the procedure well.  No complications.  Follow up instructions were given.     Lydia Corrales MD   Hand and Upper Extremity Specialist  [facility]    Scribe Disclosure:  Good KLEIN, am serving as a scribe to document services personally performed by Lydia Corrales MD at this visit, based upon the provider's statements to me. All documentation has been reviewed by the aforementioned provider prior to being entered into the official medical record.     Good KLEIN, a scribe, prepared the chart for today's encounter.     Hand / Upper Extremity Injection/Arthrocentesis: R index MCP    Date/Time: 2022 7:26 AM  Performed by: Lydia Corrales MD  Authorized by: Lydia Corrales MD     Indications:  Pain  Needle Size:  25 G  Guidance: fluoroscopy    Approach:  Lateral  Condition: osteoarthritis    Location:  Index finger  Site:  R index MCP    Medications:  40 mg triamcinolone 40 MG/ML; 2 mL lidocaine (PF) 1 %  Procedure discussed: discussed risks, benefits, and alternatives    Consent Given by:  Patient  Timeout: timeout called immediately prior to procedure    Prep: patient was prepped and draped in usual sterile fashion          [facility] [address] [id number] [phone]  Dept: [phone]  ______________________________________________________________________________    Patient: Wei Wolf   : 1965   MRN: [id number]   May 5,      INVASIVE PROCEDURE SAFETY CHECKLIST    Date: 2022   Procedure:Right Index   Patient Name: Wei Wolf  MRN: [id number]  YOB: 1965    Action: Complete sections as appropriate. Any discrepancy results in a HARD COPY until resolved.     PRE PROCEDURE:  Patient ID verified with 2 identifiers (name and  or MRN): Yes  Procedure and site verified with patient/designee (when able): Yes  Accurate consent documentation in medical record: Yes  H&P (or appropriate assessment) documented in medical record: Yes  H&P must be up to 20 days prior to procedure and updates within 24 hours of procedure as applicable: Yes  Relevant diagnostic and radiology test results appropriately labeled and displayed as applicable: Yes  Procedure site(s) marked with provider initials: Yes    TIMEOUT:  Time-Out performed immediately prior to starting procedure, including verbal and active participation of all team members addressing the following:Yes  * Correct patient identify  * Confirmed that the correct side and site are marked  * An accurate procedure consent form  * Agreement on the procedure to be done  * Correct patient position  * Relevant images and results are properly labeled and appropriately displayed  * The need to administer antibiotics or fluids for irrigation purposes during the procedure as applicable   * Safety precautions based on patient history or medication use    DURING PROCEDURE: Verification of correct person, site, and procedures any time the responsibility for care of the patient is transferred to another member of the care team.       The following medications were given:         Prior to injection, verified patient identity using patient's name and date of birth.  Due to injection administration, patient instructed to remain in clinic for 15 minutes  afterwards, and to report any adverse reaction to me immediately.      Medication Name: Kenalog 40  NDC [address]-0   Drug Amount  Wasted:  None.  Vial/Syringe: Single dose vial  Expiration Date:  12/1/23    Medication Name lidocaine 1%- 2 ml used  NDC 88681-624-36    Scribed by Suni Mota ATC for Dr. Corrales on May 5, 2022 at 7:34 am  based on the provider's statements to me.     Suni Mota, ATC

## 2022-05-04 ENCOUNTER — THERAPY VISIT (OUTPATIENT)
Dept: PHYSICAL THERAPY | Facility: CLINIC | Age: 57
End: 2022-05-04
Payer: COMMERCIAL

## 2022-05-04 DIAGNOSIS — M16.9 OSTEOARTHRITIS OF HIP: ICD-10-CM

## 2022-05-04 DIAGNOSIS — M79.644 PAIN OF FINGER OF RIGHT HAND: Primary | ICD-10-CM

## 2022-05-04 PROCEDURE — 97110 THERAPEUTIC EXERCISES: CPT | Mod: GP | Performed by: PHYSICAL THERAPIST

## 2022-05-04 PROCEDURE — 97140 MANUAL THERAPY 1/> REGIONS: CPT | Mod: GP | Performed by: PHYSICAL THERAPIST

## 2022-05-04 ASSESSMENT — ACTIVITIES OF DAILY LIVING (ADL)
WALKING_APPROXIMATELY_10_MINUTES: SLIGHT DIFFICULTY
GETTING_INTO_AND_OUT_OF_AN_AVERAGE_CAR: SLIGHT DIFFICULTY
HOS_ADL_HIGHEST_POTENTIAL_SCORE: 64
DEEP_SQUATTING: MODERATE DIFFICULTY
HOS_ADL_ITEM_SCORE_TOTAL: 37
HOS_ADL_COUNT: 16
STANDING_FOR_15_MINUTES: SLIGHT DIFFICULTY
LIGHT_TO_MODERATE_WORK: MODERATE DIFFICULTY
HEAVY_WORK: EXTREME DIFFICULTY
WALKING_UP_STEEP_HILLS: MODERATE DIFFICULTY
GOING_UP_1_FLIGHT_OF_STAIRS: MODERATE DIFFICULTY
WALKING_INITIALLY: MODERATE DIFFICULTY
ROLLING_OVER_IN_BED: SLIGHT DIFFICULTY
STEPPING_UP_AND_DOWN_CURBS: SLIGHT DIFFICULTY
GOING_DOWN_1_FLIGHT_OF_STAIRS: SLIGHT DIFFICULTY
TWISTING/PIVOTING_ON_INVOLVED_LEG: MODERATE DIFFICULTY
WALKING_15_MINUTES_OR_GREATER: MODERATE DIFFICULTY
WALKING_DOWN_STEEP_HILLS: SLIGHT DIFFICULTY
RECREATIONAL_ACTIVITIES: EXTREME DIFFICULTY
PUTTING_ON_SOCKS_AND_SHOES: MODERATE DIFFICULTY
SITTING_FOR_15_MINUTES: SLIGHT DIFFICULTY
HOS_ADL_SCORE(%): 57.81

## 2022-05-05 ENCOUNTER — OFFICE VISIT (OUTPATIENT)
Dept: ORTHOPEDICS | Facility: CLINIC | Age: 57
End: 2022-05-05
Payer: COMMERCIAL

## 2022-05-05 DIAGNOSIS — M15.2 DEGENERATIVE ARTHRITIS OF PROXIMAL INTERPHALANGEAL JOINT OF INDEX FINGER OF RIGHT HAND: Primary | ICD-10-CM

## 2022-05-05 PROCEDURE — 20600 DRAIN/INJ JOINT/BURSA W/O US: CPT | Mod: F6 | Performed by: ORTHOPAEDIC SURGERY

## 2022-05-05 PROCEDURE — 77002 NEEDLE LOCALIZATION BY XRAY: CPT | Mod: RT | Performed by: ORTHOPAEDIC SURGERY

## 2022-05-05 RX ORDER — LIDOCAINE HYDROCHLORIDE 10 MG/ML
2 INJECTION, SOLUTION EPIDURAL; INFILTRATION; INTRACAUDAL; PERINEURAL
Status: DISCONTINUED | OUTPATIENT
Start: 2022-05-05 | End: 2023-07-10

## 2022-05-05 RX ORDER — TRIAMCINOLONE ACETONIDE 40 MG/ML
40 INJECTION, SUSPENSION INTRA-ARTICULAR; INTRAMUSCULAR
Status: DISCONTINUED | OUTPATIENT
Start: 2022-05-05 | End: 2023-07-10

## 2022-05-05 RX ADMIN — TRIAMCINOLONE ACETONIDE 40 MG: 40 INJECTION, SUSPENSION INTRA-ARTICULAR; INTRAMUSCULAR at 07:26

## 2022-05-05 RX ADMIN — LIDOCAINE HYDROCHLORIDE 2 ML: 10 INJECTION, SOLUTION EPIDURAL; INFILTRATION; INTRACAUDAL; PERINEURAL at 07:26

## 2022-05-05 NOTE — NURSING NOTE
Reason For Visit:   Chief Complaint   Patient presents with     RECHECK     5 mo Follow-up right index finger injection under fluoro, requesting repeat        Primary MD: Radha Brian  Ref. MD: derrell     Age: 56 year old    ?  No      There were no vitals taken for this visit.      Pain Assessment  Patient Currently in Pain: Yes  0-10 Pain Scale: 2  Primary Pain Location: Hand    Hand Dominance Evaluation  Hand Dominance: Right      force  R hand pincher force: 11.3 kg (25 lb)  R hand  level 2 force: 35.8 kg (79 lb) (some pain)  L hand pincher force: 11.8 kg (26 lb)  L hand  level  2 force: 49.9 kg (110 lb)    QuickDASH Assessment  QuickDASH Main 5/3/2022   1. Open a tight or new jar. Moderate difficulty   2. Do heavy household chores (e.g., wash walls, floors) Mild difficulty   3. Carry a shopping bag or briefcase. No difficulty   4. Wash your back. Moderate difficulty   5. Use a knife to cut food. No difficulty   6. Recreational activities in which you take some force or impact through your arm, shoulder or hand (e.g., golf, hammering, tennis, etc.). Mild difficulty   7. During the past week, to what extent has your arm, shoulder or hand problem interfered with your normal social activities with family, friends, neighbours or groups? Slightly   8. During the past week, were you limited in your work or other regular daily activities as a result of your arm, shoulder or hand problem? Slightly limited   9. Arm, shoulder or hand pain. Mild   10.Tingling (pins and needles) in your arm,shoulder or hand. None   11. During the past week, how much difficulty have you had sleeping because of the pain in your arm, shoulder or hand? No difficulty   Quickdash Ability Score 20.45          Current Outpatient Medications   Medication Sig Dispense Refill     amLODIPine (NORVASC) 2.5 MG tablet Take 1 tablet (2.5 mg) by mouth daily 90 tablet 0     ASPIRIN NOT PRESCRIBED, INTENTIONAL, continuous prn  Antiplatelet medication not prescribed intentionally due to GI Issues / Ulcer Disease 1 each 0     blood glucose (ACCU-CHEK GUIDE) test strip Use to test blood sugar 2 times daily or as directed. 100 strip 1     blood glucose monitoring (ACCU-CHEK FASTCLIX) lancets Use to test blood sugar 2 times daily or as directed.  Ok to substitute alternative if insurance prefers. 102 each 11     insulin degludec (TRESIBA FLEXTOUCH) 100 UNIT/ML pen INJECT 24 UNITS UNDER THE SKIN EVERY MORNING 30 mL 0     pioglitazone (ACTOS) 30 MG tablet Take 1 tablet (30 mg) by mouth daily 90 tablet 1     Semaglutide, 1 MG/DOSE, (OZEMPIC, 1 MG/DOSE,) 4 MG/3ML SOPN Inject 1 mg Subcutaneous every 7 days 9 mL 3     simvastatin (ZOCOR) 10 MG tablet Take 1 tablet (10 mg) by mouth At Bedtime TAKE ONE TABLET BY MOUTH AT BEDTIME 90 tablet 2       Allergies   Allergen Reactions     Kiwi Itching     Throat itching to kiwi, watermelon, honeydew     Pollen Extract      Metformin Diarrhea       Suni Mota, ATC

## 2022-05-05 NOTE — LETTER
5/5/2022         RE: Wei Wolf  [address] [facility] [address]        Dear Colleague,    Thank you for referring your patient, Wei Wolf, to the [facility] [place]. Please see a copy of my visit note below.    Date of Service: May 5, 2022    Chief Complaint:   Chief Complaint   Patient presents with     RECHECK     5 mo Follow-up right index finger injection under fluoro, requesting repeat        History of Present Illness:  Wei Wolf is a 56 year old male who presents today in follow-up for right hand pain. The patient was last seen on 12/09/2021 for right index finger osteoarthritis. He underwent a guided corticosteroid injection at that time. Patient reports the injection provided 5 months of relief and pain has just started to return. 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 He is requesting an injection to get back to activities. He does note that he bruises more easily in the skin over the injection site, but he has had no skin breakdown or bleeding from that area.     Past medical, surgical, and social history were reviewed and updated as needed.    Physical examination:  The patient is well-developed, well nourished and in no acute distress. The patient is alert and oriented to the surroundings. Behavior is appropriate to the surroundings. Extra-ocular motions are intact. Respirations appear unlabored.     Examination of the right upper extremity reveals skin to be clean, dry and intact. Right index subcutaneous wasting dorsally. Full range of motion but pain with flexion and extension of the index MCP joint. Fingers well perfused.      Force:  R hand pincher force: 11.3 kg (25 lb)  R hand  level 2 force: 35.8 kg (79 lb) (some pain)  L hand pincher force: 11.8 kg (26 lb)  L hand  level  2 force: 49.9 kg (110 lb    Imaging:  No new imaging.     Assessment:   Right index MCP arthritis.     Plan:    We discussed  his current condition and the risks and benefits of the treatment options available to him at this time. Due to significant relief from injections in the past and goal to return to activities, I think it is reasonable to repeat an injection today. The patient voiced understanding of the information discussed and all questions were answered. Follow-up as needed.      Procedure:   After written consent, verifying site and side, a sterile Chlora- prep was performed for the injection site.  C-arm guidance was used for intraarticular placement of a 25-gauge needle with 2 mL 1% Lidocaine and 40 mg of Kenalog was injected under fluoroscopic guidance from the radial side with good relief of pain. Patient tolerated the procedure well.  No complications.  Follow up instructions were given.     Lydia Corrales MD   Hand and Upper Extremity Specialist  [facility]    Scribe Disclosure:  Good KLEIN, am serving as a scribe to document services personally performed by Lydia Corrales MD at this visit, based upon the provider's statements to me. All documentation has been reviewed by the aforementioned provider prior to being entered into the official medical record.     Good KLEIN, a scribe, prepared the chart for today's encounter.     Hand / Upper Extremity Injection/Arthrocentesis: R index MCP    Date/Time: 5/5/2022 7:26 AM  Performed by: Lydia Corrales MD  Authorized by: Lydia Corrales MD     Indications:  Pain  Needle Size:  25 G  Guidance: fluoroscopy    Approach:  Lateral  Condition: osteoarthritis    Location:  Index finger  Site:  R index MCP    Medications:  40 mg triamcinolone 40 MG/ML; 2 mL lidocaine (PF) 1 %  Procedure discussed: discussed risks, benefits, and alternatives    Consent Given by:  Patient  Timeout: timeout called immediately prior to procedure    Prep: patient was prepped and draped in usual sterile fashion          [facility]  [address] [address]  [phone]  Dept: [phone]  ______________________________________________________________________________    Patient: Wei Wolf   : 1965   MRN: [id number]   May 5, 2022    INVASIVE PROCEDURE SAFETY CHECKLIST    Date: 2022   Procedure:Right Index   Patient Name: Wei Wolf  MRN: [id number]  YOB: 1965    Action: Complete sections as appropriate. Any discrepancy results in a HARD COPY until resolved.     PRE PROCEDURE:  Patient ID verified with 2 identifiers (name and  or MRN): Yes  Procedure and site verified with patient/designee (when able): Yes  Accurate consent documentation in medical record: Yes  H&P (or appropriate assessment) documented in medical record: Yes  H&P must be up to 20 days prior to procedure and updates within 24 hours of procedure as applicable: Yes  Relevant diagnostic and radiology test results appropriately labeled and displayed as applicable: Yes  Procedure site(s) marked with provider initials: Yes    TIMEOUT:  Time-Out performed immediately prior to starting procedure, including verbal and active participation of all team members addressing the following:Yes  * Correct patient identify  * Confirmed that the correct side and site are marked  * An accurate procedure consent form  * Agreement on the procedure to be done  * Correct patient position  * Relevant images and results are properly labeled and appropriately displayed  * The need to administer antibiotics or fluids for irrigation purposes during the procedure as applicable   * Safety precautions based on patient history or medication use    DURING PROCEDURE: Verification of correct person, site, and procedures any time the responsibility for care of the patient is transferred to another member of the care team.       The following medications were given:         Prior to injection, verified patient  identity using patient's name and date of birth.  Due to injection administration, patient instructed to remain in clinic for 15 minutes  afterwards, and to report any adverse reaction to me immediately.      Medication Name: Kenalog 40  NDC [address]-0   Drug Amount Wasted:  None.  Vial/Syringe: Single dose vial  Expiration Date:  12/1/23    Medication Name lidocaine 1%- 2 ml used  NDC 28503-617-65    Scribed by Suni Mota ATC for Dr. Corrales on May 5, 2022 at 7:34 am  based on the provider's statements to me.     Suni Mota ATC

## 2022-05-08 ENCOUNTER — HEALTH MAINTENANCE LETTER (OUTPATIENT)
Age: 57
End: 2022-05-08

## 2022-05-21 ENCOUNTER — PRE VISIT (OUTPATIENT)
Dept: ORTHOPEDICS | Facility: CLINIC | Age: 57
End: 2022-05-21
Payer: COMMERCIAL

## 2022-06-27 DIAGNOSIS — Z79.4 TYPE 2 DIABETES MELLITUS WITHOUT COMPLICATION, WITH LONG-TERM CURRENT USE OF INSULIN (H): ICD-10-CM

## 2022-06-27 DIAGNOSIS — E11.9 TYPE 2 DIABETES MELLITUS WITHOUT COMPLICATION, WITH LONG-TERM CURRENT USE OF INSULIN (H): ICD-10-CM

## 2022-06-27 DIAGNOSIS — E78.00 HIGH CHOLESTEROL: ICD-10-CM

## 2022-06-27 DIAGNOSIS — Z79.4 TYPE 2 DIABETES MELLITUS WITH HYPERGLYCEMIA, WITH LONG-TERM CURRENT USE OF INSULIN (H): ICD-10-CM

## 2022-06-27 DIAGNOSIS — E11.65 TYPE 2 DIABETES MELLITUS WITH HYPERGLYCEMIA, WITH LONG-TERM CURRENT USE OF INSULIN (H): ICD-10-CM

## 2022-06-27 RX ORDER — PIOGLITAZONEHYDROCHLORIDE 30 MG/1
30 TABLET ORAL DAILY
Qty: 90 TABLET | Refills: 1 | Status: SHIPPED | OUTPATIENT
Start: 2022-06-27 | End: 2022-12-28

## 2022-06-27 RX ORDER — INSULIN DEGLUDEC 100 U/ML
INJECTION, SOLUTION SUBCUTANEOUS
Qty: 30 ML | Refills: 0 | Status: SHIPPED | OUTPATIENT
Start: 2022-06-27 | End: 2022-09-27

## 2022-06-27 RX ORDER — AMLODIPINE BESYLATE 2.5 MG/1
2.5 TABLET ORAL DAILY
Qty: 90 TABLET | Refills: 0 | Status: SHIPPED | OUTPATIENT
Start: 2022-06-27 | End: 2022-09-27

## 2022-06-30 ENCOUNTER — IMMUNIZATION (OUTPATIENT)
Dept: NURSING | Facility: CLINIC | Age: 57
End: 2022-06-30
Payer: COMMERCIAL

## 2022-06-30 DIAGNOSIS — Z23 HIGH PRIORITY FOR 2019-NCOV VACCINE: Primary | ICD-10-CM

## 2022-06-30 PROCEDURE — 0054A COVID-19,PF,PFIZER (12+ YRS): CPT

## 2022-06-30 PROCEDURE — 91305 COVID-19,PF,PFIZER (12+ YRS): CPT

## 2022-07-19 ENCOUNTER — PATIENT OUTREACH (OUTPATIENT)
Dept: FAMILY MEDICINE | Facility: CLINIC | Age: 57
End: 2022-07-19

## 2022-07-19 NOTE — TELEPHONE ENCOUNTER
Patient Quality Outreach    Patient is due for the following:   Physical  - NOW    NEXT STEPS:   Schedule a yearly physical    Type of outreach:    Sent [employer] message.      Questions for provider review:    None     Keshia Ellis, CMA

## 2022-07-21 ENCOUNTER — OFFICE VISIT (OUTPATIENT)
Dept: ORTHOPEDICS | Facility: CLINIC | Age: 57
End: 2022-07-21
Payer: COMMERCIAL

## 2022-07-21 DIAGNOSIS — E11.65 TYPE 2 DIABETES MELLITUS WITH HYPERGLYCEMIA (H): ICD-10-CM

## 2022-07-21 DIAGNOSIS — M25.551 BILATERAL HIP PAIN: Primary | ICD-10-CM

## 2022-07-21 DIAGNOSIS — M25.552 BILATERAL HIP PAIN: Primary | ICD-10-CM

## 2022-07-21 PROCEDURE — 20610 DRAIN/INJ JOINT/BURSA W/O US: CPT | Mod: 50 | Performed by: FAMILY MEDICINE

## 2022-07-21 RX ORDER — LIDOCAINE HYDROCHLORIDE 10 MG/ML
5 INJECTION, SOLUTION EPIDURAL; INFILTRATION; INTRACAUDAL; PERINEURAL
Status: DISCONTINUED | OUTPATIENT
Start: 2022-07-21 | End: 2023-07-10

## 2022-07-21 RX ORDER — TRIAMCINOLONE ACETONIDE 40 MG/ML
40 INJECTION, SUSPENSION INTRA-ARTICULAR; INTRAMUSCULAR
Status: DISCONTINUED | OUTPATIENT
Start: 2022-07-21 | End: 2023-07-10

## 2022-07-21 RX ORDER — BLOOD-GLUCOSE METER
KIT MISCELLANEOUS
Qty: 100 STRIP | Refills: 0 | Status: SHIPPED | OUTPATIENT
Start: 2022-07-21 | End: 2023-08-17

## 2022-07-21 RX ADMIN — LIDOCAINE HYDROCHLORIDE 5 ML: 10 INJECTION, SOLUTION EPIDURAL; INFILTRATION; INTRACAUDAL; PERINEURAL at 08:55

## 2022-07-21 RX ADMIN — TRIAMCINOLONE ACETONIDE 40 MG: 40 INJECTION, SUSPENSION INTRA-ARTICULAR; INTRAMUSCULAR at 08:55

## 2022-07-21 NOTE — NURSING NOTE
[address] [address]  Dept: [phone]  ______________________________________________________________________________    Patient: Wei Wolf   : 1965   MRN: [id number]   2022    INVASIVE PROCEDURE SAFETY CHECKLIST    Date: 2022   Procedure:Bilateral intra articular hip CSI   Patient Name: Wei Wolf  MRN: [id number]  YOB: 1965    Action: Complete sections as appropriate. Any discrepancy results in a HARD COPY until resolved.     PRE PROCEDURE:  Patient ID verified with 2 identifiers (name and  or MRN): Yes  Procedure and site verified with patient/designee (when able): Yes  Accurate consent documentation in medical record: Yes  H&P (or appropriate assessment) documented in medical record: Yes  H&P must be up to 20 days prior to procedure and updates within 24 hours of procedure as applicable: Yes  Relevant diagnostic and radiology test results appropriately labeled and displayed as applicable: Yes  Procedure site(s) marked with provider initials: NA    TIMEOUT:  Time-Out performed immediately prior to starting procedure, including verbal and active participation of all team members addressing the following:Yes  * Correct patient identify  * Confirmed that the correct side and site are marked  * An accurate procedure consent form  * Agreement on the procedure to be done  * Correct patient position  * Relevant images and results are properly labeled and appropriately displayed  * The need to administer antibiotics or fluids for irrigation purposes during the procedure as applicable   * Safety precautions based on patient history or medication use    DURING PROCEDURE: Verification of correct person, site, and procedures any time the responsibility for care of the patient is transferred to another member of the care team.       Prior to injection, verified patient identity using patient's name  and date of birth.  Due to injection administration, patient instructed to remain in clinic for 15 minutes  afterwards, and to report any adverse reaction to me immediately.    Joint injection was performed.      Drug Amount Wasted:  None.  Vial/Syringe: Single dose vial  Expiration Date:  1/1/24 12/1/25      Chuyita Baker, [facility]  July 21, 2022

## 2022-07-21 NOTE — LETTER
7/21/2022      RE: Wei Wolf  [address] [facility] [address]     Dear Colleague,    Thank you for referring your patient, Wei Wolf, to the [facility] [place]. Please see a copy of my visit note below.      [facility]  SPORTS & ORTHOPEDIC CLINIC VISIT     Jul 21, 2022            Ultrasound-guided bilateral hip joint injection    Date/Time: 7/21/2022 8:55 AM  Performed by: Arben Araya MD  Authorized by: Arebn Araya MD     Indications:  Osteoarthritis  Approach:  Anterior  Location:  Hip  Laterality:  Bilateral      Site:  Bilateral hip joint  Medications (Right):  40 mg triamcinolone 40 MG/ML; 5 mL lidocaine (PF) 1 %  Medications (Left):  40 mg triamcinolone 40 MG/ML; 5 mL lidocaine (PF) 1 %  Outcome:  Tolerated well, no immediate complications  Procedure discussed: discussed risks, benefits, and alternatives    Consent Given by:  Patient  Timeout: timeout called immediately prior to procedure    Prep: patient was prepped and draped in usual sterile fashion     Patient was positioned lying on exam table. Utilizing ultrasound, the femoral head neck junction was visualized and marked with a pen. Ultrasound was utilized to ensure safety of injection clear of neurovascular structures. Next the area was cleaned with a chlorhexadine swab. Using sterile technique, and continuous ultrasound visualization, 3mL 1% lidocaine was injected through a 25g needle along the injection tract. Next, a 22g spinal needle was introduced into the joint at the level of the femoral head/neck junction under direct and continuous ultrasound guidance. A solution of 2mL 1% lidocaine and 40mg triamcinolone was injected and seen flowing through the joint. Images were captured and saved to the patient's permanent record. Patient tolerated the procedure well. No immediate complications. Routine postinjection instructions were given. Follow  up promptly if significant increase in pain, warmth, redness from the injection site.     Arben Araya MD

## 2022-07-21 NOTE — PROGRESS NOTES
[facility] SUR[facility]ERY [facility]  SPORTS & ORTHOPEDIC CLINIC VISIT     Jul 21, 2022            Ultrasound-guided bilateral hip joint injection    Date/Time: 7/21/2022 8:55 AM  Performed by: Arben Araya MD  Authorized by: Arben Araya MD     Indications:  Osteoarthritis  Approach:  Anterior  Location:  Hip  Laterality:  Bilateral      Site:  Bilateral hip joint  Medications (Right):  40 mg triamcinolone 40 MG/ML; 5 mL lidocaine (PF) 1 %  Medications (Left):  40 mg triamcinolone 40 MG/ML; 5 mL lidocaine (PF) 1 %  Outcome:  Tolerated well, no immediate complications  Procedure discussed: discussed risks, benefits, and alternatives    Consent Given by:  Patient  Timeout: timeout called immediately prior to procedure    Prep: patient was prepped and draped in usual sterile fashion     Patient was positioned lying on exam table. Utilizing ultrasound, the femoral head neck junction was visualized and marked with a pen. Ultrasound was utilized to ensure safety of injection clear of neurovascular structures. Next the area was cleaned with a chlorhexadine swab. Using sterile technique, and continuous ultrasound visualization, 3mL 1% lidocaine was injected through a 25g needle along the injection tract. Next, a 22g spinal needle was introduced into the joint at the level of the femoral head/neck junction under direct and continuous ultrasound guidance. A solution of 2mL 1% lidocaine and 40mg triamcinolone was injected and seen flowing through the joint. Images were captured and saved to the patient's permanent record. Patient tolerated the procedure well. No immediate complications. Routine postinjection instructions were given. Follow up promptly if significant increase in pain, warmth, redness from the injection site.     Arben Araya MD

## 2022-09-27 ENCOUNTER — MYC REFILL (OUTPATIENT)
Dept: FAMILY MEDICINE | Facility: CLINIC | Age: 57
End: 2022-09-27

## 2022-09-27 DIAGNOSIS — E11.65 TYPE 2 DIABETES MELLITUS WITH HYPERGLYCEMIA, WITH LONG-TERM CURRENT USE OF INSULIN (H): ICD-10-CM

## 2022-09-27 DIAGNOSIS — E78.00 HIGH CHOLESTEROL: ICD-10-CM

## 2022-09-27 DIAGNOSIS — Z79.4 TYPE 2 DIABETES MELLITUS WITH HYPERGLYCEMIA, WITH LONG-TERM CURRENT USE OF INSULIN (H): ICD-10-CM

## 2022-09-28 RX ORDER — SIMVASTATIN 10 MG
10 TABLET ORAL AT BEDTIME
Qty: 90 TABLET | Refills: 0 | Status: SHIPPED | OUTPATIENT
Start: 2022-09-28 | End: 2022-12-28

## 2022-09-29 ENCOUNTER — LAB (OUTPATIENT)
Dept: LAB | Facility: CLINIC | Age: 57
End: 2022-09-29
Payer: COMMERCIAL

## 2022-09-29 DIAGNOSIS — Z12.5 SCREENING FOR PROSTATE CANCER: ICD-10-CM

## 2022-09-29 DIAGNOSIS — E11.65 TYPE 2 DIABETES MELLITUS WITH HYPERGLYCEMIA, WITH LONG-TERM CURRENT USE OF INSULIN (H): ICD-10-CM

## 2022-09-29 DIAGNOSIS — Z79.4 TYPE 2 DIABETES MELLITUS WITH HYPERGLYCEMIA, WITH LONG-TERM CURRENT USE OF INSULIN (H): ICD-10-CM

## 2022-09-29 LAB
ALBUMIN SERPL-MCNC: 3.7 G/DL (ref 3.4–5)
ALP SERPL-CCNC: 97 U/L (ref 40–150)
ALT SERPL W P-5'-P-CCNC: 24 U/L (ref 0–70)
ANION GAP SERPL CALCULATED.3IONS-SCNC: 5 MMOL/L (ref 3–14)
AST SERPL W P-5'-P-CCNC: 15 U/L (ref 0–45)
BILIRUB SERPL-MCNC: 0.6 MG/DL (ref 0.2–1.3)
BUN SERPL-MCNC: 16 MG/DL (ref 7–30)
CALCIUM SERPL-MCNC: 9.1 MG/DL (ref 8.5–10.1)
CHLORIDE BLD-SCNC: 106 MMOL/L (ref 94–109)
CHOLEST SERPL-MCNC: 119 MG/DL
CO2 SERPL-SCNC: 28 MMOL/L (ref 20–32)
CREAT SERPL-MCNC: 0.89 MG/DL (ref 0.66–1.25)
FASTING STATUS PATIENT QL REPORTED: YES
GFR SERPL CREATININE-BSD FRML MDRD: >90 ML/MIN/1.73M2
GLUCOSE BLD-MCNC: 108 MG/DL (ref 70–99)
HBA1C MFR BLD: 7.8 % (ref 0–5.6)
HDLC SERPL-MCNC: 38 MG/DL
LDLC SERPL CALC-MCNC: 69 MG/DL
NONHDLC SERPL-MCNC: 81 MG/DL
POTASSIUM BLD-SCNC: 4 MMOL/L (ref 3.4–5.3)
PROT SERPL-MCNC: 7.2 G/DL (ref 6.8–8.8)
PSA SERPL-MCNC: 0.36 UG/L (ref 0–4)
SODIUM SERPL-SCNC: 139 MMOL/L (ref 133–144)
TRIGL SERPL-MCNC: 58 MG/DL

## 2022-09-29 PROCEDURE — 80061 LIPID PANEL: CPT

## 2022-09-29 PROCEDURE — 83036 HEMOGLOBIN GLYCOSYLATED A1C: CPT

## 2022-09-29 PROCEDURE — 36415 COLL VENOUS BLD VENIPUNCTURE: CPT

## 2022-09-29 PROCEDURE — 82043 UR ALBUMIN QUANTITATIVE: CPT

## 2022-09-29 PROCEDURE — 80053 COMPREHEN METABOLIC PANEL: CPT

## 2022-09-29 PROCEDURE — G0103 PSA SCREENING: HCPCS

## 2022-09-29 RX ORDER — AMLODIPINE BESYLATE 2.5 MG/1
2.5 TABLET ORAL DAILY
Qty: 90 TABLET | Refills: 0 | Status: SHIPPED | OUTPATIENT
Start: 2022-09-29 | End: 2022-12-28

## 2022-09-30 LAB
CREAT UR-MCNC: 110 MG/DL
MICROALBUMIN UR-MCNC: 5 MG/L
MICROALBUMIN/CREAT UR: 4.55 MG/G CR (ref 0–17)

## 2022-09-30 RX ORDER — INSULIN DEGLUDEC 100 U/ML
INJECTION, SOLUTION SUBCUTANEOUS
Qty: 30 ML | Refills: 0 | Status: SHIPPED | OUTPATIENT
Start: 2022-09-30 | End: 2023-03-12

## 2022-10-14 DIAGNOSIS — M79.644 PAIN OF FINGER OF RIGHT HAND: Primary | ICD-10-CM

## 2022-10-14 DIAGNOSIS — M15.2 DEGENERATIVE ARTHRITIS OF PROXIMAL INTERPHALANGEAL JOINT OF INDEX FINGER OF RIGHT HAND: ICD-10-CM

## 2022-10-20 ENCOUNTER — OFFICE VISIT (OUTPATIENT)
Dept: ORTHOPEDICS | Facility: CLINIC | Age: 57
End: 2022-10-20
Payer: COMMERCIAL

## 2022-10-20 ENCOUNTER — ANCILLARY PROCEDURE (OUTPATIENT)
Dept: GENERAL RADIOLOGY | Facility: CLINIC | Age: 57
End: 2022-10-20
Attending: ORTHOPAEDIC SURGERY
Payer: COMMERCIAL

## 2022-10-20 DIAGNOSIS — M19.041 DEGENERATIVE ARTHRITIS OF METACARPOPHALANGEAL JOINT OF INDEX FINGER OF RIGHT HAND: Primary | ICD-10-CM

## 2022-10-20 DIAGNOSIS — M15.2 DEGENERATIVE ARTHRITIS OF PROXIMAL INTERPHALANGEAL JOINT OF INDEX FINGER OF RIGHT HAND: ICD-10-CM

## 2022-10-20 PROCEDURE — 76000 FLUOROSCOPY <1 HR PHYS/QHP: CPT | Performed by: RADIOLOGY

## 2022-10-20 PROCEDURE — 77002 NEEDLE LOCALIZATION BY XRAY: CPT | Performed by: ORTHOPAEDIC SURGERY

## 2022-10-20 PROCEDURE — 20600 DRAIN/INJ JOINT/BURSA W/O US: CPT | Mod: F6 | Performed by: ORTHOPAEDIC SURGERY

## 2022-10-20 RX ADMIN — LIDOCAINE HYDROCHLORIDE 2 ML: 10 INJECTION, SOLUTION EPIDURAL; INFILTRATION; INTRACAUDAL; PERINEURAL at 07:19

## 2022-10-20 RX ADMIN — TRIAMCINOLONE ACETONIDE 40 MG: 40 INJECTION, SUSPENSION INTRA-ARTICULAR; INTRAMUSCULAR at 07:19

## 2022-10-20 NOTE — NURSING NOTE
Reason For Visit:   Chief Complaint   Patient presents with     RECHECK     Right index finger MCP steroid injection       Primary MD: Radha Brian  Ref. MD: BETH    Age: 56 year old    ?  No      There were no vitals taken for this visit.      Pain Assessment  Patient Currently in Pain: No (Right index finger pain with movement up to 3/10)    Hand Dominance Evaluation  Hand Dominance: Right      force  R hand  level 2 force: 40.8 kg (90 lb)  L hand  level  2 force: 52.2 kg (115 lb)    QuickDASH Assessment  No flowsheet data found.       Current Outpatient Medications   Medication Sig Dispense Refill     amLODIPine (NORVASC) 2.5 MG tablet Take 1 tablet (2.5 mg) by mouth daily 90 tablet 0     ASPIRIN NOT PRESCRIBED, INTENTIONAL, continuous prn Antiplatelet medication not prescribed intentionally due to GI Issues / Ulcer Disease 1 each 0     blood glucose (GLUCOCARD VITAL TEST) test strip USE TO TEST BLOD SUGAR TWICE DAILY OR AS DIRECTED 100 strip 0     blood glucose monitoring (ACCU-CHEK FASTCLIX) lancets Use to test blood sugar 2 times daily or as directed.  Ok to substitute alternative if insurance prefers. 102 each 11     insulin degludec (TRESIBA FLEXTOUCH) 100 UNIT/ML pen INJECT 24 UNITS UNDER THE SKIN EVERY MORNING 30 mL 0     pioglitazone (ACTOS) 30 MG tablet Take 1 tablet (30 mg) by mouth daily 90 tablet 1     simvastatin (ZOCOR) 10 MG tablet Take 1 tablet (10 mg) by mouth At Bedtime +++NEED RECHECK+++ 90 tablet 0       Allergies   Allergen Reactions     Kiwi Itching     Throat itching to kiwi, watermelon, honeydew     Pollen Extract      Metformin Diarrhea       Brody Pierre, TRUE

## 2022-10-20 NOTE — PROGRESS NOTES
Hand / Upper Extremity Injection/Arthrocentesis: R index MCP    Date/Time: 10/20/2022 7:19 AM  Performed by: Lydia Corrales MD  Authorized by: Lydia Corrales MD     Indications:  Pain  Needle Size:  25 G  Guidance: fluoroscopy    Condition: osteoarthritis    Location:  Index finger  Site:  R index MCP    Medications:  40 mg triamcinolone 40 MG/ML; 2 mL lidocaine (PF) 1 %  Outcome:  Tolerated well, no immediate complications  Procedure discussed: discussed risks, benefits, and alternatives    Consent Given by:  Patient  Timeout: timeout called immediately prior to procedure    Prep: patient was prepped and draped in usual sterile fashion          [facility] [address]  [address] [facility] [id number] [phone]  Dept: [phone]  ______________________________________________________________________________    Patient: Wei Wolf   : 1965   MRN: [id number]   2022    INVASIVE PROCEDURE SAFETY CHECKLIST    Date: 10/20/2022   Procedure: Right index MCP cortisone injection  Patient Name: Wei Wolf  MRN: [id number]  YOB: 1965    Action: Complete sections as appropriate. Any discrepancy results in a HARD COPY until resolved.     PRE PROCEDURE:  Patient ID verified with 2 identifiers (name and  or MRN): Yes  Procedure and site verified with patient/designee (when able): Yes  Accurate consent documentation in medical record: Yes  H&P (or appropriate assessment) documented in medical record: Yes  H&P must be up to 20 days prior to procedure and updates within 24 hours of procedure as applicable: Yes  Relevant diagnostic and radiology test results appropriately labeled and displayed as applicable: Yes  Procedure site(s) marked with provider initials: NA    TIMEOUT:  Time-Out performed immediately prior to starting procedure, including verbal and active participation of all team members  addressing the following:Yes  * Correct patient identify  * Confirmed that the correct side and site are marked  * An accurate procedure consent form  * Agreement on the procedure to be done  * Correct patient position  * Relevant images and results are properly labeled and appropriately displayed  * The need to administer antibiotics or fluids for irrigation purposes during the procedure as applicable   * Safety precautions based on patient history or medication use    DURING PROCEDURE: Verification of correct person, site, and procedures any time the responsibility for care of the patient is transferred to another member of the care team.       The following medications were given:         Prior to injection, verified patient identity using patient's name and date of birth.  Due to injection administration, patient instructed to remain in clinic for 15 minutes  afterwards, and to report any adverse reaction to me immediately.    Joint injection was performed.    Medication Name: Kenalog 40 NDC [address]-2  Drug Amount Wasted:  None.  Vial/Syringe: Single dose vial  Expiration Date:  7/1/2024    Medication Name Lidocaine 1% NDC 64716-247-45    Scribed by Brody Pierre for Dr. Corrales on October 20, 2022 at 7:20a based on the provider's statements to me.     TRUE Rahman

## 2022-10-20 NOTE — PROGRESS NOTES
Service Date: 10/20/2022    HISTORY OF PRESENT ILLNESS:  Wei is a 56-year-old right-hand dominant male who has a right index finger metacarpophalangeal joint osteoarthritis.  He comes in today requesting an injection of cortisone.  He last had an injection on 2022.  He reports that his pain is back to 2-3 on a scale from 0-10.  At worst, it will get to 8-9 on a scale of 0-10.  The injection helped significantly, and he would like to have another one.    PHYSICAL EXAMINATION:  On examination today, his skin is intact.  There is minimal subcutaneous wasting.  He has full range of motion but pain with terminal flexion and extension of the index MCP joint.    ASSESSMENT:  Right index MCP arthritis and synovitis.    PLAN:  We discussed the risks, benefits, and alternatives of treatment options available.  Due to his significant relief from injections in the past and goal to return to and continue with his work activities, I think it is reasonable to repeat an injection today.    PROCEDURE:  After written consent, verifying site and side, a sterile ChloraPrep was performed on the injection site.  C-arm guidance was used for difficult placement of a 25-gauge needle intra-articular into the right MCP joint.  2 mL with 1 mL of 1% lidocaine and 40 mg of Kenalog was injected under fluoroscopic guidance from the radial side with good relief of pain.  The patient tolerated the procedure well.  No complications were noted.  Followup instructions were given.    Lydia Corrales MD        D: 10/20/2022   T: 10/20/2022   MT: farrah    Name:     WEI PANTOJA  MRN:      [id number]        Account:      [id number]   :      1965           Service Date: 10/20/2022       Document: [id number]

## 2022-10-20 NOTE — LETTER
10/20/2022         RE: Wei Wolf  [address] [facility] [address]        Dear Colleague,    Thank you for referring your patient, Wei Wolf, to the [facility] ORTHOP[facility]. Please see a copy of my visit note below.    Hand / Upper Extremity Injection/Arthrocentesis: R index MCP    Date/Time: 10/20/2022 7:19 AM  Performed by: Lydia Corrales MD  Authorized by: Lydia Corrales MD     Indications:  Pain  Needle Size:  25 G  Guidance: fluoroscopy    Condition: osteoarthritis    Location:  Index finger  Site:  R index MCP    Medications:  40 mg triamcinolone 40 MG/ML; 2 mL lidocaine (PF) 1 %  Outcome:  Tolerated well, no immediate complications  Procedure discussed: discussed risks, benefits, and alternatives    Consent Given by:  Patient  Timeout: timeout called immediately prior to procedure    Prep: patient was prepped and draped in usual sterile fashion          [facility] ORTHOPED[facility] [address]  [address] [facility] [address] [phone]  Dept: [phone]  ______________________________________________________________________________    Patient: Wei Wolf   : 1965   MRN: [id number]   2022    INVASIVE PROCEDURE SAFETY CHECKLIST    Date: 10/20/2022   Procedure: Right index MCP cortisone injection  Patient Name: Wei Wolf  MRN: [id number]  YOB: 1965    Action: Complete sections as appropriate. Any discrepancy results in a HARD COPY until resolved.     PRE PROCEDURE:  Patient ID verified with 2 identifiers (name and  or MRN): Yes  Procedure and site verified with patient/designee (when able): Yes  Accurate consent documentation in medical record: Yes  H&P (or appropriate assessment) documented in medical record: Yes  H&P must be up to 20 days prior to procedure and updates within 24 hours of procedure as applicable: Yes  Relevant diagnostic and  radiology test results appropriately labeled and displayed as applicable: Yes  Procedure site(s) marked with provider initials: NA    TIMEOUT:  Time-Out performed immediately prior to starting procedure, including verbal and active participation of all team members addressing the following:Yes  * Correct patient identify  * Confirmed that the correct side and site are marked  * An accurate procedure consent form  * Agreement on the procedure to be done  * Correct patient position  * Relevant images and results are properly labeled and appropriately displayed  * The need to administer antibiotics or fluids for irrigation purposes during the procedure as applicable   * Safety precautions based on patient history or medication use    DURING PROCEDURE: Verification of correct person, site, and procedures any time the responsibility for care of the patient is transferred to another member of the care team.       The following medications were given:         Prior to injection, verified patient identity using patient's name and date of birth.  Due to injection administration, patient instructed to remain in clinic for 15 minutes  afterwards, and to report any adverse reaction to me immediately.    Joint injection was performed.    Medication Name: Kenalog 40 NDC [address]-1  Drug Amount Wasted:  None.  Vial/Syringe: Single dose vial  Expiration Date:  7/1/2024    Medication Name Lidocaine 1% NDC 95775-249-07    Scribed by Brody Pierre for Dr. Corrales on October 20, 2022 at 7:20a based on the provider's statements to me.     Brody Pierre [facility]        Service Date: 10/20/2022    HISTORY OF PRESENT ILLNESS:  Wei is a 56-year-old right-hand dominant male who has a right index finger metacarpophalangeal joint osteoarthritis.  He comes in today requesting an injection of cortisone.  He last had an injection on 05/05/2022.  He reports that his pain is back to 2-3 on a scale from 0-10.  At worst, it will get to 8-9 on  a scale of 0-10.  The injection helped significantly, and he would like to have another one.    PHYSICAL EXAMINATION:  On examination today, his skin is intact.  There is minimal subcutaneous wasting.  He has full range of motion but pain with terminal flexion and extension of the index MCP joint.    ASSESSMENT:  Right index MCP arthritis and synovitis.    PLAN:  We discussed the risks, benefits, and alternatives of treatment options available.  Due to his significant relief from injections in the past and goal to return to and continue with his work activities, I think it is reasonable to repeat an injection today.    PROCEDURE:  After written consent, verifying site and side, a sterile ChloraPrep was performed on the injection site.  C-arm guidance was used for difficult placement of a 25-gauge needle intra-articular into the right MCP joint.  2 mL with 1 mL of 1% lidocaine and 40 mg of Kenalog was injected under fluoroscopic guidance from the radial side with good relief of pain.  The patient tolerated the procedure well.  No complications were noted.  Followup instructions were given.    Lydia Corrales MD    D: 10/20/2022   T: 10/20/2022   MT: farrah    Name:     AMADA PANTOJA  MRN:      [id number]        Account:      [id number]   :      1965           Service Date: 10/20/2022       Document: [id number]

## 2022-10-21 DIAGNOSIS — Z79.4 TYPE 2 DIABETES MELLITUS WITHOUT COMPLICATION, WITH LONG-TERM CURRENT USE OF INSULIN (H): ICD-10-CM

## 2022-10-21 DIAGNOSIS — E11.9 TYPE 2 DIABETES MELLITUS WITHOUT COMPLICATION, WITH LONG-TERM CURRENT USE OF INSULIN (H): ICD-10-CM

## 2022-10-21 RX ORDER — SEMAGLUTIDE 1.34 MG/ML
1 INJECTION, SOLUTION SUBCUTANEOUS
Qty: 9 ML | Refills: 3 | OUTPATIENT
Start: 2022-10-21

## 2022-10-26 ENCOUNTER — MYC MEDICAL ADVICE (OUTPATIENT)
Dept: FAMILY MEDICINE | Facility: CLINIC | Age: 57
End: 2022-10-26

## 2022-10-26 DIAGNOSIS — E11.9 TYPE 2 DIABETES MELLITUS WITHOUT COMPLICATION, WITH LONG-TERM CURRENT USE OF INSULIN (H): ICD-10-CM

## 2022-10-26 DIAGNOSIS — Z79.4 TYPE 2 DIABETES MELLITUS WITHOUT COMPLICATION, WITH LONG-TERM CURRENT USE OF INSULIN (H): ICD-10-CM

## 2022-10-26 NOTE — TELEPHONE ENCOUNTER
Patient says he is still taking ozempic and has been the last couple years but it is no longer on his medication list.  Medication says it  on patient's medication list.   RN pended last order for this medication with pharmacy.    Karin Feng BSN, RN

## 2022-10-27 RX ORDER — TRIAMCINOLONE ACETONIDE 40 MG/ML
40 INJECTION, SUSPENSION INTRA-ARTICULAR; INTRAMUSCULAR
Status: DISCONTINUED | OUTPATIENT
Start: 2022-10-20 | End: 2023-07-10

## 2022-10-27 RX ORDER — SEMAGLUTIDE 1.34 MG/ML
1 INJECTION, SOLUTION SUBCUTANEOUS
Qty: 9 ML | Refills: 0 | Status: SHIPPED | OUTPATIENT
Start: 2022-10-27 | End: 2022-12-28

## 2022-10-27 RX ORDER — LIDOCAINE HYDROCHLORIDE 10 MG/ML
2 INJECTION, SOLUTION EPIDURAL; INFILTRATION; INTRACAUDAL; PERINEURAL
Status: DISCONTINUED | OUTPATIENT
Start: 2022-10-20 | End: 2023-07-10

## 2022-11-18 PROBLEM — M16.9 OSTEOARTHRITIS OF HIP: Status: RESOLVED | Noted: 2022-05-04 | Resolved: 2022-11-18

## 2022-12-07 ENCOUNTER — OFFICE VISIT (OUTPATIENT)
Dept: PHYSICAL MEDICINE AND REHAB | Facility: CLINIC | Age: 57
End: 2022-12-07
Payer: COMMERCIAL

## 2022-12-07 VITALS
SYSTOLIC BLOOD PRESSURE: 146 MMHG | HEIGHT: 70 IN | BODY MASS INDEX: 40.23 KG/M2 | WEIGHT: 281 LBS | DIASTOLIC BLOOD PRESSURE: 74 MMHG | HEART RATE: 80 BPM

## 2022-12-07 DIAGNOSIS — M54.50 LUMBAR SPINE PAIN: Primary | ICD-10-CM

## 2022-12-07 DIAGNOSIS — M79.18 MYOFASCIAL PAIN: ICD-10-CM

## 2022-12-07 DIAGNOSIS — M25.559 HIP PAIN: ICD-10-CM

## 2022-12-07 PROCEDURE — 99213 OFFICE O/P EST LOW 20 MIN: CPT | Performed by: PHYSICAL MEDICINE & REHABILITATION

## 2022-12-07 ASSESSMENT — PAIN SCALES - GENERAL: PAINLEVEL: WORST PAIN (10)

## 2022-12-07 NOTE — LETTER
12/7/2022         RE: Wei Wolf  [address] [facility] [address]        Dear Colleague,    Thank you for referring your patient, Wei Wolf, to the [facility] SPINE AND NEUROSURGERY. Please see a copy of my visit note below.    Assessment/Plan:      Wei was seen today for back pain.    Diagnoses and all orders for this visit:    Lumbar spine pain    Myofascial pain    Hip pain         Assessment: Pleasant 56 year old male with a history of hypertension, diabetes mellitus,Osteoarthritis, sleep apnea with:    1.  Lumbar spine pain for the last several months.  This at the lumbosacral junction consistent with this discogenic pain/annular tear disc herniation without radicular pain.  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  He does have some mild degenerative change in the lower lumbar spine on AP view of the hips/pelvis.    2.  Myofascial pain lumbar spine and gluteal region.    3.  Bilateral hip pain consistent with osteoarthritis which is mild on plain films and decreased range of motion.  Treated at [facility].      Discussion:    1.  I discussed the diagnosis and treatment options.  We discussed this likely represents a small disc herniation or annular tear with improving symptoms with associated myofascial pain.  Some of this may be related to decreased range of motion of the hips and crouching leaning forward.  We discussed home exercises along with physical therapy.  Discussed the options of imaging although at this point his pain is improving and can also monitor symptoms which he prefers.    2.  Home exercises provided for the patient for lumbar range of motion SI range of motion and stretching.  This may help the low back and hips.    3.  Try Aleve or ibuprofen over-the-counter as needed for pain.    4.  Reassurance provided that this should continue to improve.  If it does not improve we will likely recommend imaging such  as MRI.    5.  Follow-up with me as needed if symptoms worsen.          It was our pleasure caring for your patient today, if there any questions or concerns please do not hesitate to contact us.      Subjective:   Patient ID: Wei Wolf is a 56 year old male.    History of Present Illness:   Patient presents at the request of Dr. Brian for an evaluation of low back pain.  He has low back pain lumbosacral junction for the past several months.  Was working on a boat trailer doing some maintenance with the brakes and axles in was sitting leaning forward for quite a while and began having significant pain at the lumbosacral junction we stood up and stepped over something severe back pain had to lay on his back for several hours and then he was able to slowly move around.  This flared for 4 days with severe pain better laying down worse with slouching.  Over the past few months he also has had improving in pain but worse with slouching better with standing.  The pain is limited to the low back central midline L4-5 L5-S1 region.  Rated 8/10 at worst 1/10 today and at best.  No radiation down the legs paresthesias or weakness.  Takes ibuprofen as needed.  Has not had formal physical therapy for the low back has had some for his hips.    He also has had 1 year of significant hip pain anteriorly bilaterally.  Has decreased range of motion.  Has had plain films reported osteoarthritis had hip injections of the [facility] along with physical therapy.  Hip injections do significantly help.    Imaging plain films of the hips personally reviewed imaging and report showing mild osteoarthritis of the hips bilaterally with anterior view showing mild facet arthropathy in the lower lumbar spine.         Review of Systems: Complains of joint pain.  Denies fever, weight loss, waking, headache, change in vision, chest pain, shortness of breath, abdominal pain, nausea vomiting, bowel or bladder incontinence, skin  issues, balance issues.  Remainder of 12 point review systems negative unless listed above.    Past Medical History:   Diagnosis Date     AR (allergic rhinitis)      Diabetes (H)      Hypertension goal BP (blood pressure) < 140/90 12/20/2010     Osteoarthritis of hip 5/4/2022     Sleep apnea        Family History   Problem Relation Age of Onset     Asthma Mother      Cancer Mother         CERVICAL     Diabetes Mother      Cancer Father         BRAIN     Diabetes Father      Asthma Brother      Asthma Brother          Social History     Socioeconomic History     Marital status:      Spouse name: None     Number of children: None     Years of education: None     Highest education level: None   Occupational History     Occupation: Self-employed     Employer: [employer]     Comment: [employer]   Tobacco Use     Smoking status: Never     Smokeless tobacco: Never   Vaping Use     Vaping Use: Never used   Substance and Sexual Activity     Alcohol use: No     Drug use: No     Sexual activity: Yes     Partners: Female     Birth control/protection: Male Surgical   Other Topics Concern     Parent/sibling w/ CABG, MI or angioplasty before 65F 55M? No      Service No     Blood Transfusions No     Caffeine Concern No     Occupational Exposure No     Hobby Hazards No     Sleep Concern No     Stress Concern Yes     Weight Concern Yes     Special Diet Yes     Comment: for DM     Back Care No     Exercise Yes     Bike Helmet No     Seat Belt No     Self-Exams No     Social history: .  2 grown children.  Works as a  in construction.  No tobacco.  Does drink alcohol 1 to 2/month    The following portions of the patient's history were reviewed and updated as appropriate: allergies, current medications, past family history, past medical history, past social history, past surgical history and problem list.    Oswestry (NAKUL) Questionnaire    OSWESTRY DISABILITY INDEX 12/7/2022   Count 10   Sum 7  "  Oswestry Score (%) 14   Some recent data might be hidden       Neck Disability Index:  No flowsheet data found.       PHQ-2 Score:     PHQ-2 ( 1999 [employer]) 10/20/2022 3/21/2022   Q1: Little interest or pleasure in doing things 0 0   Q2: Feeling down, depressed or hopeless 0 0   PHQ-2 Score 0 0   PHQ-2 Total Score (12-17 Years)- Positive if 3 or more points; Administer PHQ-A if positive - -   Q1: Little interest or pleasure in doing things - -   Q2: Feeling down, depressed or hopeless - -   PHQ-2 Score - -                  Objective:   Physical Exam:    BP (!) 146/74   Pulse 80   Ht 5' 10\" (1.778 m)   Wt 281 lb (127.5 kg)   BMI 40.32 kg/m    Body mass index is 40.32 kg/m .    General:  Well-appearing male in no acute distress.  Pleasant, cooperative, and interactive throughout the examination and interview.  CV: No lower extremity edema on inspection or paltation.  Lymphatics: No cervical lymphadenopathy palpated. Eyes: sclera clear. Skin: No rashes or lesions seen over the head/neck, hairline, arms, legs, trunk.  Respirations unlabored.  MSK: Gait is normal.  Able to heel-toe walk without difficulty.  Negative Romberg.  Spine: normal AP curves of the C, T, and L spine.  Mild to moderately decreased lumbar flexion finger to floor testing..  Palpation: Tenderness to palpation over lumbar paraspinals L4-L5-S1 bilaterally.  Extremities: Full range of motion of the elbows, and wrists with no effusions or tenderness to palpation.  Mild decreased range of motion of the hips record in the left and external rotation and internal rotation.  Positive SEAN test right greater than left.  Full range of motion of the  knees, and ankles with no effusions or tenderness to palpation.  Hypertonic hamstrings bilaterally.  No hypermobility of the upper or lower extremities.  Neurologic exam: Mental status: Patient is alert and oriented with normal affect.  Attention, knowledge, memory, and language are intact.  Normal " coordination throughout the examination.  Reflexes are 2+ and symmetric biceps, triceps, brachioradialis, patellar, and trace Achilles with down-going toes and Negative Cira's.  Sensation is intact to light touch throughout the upper and lower extremities bilaterally.  Manual muscle testing reveals 5 out of 5 in the hip flexors, knee flexors/extensors, ankle plantar flexors, ankle  dorsiflexors, and EHL.  Upper extremities: Grossly normal strength . Normal muscle bulk and tone in the arms and legs.    Negative seated and supine straight leg raise bilaterally.          Again, thank you for allowing me to participate in the care of your patient.        Sincerely,        Ashish Lee, DO

## 2022-12-07 NOTE — PROGRESS NOTES
Assessment/Plan:      Wei was seen today for back pain.    Diagnoses and all orders for this visit:    Lumbar spine pain    Myofascial pain    Hip pain         Assessment: Pleasant 56 year old male with a history of hypertension, diabetes mellitus,Osteoarthritis, sleep apnea with:    1.  Lumbar spine pain for the last several months.  This at the lumbosacral junction consistent with this discogenic pain/annular tear disc herniation without radicular pain.  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  He does have some mild degenerative change in the lower lumbar spine on AP view of the hips/pelvis.    2.  Myofascial pain lumbar spine and gluteal region.    3.  Bilateral hip pain consistent with osteoarthritis which is mild on plain films and decreased range of motion.  Treated at [facility].      Discussion:    1.  I discussed the diagnosis and treatment options.  We discussed this likely represents a small disc herniation or annular tear with improving symptoms with associated myofascial pain.  Some of this may be related to decreased range of motion of the hips and crouching leaning forward.  We discussed home exercises along with physical therapy.  Discussed the options of imaging although at this point his pain is improving and can also monitor symptoms which he prefers.    2.  Home exercises provided for the patient for lumbar range of motion SI range of motion and stretching.  This may help the low back and hips.    3.  Try Aleve or ibuprofen over-the-counter as needed for pain.    4.  Reassurance provided that this should continue to improve.  If it does not improve we will likely recommend imaging such as MRI.    5.  Follow-up with me as needed if symptoms worsen.          It was our pleasure caring for your patient today, if there any questions or concerns please do not hesitate to contact us.      Subjective:   Patient ID: Wei Wolf is a 56 year old  male.    History of Present Illness:   Patient presents at the request of Dr. Brian for an evaluation of low back pain.  He has low back pain lumbosacral junction for the past several months.  Was working on a boat trailer doing some maintenance with the brakes and axles in was sitting leaning forward for quite a while and began having significant pain at the lumbosacral junction we stood up and stepped over something severe back pain had to lay on his back for several hours and then he was able to slowly move around.  This flared for 4 days with severe pain better laying down worse with slouching.  Over the past few months he also has had improving in pain but worse with slouching better with standing.  The pain is limited to the low back central midline L4-5 L5-S1 region.  Rated 8/10 at worst 1/10 today and at best.  No radiation down the legs paresthesias or weakness.  Takes ibuprofen as needed.  Has not had formal physical therapy for the low back has had some for his hips.    He also has had 1 year of significant hip pain anteriorly bilaterally.  Has decreased range of motion.  Has had plain films reported osteoarthritis had hip injections of the [facility] along with physical therapy.  Hip injections do significantly help.    Imaging plain films of the hips personally reviewed imaging and report showing mild osteoarthritis of the hips bilaterally with anterior view showing mild facet arthropathy in the lower lumbar spine.         Review of Systems: Complains of joint pain.  Denies fever, weight loss, waking, headache, change in vision, chest pain, shortness of breath, abdominal pain, nausea vomiting, bowel or bladder incontinence, skin issues, balance issues.  Remainder of 12 point review systems negative unless listed above.    Past Medical History:   Diagnosis Date     AR (allergic rhinitis)      Diabetes (H)      Hypertension goal BP (blood pressure) < 140/90 12/20/2010     Osteoarthritis of  hip 5/4/2022     Sleep apnea        Family History   Problem Relation Age of Onset     Asthma Mother      Cancer Mother         CERVICAL     Diabetes Mother      Cancer Father         BRAIN     Diabetes Father      Asthma Brother      Asthma Brother          Social History     Socioeconomic History     Marital status:      Spouse name: None     Number of children: None     Years of education: None     Highest education level: None   Occupational History     Occupation: Self-employed     Employer: [employer]     Comment: [employer]   Tobacco Use     Smoking status: Never     Smokeless tobacco: Never   Vaping Use     Vaping Use: Never used   Substance and Sexual Activity     Alcohol use: No     Drug use: No     Sexual activity: Yes     Partners: Female     Birth control/protection: Male Surgical   Other Topics Concern     Parent/sibling w/ CABG, MI or angioplasty before 65F 55M? No      Service No     Blood Transfusions No     Caffeine Concern No     Occupational Exposure No     Hobby Hazards No     Sleep Concern No     Stress Concern Yes     Weight Concern Yes     Special Diet Yes     Comment: for DM     Back Care No     Exercise Yes     Bike Helmet No     Seat Belt No     Self-Exams No     Social history: .  2 grown children.  Works as a  in construction.  No tobacco.  Does drink alcohol 1 to 2/month    The following portions of the patient's history were reviewed and updated as appropriate: allergies, current medications, past family history, past medical history, past social history, past surgical history and problem list.    Oswestry (NAKUL) Questionnaire    OSWESTRY DISABILITY INDEX 12/7/2022   Count 10   Sum 7   Oswestry Score (%) 14   Some recent data might be hidden       Neck Disability Index:  No flowsheet data found.       PHQ-2 Score:     PHQ-2 ( 1999 [employer]izer) 10/20/2022 3/21/2022   Q1: Little interest or pleasure in doing things 0 0   Q2: Feeling down, depressed or  "hopeless 0 0   PHQ-2 Score 0 0   PHQ-2 Total Score (12-17 Years)- Positive if 3 or more points; Administer PHQ-A if positive - -   Q1: Little interest or pleasure in doing things - -   Q2: Feeling down, depressed or hopeless - -   PHQ-2 Score - -                  Objective:   Physical Exam:    BP (!) 146/74   Pulse 80   Ht 5' 10\" (1.778 m)   Wt 281 lb (127.5 kg)   BMI 40.32 kg/m    Body mass index is 40.32 kg/m .    General:  Well-appearing male in no acute distress.  Pleasant, cooperative, and interactive throughout the examination and interview.  CV: No lower extremity edema on inspection or paltation.  Lymphatics: No cervical lymphadenopathy palpated. Eyes: sclera clear. Skin: No rashes or lesions seen over the head/neck, hairline, arms, legs, trunk.  Respirations unlabored.  MSK: Gait is normal.  Able to heel-toe walk without difficulty.  Negative Romberg.  Spine: normal AP curves of the C, T, and L spine.  Mild to moderately decreased lumbar flexion finger to floor testing..  Palpation: Tenderness to palpation over lumbar paraspinals L4-L5-S1 bilaterally.  Extremities: Full range of motion of the elbows, and wrists with no effusions or tenderness to palpation.  Mild decreased range of motion of the hips record in the left and external rotation and internal rotation.  Positive SEAN test right greater than left.  Full range of motion of the  knees, and ankles with no effusions or tenderness to palpation.  Hypertonic hamstrings bilaterally.  No hypermobility of the upper or lower extremities.  Neurologic exam: Mental status: Patient is alert and oriented with normal affect.  Attention, knowledge, memory, and language are intact.  Normal coordination throughout the examination.  Reflexes are 2+ and symmetric biceps, triceps, brachioradialis, patellar, and trace Achilles with down-going toes and Negative Cira's.  Sensation is intact to light touch throughout the upper and lower extremities bilaterally.  " Manual muscle testing reveals 5 out of 5 in the hip flexors, knee flexors/extensors, ankle plantar flexors, ankle  dorsiflexors, and EHL.  Upper extremities: Grossly normal strength . Normal muscle bulk and tone in the arms and legs.    Negative seated and supine straight leg raise bilaterally.

## 2022-12-20 ENCOUNTER — VIRTUAL VISIT (OUTPATIENT)
Dept: FAMILY MEDICINE | Facility: CLINIC | Age: 57
End: 2022-12-20
Payer: COMMERCIAL

## 2022-12-20 DIAGNOSIS — Z79.4 TYPE 2 DIABETES MELLITUS WITH HYPERGLYCEMIA, WITH LONG-TERM CURRENT USE OF INSULIN (H): ICD-10-CM

## 2022-12-20 DIAGNOSIS — E11.65 TYPE 2 DIABETES MELLITUS WITH HYPERGLYCEMIA, WITH LONG-TERM CURRENT USE OF INSULIN (H): ICD-10-CM

## 2022-12-20 DIAGNOSIS — E66.01 MORBID OBESITY (H): ICD-10-CM

## 2022-12-20 PROCEDURE — 99214 OFFICE O/P EST MOD 30 MIN: CPT | Mod: 95 | Performed by: FAMILY MEDICINE

## 2022-12-20 NOTE — PROGRESS NOTES
"Wei is a 57 year old who is being evaluated via a billable video visit.      How would you like to obtain your AVS? MyChart  If the video visit is dropped, the invitation should be resent by: Text to cell phone: [phone]  Will anyone else be joining your video visit? No          Assessment & Plan     Type 2 diabetes mellitus with hyperglycemia, with long-term current use of insulin (H)  Diabetes is fairly controlled, current medication include Tresiba, Ozempic, Actos  We will repeat A1c.  Continue the same treatment for now  - Hemoglobin A1c; Future    Morbid obesity (H)  Wt Readings from Last 4 Encounters:   12/07/22 127.5 kg (281 lb)   12/28/21 125.1 kg (275 lb 12.8 oz)   11/02/21 122.9 kg (271 lb)   11/19/20 120.2 kg (265 lb)   Contributing to diabetes mellitus.   Discussed diet and exercise  Continue Ozempic which helps with weight loss             BMI:   Estimated body mass index is 40.32 kg/m  as calculated from the following:    Height as of 12/7/22: 1.778 m (5' 10\").    Weight as of 12/7/22: 127.5 kg (281 lb).           No follow-ups on file.    Radha Brian MD  [facility]   Wei is a 57 year old, presenting for the following health issues:  Diabetes      History of Present Illness       Diabetes:   He presents for follow up of diabetes.  He is checking home blood glucose a few times a month. He checks blood glucose before meals.  Blood glucose is never over 200 and never under 70. He is aware of hypoglycemia symptoms including shakiness. He has no concerns regarding his diabetes at this time.  He is not experiencing numbness or burning in feet, excessive thirst, blurry vision, weight changes or redness, sores or blisters on feet. The patient has not had a diabetic eye exam in the last 12 months.         He eats 2-3 servings of fruits and vegetables daily.He consumes 0 sweetened beverage(s) daily.He exercises with enough effort to increase his heart rate 10 " to 19 minutes per day.  He exercises with enough effort to increase his heart rate 4 days per week.   He is taking medications regularly.             Review of Systems   Constitutional, HEENT, cardiovascular, pulmonary, gi and gu systems are negative, except as otherwise noted.      Objective           Vitals:  No vitals were obtained today due to virtual visit.    Physical Exam   GENERAL: Healthy, alert and no distress  EYES: Eyes grossly normal to inspection.  No discharge or erythema, or obvious scleral/conjunctival abnormalities.  RESP: No audible wheeze, cough, or visible cyanosis.  No visible retractions or increased work of breathing.    SKIN: Visible skin clear. No significant rash, abnormal pigmentation or lesions.  NEURO: Cranial nerves grossly intact.  Mentation and speech appropriate for age.  PSYCH: Mentation appears normal, affect normal/bright, judgement and insight intact, normal speech and appearance well-groomed.                Video-Visit Details    Video Start Time: 10:01 AM    Type of service:  Video Visit    Video End Time:10:09 AM    Originating Location (pt. Location): Home        Distant Location (provider location):  On-site    Platform used for Video Visit: Shopventory

## 2022-12-21 ENCOUNTER — LAB (OUTPATIENT)
Dept: LAB | Facility: CLINIC | Age: 57
End: 2022-12-21
Payer: COMMERCIAL

## 2022-12-21 DIAGNOSIS — E11.65 TYPE 2 DIABETES MELLITUS WITH HYPERGLYCEMIA, WITH LONG-TERM CURRENT USE OF INSULIN (H): ICD-10-CM

## 2022-12-21 DIAGNOSIS — Z79.4 TYPE 2 DIABETES MELLITUS WITH HYPERGLYCEMIA, WITH LONG-TERM CURRENT USE OF INSULIN (H): ICD-10-CM

## 2022-12-21 LAB — HBA1C MFR BLD: 7.9 % (ref 0–5.6)

## 2022-12-21 PROCEDURE — 36415 COLL VENOUS BLD VENIPUNCTURE: CPT

## 2022-12-21 PROCEDURE — 83036 HEMOGLOBIN GLYCOSYLATED A1C: CPT

## 2022-12-28 DIAGNOSIS — Z79.4 TYPE 2 DIABETES MELLITUS WITHOUT COMPLICATION, WITH LONG-TERM CURRENT USE OF INSULIN (H): ICD-10-CM

## 2022-12-28 DIAGNOSIS — E78.00 HIGH CHOLESTEROL: ICD-10-CM

## 2022-12-28 DIAGNOSIS — E11.9 TYPE 2 DIABETES MELLITUS WITHOUT COMPLICATION, WITH LONG-TERM CURRENT USE OF INSULIN (H): ICD-10-CM

## 2022-12-28 RX ORDER — SIMVASTATIN 10 MG
10 TABLET ORAL AT BEDTIME
Qty: 90 TABLET | Refills: 0 | Status: SHIPPED | OUTPATIENT
Start: 2022-12-28 | End: 2023-03-29

## 2022-12-28 RX ORDER — PIOGLITAZONEHYDROCHLORIDE 30 MG/1
30 TABLET ORAL DAILY
Qty: 90 TABLET | Refills: 0 | Status: SHIPPED | OUTPATIENT
Start: 2022-12-28 | End: 2023-03-29

## 2022-12-28 RX ORDER — AMLODIPINE BESYLATE 2.5 MG/1
2.5 TABLET ORAL DAILY
Qty: 90 TABLET | Refills: 0 | Status: SHIPPED | OUTPATIENT
Start: 2022-12-28 | End: 2023-03-29

## 2023-03-12 ENCOUNTER — MYC REFILL (OUTPATIENT)
Dept: FAMILY MEDICINE | Facility: CLINIC | Age: 58
End: 2023-03-12
Payer: COMMERCIAL

## 2023-03-12 DIAGNOSIS — Z79.4 TYPE 2 DIABETES MELLITUS WITH HYPERGLYCEMIA, WITH LONG-TERM CURRENT USE OF INSULIN (H): ICD-10-CM

## 2023-03-12 DIAGNOSIS — E11.65 TYPE 2 DIABETES MELLITUS WITH HYPERGLYCEMIA, WITH LONG-TERM CURRENT USE OF INSULIN (H): ICD-10-CM

## 2023-03-13 RX ORDER — INSULIN DEGLUDEC 100 U/ML
INJECTION, SOLUTION SUBCUTANEOUS
Qty: 30 ML | Refills: 0 | Status: SHIPPED | OUTPATIENT
Start: 2023-03-13 | End: 2023-07-10

## 2023-03-23 DIAGNOSIS — M19.041 DEGENERATIVE ARTHRITIS OF METACARPOPHALANGEAL JOINT OF INDEX FINGER OF RIGHT HAND: Primary | ICD-10-CM

## 2023-03-25 DIAGNOSIS — E78.00 HIGH CHOLESTEROL: ICD-10-CM

## 2023-03-25 DIAGNOSIS — Z79.4 TYPE 2 DIABETES MELLITUS WITHOUT COMPLICATION, WITH LONG-TERM CURRENT USE OF INSULIN (H): ICD-10-CM

## 2023-03-25 DIAGNOSIS — E11.9 TYPE 2 DIABETES MELLITUS WITHOUT COMPLICATION, WITH LONG-TERM CURRENT USE OF INSULIN (H): ICD-10-CM

## 2023-03-29 RX ORDER — PIOGLITAZONEHYDROCHLORIDE 30 MG/1
30 TABLET ORAL DAILY
Qty: 90 TABLET | Refills: 0 | Status: SHIPPED | OUTPATIENT
Start: 2023-03-29 | End: 2023-07-10

## 2023-03-29 RX ORDER — SIMVASTATIN 10 MG
10 TABLET ORAL AT BEDTIME
Qty: 90 TABLET | Refills: 0 | Status: SHIPPED | OUTPATIENT
Start: 2023-03-29 | End: 2023-07-10

## 2023-03-29 RX ORDER — AMLODIPINE BESYLATE 2.5 MG/1
2.5 TABLET ORAL DAILY
Qty: 90 TABLET | Refills: 0 | Status: SHIPPED | OUTPATIENT
Start: 2023-03-29 | End: 2023-05-17

## 2023-03-30 ENCOUNTER — OFFICE VISIT (OUTPATIENT)
Dept: ORTHOPEDICS | Facility: CLINIC | Age: 58
End: 2023-03-30
Payer: COMMERCIAL

## 2023-03-30 ENCOUNTER — ANCILLARY PROCEDURE (OUTPATIENT)
Dept: GENERAL RADIOLOGY | Facility: CLINIC | Age: 58
End: 2023-03-30
Attending: ORTHOPAEDIC SURGERY
Payer: COMMERCIAL

## 2023-03-30 DIAGNOSIS — M19.041 DEGENERATIVE ARTHRITIS OF METACARPOPHALANGEAL JOINT OF INDEX FINGER OF RIGHT HAND: ICD-10-CM

## 2023-03-30 DIAGNOSIS — M19.041 DEGENERATIVE ARTHRITIS OF METACARPOPHALANGEAL JOINT OF INDEX FINGER OF RIGHT HAND: Primary | ICD-10-CM

## 2023-03-30 PROCEDURE — 99207 PR DROP WITH A PROCEDURE: CPT | Performed by: ORTHOPAEDIC SURGERY

## 2023-03-30 PROCEDURE — 20600 DRAIN/INJ JOINT/BURSA W/O US: CPT | Mod: F6 | Performed by: ORTHOPAEDIC SURGERY

## 2023-03-30 PROCEDURE — 77002 NEEDLE LOCALIZATION BY XRAY: CPT | Performed by: ORTHOPAEDIC SURGERY

## 2023-03-30 RX ORDER — LIDOCAINE HYDROCHLORIDE 10 MG/ML
2 INJECTION, SOLUTION EPIDURAL; INFILTRATION; INTRACAUDAL; PERINEURAL
Status: DISCONTINUED | OUTPATIENT
Start: 2023-03-30 | End: 2023-07-10

## 2023-03-30 RX ORDER — TRIAMCINOLONE ACETONIDE 40 MG/ML
40 INJECTION, SUSPENSION INTRA-ARTICULAR; INTRAMUSCULAR
Status: DISCONTINUED | OUTPATIENT
Start: 2023-03-30 | End: 2023-07-10

## 2023-03-30 RX ADMIN — TRIAMCINOLONE ACETONIDE 40 MG: 40 INJECTION, SUSPENSION INTRA-ARTICULAR; INTRAMUSCULAR at 07:53

## 2023-03-30 RX ADMIN — LIDOCAINE HYDROCHLORIDE 2 ML: 10 INJECTION, SOLUTION EPIDURAL; INFILTRATION; INTRACAUDAL; PERINEURAL at 07:53

## 2023-03-30 NOTE — LETTER
3/30/2023         RE: Wei Wolf  [address] [facility] [address]        Dear Colleague,    Thank you for referring your patient, Wei Wolf, to the [facility]. Please see a copy of my visit note below.    Hand / Upper Extremity Injection/Arthrocentesis: R index MCP    Date/Time: 3/30/2023 7:53 AM  Performed by: Lydia Corrales MD  Authorized by: Lydia Corrales MD     Indications:  Pain  Needle Size:  25 G  Guidance: landmark    Condition: osteoarthritis    Location:  Index finger  Site:  R index MCP    Medications:  40 mg triamcinolone 40 MG/ML; 2 mL lidocaine (PF) 1 %  Outcome:  Tolerated well, no immediate complications  Procedure discussed: discussed risks, benefits, and alternatives    Consent Given by:  Patient  Timeout: timeout called immediately prior to procedure    Prep: patient was prepped and draped in usual sterile fashion          [facility] [address]  [address] [facility] [address] [phone]  Dept: [phone]  ______________________________________________________________________________    Patient: Wei Wolf   : 1965   MRN: [id number]   2023    INVASIVE PROCEDURE SAFETY CHECKLIST    Date: 3/30/2023   Procedure: Right index MCP injection  Patient Name: Wei Wolf  MRN: [id number]  YOB: 1965    Action: Complete sections as appropriate. Any discrepancy results in a HARD COPY until resolved.     PRE PROCEDURE:  Patient ID verified with 2 identifiers (name and  or MRN): Yes  Procedure and site verified with patient/designee (when able): Yes  Accurate consent documentation in medical record: Yes  H&P (or appropriate assessment) documented in medical record: Yes  H&P must be up to 20 days prior to procedure and updates within 24 hours of procedure as applicable: Yes  Relevant diagnostic and radiology test  results appropriately labeled and displayed as applicable: Yes  Procedure site(s) marked with provider initials: NA    TIMEOUT:  Time-Out performed immediately prior to starting procedure, including verbal and active participation of all team members addressing the following:Yes  * Correct patient identify  * Confirmed that the correct side and site are marked  * An accurate procedure consent form  * Agreement on the procedure to be done  * Correct patient position  * Relevant images and results are properly labeled and appropriately displayed  * The need to administer antibiotics or fluids for irrigation purposes during the procedure as applicable   * Safety precautions based on patient history or medication use    DURING PROCEDURE: Verification of correct person, site, and procedures any time the responsibility for care of the patient is transferred to another member of the care team.       The following medications were given:         Prior to injection, verified patient identity using patient's name and date of birth.  Due to injection administration, patient instructed to remain in clinic for 15 minutes  afterwards, and to report any adverse reaction to me immediately.    Joint injection was performed.    Medication Name: Kenalog 40 NDC [address]-8  Drug Amount Wasted:  None.  Vial/Syringe: Single dose vial  Expiration Date:  10/1/2024    Medication Name Lidocaine 1% NDC 81051-490-54    Scribed by Brody Pierre for Dr. Corrales on March 30, 2023 at 7:55a based on the provider's statements to me.     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  He comes in today requesting an injection of cortisone.  He last had an injection on 10/20//2022.  He reports that his pain is back to 5-6 on a scale from 0-10.  The injection helped significantly, and he would like to have another one.   With the injection, his pain was zero.    PHYSICAL EXAMINATION:  On examination today, his skin is intact.  There is minimal subcutaneous wasting.  He has full range of motion but pain with terminal flexion and extension of the index MCP joint.     ASSESSMENT:  Right index MCP arthritis and synovitis.     PLAN:  We discussed the risks, benefits, and alternatives of treatment options available.  Due to his significant relief from injections in the past and goal to return to and continue with his work activities, I think it is reasonable to repeat an injection today.     PROCEDURE:  After written consent, verifying site and side, a sterile ChloraPrep was performed on the injection site.  C-arm guidance was used for difficult placement of a 25-gauge needle intra-articular into the right MCP joint.  2 mL with 1 mL of 1% lidocaine and 40 mg of Kenalog was injected under fluoroscopic guidance from the radial side with good relief of pain.  The patient tolerated the procedure well.  No complications were noted.  Followup instructions were given.     Lydia Corrales MD

## 2023-03-30 NOTE — PROGRESS NOTES
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  He comes in today requesting an injection of cortisone.  He last had an injection on 10/20//2022.  He reports that his pain is back to 5-6 on a scale from 0-10.  The injection helped significantly, and he would like to have another one.   With the injection, his pain was zero.    PHYSICAL EXAMINATION:  On examination today, his skin is intact.  There is minimal subcutaneous wasting.  He has full range of motion but pain with terminal flexion and extension of the index MCP joint.     ASSESSMENT:  Right index MCP arthritis and synovitis.     PLAN:  We discussed the risks, benefits, and alternatives of treatment options available.  Due to his significant relief from injections in the past and goal to return to and continue with his work activities, I think it is reasonable to repeat an injection today.     PROCEDURE:  After written consent, verifying site and side, a sterile ChloraPrep was performed on the injection site.  C-arm guidance was used for difficult placement of a 25-gauge needle intra-articular into the right MCP joint.  2 mL with 1 mL of 1% lidocaine and 40 mg of Kenalog was injected under fluoroscopic guidance from the radial side with good relief of pain.  The patient tolerated the procedure well.  No complications were noted.  Followup instructions were given.     Lydia Corrales MD

## 2023-03-30 NOTE — NURSING NOTE
Reason For Visit:   Chief Complaint   Patient presents with     RECHECK     Right index MCP injection       Primary MD: Radha Brian  Ref. MD: BETH    Age: 57 year old    ?  No      There were no vitals taken for this visit.      Pain Assessment  Patient Currently in Pain: Yes  0-10 Pain Scale: 3 (Up to 6/10 with accidental contact)  Primary Pain Location: Finger (Comment which one) (Right index finger)    Hand Dominance Evaluation  Hand Dominance: Right      force  R hand pincher force: 14.1 kg (31 lb) (with pain)  R hand  level 2 force: 43.1 kg (95 lb)  L hand pincher force: 15.4 kg (34 lb)  L hand  level  2 force: 49.9 kg (110 lb)    QuickDASH Assessment  QuickDASH Main 3/23/2023   1. Open a tight or new jar. Moderate difficulty   2. Do heavy household chores (e.g., wash walls, floors) Mild difficulty   3. Carry a shopping bag or briefcase. Mild difficulty   4. Wash your back. Severe difficulty   5. Use a knife to cut food. Mild difficulty   6. Recreational activities in which you take some force or impact through your arm, shoulder or hand (e.g., golf, hammering, tennis, etc.). Mild difficulty   7. During the past week, to what extent has your arm, shoulder or hand problem interfered with your normal social activities with family, friends, neighbours or groups? Not at all   8. During the past week, were you limited in your work or other regular daily activities as a result of your arm, shoulder or hand problem? Slightly limited   9. Arm, shoulder or hand pain. Mild   10.Tingling (pins and needles) in your arm,shoulder or hand. None   11. During the past week, how much difficulty have you had sleeping because of the pain in your arm, shoulder or hand? No difficulty   Quickdash Ability Score 25          Current Outpatient Medications   Medication Sig Dispense Refill     amLODIPine (NORVASC) 2.5 MG tablet Take 1 tablet (2.5 mg) by mouth daily 90 tablet 0     ASPIRIN NOT PRESCRIBED,  INTENTIONAL, continuous prn Antiplatelet medication not prescribed intentionally due to GI Issues / Ulcer Disease 1 each 0     blood glucose (GLUCOCARD VITAL TEST) test strip USE TO TEST BLOD SUGAR TWICE DAILY OR AS DIRECTED 100 strip 0     blood glucose monitoring (ACCU-CHEK FASTCLIX) lancets Use to test blood sugar 2 times daily or as directed.  Ok to substitute alternative if insurance prefers. 102 each 11     insulin degludec (TRESIBA FLEXTOUCH) 100 UNIT/ML pen INJECT 24 UNITS UNDER THE SKIN EVERY MORNING 30 mL 0     OZEMPIC, 1 MG/DOSE, 4 MG/3ML SOPN INJECT 1 MG SUBCUTANEOUS EVERY 7 DAYS 9 mL 1     pioglitazone (ACTOS) 30 MG tablet Take 1 tablet (30 mg) by mouth daily 90 tablet 0     simvastatin (ZOCOR) 10 MG tablet Take 1 tablet (10 mg) by mouth At Bedtime 90 tablet 0       Allergies   Allergen Reactions     Kiwi Itching     Throat itching to kiwi, watermelon, honeydew     Pollen Extract      Metformin Diarrhea       Brody Pierre, AEMT

## 2023-03-30 NOTE — PROGRESS NOTES
Hand / Upper Extremity Injection/Arthrocentesis: R index MCP    Date/Time: 3/30/2023 7:53 AM  Performed by: Lydia Corrales MD  Authorized by: Lydia Corrales MD     Indications:  Pain  Needle Size:  25 G  Guidance: landmark    Condition: osteoarthritis    Location:  Index finger  Site:  R index MCP    Medications:  40 mg triamcinolone 40 MG/ML; 2 mL lidocaine (PF) 1 %  Outcome:  Tolerated well, no immediate complications  Procedure discussed: discussed risks, benefits, and alternatives    Consent Given by:  Patient  Timeout: timeout called immediately prior to procedure    Prep: patient was prepped and draped in usual sterile fashion          [facility] [address]  [address] [facility] [id number] [phone]  Dept: [phone]  ______________________________________________________________________________    Patient: Wei Wolf   : 1965   MRN: [id number]   2023    INVASIVE PROCEDURE SAFETY CHECKLIST    Date: 3/30/2023   Procedure: Right index MCP injection  Patient Name: Wei Wolf  MRN: [id number]  YOB: 1965    Action: Complete sections as appropriate. Any discrepancy results in a HARD COPY until resolved.     PRE PROCEDURE:  Patient ID verified with 2 identifiers (name and  or MRN): Yes  Procedure and site verified with patient/designee (when able): Yes  Accurate consent documentation in medical record: Yes  H&P (or appropriate assessment) documented in medical record: Yes  H&P must be up to 20 days prior to procedure and updates within 24 hours of procedure as applicable: Yes  Relevant diagnostic and radiology test results appropriately labeled and displayed as applicable: Yes  Procedure site(s) marked with provider initials: NA    TIMEOUT:  Time-Out performed immediately prior to starting procedure, including verbal and active participation of all team members addressing the  following:Yes  * Correct patient identify  * Confirmed that the correct side and site are marked  * An accurate procedure consent form  * Agreement on the procedure to be done  * Correct patient position  * Relevant images and results are properly labeled and appropriately displayed  * The need to administer antibiotics or fluids for irrigation purposes during the procedure as applicable   * Safety precautions based on patient history or medication use    DURING PROCEDURE: Verification of correct person, site, and procedures any time the responsibility for care of the patient is transferred to another member of the care team.       The following medications were given:         Prior to injection, verified patient identity using patient's name and date of birth.  Due to injection administration, patient instructed to remain in clinic for 15 minutes  afterwards, and to report any adverse reaction to me immediately.    Joint injection was performed.    Medication Name: Kenalog 40 NDC [address]-7  Drug Amount Wasted:  None.  Vial/Syringe: Single dose vial  Expiration Date:  10/1/2024    Medication Name Lidocaine 1% NDC 18401-287-70    Scribed by Brody Pierre for Dr. Corrales on March 30, 2023 at 7:55a based on the provider's statements to me.     TRUE Rahman

## 2023-04-18 ENCOUNTER — MYC MEDICAL ADVICE (OUTPATIENT)
Dept: FAMILY MEDICINE | Facility: CLINIC | Age: 58
End: 2023-04-18
Payer: COMMERCIAL

## 2023-04-18 DIAGNOSIS — I10 HYPERTENSION GOAL BP (BLOOD PRESSURE) < 140/90: Primary | ICD-10-CM

## 2023-05-17 RX ORDER — NIFEDIPINE 30 MG/1
30 TABLET, EXTENDED RELEASE ORAL DAILY
Qty: 90 TABLET | Refills: 1 | Status: SHIPPED | OUTPATIENT
Start: 2023-05-17 | End: 2023-07-10

## 2023-05-17 NOTE — TELEPHONE ENCOUNTER
We can switch to Procardia ER 30 mg daily   let me know if you ok with this plan   Radha Brian MD.

## 2023-06-02 ENCOUNTER — HEALTH MAINTENANCE LETTER (OUTPATIENT)
Age: 58
End: 2023-06-02

## 2023-07-05 DIAGNOSIS — E11.9 TYPE 2 DIABETES MELLITUS WITHOUT COMPLICATION, WITH LONG-TERM CURRENT USE OF INSULIN (H): ICD-10-CM

## 2023-07-05 DIAGNOSIS — Z79.4 TYPE 2 DIABETES MELLITUS WITHOUT COMPLICATION, WITH LONG-TERM CURRENT USE OF INSULIN (H): ICD-10-CM

## 2023-07-05 RX ORDER — SEMAGLUTIDE 1.34 MG/ML
INJECTION, SOLUTION SUBCUTANEOUS
Qty: 9 ML | Refills: 1 | Status: SHIPPED | OUTPATIENT
Start: 2023-07-05 | End: 2023-12-15

## 2023-07-05 ASSESSMENT — ENCOUNTER SYMPTOMS
HEMATOCHEZIA: 0
COUGH: 0
ARTHRALGIAS: 1
ABDOMINAL PAIN: 0
JOINT SWELLING: 1
PARESTHESIAS: 0
HEMATURIA: 0
NAUSEA: 0
SORE THROAT: 0
WEAKNESS: 0
EYE PAIN: 0
DIARRHEA: 0
PALPITATIONS: 0
FEVER: 0
DIZZINESS: 0
HEARTBURN: 0
NERVOUS/ANXIOUS: 0
SHORTNESS OF BREATH: 0
FREQUENCY: 0
MYALGIAS: 0
CHILLS: 0
HEADACHES: 0
CONSTIPATION: 0

## 2023-07-06 DIAGNOSIS — Z79.4 TYPE 2 DIABETES MELLITUS WITHOUT COMPLICATION, WITH LONG-TERM CURRENT USE OF INSULIN (H): ICD-10-CM

## 2023-07-06 DIAGNOSIS — E11.65 TYPE 2 DIABETES MELLITUS WITH HYPERGLYCEMIA, WITH LONG-TERM CURRENT USE OF INSULIN (H): ICD-10-CM

## 2023-07-06 DIAGNOSIS — Z79.4 TYPE 2 DIABETES MELLITUS WITH HYPERGLYCEMIA, WITH LONG-TERM CURRENT USE OF INSULIN (H): ICD-10-CM

## 2023-07-06 DIAGNOSIS — E11.9 TYPE 2 DIABETES MELLITUS WITHOUT COMPLICATION, WITH LONG-TERM CURRENT USE OF INSULIN (H): ICD-10-CM

## 2023-07-06 DIAGNOSIS — E78.00 HIGH CHOLESTEROL: ICD-10-CM

## 2023-07-10 ENCOUNTER — OFFICE VISIT (OUTPATIENT)
Dept: FAMILY MEDICINE | Facility: CLINIC | Age: 58
End: 2023-07-10
Payer: COMMERCIAL

## 2023-07-10 VITALS
RESPIRATION RATE: 16 BRPM | HEIGHT: 70 IN | WEIGHT: 290 LBS | HEART RATE: 89 BPM | SYSTOLIC BLOOD PRESSURE: 128 MMHG | OXYGEN SATURATION: 95 % | DIASTOLIC BLOOD PRESSURE: 75 MMHG | TEMPERATURE: 96.8 F | BODY MASS INDEX: 41.52 KG/M2

## 2023-07-10 DIAGNOSIS — E66.01 MORBID OBESITY (H): ICD-10-CM

## 2023-07-10 DIAGNOSIS — Z79.4 TYPE 2 DIABETES MELLITUS WITHOUT COMPLICATION, WITH LONG-TERM CURRENT USE OF INSULIN (H): ICD-10-CM

## 2023-07-10 DIAGNOSIS — E78.00 HIGH CHOLESTEROL: ICD-10-CM

## 2023-07-10 DIAGNOSIS — Z00.00 ROUTINE GENERAL MEDICAL EXAMINATION AT A HEALTH CARE FACILITY: Primary | ICD-10-CM

## 2023-07-10 DIAGNOSIS — E11.9 TYPE 2 DIABETES MELLITUS WITHOUT COMPLICATION, WITH LONG-TERM CURRENT USE OF INSULIN (H): ICD-10-CM

## 2023-07-10 DIAGNOSIS — I10 HYPERTENSION GOAL BP (BLOOD PRESSURE) < 140/90: ICD-10-CM

## 2023-07-10 DIAGNOSIS — Z12.5 SCREENING FOR PROSTATE CANCER: ICD-10-CM

## 2023-07-10 LAB — HBA1C MFR BLD: 7.6 % (ref 0–5.6)

## 2023-07-10 PROCEDURE — 99213 OFFICE O/P EST LOW 20 MIN: CPT | Mod: 25 | Performed by: FAMILY MEDICINE

## 2023-07-10 PROCEDURE — 83036 HEMOGLOBIN GLYCOSYLATED A1C: CPT | Performed by: FAMILY MEDICINE

## 2023-07-10 PROCEDURE — 36415 COLL VENOUS BLD VENIPUNCTURE: CPT | Performed by: FAMILY MEDICINE

## 2023-07-10 PROCEDURE — 80061 LIPID PANEL: CPT | Performed by: FAMILY MEDICINE

## 2023-07-10 PROCEDURE — 90471 IMMUNIZATION ADMIN: CPT | Performed by: FAMILY MEDICINE

## 2023-07-10 PROCEDURE — 90746 HEPB VACCINE 3 DOSE ADULT IM: CPT | Performed by: FAMILY MEDICINE

## 2023-07-10 PROCEDURE — 80053 COMPREHEN METABOLIC PANEL: CPT | Performed by: FAMILY MEDICINE

## 2023-07-10 PROCEDURE — 90677 PCV20 VACCINE IM: CPT | Performed by: FAMILY MEDICINE

## 2023-07-10 PROCEDURE — 90472 IMMUNIZATION ADMIN EACH ADD: CPT | Performed by: FAMILY MEDICINE

## 2023-07-10 PROCEDURE — 99396 PREV VISIT EST AGE 40-64: CPT | Mod: 25 | Performed by: FAMILY MEDICINE

## 2023-07-10 PROCEDURE — G0103 PSA SCREENING: HCPCS | Performed by: FAMILY MEDICINE

## 2023-07-10 RX ORDER — LISINOPRIL/HYDROCHLOROTHIAZIDE 10-12.5 MG
1 TABLET ORAL DAILY
Qty: 90 TABLET | Refills: 1 | Status: SHIPPED | OUTPATIENT
Start: 2023-07-10 | End: 2024-01-04

## 2023-07-10 RX ORDER — PIOGLITAZONEHYDROCHLORIDE 30 MG/1
30 TABLET ORAL DAILY
Qty: 90 TABLET | Refills: 0 | Status: CANCELLED | OUTPATIENT
Start: 2023-07-10

## 2023-07-10 RX ORDER — SIMVASTATIN 10 MG
10 TABLET ORAL AT BEDTIME
Qty: 90 TABLET | Refills: 1 | Status: SHIPPED | OUTPATIENT
Start: 2023-07-10 | End: 2024-01-04

## 2023-07-10 RX ORDER — NIFEDIPINE 30 MG/1
30 TABLET, EXTENDED RELEASE ORAL DAILY
Qty: 90 TABLET | Refills: 1 | Status: CANCELLED | OUTPATIENT
Start: 2023-07-10

## 2023-07-10 RX ORDER — INSULIN DEGLUDEC 100 U/ML
30 INJECTION, SOLUTION SUBCUTANEOUS EVERY MORNING
Qty: 30 ML | Refills: 1 | Status: SHIPPED | OUTPATIENT
Start: 2023-07-10 | End: 2023-08-31

## 2023-07-10 RX ORDER — INSULIN DEGLUDEC 100 U/ML
30 INJECTION, SOLUTION SUBCUTANEOUS EVERY MORNING
Qty: 30 ML | Refills: 1 | Status: SHIPPED | OUTPATIENT
Start: 2023-07-10 | End: 2023-07-10

## 2023-07-10 RX ORDER — SIMVASTATIN 10 MG
10 TABLET ORAL AT BEDTIME
Qty: 90 TABLET | Refills: 0 | Status: CANCELLED | OUTPATIENT
Start: 2023-07-10

## 2023-07-10 RX ORDER — INSULIN DEGLUDEC 100 U/ML
30 INJECTION, SOLUTION SUBCUTANEOUS EVERY MORNING
Qty: 30 ML | Refills: 1 | Status: CANCELLED | OUTPATIENT
Start: 2023-07-10

## 2023-07-10 RX ORDER — INSULIN DEGLUDEC 100 U/ML
INJECTION, SOLUTION SUBCUTANEOUS
Qty: 30 ML | Refills: 0 | Status: CANCELLED | OUTPATIENT
Start: 2023-07-10

## 2023-07-10 RX ORDER — INSULIN DEGLUDEC 100 U/ML
30 INJECTION, SOLUTION SUBCUTANEOUS EVERY MORNING
Qty: 30 ML | Refills: 1 | Status: SHIPPED | OUTPATIENT
Start: 2023-07-10 | End: 2023-08-18

## 2023-07-10 ASSESSMENT — ENCOUNTER SYMPTOMS
SHORTNESS OF BREATH: 0
CHILLS: 0
JOINT SWELLING: 1
CONSTIPATION: 0
NERVOUS/ANXIOUS: 0
NAUSEA: 0
SORE THROAT: 0
FEVER: 0
DIARRHEA: 0
HEMATURIA: 0
ABDOMINAL PAIN: 0
WEAKNESS: 0
PARESTHESIAS: 0
PALPITATIONS: 0
COUGH: 0
FREQUENCY: 0
EYE PAIN: 0
MYALGIAS: 0
HEADACHES: 0
HEARTBURN: 0
DIZZINESS: 0
ARTHRALGIAS: 1
HEMATOCHEZIA: 0

## 2023-07-10 ASSESSMENT — PAIN SCALES - GENERAL: PAINLEVEL: NO PAIN (0)

## 2023-07-10 NOTE — PATIENT INSTRUCTIONS
- Stop Actos and Niufidipine     - Increase Tresiba to 30 units daily     - Start Lisinopril - hydrochlorothiazide     - labs today     - vaccines today     - Set up eye appointment       Preventive Health Recommendations  Male Ages 50 - 64    Yearly exam:             See your health care provider every year in order to  o   Review health changes.   o   Discuss preventive care.    o   Review your medicines if your doctor has prescribed any.   Have a cholesterol test every 5 years, or more frequently if you are at risk for high cholesterol/heart disease.   Have a diabetes test (fasting glucose) every three years. If you are at risk for diabetes, you should have this test more often.   Have a colonoscopy at age 50, or have a yearly FIT test (stool test). These exams will check for colon cancer.    Talk with your health care provider about whether or not a prostate cancer screening test (PSA) is right for you.  You should be tested each year for STDs (sexually transmitted diseases), if you re at risk.     Shots: Get a flu shot each year. Get a tetanus shot every 10 years.     Nutrition:  Eat at least 5 servings of fruits and vegetables daily.   Eat whole-grain bread, whole-wheat pasta and brown rice instead of white grains and rice.   Get adequate Calcium and Vitamin D.     Lifestyle  Exercise for at least 150 minutes a week (30 minutes a day, 5 days a week). This will help you control your weight and prevent disease.   Limit alcohol to one drink per day.   No smoking.   Wear sunscreen to prevent skin cancer.   See your dentist every six months for an exam and cleaning.   See your eye doctor every 1 to 2 years.

## 2023-07-10 NOTE — PROGRESS NOTES
SUBJECTIVE:   CC: Wei is an 57 year old who presents for preventative health visit.       7/10/2023     4:13 PM   Additional Questions   Roomed by Keshia   Accompanied by Self     Healthy Habits:     Getting at least 3 servings of Calcium per day:  NO    Bi-annual eye exam:  NO    Dental care twice a year:  Yes    Sleep apnea or symptoms of sleep apnea:  Sleep apnea    Diet:  Diabetic    Duration of exercise:  Less than 15 minutes    Taking medications regularly:  Yes    Medication side effects:  None    Additional concerns today:  No    Diabetes:   120 in the morning   - Ozempic 1 mg week ly   - Actos 30   - Insulin 24 units per day in the morning  - increase to 30 units   Metformin caused diarrhea        Lab Results   Component Value Date    A1C 7.9 12/21/2022    A1C 7.8 09/29/2022    A1C 7.0 10/14/2021    A1C 7.0 04/14/2021    A1C 6.7 10/07/2020    A1C 8.1 12/12/2019    A1C 8.0 03/11/2019    A1C 8.8 10/08/2018       Wt Readings from Last 4 Encounters:   07/10/23 131.5 kg (290 lb)   12/07/22 127.5 kg (281 lb)   12/28/21 125.1 kg (275 lb 12.8 oz)   11/02/21 122.9 kg (271 lb)       Hypertension   Procardia   Smell after sexual intercourse   Preventive -     Immunization History   Administered Date(s) Administered     COVID-19 Bivalent 12+ (Pfizer) 11/08/2022     COVID-19 MONOVALENT 12+ (Pfizer) 03/26/2021, 04/16/2021, 11/09/2021     COVID-19 Monovalent 12+ (Pfizer 2022) 06/30/2022     FLU 6-35 months 11/11/2006, 11/10/2007     Influenza (H1N1) 11/24/2009     Influenza (IIV3) PF 10/30/2002, 11/21/2003, 10/26/2004, 12/02/2008, 01/05/2010, 09/27/2011, 11/01/2012     Influenza Vaccine 50-64 or 18-64 w/egg allergy (Flublok) 12/17/2019, 09/25/2020, 09/16/2021, 11/08/2022     Pneumococcal 23 valent 11/04/1999, 11/10/2007, 11/01/2009     TD,PF 7+ (Tenivac) 02/05/1999     TDAP Vaccine (Adacel) 06/08/2010, 06/25/2021     Zoster recombinant adjuvanted (SHINGRIX) 06/25/2021, 09/16/2021       - Colon CA screen: Colonoscopy,  age 45-75 every 10 years or FIT every year or Cologuard every 3 years   Recommendation:           - Patient has a contact number available for                             emergencies. The signs and symptoms of potential                             delayed complications were discussed with the                             patient. Return to normal activities tomorrow.                             Written discharge instructions were provided to the                             patient.                             - Resume previous diet.                             - Continue present medications.                             - Repeat colonoscopy in 10 years for screening                             purposes.   Next 2028   - Prostate CA screen: Discussed controversy about screening.   Prostate Specific Antigen Screen   Date Value Ref Range Status   09/29/2022 0.36 0.00 - 4.00 ug/L Final         - Lung cancer screen: [facility] [address] * Asymptomatic, age 55 - 79 years, Current or Former Smoker; if former, must have quit in past 15 years, 30 + pack-year smoking history. Beta carotene use in smokers has been associated with higher risk of lung CA.  No smoking   -lipids screen: done     Diabetes screen: done           Have you ever done Advance Care Planning? (For example, a Health Directive, POLST, or a discussion with a medical provider or your loved ones about your wishes): No, advance care planning information given to patient to review.  Patient plans to discuss their wishes with loved ones or provider.      Social History     Tobacco Use     Smoking status: Never     Smokeless tobacco: Never   Substance Use Topics     Alcohol use: Yes     Comment: 2 drinks a month             7/5/2023     2:14 PM   Alcohol Use   Prescreen: >3 drinks/day or >7 drinks/week? No          No data to display                Last PSA:   Prostate Specific Antigen Screen   Date Value Ref Range Status   09/29/2022 0.36 0.00 - 4.00 ug/L Final       Reviewed  orders with patient. Reviewed health maintenance and updated orders accordingly - Yes  Lab work is in process  Labs reviewed in EPIC  BP Readings from Last 3 Encounters:   07/10/23 128/75   12/07/22 (!) 146/74   12/28/21 122/74    Wt Readings from Last 3 Encounters:   07/10/23 131.5 kg (290 lb)   12/07/22 127.5 kg (281 lb)   12/28/21 125.1 kg (275 lb 12.8 oz)                  Patient Active Problem List   Diagnosis     HYPERLIPIDEMIA LDL GOAL <100     Hypertension goal BP (blood pressure) < 140/90     Obesity     SAI (obstructive sleep apnea)     Morbid obesity (H)     Uncontrolled diabetes mellitus type 2 without complications     Past Surgical History:   Procedure Laterality Date     ABDOMEN SURGERY       COLONOSCOPY WITH CO2 INSUFFLATION N/A 10/19/2018    Procedure: COLON SCREEN/ ;  Surgeon: Mason Duenas MD;  Location: [facility] OR     EXCISE EXOSTOSIS FINGER(S) Right 12/21/2017    Procedure: EXCISE EXOSTOSIS FINGER(S);  Right Index Finger Metacarpal Bone Cyst Curettage with Allograft from Distal Radius;  Surgeon: Lydia Corrales MD;  Location: [facility] OR     GRAFT BONE TO FINGER Right 12/21/2017    Procedure: GRAFT BONE TO FINGER;;  Surgeon: Lydia Corrales MD;  Location: [facility] OR     NO HISTORY OF SURGERY       [facility] HAND/FINGER SURGERY UNLISTED       [facility] STOMACH SURGERY PROCEDURE UNLISTED  2014       Social History     Tobacco Use     Smoking status: Never     Smokeless tobacco: Never   Substance Use Topics     Alcohol use: Yes     Comment: 2 drinks a month     Family History   Problem Relation Age of Onset     Asthma Mother      Cancer Mother         CERVICAL     Diabetes Mother      Cancer Father         BRAIN     Diabetes Father      Asthma Brother      Asthma Brother          Current Outpatient Medications   Medication Sig Dispense Refill     ASPIRIN NOT PRESCRIBED, INTENTIONAL, continuous prn Antiplatelet medication not prescribed intentionally due to GI Issues / Ulcer Disease 1 each 0      blood glucose (GLUCOCARD VITAL TEST) test strip USE TO TEST BLOD SUGAR TWICE DAILY OR AS DIRECTED 100 strip 0     blood glucose monitoring (ACCU-CHEK FASTCLIX) lancets Use to test blood sugar 2 times daily or as directed.  Ok to substitute alternative if insurance prefers. 102 each 11     insulin degludec (TRESIBA FLEXTOUCH) 100 UNIT/ML pen Inject 30 Units Subcutaneous every morning 30 mL 1     insulin degludec (TRESIBA FLEXTOUCH) 100 UNIT/ML pen Inject 30 Units Subcutaneous every morning 30 mL 1     lisinopril-hydrochlorothiazide (ZESTORETIC) 10-12.5 MG tablet Take 1 tablet by mouth daily 90 tablet 1     OZEMPIC, 1 MG/DOSE, 4 MG/3ML pen INJECT 1 MG UNDER THE SKIN EVERY 7 DAYS 9 mL 1     simvastatin (ZOCOR) 10 MG tablet Take 1 tablet (10 mg) by mouth At Bedtime 90 tablet 1     Allergies   Allergen Reactions     Kiwi Itching     Throat itching to kiwi, watermelon, honeydew     Pollen Extract      Metformin Diarrhea     Recent Labs   Lab Test 07/10/23  1703 12/21/22  0748 09/29/22  0749 10/14/21  1426 10/14/21  1426 04/14/21  0835 10/07/20  0859 12/12/19  0811   A1C 7.6* 7.9* 7.8*   < > 7.0* 7.0* 6.7* 8.1*   LDL  --   --  69  --  68  --  63 70   HDL  --   --  38*  --  37*  --  48 49   TRIG  --   --  58  --  94  --  49 56   ALT  --   --  24  --   --  28  --   --    CR  --   --  0.89  --  0.82  --  0.93 0.85   GFRESTIMATED  --   --  >90  --  >90  --  >90 >90   GFRESTBLACK  --   --   --   --   --   --  >90 >90   POTASSIUM  --   --  4.0  --  3.9  --  4.0 4.3   TSH  --   --   --   --   --   --  1.45 1.96    < > = values in this interval not displayed.        Reviewed and updated as needed this visit by clinical staff   Tobacco  Allergies  Meds              Reviewed and updated as needed this visit by Provider                 Past Medical History:   Diagnosis Date     AR (allergic rhinitis)      Diabetes (H)      Hypertension goal BP (blood pressure) < 140/90 12/20/2010     Osteoarthritis of hip 5/4/2022     Sleep apnea   "     Past Surgical History:   Procedure Laterality Date     ABDOMEN SURGERY       COLONOSCOPY WITH CO2 INSUFFLATION N/A 10/19/2018    Procedure: COLON SCREEN/ ;  Surgeon: Mason Duenas MD;  Location: [facility]     EXCISE EXOSTOSIS FINGER(S) Right 12/21/2017    Procedure: EXCISE EXOSTOSIS FINGER(S);  Right Index Finger Metacarpal Bone Cyst Curettage with Allograft from Distal Radius;  Surgeon: Lydia Corrales MD;  [facility]     GRAFT BONE TO FINGER Right 12/21/2017    Procedure: GRAFT BONE TO FINGER;;  Surgeon: Lydia Corrales MD;  [facility]: [facility]     NO HISTORY OF SURGERY       [facility] [place] HAND/FINGER SURGERY UNLISTED       [facility] [place] STOMACH SURGERY PROCEDURE UNLISTED  2014       Review of Systems   Constitutional: Negative for chills and fever.   HENT: Negative for congestion, ear pain, hearing loss and sore throat.    Eyes: Negative for pain and visual disturbance.   Respiratory: Negative for cough and shortness of breath.    Cardiovascular: Positive for peripheral edema. Negative for chest pain and palpitations.   Gastrointestinal: Negative for abdominal pain, constipation, diarrhea, heartburn, hematochezia and nausea.   Genitourinary: Negative for frequency, genital sores, hematuria, impotence, penile discharge and urgency.   Musculoskeletal: Positive for arthralgias and joint swelling. Negative for myalgias.   Skin: Negative for rash.   Neurological: Negative for dizziness, weakness, headaches and paresthesias.   Psychiatric/Behavioral: Negative for mood changes. The patient is not nervous/anxious.          OBJECTIVE:   /75   Pulse 89   Temp 96.8  F (36  C) (Tympanic)   Resp 16   Ht 1.778 m (5' 10\")   Wt 131.5 kg (290 lb)   SpO2 95%   BMI 41.61 kg/m      Physical Exam  Vitals and nursing note reviewed.   Constitutional:       General: He is not in acute distress.     Appearance: Normal appearance. He is not ill-appearing, toxic-appearing or diaphoretic.   HENT:      Head: " Normocephalic and atraumatic.   Cardiovascular:      Rate and Rhythm: Normal rate and regular rhythm.      Heart sounds: No murmur heard.     No friction rub. No gallop.   Pulmonary:      Effort: Pulmonary effort is normal. No respiratory distress.      Breath sounds: No stridor. No wheezing or rhonchi.   Musculoskeletal:      Cervical back: Normal range of motion and neck supple.   Skin:     Capillary Refill: Capillary refill takes less than 2 seconds.   Neurological:      General: No focal deficit present.      Mental Status: He is alert.               ASSESSMENT/PLAN:   (Z00.00) Routine general medical examination at a health care facility  (primary encounter diagnosis)  Comment: Preventive care reviewed and updated.    (E78.00) High cholesterol  Chronic stable problem.  Continue Zocor.  Refill provided  Plan: simvastatin (ZOCOR) 10 MG tablet, Lipid panel         reflex to direct LDL Non-fasting    (E11.9,  Z79.4) Type 2 diabetes mellitus without complication, with long-term current use of insulin (H)   I had a discussion with the patient about his concerns. Diabetes is  not well controlled.  Current medications: Tresiba 24 units, Ozempic 1 mg, Actos  I recommend the following :  1. Patient education: In depth discussion and education was provided about the assessment and implications of each of the below recommendations for management. Patient indicated readiness to learn, all questions were answered and understanding of material presented was confirmed.  2. -Increase exercise as tolerated.   3. Avoid high glycemic index diet , Increasae exericise as tolerated.  4. -BP:  At goal  5. -NAFL/LOPEZ:  CMP ordered  6. -Lipids:  On statin  7. -Microalbumin; will start lisinopril  8. -Eyes: last eye exam   referred  9. Medications: Increase Tresiba to 50 units in the morning, continue Ozempic 1 mg daily, and stop Actos due to lower extremity edema  10. Referral / follow up with other providers: Eye exam  11. Follow up:    "6 months      Plan: Adult Eye  Referral, HEMOGLOBIN A1C,         Comprehensive metabolic panel (BMP + Alb, Alk         Phos, ALT, AST, Total. Bili, TP), insulin         degludec (TRESIBA FLEXTOUCH) 100 UNIT/ML pen,         insulin degludec (TRESIBA FLEXTOUCH) 100         UNIT/ML pen      Consider starting Jardiance    Lab Results   Component Value Date    A1C 7.6 07/10/2023    A1C 7.9 12/21/2022    A1C 7.8 09/29/2022    A1C 7.0 10/14/2021    A1C 7.0 04/14/2021    A1C 6.7 10/07/2020    A1C 8.1 12/12/2019    A1C 8.0 03/11/2019    A1C 8.8 10/08/2018       (I10) Hypertension goal BP (blood pressure) < 140/90  Comment: Blood pressure at goal, however he developed lower extremity edema from nifedipine, previously was on amlodipine but stopped it due to body odor  Plan: lisinopril-hydrochlorothiazide (ZESTORETIC)         10-12.5 MG tablet         (Z12.5) Screening for prostate cancer    Plan: PSA, screen    (E66.01) Morbid obesity (H)    Contributes to diabetes, hypertension, and SAI  Continue Ozempic   discussed diet and exercise  Wt Readings from Last 4 Encounters:   07/10/23 131.5 kg (290 lb)   12/07/22 127.5 kg (281 lb)   12/28/21 125.1 kg (275 lb 12.8 oz)   11/02/21 122.9 kg (271 lb)     Body mass index is 41.61 kg/m .    Patient has been advised of split billing requirements and indicates understanding: Yes      COUNSELING:   Reviewed preventive health counseling, as reflected in patient instructions       Immunizations    Vaccinated for: Hepatitis B and Pneumococcal            BMI:   Estimated body mass index is 41.61 kg/m  as calculated from the following:    Height as of this encounter: 1.778 m (5' 10\").    Weight as of this encounter: 131.5 kg (290 lb).   Weight management plan: Discussed healthy diet and exercise guidelines      He reports that he has never smoked. He has never used smokeless tobacco.        Radha Brian MD  [facility]  "

## 2023-07-10 NOTE — NURSING NOTE
Prior to immunization administration, verified patients identity using patient s name and date of birth. Please see Immunization Activity for additional information.     Screening Questionnaire for Adult Immunization    Are you sick today?   No   Do you have allergies to medications, food, a vaccine component or latex?   No   Have you ever had a serious reaction after receiving a vaccination?   No   Do you have a long-term health problem with heart, lung, kidney, or metabolic disease (e.g., diabetes), asthma, a blood disorder, no spleen, complement component deficiency, a cochlear implant, or a spinal fluid leak?  Are you on long-term aspirin therapy?   No   Do you have cancer, leukemia, HIV/AIDS, or any other immune system problem?   No   Do you have a parent, brother, or sister with an immune system problem?   No   In the past 3 months, have you taken medications that affect  your immune system, such as prednisone, other steroids, or anticancer drugs; drugs for the treatment of rheumatoid arthritis, Crohn s disease, or psoriasis; or have you had radiation treatments?   No   Have you had a seizure, or a brain or other nervous system problem?   No   During the past year, have you received a transfusion of blood or blood    products, or been given immune (gamma) globulin or antiviral drug?   No   For women: Are you pregnant or is there a chance you could become       pregnant during the next month?   No   Have you received any vaccinations in the past 4 weeks?   No     Immunization questionnaire answers were all negative.      Patient instructed to remain in clinic for 15 minutes afterwards, and to report any adverse reactions.     Screening performed by Keshia Ellis CMA on 7/10/2023 at 5:01 PM.

## 2023-07-11 LAB
ALBUMIN SERPL BCG-MCNC: 4.4 G/DL (ref 3.5–5.2)
ALP SERPL-CCNC: 101 U/L (ref 40–129)
ALT SERPL W P-5'-P-CCNC: 21 U/L (ref 0–70)
ANION GAP SERPL CALCULATED.3IONS-SCNC: 13 MMOL/L (ref 7–15)
AST SERPL W P-5'-P-CCNC: 25 U/L (ref 0–45)
BILIRUB SERPL-MCNC: 0.4 MG/DL
BUN SERPL-MCNC: 19.4 MG/DL (ref 6–20)
CALCIUM SERPL-MCNC: 9.2 MG/DL (ref 8.6–10)
CHLORIDE SERPL-SCNC: 103 MMOL/L (ref 98–107)
CHOLEST SERPL-MCNC: 128 MG/DL
CREAT SERPL-MCNC: 0.83 MG/DL (ref 0.67–1.17)
DEPRECATED HCO3 PLAS-SCNC: 23 MMOL/L (ref 22–29)
GFR SERPL CREATININE-BSD FRML MDRD: >90 ML/MIN/1.73M2
GLUCOSE SERPL-MCNC: 130 MG/DL (ref 70–99)
HDLC SERPL-MCNC: 38 MG/DL
LDLC SERPL CALC-MCNC: 70 MG/DL
NONHDLC SERPL-MCNC: 90 MG/DL
POTASSIUM SERPL-SCNC: 4.1 MMOL/L (ref 3.4–5.3)
PROT SERPL-MCNC: 7 G/DL (ref 6.4–8.3)
PSA SERPL DL<=0.01 NG/ML-MCNC: 0.28 NG/ML (ref 0–3.5)
SODIUM SERPL-SCNC: 139 MMOL/L (ref 136–145)
TRIGL SERPL-MCNC: 98 MG/DL

## 2023-08-14 ENCOUNTER — MYC MEDICAL ADVICE (OUTPATIENT)
Dept: FAMILY MEDICINE | Facility: CLINIC | Age: 58
End: 2023-08-14
Payer: COMMERCIAL

## 2023-08-14 DIAGNOSIS — Z79.4 TYPE 2 DIABETES MELLITUS WITHOUT COMPLICATION, WITH LONG-TERM CURRENT USE OF INSULIN (H): ICD-10-CM

## 2023-08-14 DIAGNOSIS — E11.9 TYPE 2 DIABETES MELLITUS WITHOUT COMPLICATION, WITH LONG-TERM CURRENT USE OF INSULIN (H): ICD-10-CM

## 2023-08-16 DIAGNOSIS — E11.65 TYPE 2 DIABETES MELLITUS WITH HYPERGLYCEMIA (H): ICD-10-CM

## 2023-08-17 ENCOUNTER — OFFICE VISIT (OUTPATIENT)
Dept: ORTHOPEDICS | Facility: CLINIC | Age: 58
End: 2023-08-17
Payer: COMMERCIAL

## 2023-08-17 DIAGNOSIS — M79.644 PAIN OF FINGER OF RIGHT HAND: Primary | ICD-10-CM

## 2023-08-17 DIAGNOSIS — M25.552 BILATERAL HIP PAIN: Primary | ICD-10-CM

## 2023-08-17 DIAGNOSIS — M25.551 BILATERAL HIP PAIN: Primary | ICD-10-CM

## 2023-08-17 PROCEDURE — 20611 DRAIN/INJ JOINT/BURSA W/US: CPT | Mod: 50 | Performed by: FAMILY MEDICINE

## 2023-08-17 RX ORDER — TRIAMCINOLONE ACETONIDE 40 MG/ML
40 INJECTION, SUSPENSION INTRA-ARTICULAR; INTRAMUSCULAR
Status: SHIPPED | OUTPATIENT
Start: 2023-08-17

## 2023-08-17 RX ORDER — LIDOCAINE HYDROCHLORIDE 10 MG/ML
5 INJECTION, SOLUTION EPIDURAL; INFILTRATION; INTRACAUDAL; PERINEURAL
Status: SHIPPED | OUTPATIENT
Start: 2023-08-17

## 2023-08-17 RX ADMIN — TRIAMCINOLONE ACETONIDE 40 MG: 40 INJECTION, SUSPENSION INTRA-ARTICULAR; INTRAMUSCULAR at 12:14

## 2023-08-17 RX ADMIN — LIDOCAINE HYDROCHLORIDE 5 ML: 10 INJECTION, SOLUTION EPIDURAL; INFILTRATION; INTRACAUDAL; PERINEURAL at 12:14

## 2023-08-17 NOTE — NURSING NOTE
[address] [address]  Dept: [phone]  ______________________________________________________________________________    Patient: Wei Wolf   : 1965   MRN: [id number]   2023    INVASIVE PROCEDURE SAFETY CHECKLIST    Date: 23   Procedure:Bilateral hip IA USG CSI  Patient Name: Wei Wolf  MRN: [id number]  YOB: 1965    Action: Complete sections as appropriate. Any discrepancy results in a HARD COPY until resolved.     PRE PROCEDURE:  Patient ID verified with 2 identifiers (name and  or MRN): Yes  Procedure and site verified with patient/designee (when able): Yes  Accurate consent documentation in medical record: Yes  H&P (or appropriate assessment) documented in medical record: Yes  H&P must be up to 20 days prior to procedure and updates within 24 hours of procedure as applicable: NA  Relevant diagnostic and radiology test results appropriately labeled and displayed as applicable: Yes  Procedure site(s) marked with provider initials: NA    TIMEOUT:  Time-Out performed immediately prior to starting procedure, including verbal and active participation of all team members addressing the following:Yes  * Correct patient identify  * Confirmed that the correct side and site are marked  * An accurate procedure consent form  * Agreement on the procedure to be done  * Correct patient position  * Relevant images and results are properly labeled and appropriately displayed  * The need to administer antibiotics or fluids for irrigation purposes during the procedure as applicable   * Safety precautions based on patient history or medication use    DURING PROCEDURE: Verification of correct person, site, and procedures any time the responsibility for care of the patient is transferred to another member of the care team.       Prior to injection, verified patient identity using patient's name and date of  birth.  Due to injection administration, patient instructed to remain in clinic for 15 minutes  afterwards, and to report any adverse reaction to me immediately.    Joint injection was performed.      Drug Amount Wasted:  None.  Vial/Syringe: Single dose vial  Expiration Date:  4/1/25      Jorge Peterson, ATC  August 17, 2023

## 2023-08-17 NOTE — LETTER
8/17/2023      RE: Wei Wolf  [address] [facility] [address]     Dear Colleague,    Thank you for referring your patient, Wei Wolf, to the [facility] [place]. Please see a copy of my visit note below.      [facility]  SPORTS & ORTHOPEDIC CLINIC VISIT     Aug 17, 2023         Ultrasound-guided bilateral hip joint injection    Date/Time: 8/17/2023 12:14 PM    Performed by: Arben Araya MD  Authorized by: Arben Araya MD    Indications:  Pain and osteoarthritis  Needle Size:  22 G  Guidance: ultrasound    Approach:  Anterior  Location:  Hip  Laterality:  Bilateral      Site:  Bilateral hip joint  Medications (Right):  5 mL lidocaine (PF) 1 %; 40 mg triamcinolone 40 MG/ML  Medications (Left):  5 mL lidocaine (PF) 1 %; 40 mg triamcinolone 40 MG/ML  Outcome:  Tolerated well, no immediate complications  Procedure discussed: discussed risks, benefits, and alternatives    Consent Given by:  Patient  Timeout: timeout called immediately prior to procedure    Prep: patient was prepped and draped in usual sterile fashion      Patient was positioned lying on exam table. Utilizing ultrasound, the femoral head neck junction was visualized and marked with a pen. Ultrasound was utilized to ensure safety of injection clear of neurovascular structures. Next the area was cleaned with a chlorhexadine swab. Using sterile technique, and continuous ultrasound visualization, 3mL 1% lidocaine was injected through a 25g needle along the injection tract. Next, a 22g spinal needle was introduced into the joint at the level of the femoral head/neck junction under direct and continuous ultrasound guidance. A solution of 2mL 1% lidocaine and 40mg triamcinolone was injected and seen flowing through the joint. Images were captured and saved to the patient's permanent record. Patient tolerated the procedure well. No immediate complications.  Routine postinjection instructions were given. Follow up promptly if significant increase in pain, warmth, redness from the injection site.         Again, thank you for allowing me to participate in the care of your patient.      Sincerely,    Arben Araya MD

## 2023-08-17 NOTE — PROGRESS NOTES
[facility]  SPORTS & ORTHOPEDIC CLINIC VISIT     Aug 17, 2023         Ultrasound-guided bilateral hip joint injection    Date/Time: 8/17/2023 12:14 PM    Performed by: Arben Araya MD  Authorized by: Arben Araya MD    Indications:  Pain and osteoarthritis  Needle Size:  22 G  Guidance: ultrasound    Approach:  Anterior  Location:  Hip  Laterality:  Bilateral      Site:  Bilateral hip joint  Medications (Right):  5 mL lidocaine (PF) 1 %; 40 mg triamcinolone 40 MG/ML  Medications (Left):  5 mL lidocaine (PF) 1 %; 40 mg triamcinolone 40 MG/ML  Outcome:  Tolerated well, no immediate complications  Procedure discussed: discussed risks, benefits, and alternatives    Consent Given by:  Patient  Timeout: timeout called immediately prior to procedure    Prep: patient was prepped and draped in usual sterile fashion      Patient was positioned lying on exam table. Utilizing ultrasound, the femoral head neck junction was visualized and marked with a pen. Ultrasound was utilized to ensure safety of injection clear of neurovascular structures. Next the area was cleaned with a chlorhexadine swab. Using sterile technique, and continuous ultrasound visualization, 3mL 1% lidocaine was injected through a 25g needle along the injection tract. Next, a 22g spinal needle was introduced into the joint at the level of the femoral head/neck junction under direct and continuous ultrasound guidance. A solution of 2mL 1% lidocaine and 40mg triamcinolone was injected and seen flowing through the joint. Images were captured and saved to the patient's permanent record. Patient tolerated the procedure well. No immediate complications. Routine postinjection instructions were given. Follow up promptly if significant increase in pain, warmth, redness from the injection site.

## 2023-08-18 NOTE — TELEPHONE ENCOUNTER
Provider:  Please see [employer] message from the patient. Rx prompted for 50 units each morning. Please review carefully before sending if appropriate.  Thank you. Kristine Hernandez R.N.

## 2023-08-21 RX ORDER — INSULIN DEGLUDEC 100 U/ML
50 INJECTION, SOLUTION SUBCUTANEOUS EVERY MORNING
Qty: 30 ML | Refills: 1 | Status: SHIPPED | OUTPATIENT
Start: 2023-08-21 | End: 2023-12-22

## 2023-08-24 ENCOUNTER — OFFICE VISIT (OUTPATIENT)
Dept: URGENT CARE | Facility: URGENT CARE | Age: 58
End: 2023-08-24
Payer: COMMERCIAL

## 2023-08-24 ENCOUNTER — MYC MEDICAL ADVICE (OUTPATIENT)
Dept: FAMILY MEDICINE | Facility: CLINIC | Age: 58
End: 2023-08-24

## 2023-08-24 ENCOUNTER — APPOINTMENT (OUTPATIENT)
Dept: NURSING | Facility: CLINIC | Age: 58
End: 2023-08-24
Payer: COMMERCIAL

## 2023-08-24 VITALS
OXYGEN SATURATION: 98 % | HEART RATE: 91 BPM | RESPIRATION RATE: 25 BRPM | DIASTOLIC BLOOD PRESSURE: 74 MMHG | SYSTOLIC BLOOD PRESSURE: 149 MMHG | TEMPERATURE: 97.5 F

## 2023-08-24 DIAGNOSIS — R07.89 FEELING OF CHEST TIGHTNESS: Primary | ICD-10-CM

## 2023-08-24 PROCEDURE — 99214 OFFICE O/P EST MOD 30 MIN: CPT | Performed by: STUDENT IN AN ORGANIZED HEALTH CARE EDUCATION/TRAINING PROGRAM

## 2023-08-24 PROCEDURE — 93000 ELECTROCARDIOGRAM COMPLETE: CPT | Performed by: STUDENT IN AN ORGANIZED HEALTH CARE EDUCATION/TRAINING PROGRAM

## 2023-08-24 NOTE — PATIENT INSTRUCTIONS
Go to the Emergency Department for cardiac workup    57 year old with history of insulin dependent diabetes, HLD, HTN, who presents with 4 days of left chest tightness.    BP (!) 149/74 (BP Location: Left arm, Patient Position: Sitting, Cuff Size: Adult Large)   Pulse 91   Temp 97.5  F (36.4  C) (Tympanic)   Resp 25   SpO2 98%   GENERAL: healthy, alert and no distress  EYES: Eyes grossly normal to inspection, and conjunctivae and sclerae normal  RESP: lungs clear to auscultation - no rales, rhonchi or wheezes  CV: regular rate and rhythm, normal S1 S2, no S3 or S4, no murmur, normal cap refill, no peripheral edema and peripheral pulses strong  MS: no gross musculoskeletal defects noted, no edema  SKIN: no suspicious lesions or rashes  NEURO: Normal nonfocal exam    EKG showed sinus rhythm with frequent PACs, no ST-T wave abnormalities    HEAR score 5, moderate risk

## 2023-08-24 NOTE — PROGRESS NOTES
Patient presented to the clinic reporting left sided chest tightness on and off for the last 4 days.  He does note that 2 weeks ago he was lifting something very heavy and started to sweat profusely. Vital per chart. He denies any other symptoms and the tightness does not radiate anywhere.     Nursing action: Report was given to Dr. Cardenas.  She was informed that an EKG was being done by staff. Care was turned over to Dr. Cardenas without incident and staff was notified of the plan.  Thank you. Kristine Hernandez R.N.

## 2023-08-24 NOTE — PROGRESS NOTES
Assessment & Plan     Feeling of chest tightness  57 year old with history of insulin dependent diabetes, HLD, HTN, who presents with 4 days of intermittent left chest tightness.  Vitals notable for /74, respiratory rate 25, satting 98% on room air.  On exam, regular rate and irregular rhythm, no murmur, normal cap refill with no peripheral edema and symmetric peripheral pulses.  ECG showed normal sinus rhythm with frequent PACs, no ST or T wave abnormalities. HEAR score 5. Plan: Recommend patient go to the emergency department for chest pain rule out.  The patient will transfer to the ED by private car.  The patient was discharged in stable condition.  - EKG 12-lead complete w/read - Clinics             No follow-ups on file.    [place] [facility] MD Jose Armando  [facility] URGENT CARE [place]    Nancy Carvajal is a 57 year old male who presents to clinic today for the following health issues:  Chief Complaint   Patient presents with    Urgent Care     Present for left side chest tightness for 4 days.     HPI    Cardiac Event    Symptom Onset: 4 day(s) ago.  Timing of illness: sudden onset and intermittent episodes lasting 5-10 minutes  Current and Associated symptoms: intermittent chest tightness  Character: tightness.  Severity 0/10.  Location: left chest.  Radiation: none.  Associated Symptoms: none.  Exacerbated by: has not tried to do anything physical  Relieved by: nothing, resolves on its own  Cardiac risk factors: diabetes mellitus, hypertension, and hypercholesterolemia/hyperlipidemia.  2 weeks ago, he was carrying heavy object and sweating, no chest tightness of SOB    Review of Systems  Constitutional, HEENT, cardiovascular, pulmonary, gi and gu systems are negative, except as otherwise noted.      Objective    BP (!) 149/74 (BP Location: Left arm, Patient Position: Sitting, Cuff Size: Adult Large)   Pulse 91   Temp 97.5  F (36.4  C) (Tympanic)   Resp 25   SpO2 98%   Physical Exam    GENERAL: healthy, alert and no distress  EYES: Eyes grossly normal to inspection, and conjunctivae and sclerae normal  RESP: lungs clear to auscultation - no rales, rhonchi or wheezes  CV: regular rate and rhythm, normal S1 S2, no S3 or S4, no murmur, normal cap refill, no peripheral edema and peripheral pulses strong  ABDOMEN: soft, nontender, no hepatosplenomegaly, no masses and bowel sounds normal  MS: no gross musculoskeletal defects noted, no edema  SKIN: no suspicious lesions or rashes  NEURO: Normal nonfocal exam    EKG - Reviewed and interpreted by me   Normal sinus rhythm with frequent PACs, no ST or T wave abnormalities

## 2023-08-29 NOTE — TELEPHONE ENCOUNTER
DIAGNOSIS: Dr Corrales patient. Seeking injection under flouro for Rt index MCP joint, last injection 3-30-23    APPOINTMENT DATE: 10/02/2023   NOTES STATUS DETAILS   OFFICE NOTE from referring provider SELF    OFFICE NOTE from other specialist Internal 2017 to 2023 - Lydia Sauceda MD - [facility]   OPERATIVE REPORT Internal 12/21/2017 - RT Index Finger Bone Cyst Curettage and Allograft   MEDICATION LIST Internal    IMAGING/iNJECTIONS Internal

## 2023-08-31 ENCOUNTER — OFFICE VISIT (OUTPATIENT)
Dept: FAMILY MEDICINE | Facility: CLINIC | Age: 58
End: 2023-08-31
Payer: COMMERCIAL

## 2023-08-31 VITALS
HEIGHT: 70 IN | SYSTOLIC BLOOD PRESSURE: 139 MMHG | BODY MASS INDEX: 38.51 KG/M2 | WEIGHT: 269 LBS | HEART RATE: 84 BPM | OXYGEN SATURATION: 100 % | DIASTOLIC BLOOD PRESSURE: 80 MMHG | TEMPERATURE: 97.1 F | RESPIRATION RATE: 22 BRPM

## 2023-08-31 DIAGNOSIS — R07.9 CHEST PAIN, UNSPECIFIED TYPE: Primary | ICD-10-CM

## 2023-08-31 DIAGNOSIS — Z79.4 TYPE 2 DIABETES MELLITUS WITH HYPERGLYCEMIA, WITH LONG-TERM CURRENT USE OF INSULIN (H): ICD-10-CM

## 2023-08-31 DIAGNOSIS — E11.65 TYPE 2 DIABETES MELLITUS WITH HYPERGLYCEMIA, WITH LONG-TERM CURRENT USE OF INSULIN (H): ICD-10-CM

## 2023-08-31 PROCEDURE — 99213 OFFICE O/P EST LOW 20 MIN: CPT | Performed by: FAMILY MEDICINE

## 2023-08-31 ASSESSMENT — PAIN SCALES - GENERAL: PAINLEVEL: NO PAIN (0)

## 2023-08-31 NOTE — PROGRESS NOTES
"  {PROVIDER CHARTING PREFERENCE:241888}    Nancy Carvajal is a 57 year old, presenting for the following health issues:  Hospital F/U      8/31/2023     3:42 PM   Additional Questions   Roomed by ricardo   Accompanied by self         8/31/2023     3:42 PM   Patient Reported Additional Medications   Patient reports taking the following new medications see chart       HPI       Hospital Follow-up Visit:    [facility]  Date of Admission: 8/24/23  Date of Discharge: 8/24/23  Reason(s) for Admission: chest tightness    Was your hospitalization related to COVID-19? No   Problems taking medications regularly:  None  Medication changes since discharge: None  Problems adhering to non-medication therapy:  None    Summary of hospitalization:  {:143238}  Diagnostic Tests/Treatments reviewed.  Follow up needed: {:873582:}  Other Healthcare Providers Involved in Patient s Care:         {those currently involved after discharge:795807::\"None\"}  Update since discharge: {:869882} {TIP  Include information from family/caregivers, SNF, Care Coordination :597064}        Plan of care communicated with {:065512}     {Reference  Coding guidelines- Moderate Complexity F2F/Video within 7 - 14 days of discharge [id number], High Complexity F2F/Video within 7 days 8378514 or [id number] days [id number] :408481}      {additonal problems for provider to add (Optional):069724}  {additonal problems for provider to add (Optional):946377}      Review of Systems   {ROS COMP (Optional):549232}      Objective    There were no vitals taken for this visit.  There is no height or weight on file to calculate BMI.  Physical Exam   {Exam List (Optional):908572}    {Diagnostic Test Results (Optional):110067}    {AMBULATORY ATTESTATION (Optional):478569}              " Case Management Discharge Note      Final Note: home with continued caretenders HH    Provided Post Acute Provider List?: Refused    Selected Continued Care - Discharged on 6/14/2022 Admission date: 6/12/2022 - Discharge disposition: Home or Self Care    Destination    No services have been selected for the patient.              Durable Medical Equipment    No services have been selected for the patient.              Dialysis/Infusion    No services have been selected for the patient.              Home Medical Care Coordination complete.    Service Provider Selected Services Address Phone Fax Patient Preferred    [facility] [address], [address], [place] [address] [phone] --          Therapy    No services have been selected for the patient.              Community Resources    No services have been selected for the patient.              [facility]    No services have been selected for the patient.                Selected Continued Care - Prior Encounters Includes selections from prior encounters from 3/14/2022 to 6/14/2022    Discharged on 5/11/2022 Admission date: 5/7/2022 - Discharge disposition: Home-Health Care [facility]    Destination     Service Provider Selected Services Address Phone Fax Patient Preferred    [facility]  Skilled Nursing [address], [place] [address] [phone] --          Home Medical Care     Service Provider Selected Services Address Phone Fax Patient Preferred    [facility] [address], [address], [place] [address] [phone] --                    Transportation Services  Private: Car    Final Discharge Disposition Code: 06 - home with home health care

## 2023-08-31 NOTE — PROGRESS NOTES
Assessment & Plan   Wei was seen today for hospital f/u.    Diagnoses and all orders for this visit:    Chest pain, unspecified type  Wei Wolf is a 57 year old male who presents today for follow-up after recent ED visit for chest pain.  Chest pain described as tightness, intermittent, comes randomly with no triggering factors.  It is not associated with exertion.  He denies any current pain from the breath.  Work-up in the ED was negative, it was recommended to proceed with outpatient stress test  Discussed with patient concern.  Cannot exercise due to bilateral hip pain, recently had injection.  Will obtain Lexiscan for further evaluation and rule out ischemia.  Red flag symptoms discussed with the patient  -     NM Lexiscan stress test; Future      Type 2 diabetes mellitus with hyperglycemia, with long-term current use of insulin (H)  Diabetes currently well controlled.  Continue Tresiba and Ozempic  Lab Results   Component Value Date    A1C 7.6 07/10/2023    A1C 7.9 12/21/2022    A1C 7.8 09/29/2022    A1C 7.0 10/14/2021    A1C 7.0 04/14/2021    A1C 6.7 10/07/2020    A1C 8.1 12/12/2019    A1C 8.0 03/11/2019    A1C 8.8 10/08/2018                MED REC REQUIRED  Post Medication Reconciliation Status: discharge medications reconciled and changed, per note/orders  Work on weight loss  Regular exercise    Radha Brian MD  [facility] [place]   Wei is a 57 year old, presenting for the following health issues:  Hospital F/U      8/31/2023     3:42 PM   Additional Questions   Roomed by ricardo   Accompanied by self         8/31/2023     3:42 PM   Patient Reported Additional Medications   Patient reports taking the following new medications see chart       HPI     ED/UC Followup:    Facility:  [facility]  Date of visit: 8/24/23  Reason for visit: chest tightness  Current Status: found irregular hb        ED course:    Impression and Plan:  Wei Wolf is a 57 y.o. male  "with diabetes hypertension hyperlipidemia, presenting with few episodes of fleeting chest discomfort and diaphoresis. Broad differential considered on arrival he was sent over from urgent care for full work-up. His EKG is nonischemic just remarkable for a few premature atrial contractions. There is no ST-T wave changes. His troponin high-sensitivity 7 and 8. No symptoms in the emergency department. Therefore no concern for accelerating angina or acute coronary syndrome. No signs of carditis on his EKG. His chest x-ray is completely normal without pneumothorax effusions or pneumonias. No widened mediastinum no concern for aortic pathology. D-dimer less than 0.27, therefore I feel he is very low risk and does not require CT pulmonary angiogram for PE. It does not seem to be gastrointestinal in etiology. His symptoms were onset with exertion. He has some risk factors. I do not think he needs admission to the hospital or cardiology consultation at this time for expedited restratification. However, I did recommend outpatient stress test in the near future and discussing this with his regular provider. His electrolytes and blood counts are grossly stable. His vital signs are grossly stable and he is otherwise ambulatory without symptoms here. I told him not to exercise or strain himself until he can complete this stress test. He can take 81 mg of aspirin per day until he can get the stress test set up. We reviewed chest pain return precautions closely and he was discharged with close follow-up in the next 1 to 2 weeks.      Review of Systems   Constitutional, HEENT, cardiovascular, pulmonary, gi and gu systems are negative, except as otherwise noted.      Objective    /80   Pulse 84   Temp 97.1  F (36.2  C) (Tympanic)   Resp 22   Ht 1.778 m (5' 10\")   Wt 122 kg (269 lb)   SpO2 100%   BMI 38.60 kg/m    Body mass index is 38.6 kg/m .  Physical Exam   GENERAL: healthy, alert and no distress  NECK: no adenopathy, " no asymmetry, masses, or scars and thyroid normal to palpation  RESP: lungs clear to auscultation - no rales, rhonchi or wheezes  CV: regular rate and rhythm, normal S1 S2, no S3 or S4, no murmur, click or rub, no peripheral edema and peripheral pulses strong  ABDOMEN: soft, nontender, no hepatosplenomegaly, no masses and bowel sounds normal  MS: no gross musculoskeletal defects noted, no edema

## 2023-09-27 DIAGNOSIS — M79.644 PAIN OF FINGER OF RIGHT HAND: Primary | ICD-10-CM

## 2023-09-27 ASSESSMENT — ENCOUNTER SYMPTOMS
ARTHRALGIAS: 1
BACK PAIN: 1
MYALGIAS: 0
NECK PAIN: 0
JOINT SWELLING: 1
MUSCLE CRAMPS: 0
STIFFNESS: 1
MUSCLE WEAKNESS: 0

## 2023-10-02 ENCOUNTER — OFFICE VISIT (OUTPATIENT)
Dept: ORTHOPEDICS | Facility: CLINIC | Age: 58
End: 2023-10-02
Payer: COMMERCIAL

## 2023-10-02 ENCOUNTER — PRE VISIT (OUTPATIENT)
Dept: ORTHOPEDICS | Facility: CLINIC | Age: 58
End: 2023-10-02

## 2023-10-02 DIAGNOSIS — M19.041 DEGENERATIVE ARTHRITIS OF METACARPOPHALANGEAL JOINT OF INDEX FINGER OF RIGHT HAND: Primary | ICD-10-CM

## 2023-10-02 PROCEDURE — 20600 DRAIN/INJ JOINT/BURSA W/O US: CPT | Mod: F6 | Performed by: STUDENT IN AN ORGANIZED HEALTH CARE EDUCATION/TRAINING PROGRAM

## 2023-10-02 PROCEDURE — 99214 OFFICE O/P EST MOD 30 MIN: CPT | Mod: 25 | Performed by: STUDENT IN AN ORGANIZED HEALTH CARE EDUCATION/TRAINING PROGRAM

## 2023-10-02 RX ORDER — BETAMETHASONE SODIUM PHOSPHATE AND BETAMETHASONE ACETATE 3; 3 MG/ML; MG/ML
6 INJECTION, SUSPENSION INTRA-ARTICULAR; INTRALESIONAL; INTRAMUSCULAR; SOFT TISSUE
Status: SHIPPED | OUTPATIENT
Start: 2023-10-02

## 2023-10-02 RX ORDER — LIDOCAINE HYDROCHLORIDE 10 MG/ML
0.5 INJECTION, SOLUTION EPIDURAL; INFILTRATION; INTRACAUDAL; PERINEURAL
Status: SHIPPED | OUTPATIENT
Start: 2023-10-02

## 2023-10-02 RX ADMIN — BETAMETHASONE SODIUM PHOSPHATE AND BETAMETHASONE ACETATE 6 MG: 3; 3 INJECTION, SUSPENSION INTRA-ARTICULAR; INTRALESIONAL; INTRAMUSCULAR; SOFT TISSUE at 09:34

## 2023-10-02 RX ADMIN — LIDOCAINE HYDROCHLORIDE 0.5 ML: 10 INJECTION, SOLUTION EPIDURAL; INFILTRATION; INTRACAUDAL; PERINEURAL at 09:34

## 2023-10-02 NOTE — PROGRESS NOTES
Hand / Upper Extremity Injection/Arthrocentesis: R index MCP    Date/Time: 10/2/2023 9:34 AM    Performed by: Travis Peña MD  Authorized by: Travis Peña MD    Indications:  Pain  Needle Size:  25 G  Guidance: landmark    Condition: osteoarthritis    Location:  Index finger  Site:  R index MCP    Medications:  6 mg betamethasone acet & sod phos 6 (3-3) MG/ML; 0.5 mL lidocaine (PF) 1 %  Outcome:  Tolerated well, no immediate complications  Procedure discussed: discussed risks, benefits, and alternatives    Consent Given by:  Patient  Timeout: timeout called immediately prior to procedure    Prep: patient was prepped and draped in usual sterile fashion        [facility] [address]  [address] [facility] [id number] [phone]  Dept: [phone]  ______________________________________________________________________________    Patient: Wei Wolf   : 1965   MRN: [id number]   2023    INVASIVE PROCEDURE SAFETY CHECKLIST    Date: 10/2/2023   Procedure:Right Index finger MCP joint steroid injection  Patient Name: Wei Wolf  MRN: [id number]  YOB: 1965    Action: Complete sections as appropriate. Any discrepancy results in a HARD COPY until resolved.     PRE PROCEDURE:  Patient ID verified with 2 identifiers (name and  or MRN): Yes  Procedure and site verified with patient/designee (when able): Yes  Accurate consent documentation in medical record: Yes  H&P (or appropriate assessment) documented in medical record: Yes  H&P must be up to 20 days prior to procedure and updates within 24 hours of procedure as applicable: Yes  Relevant diagnostic and radiology test results appropriately labeled and displayed as applicable: NA  Procedure site(s) marked with provider initials: NA    TIMEOUT:  Time-Out performed immediately prior to starting procedure, including verbal and active participation of all team members addressing  the following:Yes  * Correct patient identify  * Confirmed that the correct side and site are marked  * An accurate procedure consent form  * Agreement on the procedure to be done  * Correct patient position  * Relevant images and results are properly labeled and appropriately displayed  * The need to administer antibiotics or fluids for irrigation purposes during the procedure as applicable   * Safety precautions based on patient history or medication use    DURING PROCEDURE: Verification of correct person, site, and procedures any time the responsibility for care of the patient is transferred to another member of the care team.       The following medications were given:         Prior to injection, verified patient identity using patient's name and date of birth.  Due to injection administration, patient instructed to remain in clinic for 15 minutes  afterwards, and to report any adverse reaction to me immediately.    Joint injection was performed.    Medication Name: Betamethasone Sodium Phospate and Betamethasone Acetate 6mg/mL NDC 9781-4930-77  Drug Amount Wasted:  Yes: 4.5 mg/ml   Vial/Syringe: Single dose vial  Expiration Date:  6/1/24    Medication Name Lidocaine 1% NDC 40455-009-51    Scribed by LASHAE KILLIAN, ATC for Dr. Peña on October 2, 2023 at 9:41a based on the provider's statements to me.     LASHAE KILLIAN, ATC

## 2023-10-02 NOTE — NURSING NOTE
Reason For Visit:   Chief Complaint   Patient presents with    Consult     Consult for right index finger pain, looking for an injection       Primary MD: Radha Brian  Ref. MD: Dr. Corrales    Age: 57 year old    ?  No      There were no vitals taken for this visit.      Pain Assessment  Patient Currently in Pain: Yes  0-10 Pain Scale: 2  Primary Pain Location: Finger (Comment which one) (right index)  Pain Descriptors: Sore, Dull, Intermittent  Alleviating Factors: NSAIDS    Hand Dominance Evaluation  Hand Dominance: Right          QuickDASH Assessment      9/27/2023     3:26 PM   QuickDASH Main   1. Open a tight or new jar Moderate difficulty   2. Do heavy household chores (e.g., wash walls, floors) Mild difficulty   3. Carry a shopping bag or briefcase Mild difficulty   4. Wash your back Mild difficulty   5. Use a knife to cut food Mild difficulty   6. Recreational activities in which you take some force or impact through your arm, shoulder or hand (e.g., golf, hammering, tennis, etc.) Mild difficulty   7. During the past week, to what extent has your arm, shoulder or hand problem interfered with your normal social activities with family, friends, neighbours or groups Slightly   8. During the past week, were you limited in your work or other regular daily activities as a result of your arm, shoulder or hand problem Moderately limited   9. Arm, shoulder or hand pain Moderate   10.Tingling (pins and needles) in your arm,shoulder or hand None   11. During the past week, how much difficulty have you had sleeping because of the pain in your arm, shoulder or hand No difficulty   Quickdash Ability Score 27.27          Current Outpatient Medications   Medication Sig Dispense Refill    ASPIRIN NOT PRESCRIBED, INTENTIONAL, continuous prn Antiplatelet medication not prescribed intentionally due to GI Issues / Ulcer Disease (Patient not taking: Reported on 8/31/2023) 1 each 0    CONTOUR NEXT TEST test strip  USE TO TEST BLOD SUGAR TWICE DAILY OR AS DIRECTED ++ APPOINTMENT NEEDED 100 strip 0    insulin degludec (TRESIBA FLEXTOUCH) 100 UNIT/ML pen Inject 50 Units Subcutaneous every morning 30 mL 1    lisinopril-hydrochlorothiazide (ZESTORETIC) 10-12.5 MG tablet Take 1 tablet by mouth daily 90 tablet 1    OZEMPIC, 1 MG/DOSE, 4 MG/3ML pen INJECT 1 MG UNDER THE SKIN EVERY 7 DAYS 9 mL 1    simvastatin (ZOCOR) 10 MG tablet Take 1 tablet (10 mg) by mouth At Bedtime 90 tablet 1       Allergies   Allergen Reactions    Kiwi Itching     Throat itching to kiwi, watermelon, honeydew    Pollen Extract     Metformin Diarrhea       Toshia Valentino, ATC

## 2023-10-02 NOTE — PROGRESS NOTES
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 Prior patient of Dr. Rogers. 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 He continued to have pain after the surgery and has been treated with serial steroid injections. His last was 3/30/23. He obtains near complete relief after the injections. This last one has worked for about 6 months and he is just now feeling sore again. He comes in today requesting an injection of steroid.  He last had an injection on 10/20//2022.     Patient also has diminished ROM of the left index MCP joint but it is not painful.    He works as a manager for a construction company.    PHYSICAL EXAMINATION:  On examination today, his skin is intact.  There is minimal subcutaneous wasting.  Active index MCP joint flexion approximately 50 degrees but pain with terminal flexion and extension of the index MCP joint. SILT all distributions. WWP CR< 2s    Imaging:  Fluoro x-rays from 3/30 demonstrates joint space narrowing of MCP joint    Last formal x-rays 2/2019 demonstrated early degenerative changes.     ASSESSMENT:  Right index MCP arthritis and synovitis.     PLAN:  We discussed the risks, benefits, and alternatives of treatment options available.  He wishes to have an injection today.      PROCEDURE:  After written consent, verifying site and side, a sterile ChloraPrep was performed on the injection site. [place] guidance was used and 0.5 cc of betamethasone 6 mg/mL and 0.5 cc of 1% lidocaine were injected into the  radial aspect of the joint using a 25-gauge needle.  The patient tolerated the procedure well.  No complications were noted.  Followup instructions were given.    I discussed that he may have repeat injections every 3 to 4 months.  If they become less effective, he may be a candidate for MCP joint arthroplasty.    The patient voiced understanding and agreement.  All questions answered.  He will follow-up with me as needed.  At next visit x-rays of bilateral hands will be needed.     Travis Peña MD    Hand & Upper Extremity Surgery  Department of Orthopedic Surgery  [facility]               Answers submitted by the patient for this visit:  NEW VISIT on 10/2/2023  9:20 AM with Travis Peña MD  Symptoms you have experienced in the last 30 days (Submitted on 9/27/2023)  General Symptoms: No  Skin Symptoms: No  HENT Symptoms: No  EYE SYMPTOMS: No  HEART SYMPTOMS: No  LUNG SYMPTOMS: No  INTESTINAL SYMPTOMS: No  URINARY SYMPTOMS: No  REPRODUCTIVE SYMPTOMS: No  SKELETAL SYMPTOMS: Yes  BLOOD SYMPTOMS: No  NERVOUS SYSTEM SYMPTOMS: No  MENTAL HEALTH SYMPTOMS: No

## 2023-10-02 NOTE — LETTER
10/2/2023         RE: Wei Wolf  [address] [facility] [address]        Dear Colleague,    Thank you for referring your patient, Wei Wolf, to the [facility] ORTHOPEDIC [facility]. Please see a copy of my visit note below.      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 Prior patient of Dr. Rogers. 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 He continued to have pain after the surgery and has been treated with serial steroid injections. His last was 3/30/23. He obtains near complete relief after the injections. This last one has worked for about 6 months and he is just now feeling sore again. He comes in today requesting an injection of steroid.  He last had an injection on 10/20//2022.     Patient also has diminished ROM of the left index MCP joint but it is not painful.    He works as a manager for a construction company.    PHYSICAL EXAMINATION:  On examination today, his skin is intact.  There is minimal subcutaneous wasting.  Active index MCP joint flexion approximately 50 degrees but pain with terminal flexion and extension of the index MCP joint. SILT all distributions. WWP CR< 2s    Imaging:  Fluoro x-rays from 3/30 demonstrates joint space narrowing of MCP joint    Last formal x-rays 2/2019 demonstrated early degenerative changes.     ASSESSMENT:  Right index MCP arthritis and synovitis.     PLAN:  We discussed the risks, benefits, and alternatives of treatment options available.  He  wishes to have an injection today.      PROCEDURE:  After written consent, verifying site and side, a sterile ChloraPrep was performed on the injection site. Bryson guidance was used and 0.5 cc of betamethasone 6 mg/mL and 0.5 cc of 1% lidocaine were injected into the radial aspect of the joint using a 25-gauge needle.  The patient tolerated the procedure well.  No complications were noted.  Followup instructions were given.    I discussed that he may have repeat injections every 3 to 4 months.  If they become less effective, he may be a candidate for MCP joint arthroplasty.    The patient voiced understanding and agreement.  All questions answered.  He will follow-up with me as needed.  At next visit x-rays of bilateral hands will be needed.     Travis Peña MD    Hand & Upper Extremity Surgery  Department of Orthopedic Surgery  [facility]               Answers submitted by the patient for this visit:  NEW VISIT on 10/2/2023  9:20 AM with Travis Peña MD  Symptoms you have experienced in the last 30 days (Submitted on 9/27/2023)  General Symptoms: No  Skin Symptoms: No  HENT Symptoms: No  EYE SYMPTOMS: No  HEART SYMPTOMS: No  LUNG SYMPTOMS: No  INTESTINAL SYMPTOMS: No  URINARY SYMPTOMS: No  REPRODUCTIVE SYMPTOMS: No  SKELETAL SYMPTOMS: Yes  BLOOD SYMPTOMS: No  NERVOUS SYSTEM SYMPTOMS: No  MENTAL HEALTH SYMPTOMS: No    Hand / Upper Extremity Injection/Arthrocentesis: R index MCP    Date/Time: 10/2/2023 9:34 AM    Performed by: Travis Peña MD  Authorized by: Travis Peña MD    Indications:  Pain  Needle Size:  25 G  Guidance: landmark    Condition: osteoarthritis    Location:  Index finger  Site:  R index MCP    Medications:  6 mg betamethasone acet & sod phos 6 (3-3) MG/ML; 0.5 mL lidocaine (PF) 1 %  Outcome:  Tolerated well, no immediate complications  Procedure discussed: discussed risks, benefits, and alternatives    Consent Given by:  Patient  Timeout: timeout called immediately  prior to procedure    Prep: patient was prepped and draped in usual sterile fashion        [facility] [address]  [address] [facility] [id number] [phone]  Dept: [phone]  ______________________________________________________________________________    Patient: Wei Wolf   : 1965   MRN: [id number]   2023    INVASIVE PROCEDURE SAFETY CHECKLIST    Date: 10/2/2023   Procedure:Right Index finger MCP joint steroid injection  Patient Name: Wei Wlof  MRN: [id number]  YOB: 1965    Action: Complete sections as appropriate. Any discrepancy results in a HARD COPY until resolved.     PRE PROCEDURE:  Patient ID verified with 2 identifiers (name and  or MRN): Yes  Procedure and site verified with patient/designee (when able): Yes  Accurate consent documentation in medical record: Yes  H&P (or appropriate assessment) documented in medical record: Yes  H&P must be up to 20 days prior to procedure and updates within 24 hours of procedure as applicable: Yes  Relevant diagnostic and radiology test results appropriately labeled and displayed as applicable: NA  Procedure site(s) marked with provider initials: NA    TIMEOUT:  Time-Out performed immediately prior to starting procedure, including verbal and active participation of all team members addressing the following:Yes  * Correct patient identify  * Confirmed that the correct side and site are marked  * An accurate procedure consent form  * Agreement on the procedure to be done  * Correct patient position  * Relevant images and results are properly labeled and appropriately displayed  * The need to administer antibiotics or fluids for irrigation purposes during the procedure as applicable   * Safety precautions based on patient history or medication use    DURING PROCEDURE: Verification of correct person, site, and procedures any time the responsibility for care  of the patient is transferred to another member of the care team.     The following medications were given:     Prior to injection, verified patient identity using patient's name and date of birth.  Due to injection administration, patient instructed to remain in clinic for 15 minutes  afterwards, and to report any adverse reaction to me immediately.    Joint injection was performed.    Medication Name: Betamethasone Sodium Phospate and Betamethasone Acetate 6mg/mL NDC 3576-8616-68  Drug Amount Wasted:  Yes: 4.5 mg/ml   Vial/Syringe: Single dose vial  Expiration Date:  6/1/24    Medication Name Lidocaine 1% NDC 00285-936-06    Scribed by LASHAE KILLIAN, ATC for Dr. Peña on October 2, 2023 at 9:41a based on the provider's statements to me.     LASHAE KILLIAN ATC Edward Wu, MD

## 2023-10-11 ENCOUNTER — ANCILLARY PROCEDURE (OUTPATIENT)
Dept: NUCLEAR MEDICINE | Facility: CLINIC | Age: 58
End: 2023-10-11
Attending: FAMILY MEDICINE
Payer: COMMERCIAL

## 2023-10-11 DIAGNOSIS — R07.9 CHEST PAIN, UNSPECIFIED TYPE: ICD-10-CM

## 2023-10-11 DIAGNOSIS — R07.9 CHEST PAIN, UNSPECIFIED TYPE: Primary | ICD-10-CM

## 2023-10-11 LAB
CV STRESS MAX HR HE: 100
RATE PRESSURE PRODUCT: NORMAL
STRESS ECHO BASELINE DIASTOLIC HE: 78
STRESS ECHO BASELINE HR: 78 BPM
STRESS ECHO BASELINE SYSTOLIC BP: 130
STRESS ECHO CALCULATED PERCENT HR: 61 %
STRESS ECHO LAST STRESS DIASTOLIC BP: 58
STRESS ECHO LAST STRESS SYSTOLIC BP: 123
STRESS ECHO TARGET HR: 165

## 2023-10-11 PROCEDURE — A9502 TC99M TETROFOSMIN: HCPCS | Performed by: INTERNAL MEDICINE

## 2023-10-11 PROCEDURE — 78452 HT MUSCLE IMAGE SPECT MULT: CPT | Performed by: INTERNAL MEDICINE

## 2023-10-11 PROCEDURE — 93017 CV STRESS TEST TRACING ONLY: CPT | Performed by: INTERNAL MEDICINE

## 2023-10-11 PROCEDURE — 93018 CV STRESS TEST I&R ONLY: CPT | Performed by: INTERNAL MEDICINE

## 2023-10-11 PROCEDURE — 93016 CV STRESS TEST SUPVJ ONLY: CPT | Performed by: STUDENT IN AN ORGANIZED HEALTH CARE EDUCATION/TRAINING PROGRAM

## 2023-10-11 RX ORDER — REGADENOSON 0.08 MG/ML
0.4 INJECTION, SOLUTION INTRAVENOUS ONCE
Status: COMPLETED | OUTPATIENT
Start: 2023-10-11 | End: 2023-10-11

## 2023-10-11 RX ADMIN — REGADENOSON 0.4 MG: 0.08 INJECTION, SOLUTION INTRAVENOUS at 09:12

## 2023-10-13 ENCOUNTER — OFFICE VISIT (OUTPATIENT)
Dept: PHYSICAL MEDICINE AND REHAB | Facility: CLINIC | Age: 58
End: 2023-10-13
Payer: COMMERCIAL

## 2023-10-13 VITALS — HEART RATE: 85 BPM | SYSTOLIC BLOOD PRESSURE: 134 MMHG | DIASTOLIC BLOOD PRESSURE: 62 MMHG

## 2023-10-13 DIAGNOSIS — M79.18 MYOFASCIAL PAIN: ICD-10-CM

## 2023-10-13 DIAGNOSIS — M54.50 LUMBAR SPINE PAIN: Primary | ICD-10-CM

## 2023-10-13 DIAGNOSIS — M54.16 LUMBAR RADICULAR PAIN: ICD-10-CM

## 2023-10-13 PROCEDURE — 99214 OFFICE O/P EST MOD 30 MIN: CPT | Performed by: PHYSICAL MEDICINE & REHABILITATION

## 2023-10-13 RX ORDER — NABUMETONE 500 MG/1
500-1000 TABLET, FILM COATED ORAL 2 TIMES DAILY PRN
Qty: 60 TABLET | Refills: 1 | Status: SHIPPED | OUTPATIENT
Start: 2023-10-13 | End: 2024-06-10

## 2023-10-13 ASSESSMENT — PAIN SCALES - GENERAL: PAINLEVEL: MILD PAIN (2)

## 2023-10-13 NOTE — LETTER
10/13/2023         RE: Wei Wolf  [address] [facility] [address]        Dear Colleague,    Thank you for referring your patient, Wei Wolf, to the [facility] SPINE AND NEUROSURGERY. Please see a copy of my visit note below.    Assessment/Plan:      Wei was seen today for back pain.    Diagnoses and all orders for this visit:    Lumbar spine pain  -     MR Lumbar Spine w/o Contrast; Future  -     Physical Therapy Referral; Future  -     nabumetone (RELAFEN) 500 MG tablet; Take 1-2 tablets (500-1,000 mg) by mouth 2 times daily as needed for moderate pain    Lumbar radicular pain  -     MR Lumbar Spine w/o Contrast; Future  -     Physical Therapy Referral; Future    Myofascial pain  -     Physical Therapy Referral; Future  -     nabumetone (RELAFEN) 500 MG tablet; Take 1-2 tablets (500-1,000 mg) by mouth 2 times daily as needed for moderate pain         Assessment: Pleasant 57 year old male  with a history of hypertension, diabetes mellitus,Osteoarthritis, sleep apnea with:     1.  Intermittent chronic lumbar spine pain severe flare over the past 2 to 3 months with intermittent right lower extremity radicular pain.  Has had prior pain at the lumbosacral junction consistent with  discogenic pain/annular tear disc herniation without radicular pain.    He does have some mild degenerative change in the lower lumbar spine on AP view of the hips/pelvis.  Recent flare over the past couple of months with right leg radicular pain was seen at urgent care had reported plain films done I do not have those available.  Also had steroid pack which helped but his pain is now worsening again over the past couple of days.     2.  Myofascial pain lumbar spine and gluteal region.          Discussion:    1.  I discussed the diagnosis and treatment options.  Symptoms are consistent with recurrent flare of discogenic pain or disc herniation/annular tear.  Has not had recent physical therapy but has  had Medrol Dosepak which helped.  Has had some intermittent right leg radicular pain and his pain is worsening now that the Medrol Dosepak is being completed.    2.  We will obtain plain films of the lumbar spine from [facility] we will have him sign a release.    3.  Given that this has been ongoing chronically with intermittent flares since his last visit last fall, and new radicular pain with worsening pain after completion of Medrol Dosepak recommend MRI lumbar spine to evaluate    4.  Start physical therapy for lumbar core strengthening stabilization.    5.  We will provide nabumetone 500-1000 mg twice daily as needed for pain if ibuprofen is not helpful.    6.  Follow-up 1 month and we will contact him when imaging is available.      It was our pleasure caring for your patient today, if there any questions or concerns please do not hesitate to contact us.      Subjective:   Patient ID: Wei Wolf is a 57 year old male.    History of Present Illness: Patient presents for follow-up of low back pain.  Significant flare last year before his visit and was improved when I saw him.  Has been doing well until July or August of this year was working on the trailer had a flare of pain and then intermittently since has had a flare with working on his daughter's camper and again severe flare a week ago with some intermittent pain on the right leg.  Worse with side bending to the right or left.  Rated a 9/10 at worst 2/10 today 1/10 at best.  Better with laying flat on his back and ibuprofen but ibuprofen was not that helpful when his pain was severe.  Was seen at urgent care at [facility] orthopedics reports having x-ray done and given Medrol Dosepak and muscle relaxer which she only took 1 or 2.  Medrol pack was completed 2 days ago and had very good relief initially and was having right leg radicular pain prior to the Medrol which has resolved but now the pain is worsening in the low back again.  Had to cancel a trip  to [place] due to the severity of the pain.  Has not had any recent physical therapy.    I reviewed plain films  pelvis from last year showing mild degenerative degenerative changes lumbar spine on AP views.     Review of Systems: Pertinent positives: None.  Pertinent negatives: No numbness, tingling or weakness.  No bowel or bladder incontinence.  No urinary retention.  No fevers, unintentional weight loss, balance changes, headaches, frequent falling, difficulty swallowing, or coordination difficulties.  All others reviewed are negative.           Past Medical History:   Diagnosis Date     AR (allergic rhinitis)      Diabetes (H)      Hypertension goal BP (blood pressure) < 140/90 12/20/2010     Osteoarthritis of hip 5/4/2022     Sleep apnea        The following portions of the patient's history were reviewed and updated as appropriate: allergies, current medications, past family history, past medical history, past social history, past surgical history and problem list.           Objective:   Physical Exam:    /62   Pulse 85   There is no height or weight on file to calculate BMI.      General: Alert and oriented with normal affect. Attention, knowledge, memory, and language are intact. No acute distress.   Eyes: Sclerae are clear.  Respirations: Unlabored. CV: No lower extremity edema.  Skin: No rashes seen.    Gait:  Nonantalgic  Mildly reduced lumbar flexion finger to floor testing.  Full extension.  Negative seated straight leg raise bilaterally.  Sensation is intact to light touch throughout the lower extremities.  Reflexes are  2+ patellar and 1+ Achilles      Manual muscle testing reveals:  Right /Left out of 5     5/5 hip flexors  5/5 knee flexors  5/5 knee extensors  5/5 ankle plantar flexors  5/5 ankle dorsiflexors  5/5  EHL        Again, thank you for allowing me to participate in the care of your patient.        Sincerely,        Ashish Lee DO

## 2023-10-13 NOTE — PATIENT INSTRUCTIONS
A physical therapy order was provided for you today.  You will be contacted by physical therapy.  If nobody contacts you within 3 to 5 days, please contact the clinic at [phone].  It will be very important for you to do your physical therapy exercises on a regular basis to decrease your pain and prevent future pain flares.   An MRI was ordered for you today.  You will be contacted by scheduling within 3 days.    If you are not contacted, please call Radiology at [phone].  [facility] [phone]. [facility] [phone].   Nabumetone (which is an anti-inflammatory) medication is prescribed today. Take 1-2 tablets 2 times a day as needed for pain. This medication should be taken with food and water to prevent any stomach upset. Do not take ibuprofen/Advil/Motrin/Aleve/naproxen while you take Nabumetone. Please call if you have any side effects.   Please sign a release to get the XRAYS from [facility]

## 2023-10-13 NOTE — PROGRESS NOTES
Assessment/Plan:      Wei was seen today for back pain.    Diagnoses and all orders for this visit:    Lumbar spine pain  -     MR Lumbar Spine w/o Contrast; Future  -     Physical Therapy Referral; Future  -     nabumetone (RELAFEN) 500 MG tablet; Take 1-2 tablets (500-1,000 mg) by mouth 2 times daily as needed for moderate pain    Lumbar radicular pain  -     MR Lumbar Spine w/o Contrast; Future  -     Physical Therapy Referral; Future    Myofascial pain  -     Physical Therapy Referral; Future  -     nabumetone (RELAFEN) 500 MG tablet; Take 1-2 tablets (500-1,000 mg) by mouth 2 times daily as needed for moderate pain         Assessment: Pleasant 57 year old male  with a history of hypertension, diabetes mellitus,Osteoarthritis, sleep apnea with:     1.  Intermittent chronic lumbar spine pain severe flare over the past 2 to 3 months with intermittent right lower extremity radicular pain.  Has had prior pain at the lumbosacral junction consistent with  discogenic pain/annular tear disc herniation without radicular pain.    He does have some mild degenerative change in the lower lumbar spine on AP view of the hips/pelvis.  Recent flare over the past couple of months with right leg radicular pain was seen at urgent care had reported plain films done I do not have those available.  Also had steroid pack which helped but his pain is now worsening again over the past couple of days.     2.  Myofascial pain lumbar spine and gluteal region.          Discussion:    1.  I discussed the diagnosis and treatment options.  Symptoms are consistent with recurrent flare of discogenic pain or disc herniation/annular tear.  Has not had recent physical therapy but has had Medrol Dosepak which helped.  Has had some intermittent right leg radicular pain and his pain is worsening now that the Medrol Dosepak is being completed.    2.  We will obtain plain films of the lumbar spine from [facility] we will have him sign a release.    3.   Given that this has been ongoing chronically with intermittent flares since his last visit last fall, and new radicular pain with worsening pain after completion of Medrol Dosepak recommend MRI lumbar spine to evaluate    4.  Start physical therapy for lumbar core strengthening stabilization.    5.  We will provide nabumetone 500-1000 mg twice daily as needed for pain if ibuprofen is not helpful.    6.  Follow-up 1 month and we will contact him when imaging is available.      It was our pleasure caring for your patient today, if there any questions or concerns please do not hesitate to contact us.      Subjective:   Patient ID: Wei Wolf is a 57 year old male.    History of Present Illness: Patient presents for follow-up of low back pain.  Significant flare last year before his visit and was improved when I saw him.  Has been doing well until July or August of this year was working on the trailer had a flare of pain and then intermittently since has had a flare with working on his daughter's camper and again severe flare a week ago with some intermittent pain on the right leg.  Worse with side bending to the right or left.  Rated a 9/10 at worst 2/10 today 1/10 at best.  Better with laying flat on his back and ibuprofen but ibuprofen was not that helpful when his pain was severe.  Was seen at urgent care at [facility] orthopedics reports having x-ray done and given Medrol Dosepak and muscle relaxer which she only took 1 or 2.  Medrol pack was completed 2 days ago and had very good relief initially and was having right leg radicular pain prior to the Medrol which has resolved but now the pain is worsening in the low back again.  Had to cancel a trip to [place] due to the severity of the pain.  Has not had any recent physical therapy.    I reviewed plain films  pelvis from last year showing mild degenerative degenerative changes lumbar spine on AP views.     Review of Systems: Pertinent positives: None.   Pertinent negatives: No numbness, tingling or weakness.  No bowel or bladder incontinence.  No urinary retention.  No fevers, unintentional weight loss, balance changes, headaches, frequent falling, difficulty swallowing, or coordination difficulties.  All others reviewed are negative.           Past Medical History:   Diagnosis Date    AR (allergic rhinitis)     Diabetes (H)     Hypertension goal BP (blood pressure) < 140/90 12/20/2010    Osteoarthritis of hip 5/4/2022    Sleep apnea        The following portions of the patient's history were reviewed and updated as appropriate: allergies, current medications, past family history, past medical history, past social history, past surgical history and problem list.           Objective:   Physical Exam:    /62   Pulse 85   There is no height or weight on file to calculate BMI.      General: Alert and oriented with normal affect. Attention, knowledge, memory, and language are intact. No acute distress.   Eyes: Sclerae are clear.  Respirations: Unlabored. CV: No lower extremity edema.  Skin: No rashes seen.    Gait:  Nonantalgic  Mildly reduced lumbar flexion finger to floor testing.  Full extension.  Negative seated straight leg raise bilaterally.  Sensation is intact to light touch throughout the lower extremities.  Reflexes are  2+ patellar and 1+ Achilles      Manual muscle testing reveals:  Right /Left out of 5     5/5 hip flexors  5/5 knee flexors  5/5 knee extensors  5/5 ankle plantar flexors  5/5 ankle dorsiflexors  5/5  EHL

## 2023-10-18 ENCOUNTER — THERAPY VISIT (OUTPATIENT)
Dept: PHYSICAL THERAPY | Facility: CLINIC | Age: 58
End: 2023-10-18
Attending: PHYSICAL MEDICINE & REHABILITATION
Payer: COMMERCIAL

## 2023-10-18 DIAGNOSIS — M54.16 LUMBAR RADICULAR PAIN: ICD-10-CM

## 2023-10-18 DIAGNOSIS — M79.18 MYOFASCIAL PAIN: ICD-10-CM

## 2023-10-18 DIAGNOSIS — M54.50 LUMBAR SPINE PAIN: ICD-10-CM

## 2023-10-18 PROCEDURE — 97161 PT EVAL LOW COMPLEX 20 MIN: CPT | Mod: GP | Performed by: PHYSICAL THERAPIST

## 2023-10-18 PROCEDURE — 97110 THERAPEUTIC EXERCISES: CPT | Mod: GP | Performed by: PHYSICAL THERAPIST

## 2023-10-18 NOTE — PROGRESS NOTES
PHYSICAL THERAPY EVALUATION  Type of Visit: Evaluation    See electronic medical record for Abuse and Falls Screening details.    Subjective       Presenting condition or subjective complaint: Lower back pain. Chronic lumbar spine pain severe flare over the past 2 to 3 months with intermittent right lower extremity radicular pain. The most recent exacerbation is mostly resolved after a steroid treatment. It was difficult to walk when the pain was at its worst. Repeated bending/lifting activities seem to be the most exacerbating.  Date of onset: 07/18/23    Relevant medical history: Arthritis; Diabetes   Dates & types of surgery:      Prior diagnostic imaging/testing results: X-ray     Prior therapy history for the same diagnosis, illness or injury: No      Prior Level of Function  Transfers:   Ambulation:   ADL:   IADL:     Living Environment  Social support: With a significant other or spouse   Type of home: House   Stairs to enter the home: Yes 3 Is there a railing: No   Ramp: No   Stairs inside the home: Yes 13 Is there a railing: Yes   Help at home: None  Equipment owned:       Employment: Yes Management  Hobbies/Interests:      Patient goals for therapy: function normally    Pain assessment: See objective evaluation for additional pain details     Objective   LUMBAR SPINE EVALUATION  PAIN: Pain Level at Rest: 0/10  Pain Level with Use: 3/10 (at worst before steroids, it was much higher)  Pain Location: central low back and into the right leg occasionally  Pain Quality: Sharp  Pain Frequency: intermittent  Pain is Exacerbated By: bending, lifting and twisting activities, prolonged sitting especially when leaning forwards  Pain is Relieved By: steroids were helpful; advil daily currently  Pain Progression: Improved  Associated symptoms: no numbness or tingling, no weakness, no leg buckling  INTEGUMENTARY (edema, incisions):   POSTURE: Sitting Posture: Rounded shoulders, Forward head  GAIT:   Weightbearing Status:    Assistive Device(s):   Gait Deviations:   BALANCE/PROPRIOCEPTION:   WEIGHTBEARING ALIGNMENT:   NON-WEIGHTBEARING ALIGNMENT:    ROM: lumbar flexion to mid tibia with hamstring tightness and no curve reversal in lumbar spine; extension mod loss (no pain); L SGIS min loss, R SGIS mod loss (no pain with either. Repeated extension in prone increased ROM, NE on radicular symptoms.  PELVIC/SI SCREEN:   STRENGTH:  mytomes WNL (5/5).     MYOTOMES:    Left Right   T12-L3 (Hip Flexion) 5 5   L2-4 (Quads)  5 5   L4 (Ankle DF) 5 5   L5 (Great Toe Ext) 5 5   S1 (Toe Raise) 5 5     DTR S:   CORD SIGNS:   DERMATOMES: WNL  NEURAL TENSION:  - Slump and SLR  FLEXIBILITY:   LUMBAR/HIP Special Tests:    PELVIS/SI SPECIAL TESTS:   FUNCTIONAL TESTS:   PALPATION:  QL hypertonic V  SPINAL SEGMENTAL CONCLUSIONS:  Hypomobile L3-5      Assessment & Plan   CLINICAL IMPRESSIONS  Medical Diagnosis: Lumbar radicular pain    Treatment Diagnosis: Low back pain with right sided radicular pain   Impression/Assessment: Patient is a 57 year old male with low back complaints.  The following significant findings have been identified: Pain, Decreased ROM/flexibility, Decreased joint mobility, Decreased strength, Impaired muscle performance, Decreased activity tolerance, and Impaired posture. These impairments interfere with their ability to perform work tasks, recreational activities, household chores, and community mobility as compared to previous level of function.     Clinical Decision Making (Complexity):  Clinical Presentation: Stable/Uncomplicated  Clinical Presentation Rationale: based on medical and personal factors listed in PT evaluation  Clinical Decision Making (Complexity): Low complexity    PLAN OF CARE  Treatment Interventions:  Interventions: Manual Therapy, Neuromuscular Re-education, Therapeutic Activity, Therapeutic Exercise, Self-Care/Home Management    Long Term Goals     PT Goal 1  Goal Identifier: LTG 1  Goal Description: Patient  will be able to lift 30 pounds without pain in low back during or after the activity  Rationale: to maximize safety and independence with performance of ADLs and functional tasks;to maximize safety and independence within the community  Target Date: 12/13/23      Frequency of Treatment: 1x/week  Duration of Treatment: 12 weeks    Recommended Referrals to Other Professionals:  none  Education Assessment:   Learner/Method: No Barriers to Learning;Pictures/Video;Demonstration;Reading;Listening;Patient    Risks and benefits of evaluation/treatment have been explained.   Patient/Family/caregiver agrees with Plan of Care.     Evaluation Time:     PT Eval, Low Complexity Minutes (65587): 23       Signing Clinician: Sukumar Dodson PT

## 2023-10-25 ENCOUNTER — THERAPY VISIT (OUTPATIENT)
Dept: PHYSICAL THERAPY | Facility: CLINIC | Age: 58
End: 2023-10-25
Payer: COMMERCIAL

## 2023-10-25 DIAGNOSIS — M54.16 LUMBAR RADICULAR PAIN: Primary | ICD-10-CM

## 2023-10-25 PROCEDURE — 97110 THERAPEUTIC EXERCISES: CPT | Mod: GP | Performed by: PHYSICAL THERAPIST

## 2023-10-25 PROCEDURE — 97140 MANUAL THERAPY 1/> REGIONS: CPT | Mod: GP | Performed by: PHYSICAL THERAPIST

## 2023-11-03 ENCOUNTER — THERAPY VISIT (OUTPATIENT)
Dept: PHYSICAL THERAPY | Facility: CLINIC | Age: 58
End: 2023-11-03
Attending: PHYSICAL MEDICINE & REHABILITATION
Payer: COMMERCIAL

## 2023-11-03 DIAGNOSIS — M54.16 LUMBAR RADICULAR PAIN: Primary | ICD-10-CM

## 2023-11-03 PROCEDURE — 97140 MANUAL THERAPY 1/> REGIONS: CPT | Mod: GP | Performed by: PHYSICAL THERAPIST

## 2023-11-03 PROCEDURE — 97112 NEUROMUSCULAR REEDUCATION: CPT | Mod: GP | Performed by: PHYSICAL THERAPIST

## 2023-11-03 PROCEDURE — 97110 THERAPEUTIC EXERCISES: CPT | Mod: GP | Performed by: PHYSICAL THERAPIST

## 2023-11-09 ENCOUNTER — THERAPY VISIT (OUTPATIENT)
Dept: PHYSICAL THERAPY | Facility: CLINIC | Age: 58
End: 2023-11-09
Attending: PHYSICAL MEDICINE & REHABILITATION
Payer: COMMERCIAL

## 2023-11-09 DIAGNOSIS — M54.16 LUMBAR RADICULAR PAIN: Primary | ICD-10-CM

## 2023-11-09 PROCEDURE — 97112 NEUROMUSCULAR REEDUCATION: CPT | Mod: GP | Performed by: PHYSICAL THERAPIST

## 2023-11-09 PROCEDURE — 97110 THERAPEUTIC EXERCISES: CPT | Mod: GP | Performed by: PHYSICAL THERAPIST

## 2023-11-09 PROCEDURE — 97140 MANUAL THERAPY 1/> REGIONS: CPT | Mod: GP | Performed by: PHYSICAL THERAPIST

## 2023-11-22 ENCOUNTER — THERAPY VISIT (OUTPATIENT)
Dept: PHYSICAL THERAPY | Facility: CLINIC | Age: 58
End: 2023-11-22
Payer: COMMERCIAL

## 2023-11-22 DIAGNOSIS — M54.16 LUMBAR RADICULAR PAIN: Primary | ICD-10-CM

## 2023-11-22 PROCEDURE — 97112 NEUROMUSCULAR REEDUCATION: CPT | Mod: GP | Performed by: PHYSICAL THERAPIST

## 2023-11-22 PROCEDURE — 97110 THERAPEUTIC EXERCISES: CPT | Mod: GP | Performed by: PHYSICAL THERAPIST

## 2023-11-22 NOTE — PROGRESS NOTES
"   11/22/23 0500   Appointment Info   Signing clinician's name / credentials Sobia Garner, PT, DPT, OCS   Total/Authorized Visits 12 (MD orders)   Visits Used 5   Medical Diagnosis Lumbar radicular pain   PT Tx Diagnosis Low back pain with right sided radicular pain   Other pertinent information Yellow flags may impact outcome of tx - chronicity, expectation of outcomes with PT. That said, did have extended discussion today about these flags and he reports understanding expectations of outcome does influence outcome itself   Progress Note/Certification   Onset of illness/injury or Date of Surgery 07/18/23   Therapy Frequency 1x/week   Predicted Duration 12 weeks   Progress Note Completed Date 10/18/23   PT Goal 1   Goal Identifier LTG 1   Goal Description Patient will be able to lift 30 pounds without pain in low back during or after the activity   Rationale to maximize safety and independence with performance of ADLs and functional tasks;to maximize safety and independence within the community   Goal Progress tolerated lifting pilates reformer   Target Date 12/13/23   Date Met 11/22/23   Subjective Report   Subjective Report Feeling much better starting this week. Was able to move a heavy pilates reformer into the house without back pain or rebound pain. He is feeling confident to self manage for now, though he reports concern about recurrance.   Objective Measures   Objective Measures Objective Measure 1;Objective Measure 2   Objective Measure 1   Objective Measure lumbar flexion   Details tolerates elephant walk at 15\" step   Objective Measure 2   Objective Measure SI tests, pelvic symet   Details 10/25: +Rt gilets, +Rt inflare, elevated Rt crest //+Rt SI dysf, post rot, inflare, ++++hip LOM   Treatment Interventions (PT)   Interventions Therapeutic Procedure/Exercise;Manual Therapy;Neuromuscular Re-education   Therapeutic Procedure/Exercise   Therapeutic Procedures: strength, endurance, ROM, flexibillity minutes " (70865) 15   Therapeutic Procedures Ther Proc 2;Ther Proc 3;Ther Proc 4;Ther Proc 5;Ther Proc 6;Ther Proc 7   PTRx Ther Proc 1 Prone Press Ups   PTRx Ther Proc 1 - Details d/c for child's pose   PTRx Ther Proc 2 All 4s Cat Cow   PTRx Ther Proc 2 - Details HEP - review for warmup or cooldown   PTRx Ther Proc 3 All 4s Stretch   PTRx Ther Proc 3 - Details HEP - review for warmup or cooldown   PTRx Ther Proc 4 Lumbar Flexion in Standing with/without Patient Overpressure   PTRx Ther Proc 4 - Details d/c for simplicity   PTRx Ther Proc 5 Flexion in Sitting with Patient Overpressure/Progression to Knee Extension   PTRx Ther Proc 5 - Details seated hip hinge x 4# MB x 10 reps. Ed on progressions   Skilled Intervention FLEXION Directional preference, progressive strengthening   Patient Response/Progress Tolerated well but felt good stretch in low b ack. No effect on radicular symptoms   PTRx Ther Proc 6 Prone Plank Modified Knees   PTRx Ther Proc 6 - Details trialed elevated plinth plank ed on the progressions   PTRx Ther Proc 7 Prone Plank   PTRx Ther Proc 7 - Details No Notes   Therapeutic Activity   Patient Response/Progress Demonstrates understanding   Neuromuscular Re-education   Neuromuscular re-ed of mvmt, balance, coord, kinesthetic sense, posture, proprioception minutes (31502) 15   Neuromuscular Re-education Neuro Re-ed 2   Neuro Re-ed 1 Role/goal of PT education   Neuro Re-ed 1 - Details Time spent exploring his beliefs about what caused his back pain episodes. He tends to get this episode after prolonged hip hinge in seated while working on his trailer, which tends to happen near the end of the summer. Then he exacerbates it throughout the fall. Spent time considering options and motivations of patient through dialogue. Discussed becoming more robust and resilient in his back and body overall; this will take work and investment, but after the holidays he has the time to do so. He will consider this option and if  "PT is needed to do so. I showed him progressions as written in TE an NMR today.   Neuro Re-ed 2 seated thoracic extension   Neuro Re-ed 2 - Details \"top down\" seated deadlift, x 20 x 6# MB   PTRx Neuro Re-ed 1 Pallof Press   PTRx Neuro Re-ed 1 - Details d/c for simplicity   Skilled Intervention targeting graded activity to lumbar flexion and resistance of lumbar flexion   Patient Response/Progress good discussion, provider and pt on the same page at this point in my opinion.   Manual Therapy   Manual Therapy Manual Therapy 2;Manual Therapy 3;Manual Therapy 4;Manual Therapy 5;Manual Therapy 6   Manual Therapy 1 IASTM   Manual Therapy 1 - Details x 10 min to lumbar paraspinals/QL, ed on sx modulation   Manual Therapy 2 MWM prone pressup   Education   Learner/Method No Barriers to Learning;Pictures/Video;Demonstration;Reading;Listening;Patient   Plan   Home program PTRX online - daily x 2   Plan for next session d/c formal PT   Total Session Time   Timed Code Treatment Minutes 30   Total Treatment Time (sum of timed and untimed services) 30         DISCHARGE  Reason for Discharge: Patient has met all goals.    Equipment Issued: none    Discharge Plan: Patient to continue home program.    Referring Provider:  Ashish Lee    "

## 2023-11-30 ENCOUNTER — ANCILLARY PROCEDURE (OUTPATIENT)
Dept: MRI IMAGING | Facility: CLINIC | Age: 58
End: 2023-11-30
Attending: PHYSICAL MEDICINE & REHABILITATION
Payer: COMMERCIAL

## 2023-11-30 DIAGNOSIS — M54.16 LUMBAR RADICULAR PAIN: ICD-10-CM

## 2023-11-30 DIAGNOSIS — M54.50 LUMBAR SPINE PAIN: ICD-10-CM

## 2023-11-30 PROCEDURE — 72148 MRI LUMBAR SPINE W/O DYE: CPT | Mod: GC | Performed by: STUDENT IN AN ORGANIZED HEALTH CARE EDUCATION/TRAINING PROGRAM

## 2023-12-04 ENCOUNTER — OFFICE VISIT (OUTPATIENT)
Dept: PHYSICAL MEDICINE AND REHAB | Facility: CLINIC | Age: 58
End: 2023-12-04
Payer: COMMERCIAL

## 2023-12-04 VITALS — HEART RATE: 72 BPM | DIASTOLIC BLOOD PRESSURE: 69 MMHG | SYSTOLIC BLOOD PRESSURE: 129 MMHG

## 2023-12-04 DIAGNOSIS — M54.16 LUMBAR RADICULAR PAIN: ICD-10-CM

## 2023-12-04 DIAGNOSIS — M51.26 LUMBAR DISC HERNIATION: ICD-10-CM

## 2023-12-04 DIAGNOSIS — S32.030A CLOSED COMPRESSION FRACTURE OF L3 LUMBAR VERTEBRA, INITIAL ENCOUNTER (H): Primary | ICD-10-CM

## 2023-12-04 DIAGNOSIS — M51.47 SCHMORL'S LUMBOSACRAL NODES: ICD-10-CM

## 2023-12-04 PROCEDURE — 99214 OFFICE O/P EST MOD 30 MIN: CPT | Performed by: PHYSICAL MEDICINE & REHABILITATION

## 2023-12-04 ASSESSMENT — PAIN SCALES - GENERAL: PAINLEVEL: NO PAIN (1)

## 2023-12-04 NOTE — PATIENT INSTRUCTIONS
An Bone density test was ordered for you today.  You will be contacted by scheduling within 3 days.    If you are not contacted, please call Radiology at [phone].

## 2023-12-04 NOTE — PROGRESS NOTES
Assessment/Plan:      Wei was seen today for back pain.    Diagnoses and all orders for this visit:    Closed compression fracture of L3 lumbar vertebra, initial encounter (H)  -     DX Hip/Pelvis/Spine; Future    Schmorl's lumbosacral nodes    Lumbar disc herniation    Lumbar radicular pain         Assessment: Pleasant 57 year old male  with a history of hypertension, diabetes mellitus,Osteoarthritis, sleep apnea with:     1.   Improvement of chronic lumbar spine pain severe flare over approximately 3-4 months with intermittent right lower extremity radicular pain.  Has had prior pain at the lumbosacral junction consistent with  discogenic pain/annular tear disc herniation without radicular pain.    He does have some mild degenerative change in the lower lumbar spine on AP view of the hips/pelvis.    right leg radicular pain and was seen at urgent care had reported plain films done I do not have those available.  Temporary improvement with Medrol Dosepak.  Has a right L1-2 disc extrusion abutting the right L1 and L2 nerves also compression deformity L3 with Schmorl's node and T2 signal change of the sacrum could represent insufficiency fracture.  On my review the compression deformity seems more consistent with a significant Schmorl's node.     2.  Myofascial pain lumbar spine and gluteal region.       Discussion:    1.  We discussed the diagnosis and treatment options.  His pain is significantly improved and the radicular pain has basically resolved in the right anterior thigh.  The pain is at the lumbosacral junction very mild now no pain over the sacrum.  This pain is likely related to the Schmorl's node and some of his radicular pain related to the disc extrusion.  The compression fracture L3 is relatively mild if at all but need to evaluate for bone density issues given his chronic steroid use with injections in the hips and hands.  We discussed the sacral signal change in the size of the edge of the MRI  and he is having no pain today will monitor for now.       2.   Recommend bone density testing and DEXA scan has been ordered    3.  He has nabumetone at home but will continue ibuprofen as needed.    4.  Follow-up with me as needed if symptoms worsen will reach out after DEXA scan has been completed.    It was our pleasure caring for your patient today, if there any questions or concerns please do not hesitate to contact us.      Subjective:   Patient ID: Wei Wolf is a 57 year old male.    History of Present Illness: Patient presents for follow-up of lumbar spine pain and right lower extremity radicular pain.  Symptoms have significantly improved since last visit but still has back pain lumbosacral junction 95% improved.  Worse with bending more than 5 minutes better with avoiding bending.  Was previously having right anterior thigh radicular pain with standing and walking and that is improved.  Nabumetone was helpful initially and now back to Advil as needed.  Wondering how to stop another flare.  Has been to physical therapy.  He also has bilateral hip pain has injections in his hips intermittently with orthopedics reports this was done with ultrasound.  Also has right hand injections intermittently for arthritis.      Imaging: MRI report and images were personally reviewed and discussed with the patient.  A plastic model was utilized during the discussion.  MRI of the lumbar spine personally reviewed.  Right L1-2 disc extrusion with right L2 nerve and exiting right L1 nerve contact.  Compression deformity with Schmorl's node at L3 S2 bone marrow signal change could represent fracture.  Disc osteophyte complex on the left at L4-5 contacting the left L5 nerve.    Review of Systems: Pertinent positives: None.  Pertinent negatives: No numbness, tingling or weakness.  No bowel or bladder incontinence.  No urinary retention.  No fevers, unintentional weight loss, balance changes, headaches, frequent falling,  difficulty swallowing, or coordination difficulties.  All others reviewed are negative.    Past Medical History:   Diagnosis Date    AR (allergic rhinitis)     Diabetes (H)     Hypertension goal BP (blood pressure) < 140/90 12/20/2010    Osteoarthritis of hip 5/4/2022    Sleep apnea        The following portions of the patient's history were reviewed and updated as appropriate: allergies, current medications, past family history, past medical history, past social history, past surgical history and problem list.           Objective:   Physical Exam:    /69   Pulse 72   There is no height or weight on file to calculate BMI.      General: Alert and oriented with normal affect. Attention, knowledge, memory, and language are intact. No acute distress.   Eyes: Sclerae are clear.  Respirations: Unlabored. CV: No lower extremity edema.  Skin: No rashes seen.    Gait:  Nonantalgic.  No tenderness over the lumbar spine or sacrum.    Sensation is intact to light touch throughout the   lower extremities.       Manual muscle testing reveals:  Right /Left out of 5     5/5 knee flexors  5/5 knee extensors  5/5 ankle plantar flexors  5/5 ankle dorsiflexors  5/5   ankle evertors

## 2023-12-04 NOTE — LETTER
12/4/2023         RE: Wei Wolf  [address] [facility] [address]        Dear Colleague,    Thank you for referring your patient, Wei Wolf, to the [facility]. Please see a copy of my visit note below.    Assessment/Plan:      Wei was seen today for back pain.    Diagnoses and all orders for this visit:    Closed compression fracture of L3 lumbar vertebra, initial encounter (H)  -     DX Hip/Pelvis/Spine; Future    Schmorl's lumbosacral nodes    Lumbar disc herniation    Lumbar radicular pain         Assessment: Pleasant 57 year old male  with a history of hypertension, diabetes mellitus,Osteoarthritis, sleep apnea with:     1.   Improvement of chronic lumbar spine pain severe flare over approximately 3-4 months with intermittent right lower extremity radicular pain.  Has had prior pain at the lumbosacral junction consistent with  discogenic pain/annular tear disc herniation without radicular pain.    He does have some mild degenerative change in the lower lumbar spine on AP view of the hips/pelvis.    right leg radicular pain and was seen at urgent care had reported plain films done I do not have those available.  Temporary improvement with Medrol Dosepak.  Has a right L1-2 disc extrusion abutting the right L1 and L2 nerves also compression deformity L3 with Schmorl's node and T2 signal change of the sacrum could represent insufficiency fracture.  On my review the compression deformity seems more consistent with a significant Schmorl's node.     2.  Myofascial pain lumbar spine and gluteal region.       Discussion:    1.  We discussed the diagnosis and treatment options.  His pain is significantly improved and the radicular pain has basically resolved in the right anterior thigh.  The pain is at the lumbosacral junction very mild now no pain over the sacrum.  This pain is likely related to the Schmorl's node and some of his radicular pain related to the  disc extrusion.  The compression fracture L3 is relatively mild if at all but need to evaluate for bone density issues given his chronic steroid use with injections in the hips and hands.  We discussed the sacral signal change in the size of the edge of the MRI and he is having no pain today will monitor for now.       2.   Recommend bone density testing and DEXA scan has been ordered    3.  He has nabumetone at home but will continue ibuprofen as needed.    4.  Follow-up with me as needed if symptoms worsen will reach out after DEXA scan has been completed.    It was our pleasure caring for your patient today, if there any questions or concerns please do not hesitate to contact us.      Subjective:   Patient ID: Wei Wolf is a 57 year old male.    History of Present Illness: Patient presents for follow-up of lumbar spine pain and right lower extremity radicular pain.  Symptoms have significantly improved since last visit but still has back pain lumbosacral junction 95% improved.  Worse with bending more than 5 minutes better with avoiding bending.  Was previously having right anterior thigh radicular pain with standing and walking and that is improved.  Nabumetone was helpful initially and now back to Advil as needed.  Wondering how to stop another flare.  Has been to physical therapy.  He also has bilateral hip pain has injections in his hips intermittently with orthopedics reports this was done with ultrasound.  Also has right hand injections intermittently for arthritis.      Imaging: MRI report and images were personally reviewed and discussed with the patient.  A plastic model was utilized during the discussion.  MRI of the lumbar spine personally reviewed.  Right L1-2 disc extrusion with right L2 nerve and exiting right L1 nerve contact.  Compression deformity with Schmorl's node at L3 S2 bone marrow signal change could represent fracture.  Disc osteophyte complex on the left at L4-5 contacting the  left L5 nerve.    Review of Systems: Pertinent positives: None.  Pertinent negatives: No numbness, tingling or weakness.  No bowel or bladder incontinence.  No urinary retention.  No fevers, unintentional weight loss, balance changes, headaches, frequent falling, difficulty swallowing, or coordination difficulties.  All others reviewed are negative.    Past Medical History:   Diagnosis Date     AR (allergic rhinitis)      Diabetes (H)      Hypertension goal BP (blood pressure) < 140/90 12/20/2010     Osteoarthritis of hip 5/4/2022     Sleep apnea        The following portions of the patient's history were reviewed and updated as appropriate: allergies, current medications, past family history, past medical history, past social history, past surgical history and problem list.           Objective:   Physical Exam:    /69   Pulse 72   There is no height or weight on file to calculate BMI.      General: Alert and oriented with normal affect. Attention, knowledge, memory, and language are intact. No acute distress.   Eyes: Sclerae are clear.  Respirations: Unlabored. CV: No lower extremity edema.  Skin: No rashes seen.    Gait:  Nonantalgic.  No tenderness over the lumbar spine or sacrum.    Sensation is intact to light touch throughout the   lower extremities.       Manual muscle testing reveals:  Right /Left out of 5     5/5 knee flexors  5/5 knee extensors  5/5 ankle plantar flexors  5/5 ankle dorsiflexors  5/5   ankle evertors      Again, thank you for allowing me to participate in the care of your patient.        Sincerely,        Ashish Lee DO

## 2023-12-13 ENCOUNTER — ANCILLARY PROCEDURE (OUTPATIENT)
Dept: BONE DENSITY | Facility: CLINIC | Age: 58
End: 2023-12-13
Attending: PHYSICAL MEDICINE & REHABILITATION
Payer: COMMERCIAL

## 2023-12-13 DIAGNOSIS — S32.030A CLOSED COMPRESSION FRACTURE OF L3 LUMBAR VERTEBRA, INITIAL ENCOUNTER (H): ICD-10-CM

## 2023-12-13 PROCEDURE — 77080 DXA BONE DENSITY AXIAL: CPT | Mod: TC

## 2023-12-15 ENCOUNTER — MYC REFILL (OUTPATIENT)
Dept: FAMILY MEDICINE | Facility: CLINIC | Age: 58
End: 2023-12-15
Payer: COMMERCIAL

## 2023-12-15 DIAGNOSIS — E11.9 TYPE 2 DIABETES MELLITUS WITHOUT COMPLICATION, WITH LONG-TERM CURRENT USE OF INSULIN (H): ICD-10-CM

## 2023-12-15 DIAGNOSIS — Z79.4 TYPE 2 DIABETES MELLITUS WITHOUT COMPLICATION, WITH LONG-TERM CURRENT USE OF INSULIN (H): ICD-10-CM

## 2023-12-19 RX ORDER — SEMAGLUTIDE 1.34 MG/ML
1 INJECTION, SOLUTION SUBCUTANEOUS
Qty: 9 ML | Refills: 1 | Status: SHIPPED | OUTPATIENT
Start: 2023-12-19 | End: 2024-06-12

## 2023-12-21 DIAGNOSIS — Z79.4 TYPE 2 DIABETES MELLITUS WITHOUT COMPLICATION, WITH LONG-TERM CURRENT USE OF INSULIN (H): ICD-10-CM

## 2023-12-21 DIAGNOSIS — E11.9 TYPE 2 DIABETES MELLITUS WITHOUT COMPLICATION, WITH LONG-TERM CURRENT USE OF INSULIN (H): ICD-10-CM

## 2023-12-21 NOTE — TELEPHONE ENCOUNTER
Patient is needing a refill on the Tresiba 100unit/ml. 30ml is only a 60 days supply. Patient is out of refills and is due.     Thank You  Isabela Bright, [facility]  [phone]

## 2023-12-22 RX ORDER — INSULIN DEGLUDEC 100 U/ML
50 INJECTION, SOLUTION SUBCUTANEOUS EVERY MORNING
Qty: 30 ML | Refills: 0 | Status: SHIPPED | OUTPATIENT
Start: 2023-12-22 | End: 2024-02-22

## 2023-12-27 DIAGNOSIS — I10 HYPERTENSION GOAL BP (BLOOD PRESSURE) < 140/90: ICD-10-CM

## 2023-12-27 DIAGNOSIS — E78.00 HIGH CHOLESTEROL: ICD-10-CM

## 2023-12-27 RX ORDER — SIMVASTATIN 10 MG
10 TABLET ORAL AT BEDTIME
Qty: 90 TABLET | Refills: 1 | OUTPATIENT
Start: 2023-12-27

## 2023-12-27 RX ORDER — LISINOPRIL/HYDROCHLOROTHIAZIDE 10-12.5 MG
1 TABLET ORAL DAILY
Qty: 90 TABLET | Refills: 1 | OUTPATIENT
Start: 2023-12-27

## 2024-01-04 DIAGNOSIS — I10 HYPERTENSION GOAL BP (BLOOD PRESSURE) < 140/90: ICD-10-CM

## 2024-01-04 DIAGNOSIS — E78.00 HIGH CHOLESTEROL: ICD-10-CM

## 2024-01-04 RX ORDER — LISINOPRIL/HYDROCHLOROTHIAZIDE 10-12.5 MG
1 TABLET ORAL DAILY
Qty: 90 TABLET | Refills: 1 | Status: SHIPPED | OUTPATIENT
Start: 2024-01-04 | End: 2024-06-24

## 2024-01-04 RX ORDER — SIMVASTATIN 10 MG
10 TABLET ORAL AT BEDTIME
Qty: 90 TABLET | Refills: 1 | Status: SHIPPED | OUTPATIENT
Start: 2024-01-04 | End: 2024-06-24

## 2024-01-25 ENCOUNTER — PATIENT OUTREACH (OUTPATIENT)
Dept: FAMILY MEDICINE | Facility: CLINIC | Age: 59
End: 2024-01-25
Payer: COMMERCIAL

## 2024-01-25 NOTE — TELEPHONE ENCOUNTER
Patient Quality Outreach    Patient is due for the following:   Diabetes -  A1C, Eye Exam, Microalbumin, Diabetic Follow-Up Visit, and Foot Exam    Next Steps:   Schedule a office visit for diabetes    Type of outreach:    Sent [employer] message.      Questions for provider review:    None           Keshia Ellis, [facility]

## 2024-02-11 ENCOUNTER — HEALTH MAINTENANCE LETTER (OUTPATIENT)
Age: 59
End: 2024-02-11

## 2024-02-12 ENCOUNTER — MYC MEDICAL ADVICE (OUTPATIENT)
Dept: FAMILY MEDICINE | Facility: CLINIC | Age: 59
End: 2024-02-12
Payer: COMMERCIAL

## 2024-02-12 ENCOUNTER — DOCUMENTATION ONLY (OUTPATIENT)
Dept: FAMILY MEDICINE | Facility: CLINIC | Age: 59
End: 2024-02-12

## 2024-02-12 DIAGNOSIS — E11.9 TYPE 2 DIABETES MELLITUS WITHOUT COMPLICATION, WITH LONG-TERM CURRENT USE OF INSULIN (H): Primary | ICD-10-CM

## 2024-02-12 DIAGNOSIS — Z79.4 TYPE 2 DIABETES MELLITUS WITHOUT COMPLICATION, WITH LONG-TERM CURRENT USE OF INSULIN (H): Primary | ICD-10-CM

## 2024-02-12 NOTE — TELEPHONE ENCOUNTER
Patient added to cancellation list with PCP.  Zuleyma COLLINS    [facility]

## 2024-02-12 NOTE — PROGRESS NOTES
Wei Wolf has an upcoming lab appointment:    Future Appointments   Date Time Provider Department Center   2/13/2024 10:15 AM AN LAB [facility]       There is no order available. Please review and place either future orders or HMPO (Review of Health Maintenance Protocol Orders), as appropriate.    Health Maintenance Due   Topic    ANNUAL REVIEW OF HM ORDERS     HIV SCREENING     HEPATITIS C SCREENING     MICROALBUMIN     A1C      Malia GAMBINO

## 2024-02-13 ENCOUNTER — LAB (OUTPATIENT)
Dept: LAB | Facility: CLINIC | Age: 59
End: 2024-02-13
Payer: COMMERCIAL

## 2024-02-13 DIAGNOSIS — Z79.4 TYPE 2 DIABETES MELLITUS WITHOUT COMPLICATION, WITH LONG-TERM CURRENT USE OF INSULIN (H): ICD-10-CM

## 2024-02-13 DIAGNOSIS — E11.9 TYPE 2 DIABETES MELLITUS WITHOUT COMPLICATION, WITH LONG-TERM CURRENT USE OF INSULIN (H): ICD-10-CM

## 2024-02-13 LAB
ALBUMIN SERPL BCG-MCNC: 4.2 G/DL (ref 3.5–5.2)
ALP SERPL-CCNC: 106 U/L (ref 40–150)
ALT SERPL W P-5'-P-CCNC: 15 U/L (ref 0–70)
ANION GAP SERPL CALCULATED.3IONS-SCNC: 10 MMOL/L (ref 7–15)
AST SERPL W P-5'-P-CCNC: 19 U/L (ref 0–45)
BILIRUB SERPL-MCNC: 0.5 MG/DL
BUN SERPL-MCNC: 21.5 MG/DL (ref 6–20)
CALCIUM SERPL-MCNC: 9.2 MG/DL (ref 8.6–10)
CHLORIDE SERPL-SCNC: 101 MMOL/L (ref 98–107)
CREAT SERPL-MCNC: 0.92 MG/DL (ref 0.67–1.17)
CREAT UR-MCNC: 293 MG/DL
DEPRECATED HCO3 PLAS-SCNC: 26 MMOL/L (ref 22–29)
EGFRCR SERPLBLD CKD-EPI 2021: >90 ML/MIN/1.73M2
GLUCOSE SERPL-MCNC: 167 MG/DL (ref 70–99)
HBA1C MFR BLD: 9 % (ref 0–5.6)
MICROALBUMIN UR-MCNC: <12 MG/L
MICROALBUMIN/CREAT UR: NORMAL MG/G{CREAT}
POTASSIUM SERPL-SCNC: 4.4 MMOL/L (ref 3.4–5.3)
PROT SERPL-MCNC: 7 G/DL (ref 6.4–8.3)
SODIUM SERPL-SCNC: 137 MMOL/L (ref 135–145)

## 2024-02-13 PROCEDURE — 80053 COMPREHEN METABOLIC PANEL: CPT

## 2024-02-13 PROCEDURE — 83036 HEMOGLOBIN GLYCOSYLATED A1C: CPT

## 2024-02-13 PROCEDURE — 36415 COLL VENOUS BLD VENIPUNCTURE: CPT

## 2024-02-13 PROCEDURE — 82043 UR ALBUMIN QUANTITATIVE: CPT

## 2024-02-13 PROCEDURE — 82570 ASSAY OF URINE CREATININE: CPT

## 2024-02-22 ENCOUNTER — VIRTUAL VISIT (OUTPATIENT)
Dept: FAMILY MEDICINE | Facility: CLINIC | Age: 59
End: 2024-02-22
Payer: COMMERCIAL

## 2024-02-22 DIAGNOSIS — E11.65 TYPE 2 DIABETES MELLITUS WITH HYPERGLYCEMIA, WITH LONG-TERM CURRENT USE OF INSULIN (H): Primary | ICD-10-CM

## 2024-02-22 DIAGNOSIS — E66.01 MORBID OBESITY (H): ICD-10-CM

## 2024-02-22 DIAGNOSIS — Z79.4 TYPE 2 DIABETES MELLITUS WITH HYPERGLYCEMIA, WITH LONG-TERM CURRENT USE OF INSULIN (H): Primary | ICD-10-CM

## 2024-02-22 PROCEDURE — 99214 OFFICE O/P EST MOD 30 MIN: CPT | Mod: 95 | Performed by: FAMILY MEDICINE

## 2024-02-22 NOTE — PROGRESS NOTES
Wei is a 58 year old who is being evaluated via a billable video visit.      How would you like to obtain your AVS? Permission[employer]  If the video visit is dropped, the invitation should be resent by: Text to cell phone: [phone]  Will anyone else be joining your video visit? No          Instructions Relayed to Patient by Virtual Roomer:         Reminded patient to ensure they were logged on to virtual visit by arrival time listed. Documented in appointment notes if patient had flexibility to initiate visit sooner than arrival time. If pediatric virtual visit, ensured pediatric patient along with parent/guardian will be present for video visit.     Patient offered the website [email or web address] and/or phone number to [employer] Help line: [phone]         Assessment & Plan     Type 2 diabetes mellitus with hyperglycemia, with long-term current use of insulin (H)   I had a long discussion with the patient about his concerns. Diabetes is  not well controlled.  Patient reports the reason for elevated A1c is because he has been using cough drops over the past several weeks to help with his cough which increased his blood sugar.  Last A1c is 9.  Current medications: Tresiba 50 units daily, and Ozempic 1 mg weekly.  I recommend the following :  Patient education: In depth discussion and education was provided about the assessment and implications of each of the below recommendations for management. Patient indicated readiness to learn, all questions were answered and understanding of material presented was confirmed.  -Increase exercise as tolerated.   Avoid high glycemic index diet   -BP: Currently on lisinopril-hydrochlorothiazide, continue the same treatment  -NAFL/LOPEZ: Recent labs showed normal liver function.  -Lipids: On Zocor, continue the same treatment  -Microalbumin; none, continue lisinopril  Medications: Switch Tresiba 50 units daily to Lantus 25 twice daily due to change in insurance  "coverage, continue Ozempic 1 mg weekly.    Referral / follow up with other providers: None  Follow up: 3 months    - insulin glargine (LANTUS PEN) 100 UNIT/ML pen; Inject 25 Units Subcutaneous 2 times daily for 180 days    Morbid obesity (H)  Current weight 266, patient has been losing weight over the past year.  Obesity contributes to diabetes, and hypertension.  Recommended working on diet and exercise  Continue Ozempic 1 mg weekly to help with weight loss          BMI  Estimated body mass index is 38.6 kg/m  as calculated from the following:    Height as of 8/31/23: 1.778 m (5' 10\").    Weight as of 8/31/23: 122 kg (269 lb).   Weight management plan: Discussed healthy diet and exercise guidelines      Work on weight loss  Regular exercise    Nancy Carvajal is a 58 year old, presenting for the following health issues:  Results      2/22/2024     7:04 AM   Additional Questions   Roomed by Aurelia         2/22/2024     7:04 AM   Patient Reported Additional Medications   Patient reports taking the following new medications none     History of Present Illness       Diabetes:   He presents for follow up of diabetes.  He is checking home blood glucose a few times a month.   He checks blood glucose before meals.  Blood glucose is sometimes over 200 and never under 70. He is aware of hypoglycemia symptoms including lethargy.    He has no concerns regarding his diabetes at this time.   He is not experiencing numbness or burning in feet, excessive thirst, blurry vision, weight changes or redness, sores or blisters on feet. The patient has not had a diabetic eye exam in the last 12 months.          He eats 0-1 servings of fruits and vegetables daily.He consumes 0 sweetened beverage(s) daily.He exercises with enough effort to increase his heart rate 9 or less minutes per day.  He exercises with enough effort to increase his heart rate 3 or less days per week.   He is taking medications regularly.     Cough drops for 2 " months     Current weight 266 lbs  Wt Readings from Last 4 Encounters:   08/31/23 122 kg (269 lb)   07/10/23 131.5 kg (290 lb)   12/07/22 127.5 kg (281 lb)   12/28/21 125.1 kg (275 lb 12.8 oz)               Review of Systems  Constitutional, HEENT, cardiovascular, pulmonary, gi and gu systems are negative, except as otherwise noted.      Objective           Vitals:  No vitals were obtained today due to virtual visit.    Physical Exam   GENERAL: alert and no distress  EYES: Eyes grossly normal to inspection.  No discharge or erythema, or obvious scleral/conjunctival abnormalities.  RESP: No audible wheeze, cough, or visible cyanosis.    SKIN: Visible skin clear. No significant rash, abnormal pigmentation or lesions.  NEURO: Cranial nerves grossly intact.  Mentation and speech appropriate for age.  PSYCH: Appropriate affect, tone, and pace of words          Video-Visit Details    Type of service:  Video Visit     Originating Location (pt. Location): Home    Distant Location (provider location):  On-site  Platform used for Video Visit: Lit  Signed Electronically by: Radha Brian MD

## 2024-06-10 ENCOUNTER — PATIENT OUTREACH (OUTPATIENT)
Dept: CARE COORDINATION | Facility: CLINIC | Age: 59
End: 2024-06-10
Payer: COMMERCIAL

## 2024-06-10 ENCOUNTER — MYC MEDICAL ADVICE (OUTPATIENT)
Dept: PHYSICAL MEDICINE AND REHAB | Facility: CLINIC | Age: 59
End: 2024-06-10
Payer: COMMERCIAL

## 2024-06-10 ENCOUNTER — MYC REFILL (OUTPATIENT)
Dept: PHYSICAL MEDICINE AND REHAB | Facility: CLINIC | Age: 59
End: 2024-06-10
Payer: COMMERCIAL

## 2024-06-10 DIAGNOSIS — M79.18 MYOFASCIAL PAIN: ICD-10-CM

## 2024-06-10 DIAGNOSIS — M54.50 LUMBAR SPINE PAIN: ICD-10-CM

## 2024-06-10 RX ORDER — NABUMETONE 500 MG/1
500-1000 TABLET, FILM COATED ORAL 2 TIMES DAILY PRN
Qty: 60 TABLET | Refills: 1 | Status: SHIPPED | OUTPATIENT
Start: 2024-06-10 | End: 2024-06-12

## 2024-06-10 NOTE — TELEPHONE ENCOUNTER
Last appointment: 12/04/2023  Next appointment: None    Notes/Comments: [facility]10[facility]

## 2024-06-11 NOTE — PROGRESS NOTES
Assessment:     Diagnoses and all orders for this visit:  Lumbar spine pain  -     methylPREDNISolone (MEDROL DOSEPAK) 4 MG tablet therapy pack; Follow Package Directions  -     Physical Therapy  Referral; Future  -     nabumetone (RELAFEN) 500 MG tablet; Take 1-2 tablets (500-1,000 mg) by mouth 2 times daily as needed for moderate pain  Schmorl's lumbosacral nodes  Myofascial pain  -     nabumetone (RELAFEN) 500 MG tablet; Take 1-2 tablets (500-1,000 mg) by mouth 2 times daily as needed for moderate pain     Wei Wolf is a 58 year old y.o. male with past medical history significant for hypertension, diabetes mellitus, osteoarthritis, sleep apnea who presents today for follow-up regarding:    -Acute flareup of midline lumbar spine related pain.  No current radicular component, patient is neurologically intact on exam.    *Dr. Lee primary spine provider last seen 12/4/2023.     Plan:     A shared decision making plan was used. The patient's values and choices were respected. Prior medical records were reviewed today. The following represents what was discussed and decided upon by the provider and the patient.        -DIAGNOSTIC TESTS: Images were personally reviewed and interpreted.   -- Lumbar spine MRI 11/30/2023 with RIGHT L1-2 disc extrusion abutting the right L1 and L2 nerves.  Compression deformity at L3 with Schmorl node with associated signal abnormality/surrounding edema.  T2 signal change of the sacrum representing potential insufficiency fracture.    -INTERVENTIONS: No recommendations for injections.    -MEDICATIONS: Prescribed low-dose Medrol Dosepak to see if we can improve flareup in pain.  Once completed with Medrol Dosepak advised patient to restart nabumetone as needed 1 to 2 tablets twice daily.  Patient was advised to not take Medrol dose pack and nabumetone at the same time.  Discussed side effects of medications and proper use. Patient verbalized  understanding.    -PHYSICAL THERAPY: Referral to physical therapy placed to readdress home exercise program.  Discussed the importance of core strengthening, ROM, stretching exercises with the patient and how each of these entities is important in decreasing pain.  Explained to the patient that the purpose of physical therapy is to teach the patient a home exercise program.  These exercises need to be performed every day in order to decrease pain and prevent future occurrences of pain.        -PATIENT EDUCATION:  Total time of 42 minutes, on the day of service, spent with the patient, reviewing the chart, placing orders, and documenting.     -FOLLOW UP: Follow-up as needed if symptoms or not improving post flare.  Advised to contact clinic if symptoms worsen or change.    Subjective:     Wei Wolf is a 58 year old male who presents today for follow-up regarding flareup of chronic intermittent midline low back pain.  He does report that he has this typically a couple of times a year and his pain has been progressive over the last 1 week and he wants to make sure it does not get to a severe level which has been in the past.  Today pain is a 5/10, a 1 at its best aggravated with bending or lifting.  Denies current radiating lower extremity pain.  Denies numbness or tingling sensations, denies lower extremity weakness.  Reports that he has done really well with Medrol Dosepak in the past and is hoping that this could help this flareup as well.    Treatment to Date:     Medications:  Nabumetone    Medrol Dosepak in the past with benefit    Patient Active Problem List   Diagnosis    HYPERLIPIDEMIA LDL GOAL <100    Hypertension goal BP (blood pressure) < 140/90    Obesity    SAI (obstructive sleep apnea)    Morbid obesity (H)    Uncontrolled diabetes mellitus type 2 without complications       Current Outpatient Medications   Medication Sig Dispense Refill    methylPREDNISolone (MEDROL DOSEPAK) 4 MG tablet therapy  pack Follow Package Directions 21 tablet 0    nabumetone (RELAFEN) 500 MG tablet Take 1-2 tablets (500-1,000 mg) by mouth 2 times daily as needed for moderate pain 60 tablet 1    ASPIRIN NOT PRESCRIBED, INTENTIONAL, continuous prn Antiplatelet medication not prescribed intentionally due to GI Issues / Ulcer Disease (Patient not taking: Reported on 12/4/2023) 1 each 0    CONTOUR NEXT TEST test strip USE TO TEST BLOD SUGAR TWICE DAILY OR AS DIRECTED ++ APPOINTMENT NEEDED 100 strip 0    insulin glargine (LANTUS PEN) 100 UNIT/ML pen Inject 25 Units Subcutaneous 2 times daily for 180 days 45 mL 1    lisinopril-hydrochlorothiazide (ZESTORETIC) 10-12.5 MG tablet Take 1 tablet by mouth daily 90 tablet 1    Semaglutide, 1 MG/DOSE, (OZEMPIC, 1 MG/DOSE,) 4 MG/3ML pen Inject 1 mg Subcutaneous every 7 days 9 mL 1    simvastatin (ZOCOR) 10 MG tablet Take 1 tablet (10 mg) by mouth at bedtime 90 tablet 1     Current Facility-Administered Medications   Medication Dose Route Frequency Provider Last Rate Last Admin    betamethasone acet & sod phos (CELESTONE) injection 6 mg  6 mg   Travis Peña MD   6 mg at 10/02/23 0934    lidocaine (PF) (XYLOCAINE) 1 % injection 0.5 mL  0.5 mL   Travis Peña MD   0.5 mL at 10/02/23 0934    lidocaine (PF) (XYLOCAINE) 1 % injection 5 mL  5 mL   Arben Araya MD   5 mL at 08/17/23 1214    lidocaine (PF) (XYLOCAINE) 1 % injection 5 mL  5 mL   Arben Araya MD   5 mL at 08/17/23 1214    triamcinolone (KENALOG-40) injection 40 mg  40 mg   Arben Araya MD   40 mg at 08/17/23 1214    triamcinolone (KENALOG-40) injection 40 mg  40 mg   Arben Araya MD   40 mg at 08/17/23 1214       Allergies   Allergen Reactions    Kiwi Itching     Throat itching to kiwi, watermelon, honeydew    Pollen Extract     Metformin Diarrhea       Past Medical History:   Diagnosis Date    AR (allergic rhinitis)     Diabetes (H)     Hypertension goal BP (blood pressure) < 140/90 12/20/2010  "   Osteoarthritis of hip 5/4/2022    Sleep apnea         Review of Systems  ROS:  Specifically negative for bowel/bladder dysfunction, balance changes, headache, dizziness, foot drop, fevers, chills, appetite changes, nausea/vomiting, unexplained weight loss. Otherwise 13 systems reviewed are negative. Please see the patient's intake questionnaire from today for details.    Reviewed Social, Family, Past Medical and Past Surgical history with patient, no significant changes noted since prior visit.     Objective:     BP (!) 141/64 (BP Location: Left arm, Patient Position: Sitting, Cuff Size: Adult Large)   Pulse 87   Ht 5' 10\" (1.778 m)   Wt 263 lb 8 oz (119.5 kg)   BMI 37.81 kg/m      PHYSICAL EXAMINATION:    --CONSTITUTIONAL: Well developed, well nourished, healthy appearing individual.  --PSYCHIATRIC: Appropriate mood and affect. No difficulty interacting due to temper, social withdrawal, or memory issues.  --SKIN: Lumbar region is dry and intact.   --RESPIRATORY: Normal rhythm and effort. No abnormal accessory muscle breathing patterns noted.   --MUSCULOSKELETAL:  Normal lumbar lordosis noted, no lateral shift.  --GROSS MOTOR: Easily arises from a seated position. Gait is non-antalgic  --LUMBAR SPINE:  Inspection reveals no evidence of deformity. Range of motion is not limited in lumbar flexion, extension, lateral rotation. No tenderness to palpation lumbar spine.   --LOWER EXTREMITY MOTOR TESTING:  Plantar flexion left 5/5, right 5/5   Dorsiflexion left 5/5, right 5/5   Great toe MTP extension left 5/5, right 5/5  Knee flexion left 5/5, right 5/5  Knee extension left 5/5, right 5/5   Hip flexion left 5/5, right 5/5  --HIPS: Full range of motion bilaterally.   --NEUROLOGIC: Bilateral patellar and achilles reflexes are physiologic and symmetric. Sensation to light touch is intact in the bilateral L4, L5, and S1 dermatomes.    RESULTS:   Imaging: Spine imaging was reviewed today. The images were shown to the " patient and the findings were explained using a spine model.      Lumbar spine MRI reviewed

## 2024-06-12 ENCOUNTER — OFFICE VISIT (OUTPATIENT)
Dept: PHYSICAL MEDICINE AND REHAB | Facility: CLINIC | Age: 59
End: 2024-06-12
Payer: COMMERCIAL

## 2024-06-12 VITALS
WEIGHT: 263.5 LBS | HEART RATE: 87 BPM | SYSTOLIC BLOOD PRESSURE: 141 MMHG | BODY MASS INDEX: 37.72 KG/M2 | DIASTOLIC BLOOD PRESSURE: 64 MMHG | HEIGHT: 70 IN

## 2024-06-12 DIAGNOSIS — M54.50 LUMBAR SPINE PAIN: Primary | ICD-10-CM

## 2024-06-12 DIAGNOSIS — M51.47 SCHMORL'S LUMBOSACRAL NODES: ICD-10-CM

## 2024-06-12 DIAGNOSIS — E11.9 TYPE 2 DIABETES MELLITUS WITHOUT COMPLICATION, WITH LONG-TERM CURRENT USE OF INSULIN (H): ICD-10-CM

## 2024-06-12 DIAGNOSIS — Z79.4 TYPE 2 DIABETES MELLITUS WITHOUT COMPLICATION, WITH LONG-TERM CURRENT USE OF INSULIN (H): ICD-10-CM

## 2024-06-12 DIAGNOSIS — M79.18 MYOFASCIAL PAIN: ICD-10-CM

## 2024-06-12 PROCEDURE — 99215 OFFICE O/P EST HI 40 MIN: CPT | Performed by: NURSE PRACTITIONER

## 2024-06-12 RX ORDER — NABUMETONE 500 MG/1
500-1000 TABLET, FILM COATED ORAL 2 TIMES DAILY PRN
Qty: 60 TABLET | Refills: 1 | Status: SHIPPED | OUTPATIENT
Start: 2024-06-12

## 2024-06-12 RX ORDER — SEMAGLUTIDE 1.34 MG/ML
1 INJECTION, SOLUTION SUBCUTANEOUS
Qty: 9 ML | Refills: 0 | Status: SHIPPED | OUTPATIENT
Start: 2024-06-12 | End: 2024-08-19

## 2024-06-12 RX ORDER — METHYLPREDNISOLONE 4 MG
TABLET, DOSE PACK ORAL
Qty: 21 TABLET | Refills: 0 | Status: SHIPPED | OUTPATIENT
Start: 2024-06-12

## 2024-06-12 ASSESSMENT — PAIN SCALES - GENERAL: PAINLEVEL: MILD PAIN (2)

## 2024-06-12 NOTE — LETTER
6/12/2024      Wei Wolf  [address] [facility] [address]      Dear Colleague,    Thank you for referring your patient, Wei Wolf, to the [facility]. Please see a copy of my visit note below.      Assessment:     Diagnoses and all orders for this visit:  Lumbar spine pain  -     methylPREDNISolone (MEDROL DOSEPAK) 4 MG tablet therapy pack; Follow Package Directions  -     Physical Therapy  Referral; Future  -     nabumetone (RELAFEN) 500 MG tablet; Take 1-2 tablets (500-1,000 mg) by mouth 2 times daily as needed for moderate pain  Schmorl's lumbosacral nodes  Myofascial pain  -     nabumetone (RELAFEN) 500 MG tablet; Take 1-2 tablets (500-1,000 mg) by mouth 2 times daily as needed for moderate pain     Wie Wolf is a 58 year old y.o. male with past medical history significant for hypertension, diabetes mellitus, osteoarthritis, sleep apnea who presents today for follow-up regarding:    -Acute flareup of midline lumbar spine related pain.  No current radicular component, patient is neurologically intact on exam.    *Dr. Lee primary spine provider last seen 12/4/2023.     Plan:     A shared decision making plan was used. The patient's values and choices were respected. Prior medical records were reviewed today. The following represents what was discussed and decided upon by the provider and the patient.        -DIAGNOSTIC TESTS: Images were personally reviewed and interpreted.   -- Lumbar spine MRI 11/30/2023 with RIGHT L1-2 disc extrusion abutting the right L1 and L2 nerves.  Compression deformity at L3 with Schmorl node with associated signal abnormality/surrounding edema.  T2 signal change of the sacrum representing potential insufficiency fracture.    -INTERVENTIONS: No recommendations for injections.    -MEDICATIONS: Prescribed low-dose Medrol Dosepak to see if we can improve flareup in pain.  Once completed with Medrol Dosepak advised  patient to restart nabumetone as needed 1 to 2 tablets twice daily.  Patient was advised to not take Medrol dose pack and nabumetone at the same time.  Discussed side effects of medications and proper use. Patient verbalized understanding.    -PHYSICAL THERAPY: Referral to physical therapy placed to readdress home exercise program.  Discussed the importance of core strengthening, ROM, stretching exercises with the patient and how each of these entities is important in decreasing pain.  Explained to the patient that the purpose of physical therapy is to teach the patient a home exercise program.  These exercises need to be performed every day in order to decrease pain and prevent future occurrences of pain.        -PATIENT EDUCATION:  Total time of 32 minutes, on the day of service, spent with the patient, reviewing the chart, placing orders, and documenting.     -FOLLOW UP: Follow-up as needed if symptoms or not improving post flare.  Advised to contact clinic if symptoms worsen or change.    Subjective:     Wei Wofl is a 58 year old male who presents today for follow-up regarding flareup of chronic intermittent midline low back pain.  He does report that he has this typically a couple of times a year and his pain has been progressive over the last 1 week and he wants to make sure it does not get to a severe level which has been in the past.  Today pain is a 5/10, a 1 at its best aggravated with bending or lifting.  Denies current radiating lower extremity pain.  Denies numbness or tingling sensations, denies lower extremity weakness.  Reports that he has done really well with Medrol Dosepak in the past and is hoping that this could help this flareup as well.    Treatment to Date:     Medications:  Nabumetone    Medrol Dosepak in the past with benefit    Patient Active Problem List   Diagnosis     HYPERLIPIDEMIA LDL GOAL <100     Hypertension goal BP (blood pressure) < 140/90     Obesity     SAI  (obstructive sleep apnea)     Morbid obesity (H)     Uncontrolled diabetes mellitus type 2 without complications       Current Outpatient Medications   Medication Sig Dispense Refill     methylPREDNISolone (MEDROL DOSEPAK) 4 MG tablet therapy pack Follow Package Directions 21 tablet 0     nabumetone (RELAFEN) 500 MG tablet Take 1-2 tablets (500-1,000 mg) by mouth 2 times daily as needed for moderate pain 60 tablet 1     ASPIRIN NOT PRESCRIBED, INTENTIONAL, continuous prn Antiplatelet medication not prescribed intentionally due to GI Issues / Ulcer Disease (Patient not taking: Reported on 12/4/2023) 1 each 0     CONTOUR NEXT TEST test strip USE TO TEST BLOD SUGAR TWICE DAILY OR AS DIRECTED ++ APPOINTMENT NEEDED 100 strip 0     insulin glargine (LANTUS PEN) 100 UNIT/ML pen Inject 25 Units Subcutaneous 2 times daily for 180 days 45 mL 1     lisinopril-hydrochlorothiazide (ZESTORETIC) 10-12.5 MG tablet Take 1 tablet by mouth daily 90 tablet 1     Semaglutide, 1 MG/DOSE, (OZEMPIC, 1 MG/DOSE,) 4 MG/3ML pen Inject 1 mg Subcutaneous every 7 days 9 mL 1     simvastatin (ZOCOR) 10 MG tablet Take 1 tablet (10 mg) by mouth at bedtime 90 tablet 1     Current Facility-Administered Medications   Medication Dose Route Frequency Provider Last Rate Last Admin     betamethasone acet & sod phos (CELESTONE) injection 6 mg  6 mg   Travis Peña MD   6 mg at 10/02/23 0934     lidocaine (PF) (XYLOCAINE) 1 % injection 0.5 mL  0.5 mL   Travis Peña MD   0.5 mL at 10/02/23 0934     lidocaine (PF) (XYLOCAINE) 1 % injection 5 mL  5 mL   Arben Araya MD   5 mL at 08/17/23 1214     lidocaine (PF) (XYLOCAINE) 1 % injection 5 mL  5 mL   Arben Araya MD   5 mL at 08/17/23 1214     triamcinolone (KENALOG-40) injection 40 mg  40 mg   Arben Araya MD   40 mg at 08/17/23 1214     triamcinolone (KENALOG-40) injection 40 mg  40 mg   Arben Araya MD   40 mg at 08/17/23 1214       Allergies   Allergen Reactions  "    Kiwi Itching     Throat itching to kiwi, watermelon, honeydew     Pollen Extract      Metformin Diarrhea       Past Medical History:   Diagnosis Date     AR (allergic rhinitis)      Diabetes (H)      Hypertension goal BP (blood pressure) < 140/90 12/20/2010     Osteoarthritis of hip 5/4/2022     Sleep apnea         Review of Systems  ROS:  Specifically negative for bowel/bladder dysfunction, balance changes, headache, dizziness, foot drop, fevers, chills, appetite changes, nausea/vomiting, unexplained weight loss. Otherwise 13 systems reviewed are negative. Please see the patient's intake questionnaire from today for details.    Reviewed Social, Family, Past Medical and Past Surgical history with patient, no significant changes noted since prior visit.     Objective:     BP (!) 141/64 (BP Location: Left arm, Patient Position: Sitting, Cuff Size: Adult Large)   Pulse 87   Ht 5' 10\" (1.778 m)   Wt 263 lb 8 oz (119.5 kg)   BMI 37.81 kg/m      PHYSICAL EXAMINATION:    --CONSTITUTIONAL: Well developed, well nourished, healthy appearing individual.  --PSYCHIATRIC: Appropriate mood and affect. No difficulty interacting due to temper, social withdrawal, or memory issues.  --SKIN: Lumbar region is dry and intact.   --RESPIRATORY: Normal rhythm and effort. No abnormal accessory muscle breathing patterns noted.   --MUSCULOSKELETAL:  Normal lumbar lordosis noted, no lateral shift.  --GROSS MOTOR: Easily arises from a seated position. Gait is non-antalgic  --LUMBAR SPINE:  Inspection reveals no evidence of deformity. Range of motion is not limited in lumbar flexion, extension, lateral rotation. No tenderness to palpation lumbar spine.   --LOWER EXTREMITY MOTOR TESTING:  Plantar flexion left 5/5, right 5/5   Dorsiflexion left 5/5, right 5/5   Great toe MTP extension left 5/5, right 5/5  Knee flexion left 5/5, right 5/5  Knee extension left 5/5, right 5/5   Hip flexion left 5/5, right 5/5  --HIPS: Full range of motion " bilaterally.   --NEUROLOGIC: Bilateral patellar and achilles reflexes are physiologic and symmetric. Sensation to light touch is intact in the bilateral L4, L5, and S1 dermatomes.    RESULTS:   Imaging: Spine imaging was reviewed today. The images were shown to the patient and the findings were explained using a spine model.      Lumbar spine MRI reviewed                    Again, thank you for allowing me to participate in the care of your patient.        Sincerely,        Becky Nance, CNP

## 2024-06-12 NOTE — PATIENT INSTRUCTIONS
~Spine Center Scheduling #[phone].  ~Please call our [facility] Spine Nurse Navigation #[phone] with any questions or concerns about your treatment plan, if symptoms worsen and you would like to be seen urgently, or if you have problems controlling bladder and bowel function.  ~For any future flareups or new symptoms, recommend follow-up in clinic or contact the nurse navigator line.  ~Please note that any My Chart messages may take multiple days for a response due to the high volume of patients seen in clinic.  Anything sent Thursday night or after will be answered the following week when able, as Becky Nance CNP does not work in clinic on Fridays.   ~Becky Nance CNP is at the [facility] on Tuesdays, otherwise primarily at the [facility].       ~You have been referred for Physical Therapy to [facility]. They will call you to schedule an appointment.       Scheduling phone number is [phone] for [facility], or [place] location.  If you have not heard from the scheduling office within 2 business days, please call [phone] for ALL other locations.     Discussed the importance of core strengthening, ROM, stretching exercises and how each of these entities is important in decreasing pain and improving long term spine health.  The purpose of physical therapy is to teach you an individualized home exercise program.  These exercises need to be performed every day in order to decrease pain and prevent future occurrences of pain.             A Medrol Dose Pack (Prednisone Taper) was prescribed today for your acute pain. Please follow the dosing instruction on prescription bottle.     POSSIBLE STEROID SIDE EFFECTS   -If you experience these, it should only last for a short period-   Swelling   Increased appetite   Skin redness (flushness)   Skin rash   Mouth (oral) irritation   Blood sugar (glucose) levels   Sweats    Mood changes       Prescribed Nabumatone today. Please take as prescribed, as needed for pain control as well as to aid in decreasing inflammation. Take medication with a full glass of water and food.   *Do not take Advil, Ibuprofen, Aleve, or Naproxen while taking this medication as it can cause organ failure if taken together*  This medication does have risks if taken long term, these risks include: gastrointestinal irritation, kidney dysfunction, and cardiovascular effects.

## 2024-06-24 ENCOUNTER — PATIENT OUTREACH (OUTPATIENT)
Dept: CARE COORDINATION | Facility: CLINIC | Age: 59
End: 2024-06-24
Payer: COMMERCIAL

## 2024-06-24 DIAGNOSIS — I10 HYPERTENSION GOAL BP (BLOOD PRESSURE) < 140/90: ICD-10-CM

## 2024-06-24 DIAGNOSIS — E78.00 HIGH CHOLESTEROL: ICD-10-CM

## 2024-06-24 RX ORDER — LISINOPRIL/HYDROCHLOROTHIAZIDE 10-12.5 MG
1 TABLET ORAL DAILY
Qty: 90 TABLET | Refills: 1 | Status: SHIPPED | OUTPATIENT
Start: 2024-06-24

## 2024-06-24 RX ORDER — SIMVASTATIN 10 MG
10 TABLET ORAL AT BEDTIME
Qty: 90 TABLET | Refills: 1 | Status: SHIPPED | OUTPATIENT
Start: 2024-06-24

## 2024-07-29 ENCOUNTER — OFFICE VISIT (OUTPATIENT)
Dept: ORTHOPEDICS | Facility: CLINIC | Age: 59
End: 2024-07-29
Payer: COMMERCIAL

## 2024-07-29 ENCOUNTER — ANCILLARY PROCEDURE (OUTPATIENT)
Dept: GENERAL RADIOLOGY | Facility: CLINIC | Age: 59
End: 2024-07-29
Attending: STUDENT IN AN ORGANIZED HEALTH CARE EDUCATION/TRAINING PROGRAM
Payer: COMMERCIAL

## 2024-07-29 DIAGNOSIS — M19.041 DEGENERATIVE ARTHRITIS OF METACARPOPHALANGEAL JOINT OF INDEX FINGER OF RIGHT HAND: Primary | ICD-10-CM

## 2024-07-29 DIAGNOSIS — M19.041 DEGENERATIVE ARTHRITIS OF METACARPOPHALANGEAL JOINT OF INDEX FINGER OF RIGHT HAND: ICD-10-CM

## 2024-07-29 PROCEDURE — 99213 OFFICE O/P EST LOW 20 MIN: CPT | Mod: 25 | Performed by: STUDENT IN AN ORGANIZED HEALTH CARE EDUCATION/TRAINING PROGRAM

## 2024-07-29 PROCEDURE — 20600 DRAIN/INJ JOINT/BURSA W/O US: CPT | Mod: F6 | Performed by: STUDENT IN AN ORGANIZED HEALTH CARE EDUCATION/TRAINING PROGRAM

## 2024-07-29 PROCEDURE — 73130 X-RAY EXAM OF HAND: CPT | Mod: RT | Performed by: RADIOLOGY

## 2024-07-29 RX ORDER — BETAMETHASONE SODIUM PHOSPHATE AND BETAMETHASONE ACETATE 3; 3 MG/ML; MG/ML
3 INJECTION, SUSPENSION INTRA-ARTICULAR; INTRALESIONAL; INTRAMUSCULAR; SOFT TISSUE
Status: SHIPPED | OUTPATIENT
Start: 2024-07-29

## 2024-07-29 RX ORDER — LIDOCAINE HYDROCHLORIDE 10 MG/ML
0.5 INJECTION, SOLUTION EPIDURAL; INFILTRATION; INTRACAUDAL; PERINEURAL
Status: SHIPPED | OUTPATIENT
Start: 2024-07-29

## 2024-07-29 RX ADMIN — BETAMETHASONE SODIUM PHOSPHATE AND BETAMETHASONE ACETATE 3 MG: 3; 3 INJECTION, SUSPENSION INTRA-ARTICULAR; INTRALESIONAL; INTRAMUSCULAR; SOFT TISSUE at 11:42

## 2024-07-29 RX ADMIN — LIDOCAINE HYDROCHLORIDE 0.5 ML: 10 INJECTION, SOLUTION EPIDURAL; INFILTRATION; INTRACAUDAL; PERINEURAL at 11:42

## 2024-07-29 NOTE — PROGRESS NOTES
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 Prior patient of Dr. Smiths. 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 He continued to have pain after the surgery and has been treated with serial steroid injections. His last was 3/30/23 and then 10/2/2023 performed by me. He obtains near complete relief after the injections. This last one has worked even longer than his previous injections.  He also states that the skin seems to be more durable and he has been less prone to skin tears or bleeding.  His symptoms started to return last month. He comes in today requesting an injection of steroid.     Patient also has diminished ROM of the left index MCP joint but it is not painful.    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  There is minimal subcutaneous wasting.  Active index MCP joint flexion approximately 45 degrees but pain with terminal flexion and extension of the index MCP joint. SILT all distributions. WWP CR< 2s    Imaging:    Xrs today compared to 2019 demonstrates advanced degenerative changes at the index MCP joint with no joint space remaining     ASSESSMENT:  Right index MCP arthritis and synovitis.     PLAN:  We discussed the risks, benefits, and alternatives of treatment options available.  He wishes to have an injection today.      PROCEDURE:  After written consent, verifying site and side, a  sterile ChloraPrep was performed on the injection site. [place] guidance was used and 0.5 cc of betamethasone 6 mg/mL and 0.5 cc of 1% lidocaine were injected into the dorsal radial aspect of the joint using a 25-gauge needle.  The patient tolerated the procedure well.  No complications were noted.  Followup instructions were given.    I discussed that he may have repeat injections every 3 to 4 months.  If they become less effective, he may be a candidate for MCP joint arthroplasty.  We did briefly discuss the details of the surgery.    The patient voiced understanding and agreement.  All questions answered.  He will follow-up with me as needed.     Travis Peña MD    Hand & Upper Extremity Surgery  Department of Orthopedic Surgery  [facility]

## 2024-07-29 NOTE — LETTER
7/29/2024      Wei oWlf  [address] [facility] [address]      Dear Colleague,    Thank you for referring your patient, Wei Wolf, to the [facility]. Please see a copy of my visit note below.      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 Prior patient of Dr. Corrales's. 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 He continued to have pain after the surgery and has been treated with serial steroid injections. His last was 3/30/23 and then 10/2/2023 performed by me. He obtains near complete relief after the injections. This last one has worked even longer than his previous injections.  He also states that the skin seems to be more durable and he has been less prone to skin tears or bleeding.  His symptoms started to return last month. He comes in today requesting an injection of steroid.     Patient also has diminished ROM of the left index MCP joint but it is not painful.    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  There is minimal subcutaneous wasting.  Active index MCP joint flexion approximately 45 degrees but pain with terminal flexion and extension of the index MCP joint. SILT all distributions. WWP CR< 2s    Imaging:    Xrs today compared to 2019 demonstrates advanced degenerative changes at the index MCP joint with no joint space remaining      ASSESSMENT:  Right index MCP arthritis and synovitis.     PLAN:  We discussed the risks, benefits, and alternatives of treatment options available.  He wishes to have an injection today.      PROCEDURE:  After written consent, verifying site and side, a sterile ChloraPrep was performed on the injection site. [place] guidance was used and 0.5 cc of betamethasone 6 mg/mL and 0.5 cc of 1% lidocaine were injected into the dorsal radial aspect of the joint using a 25-gauge needle.  The patient tolerated the procedure well.  No complications were noted.  Followup instructions were given.    I discussed that he may have repeat injections every 3 to 4 months.  If they become less effective, he may be a candidate for MCP joint arthroplasty.  We did briefly discuss the details of the surgery.    The patient voiced understanding and agreement.  All questions answered.  He will follow-up with me as needed.     Travis Peña MD    Hand & Upper Extremity Surgery  Department of Orthopedic Surgery  [facility]             Small Joint Injection/Arthrocentesis: R index MCP    Date/Time: 7/29/2024 11:42 AM    Performed by: Travis Peña MD  Authorized by: Travis Peña MD  Indications:  Pain  Needle Size:  25 G  Guidance: landmark       Location:  Index finger    Site:  R index MCP                    Medications:  3 mg betamethasone acet & sod phos 6 (3-3) MG/ML; 0.5 mL lidocaine (PF) 1 %        Outcome:  Tolerated well, no immediate complications  Procedure discussed: discussed risks, benefits, and alternatives    Consent Given by:  Patient  Timeout: timeout called immediately prior to procedure    Prep: patient was prepped and draped in usual sterile fashion        [facility] [address] [id number] [phone]  Dept: [phone]  ______________________________________________________________________________    Patient: Wei  LYNDA Wolf   : 1965   MRN: [id number]   2024    INVASIVE PROCEDURE SAFETY CHECKLIST    Date: 2024   Procedure:Right index finger MCP joint steroid injection  Patient Name: Wei Wolf  MRN: [id number]  YOB: 1965    Action: Complete sections as appropriate. Any discrepancy results in a HARD COPY until resolved.     PRE PROCEDURE:  Patient ID verified with 2 identifiers (name and  or MRN): Yes  Procedure and site verified with patient/designee (when able): Yes  Accurate consent documentation in medical record: Yes  H&P (or appropriate assessment) documented in medical record: Yes  H&P must be up to 20 days prior to procedure and updates within 24 hours of procedure as applicable: Yes  Relevant diagnostic and radiology test results appropriately labeled and displayed as applicable: NA  Procedure site(s) marked with provider initials: NA    TIMEOUT:  Time-Out performed immediately prior to starting procedure, including verbal and active participation of all team members addressing the following:Yes  * Correct patient identify  * Confirmed that the correct side and site are marked  * An accurate procedure consent form  * Agreement on the procedure to be done  * Correct patient position  * Relevant images and results are properly labeled and appropriately displayed  * The need to administer antibiotics or fluids for irrigation purposes during the procedure as applicable   * Safety precautions based on patient history or medication use    DURING PROCEDURE: Verification of correct person, site, and procedures any time the responsibility for care of the patient is transferred to another member of the care team.       The following medications were given:         Prior to injection, verified patient identity using patient's name and date of birth.  Due to injection administration, patient instructed to remain in clinic for 15 minutes  afterwards, and to report any adverse  reaction to me immediately.    Joint injection was performed.    Medication Name: Betamethasone Sodium Phosphate and Betamethasone Acetate 6mg/mL 3mg used NDC 6018-5483-95  Drug Amount Wasted:  Yes: 27 mg/ml   Vial/Syringe: Single dose vial  Expiration Date:  1/1/25    Medication Name Lidocaine 1% NDC 63973-038-96    Scribed by LASHAE KILLIAN, ATC for Dr. Peña on July 29, 2024 at 11:46am  based on the provider's statements to me.     LASHAE KILLIAN, ATC        Again, thank you for allowing me to participate in the care of your patient.        Sincerely,        Travis Peña MD

## 2024-07-29 NOTE — PROGRESS NOTES
Small Joint Injection/Arthrocentesis: R index MCP    Date/Time: 2024 11:42 AM    Performed by: Travis Peña MD  Authorized by: Travis Peña MD  Indications:  Pain  Needle Size:  25 G  Guidance: landmark       Location:  Index finger    Site:  R index MCP                    Medications:  3 mg betamethasone acet & sod phos 6 (3-3) MG/ML; 0.5 mL lidocaine (PF) 1 %        Outcome:  Tolerated well, no immediate complications  Procedure discussed: discussed risks, benefits, and alternatives    Consent Given by:  Patient  Timeout: timeout called immediately prior to procedure    Prep: patient was prepped and draped in usual sterile fashion        [facility] [address]  [address] [facility] [id number] [phone]  Dept: [phone]  ______________________________________________________________________________    Patient: Wei Wolf   : 1965   MRN: [id number]   2024    INVASIVE PROCEDURE SAFETY CHECKLIST    Date: 2024   Procedure:Right index finger MCP joint steroid injection  Patient Name: Wei Wolf  MRN: [id number]  YOB: 1965    Action: Complete sections as appropriate. Any discrepancy results in a HARD COPY until resolved.     PRE PROCEDURE:  Patient ID verified with 2 identifiers (name and  or MRN): Yes  Procedure and site verified with patient/designee (when able): Yes  Accurate consent documentation in medical record: Yes  H&P (or appropriate assessment) documented in medical record: Yes  H&P must be up to 20 days prior to procedure and updates within 24 hours of procedure as applicable: Yes  Relevant diagnostic and radiology test results appropriately labeled and displayed as applicable: NA  Procedure site(s) marked with provider initials: NA    TIMEOUT:  Time-Out performed immediately prior to starting procedure, including verbal and active participation of all team members addressing the  following:Yes  * Correct patient identify  * Confirmed that the correct side and site are marked  * An accurate procedure consent form  * Agreement on the procedure to be done  * Correct patient position  * Relevant images and results are properly labeled and appropriately displayed  * The need to administer antibiotics or fluids for irrigation purposes during the procedure as applicable   * Safety precautions based on patient history or medication use    DURING PROCEDURE: Verification of correct person, site, and procedures any time the responsibility for care of the patient is transferred to another member of the care team.       The following medications were given:         Prior to injection, verified patient identity using patient's name and date of birth.  Due to injection administration, patient instructed to remain in clinic for 15 minutes  afterwards, and to report any adverse reaction to me immediately.    Joint injection was performed.    Medication Name: Betamethasone Sodium Phosphate and Betamethasone Acetate 6mg/mL 3mg used NDC 2808-9349-12  Drug Amount Wasted:  Yes: 27 mg/ml   Vial/Syringe: Single dose vial  Expiration Date:  1/1/25    Medication Name Lidocaine 1% NDC 41177-671-93    Scribed by LASHAE KILLIAN, ATC for Dr. Peña on July 29, 2024 at 11:46am  based on the provider's statements to me.     LASHAE KILLIAN, ATC

## 2024-07-29 NOTE — NURSING NOTE
Reason For Visit:   Chief Complaint   Patient presents with    RECHECK     Follow-up right index finger pain OA       Primary MD: Radha Brian  Ref. MD: Cresencio    Age: 58 year old    ?  No      There were no vitals taken for this visit.      Pain Assessment  Patient Currently in Pain: Yes  0-10 Pain Scale: 2 (can get higher with use and bumping finer)  Primary Pain Location: Finger (Comment which one) (Right index)  Pain Descriptors: Intermittent, Sharp, Aching    Hand Dominance Evaluation  Hand Dominance: Right          QuickDASH Assessment      7/22/2024    12:33 PM   QuickDASH Main   1. Open a tight or new jar Moderate difficulty   2. Do heavy household chores (e.g., wash walls, floors) Mild difficulty   3. Carry a shopping bag or briefcase Mild difficulty   4. Wash your back Mild difficulty   5. Use a knife to cut food Mild difficulty   6. Recreational activities in which you take some force or impact through your arm, shoulder or hand (e.g., golf, hammering, tennis, etc.) Moderate difficulty   7. During the past week, to what extent has your arm, shoulder or hand problem interfered with your normal social activities with family, friends, neighbours or groups Moderately   8. During the past week, were you limited in your work or other regular daily activities as a result of your arm, shoulder or hand problem Moderately limited   9. Arm, shoulder or hand pain Moderate   10.Tingling (pins and needles) in your arm,shoulder or hand None   11. During the past week, how much difficulty have you had sleeping because of the pain in your arm, shoulder or hand No difficulty   Quickdash Ability Score 31.82          Current Outpatient Medications   Medication Sig Dispense Refill    ASPIRIN NOT PRESCRIBED, INTENTIONAL, continuous prn Antiplatelet medication not prescribed intentionally due to GI Issues / Ulcer Disease (Patient not taking: Reported on 12/4/2023) 1 each 0    CONTOUR NEXT TEST test strip USE TO TEST  BLOD SUGAR TWICE DAILY OR AS DIRECTED ++ APPOINTMENT NEEDED 100 strip 0    insulin glargine (LANTUS PEN) 100 UNIT/ML pen Inject 25 Units Subcutaneous 2 times daily for 180 days 45 mL 1    lisinopril-hydrochlorothiazide (ZESTORETIC) 10-12.5 MG tablet TAKE 1 TABLET BY MOUTH DAILY 90 tablet 1    methylPREDNISolone (MEDROL DOSEPAK) 4 MG tablet therapy pack Follow Package Directions 21 tablet 0    nabumetone (RELAFEN) 500 MG tablet Take 1-2 tablets (500-1,000 mg) by mouth 2 times daily as needed for moderate pain 60 tablet 1    Semaglutide, 1 MG/DOSE, (OZEMPIC, 1 MG/DOSE,) 4 MG/3ML pen INJECT 1 MG SUBCUTANEOUS EVERY 7 DAYS 9 mL 0    simvastatin (ZOCOR) 10 MG tablet TAKE ONE TABLET (10 MG) BY MOUTH AT BEDTIME 90 tablet 1       Allergies   Allergen Reactions    Kiwi Itching     Throat itching to kiwi, watermelon, honeydew    Pollen Extract     Metformin Diarrhea       LASHAE KILLIAN, ATC

## 2024-08-19 ENCOUNTER — MYC REFILL (OUTPATIENT)
Dept: FAMILY MEDICINE | Facility: CLINIC | Age: 59
End: 2024-08-19
Payer: COMMERCIAL

## 2024-08-19 DIAGNOSIS — Z79.4 TYPE 2 DIABETES MELLITUS WITH HYPERGLYCEMIA, WITH LONG-TERM CURRENT USE OF INSULIN (H): ICD-10-CM

## 2024-08-19 DIAGNOSIS — E11.65 TYPE 2 DIABETES MELLITUS WITH HYPERGLYCEMIA, WITH LONG-TERM CURRENT USE OF INSULIN (H): ICD-10-CM

## 2024-08-19 DIAGNOSIS — Z79.4 TYPE 2 DIABETES MELLITUS WITHOUT COMPLICATION, WITH LONG-TERM CURRENT USE OF INSULIN (H): ICD-10-CM

## 2024-08-19 DIAGNOSIS — E11.9 TYPE 2 DIABETES MELLITUS WITHOUT COMPLICATION, WITH LONG-TERM CURRENT USE OF INSULIN (H): ICD-10-CM

## 2024-08-19 RX ORDER — SEMAGLUTIDE 1.34 MG/ML
1 INJECTION, SOLUTION SUBCUTANEOUS
Qty: 9 ML | Refills: 0 | Status: SHIPPED | OUTPATIENT
Start: 2024-08-19

## 2024-09-08 ENCOUNTER — HEALTH MAINTENANCE LETTER (OUTPATIENT)
Age: 59
End: 2024-09-08

## 2024-09-23 ENCOUNTER — MYC MEDICAL ADVICE (OUTPATIENT)
Dept: PHYSICAL MEDICINE AND REHAB | Facility: CLINIC | Age: 59
End: 2024-09-23
Payer: COMMERCIAL

## 2024-09-23 DIAGNOSIS — M79.18 MYOFASCIAL PAIN: ICD-10-CM

## 2024-09-23 DIAGNOSIS — M54.50 LUMBAR SPINE PAIN: Primary | ICD-10-CM

## 2024-09-23 DIAGNOSIS — M51.47 SCHMORL'S LUMBOSACRAL NODES: ICD-10-CM

## 2024-09-23 DIAGNOSIS — S32.030A CLOSED COMPRESSION FRACTURE OF L3 LUMBAR VERTEBRA, INITIAL ENCOUNTER (H): ICD-10-CM

## 2024-09-24 ENCOUNTER — DOCUMENTATION ONLY (OUTPATIENT)
Dept: FAMILY MEDICINE | Facility: CLINIC | Age: 59
End: 2024-09-24

## 2024-09-24 ENCOUNTER — DOCUMENTATION ONLY (OUTPATIENT)
Dept: FAMILY MEDICINE | Facility: CLINIC | Age: 59
End: 2024-09-24
Payer: COMMERCIAL

## 2024-09-24 NOTE — PROGRESS NOTES
Wei Wolf has a lab appointment tomorrow morning. Please place orders as needed. Thank you!     Future Appointments   Date Time Provider Department Center   9/25/2024  7:45 AM AN LAB [facility]   9/25/2024  7:50 AM AND[facility]2 [facility]         There is no order available. Please review and place either future orders or HMPO (Review of Health Maintenance Protocol Orders), as appropriate.    Health Maintenance Due   Topic    ANNUAL REVIEW OF HM ORDERS     HIV SCREENING     HEPATITIS C SCREENING     LIPID     A1C      Malia Rocha

## 2024-09-24 NOTE — PROGRESS NOTES
Wei Wolf has an upcoming lab appointment: Patient has lab appointment .. Notes state need A1c please review if you need labs done for patient.    Future Appointments   Date Time Provider Department Center   9/25/2024  7:[facility]   9/27/2024  7:45 AM [facility]     Patient is scheduled for the following lab(s):     There is no order available. Please review and place either future orders or HMPO (Review of Health Maintenance Protocol Orders), as appropriate.    Health Maintenance Due   Topic    ANNUAL REVIEW OF HM ORDERS     HIV SCREENING     HEPATITIS C SCREENING     LIPID     A1C      Jose Aguayo

## 2024-09-25 ENCOUNTER — DOCUMENTATION ONLY (OUTPATIENT)
Dept: FAMILY MEDICINE | Facility: CLINIC | Age: 59
End: 2024-09-25

## 2024-09-25 ENCOUNTER — ANCILLARY PROCEDURE (OUTPATIENT)
Dept: GENERAL RADIOLOGY | Facility: CLINIC | Age: 59
End: 2024-09-25
Attending: NURSE PRACTITIONER
Payer: COMMERCIAL

## 2024-09-25 ENCOUNTER — LAB (OUTPATIENT)
Dept: LAB | Facility: CLINIC | Age: 59
End: 2024-09-25
Payer: COMMERCIAL

## 2024-09-25 DIAGNOSIS — M54.50 LUMBAR SPINE PAIN: ICD-10-CM

## 2024-09-25 DIAGNOSIS — M51.47 SCHMORL'S LUMBOSACRAL NODES: ICD-10-CM

## 2024-09-25 DIAGNOSIS — M54.50 LUMBAR SPINE PAIN: Primary | ICD-10-CM

## 2024-09-25 DIAGNOSIS — Z79.4 TYPE 2 DIABETES MELLITUS WITH HYPERGLYCEMIA, WITH LONG-TERM CURRENT USE OF INSULIN (H): Primary | ICD-10-CM

## 2024-09-25 DIAGNOSIS — E11.65 TYPE 2 DIABETES MELLITUS WITH HYPERGLYCEMIA, WITH LONG-TERM CURRENT USE OF INSULIN (H): ICD-10-CM

## 2024-09-25 DIAGNOSIS — E11.65 TYPE 2 DIABETES MELLITUS WITH HYPERGLYCEMIA, WITH LONG-TERM CURRENT USE OF INSULIN (H): Primary | ICD-10-CM

## 2024-09-25 DIAGNOSIS — Z79.4 TYPE 2 DIABETES MELLITUS WITH HYPERGLYCEMIA, WITH LONG-TERM CURRENT USE OF INSULIN (H): ICD-10-CM

## 2024-09-25 DIAGNOSIS — Z12.5 SCREENING FOR PROSTATE CANCER: ICD-10-CM

## 2024-09-25 LAB
ALBUMIN SERPL BCG-MCNC: 4 G/DL (ref 3.5–5.2)
ALP SERPL-CCNC: 97 U/L (ref 40–150)
ALT SERPL W P-5'-P-CCNC: 13 U/L (ref 0–70)
ANION GAP SERPL CALCULATED.3IONS-SCNC: 12 MMOL/L (ref 7–15)
AST SERPL W P-5'-P-CCNC: 20 U/L (ref 0–45)
BILIRUB SERPL-MCNC: 0.4 MG/DL
BUN SERPL-MCNC: 20.8 MG/DL (ref 6–20)
CALCIUM SERPL-MCNC: 9 MG/DL (ref 8.8–10.4)
CHLORIDE SERPL-SCNC: 104 MMOL/L (ref 98–107)
CHOLEST SERPL-MCNC: 132 MG/DL
CREAT SERPL-MCNC: 1.1 MG/DL (ref 0.67–1.17)
EGFRCR SERPLBLD CKD-EPI 2021: 79 ML/MIN/1.73M2
EST. AVERAGE GLUCOSE BLD GHB EST-MCNC: 200 MG/DL
GLUCOSE SERPL-MCNC: 104 MG/DL (ref 70–99)
HBA1C MFR BLD: 8.6 % (ref 0–5.6)
HCO3 SERPL-SCNC: 23 MMOL/L (ref 22–29)
HDLC SERPL-MCNC: 36 MG/DL
HOLD SPECIMEN: NORMAL
LDLC SERPL CALC-MCNC: 80 MG/DL
NONHDLC SERPL-MCNC: 96 MG/DL
POTASSIUM SERPL-SCNC: 4.2 MMOL/L (ref 3.4–5.3)
PROT SERPL-MCNC: 6.5 G/DL (ref 6.4–8.3)
PSA SERPL DL<=0.01 NG/ML-MCNC: 0.35 NG/ML (ref 0–3.5)
SODIUM SERPL-SCNC: 139 MMOL/L (ref 135–145)
TRIGL SERPL-MCNC: 79 MG/DL

## 2024-09-25 PROCEDURE — 72100 X-RAY EXAM L-S SPINE 2/3 VWS: CPT | Mod: TC | Performed by: RADIOLOGY

## 2024-09-25 PROCEDURE — 83036 HEMOGLOBIN GLYCOSYLATED A1C: CPT

## 2024-09-25 PROCEDURE — G0103 PSA SCREENING: HCPCS

## 2024-09-25 PROCEDURE — 36415 COLL VENOUS BLD VENIPUNCTURE: CPT

## 2024-09-25 PROCEDURE — 80053 COMPREHEN METABOLIC PANEL: CPT

## 2024-09-25 PROCEDURE — 80061 LIPID PANEL: CPT

## 2024-09-25 RX ORDER — METHYLPREDNISOLONE 4 MG
TABLET, DOSE PACK ORAL
Qty: 21 TABLET | Refills: 0 | Status: SHIPPED | OUTPATIENT
Start: 2024-09-25

## 2024-09-25 NOTE — PROGRESS NOTES
Patient was seen in lab this morning for A1C labs, Patient was not fasting, I drew the blood and we are holding it in lab. Please place add on orders as needed.     Thank you,   Keshia Saavedra

## 2024-10-10 ENCOUNTER — OFFICE VISIT (OUTPATIENT)
Dept: PHYSICAL MEDICINE AND REHAB | Facility: CLINIC | Age: 59
End: 2024-10-10
Payer: COMMERCIAL

## 2024-10-10 VITALS
BODY MASS INDEX: 37.16 KG/M2 | SYSTOLIC BLOOD PRESSURE: 136 MMHG | DIASTOLIC BLOOD PRESSURE: 68 MMHG | HEART RATE: 67 BPM | WEIGHT: 259 LBS

## 2024-10-10 DIAGNOSIS — M79.18 MYOFASCIAL PAIN: ICD-10-CM

## 2024-10-10 DIAGNOSIS — M99.06 SOMATIC DYSFUNCTION OF LOWER EXTREMITY: ICD-10-CM

## 2024-10-10 DIAGNOSIS — M99.05 SOMATIC DYSFUNCTION OF PELVIS REGION: ICD-10-CM

## 2024-10-10 DIAGNOSIS — M99.03 SOMATIC DYSFUNCTION OF LUMBAR REGION: ICD-10-CM

## 2024-10-10 DIAGNOSIS — M54.50 LUMBAR SPINE PAIN: Primary | ICD-10-CM

## 2024-10-10 DIAGNOSIS — M99.04 SOMATIC DYSFUNCTION OF SACROILIAC JOINT: ICD-10-CM

## 2024-10-10 DIAGNOSIS — M51.47 SCHMORL'S LUMBOSACRAL NODES: ICD-10-CM

## 2024-10-10 DIAGNOSIS — M99.02 SOMATIC DYSFUNCTION OF THORACIC REGION: ICD-10-CM

## 2024-10-10 PROCEDURE — 99214 OFFICE O/P EST MOD 30 MIN: CPT | Mod: 25 | Performed by: PHYSICAL MEDICINE & REHABILITATION

## 2024-10-10 PROCEDURE — 98927 OSTEOPATH MANJ 5-6 REGIONS: CPT | Performed by: PHYSICAL MEDICINE & REHABILITATION

## 2024-10-10 RX ORDER — NABUMETONE 500 MG/1
500-1000 TABLET, FILM COATED ORAL 2 TIMES DAILY PRN
Qty: 60 TABLET | Refills: 1 | Status: SHIPPED | OUTPATIENT
Start: 2024-10-10

## 2024-10-10 RX ORDER — METHOCARBAMOL 500 MG/1
500 TABLET, FILM COATED ORAL 2 TIMES DAILY PRN
Qty: 60 TABLET | Refills: 0 | Status: SHIPPED | OUTPATIENT
Start: 2024-10-10

## 2024-10-10 RX ORDER — METHYLPREDNISOLONE 4 MG/1
TABLET ORAL
Qty: 21 TABLET | Refills: 0 | Status: SHIPPED | OUTPATIENT
Start: 2024-10-10

## 2024-10-10 ASSESSMENT — PAIN SCALES - GENERAL: PAINLEVEL: MILD PAIN (3)

## 2024-10-10 NOTE — PATIENT INSTRUCTIONS
A physical therapy order was provided for you today.  You will be contacted by physical therapy.  If nobody contacts you within 3 to 5 days, please contact the clinic at [phone].  It will be very important for you to do your physical therapy exercises on a regular basis to decrease your pain and prevent future pain flares.   Nabumetone (which is an anti-inflammatory) medication is prescribed today. Take 1-2 tablets 2 times a day as needed for pain. This medication should be taken with food and water to prevent any stomach upset. Do not take ibuprofen/Advil/Motrin/Aleve/naproxen while you take Nabumetone. Please call if you have any side effects.   methocarbamol (muscle relaxant medication) has been prescribed today. Please take 1 tab twice daily as needed. This medication may cause drowsiness. Please do not work or drive while taking this medication until you know how it effects you. If it does make you drowsy, you should only take it before bedtime or at times that you do not have to work/drive.   Medrol dose pack has been prescribed today.  Please follow directions on package.  Do not take with NSAIDs (ibupreofen or aleve).  I would recommend tylenol (extra strength or extended release/8hour) over the counter as needed. TAKE IF YOUR PAIN FLARES   Osteopathic manual medicine today  Start pelvic isometric exercises

## 2024-10-10 NOTE — LETTER
10/10/2024      Wei Wolf  [address] [facility] [id number]      Dear Colleague,    Thank you for referring your patient, Wei Wolf, to the [facility]. Please see a copy of my visit note below.    Assessment/Plan:      Wei was seen today for back pain.    Diagnoses and all orders for this visit:    Lumbar spine pain  -     Physical Therapy  Referral; Future  -     methylPREDNISolone (MEDROL DOSEPAK) 4 MG tablet therapy pack; Follow Package Directions  -     nabumetone (RELAFEN) 500 MG tablet; Take 1-2 tablets (500-1,000 mg) by mouth 2 times daily as needed for moderate pain.  -     methocarbamol (ROBAXIN) 500 MG tablet; Take 1 tablet (500 mg) by mouth 2 times daily as needed for muscle spasms.    Myofascial pain  -     Physical Therapy  Referral; Future  -     nabumetone (RELAFEN) 500 MG tablet; Take 1-2 tablets (500-1,000 mg) by mouth 2 times daily as needed for moderate pain.  -     methocarbamol (ROBAXIN) 500 MG tablet; Take 1 tablet (500 mg) by mouth 2 times daily as needed for muscle spasms.    Schmorl's lumbosacral nodes    Somatic dysfunction of thoracic region  -     OSTEOPATHIC MANIP,5-6 BODY REGN    Somatic dysfunction of lumbar region  -     OSTEOPATHIC MANIP,5-6 BODY REGN    Somatic dysfunction of sacroiliac joint  -     OSTEOPATHIC MANIP,5-6 BODY REGN    Somatic dysfunction of pelvis region  -     OSTEOPATHIC MANIP,5-6 BODY REGN    Somatic dysfunction of lower extremity  -     OSTEOPATHIC MANIP,5-6 BODY REGN         Assessment: Pleasant 58 year old male with a history of hypertension, diabetes mellitus,Osteoarthritis, sleep apnea with:     1.  Acute on chronic lumbar spine pain.  Last flare in June of this year.  Has had intermittent right lower extremity radicular pain.  Has had prior pain at the lumbosacral junction consistent with  discogenic pain/annular tear disc herniation without radicular pain.    He does have some mild  degenerative change in the lower lumbar spine on AP view of the hips/pelvis.    right leg radicular pain and was seen at urgent care had reported plain films done I do not have those available.  Temporary improvement with Medrol Dosepak.  Has a right L1-2 disc extrusion abutting the right L1 and L2 nerves also compression deformity L3 with Schmorl's node and T2 signal change of the sacrum could represent insufficiency fracture.  On my review the compression deformity seems more consistent with a significant Schmorl's node.  Did well with Medrol Dosepak in June along with physical therapy and trial of nabumetone.  Pain is flared over the past couple of weeks again without any new injury consistent with mechanical pain superimposed on degenerative changes.  Significant flare has a trip coming in 2 weeks.     2.  Myofascial pain lumbar spine and gluteal region.     3.  Somatic dysfunctions of the   thoracic spine, lumbar spine, sacrum, pelvis, lower extremities that contribute to the patient's pain complaints.    Discussion:    1.  I discussed the diagnosis and treatment options.  I discussed options of medication such as Medrol Dosepak along with ongoing NSAIDs muscle relaxers, Osteopathic manipulative medicine T.    2.  Recommend starting pelvic joint dysfunction PT.    3.  Will refill nabumetone to take as needed for his ongoing pain flare    4.  Will provide Medrol Dosepak if Osteopathic manipulative medicine and nabumetone not helpful.    5.  Trial methocarbamol at bedtime for myofascial pain.    6.  Would recommend Osteopathic manipulative medicine trial today for acute pain flare.  He agrees to proceed.  Please see attached procedure note.    7.  Start pelvic isometric exercises which were provided.    8.  Follow-up with me 1 month.             It was our pleasure caring for your patient today, if there any questions or concerns please do not hesitate to contact us.      Subjective:   Patient ID: Wei HOWELL  Maryann is a 58 year old male.    History of Present Illness: Patient presents for significant pain flare lumbar spine.  Had previous pain flare in June.  Did quite well with Medrol Dosepak and started PT.  Had recent x-ray as well.  Did fairly well until about 2 to 3 weeks ago.  Was doing some welding crouched over stood up and felt a pop in his back.  Has had increased pain lumbosacral junction only no longer pain down the legs but significant pain with any sitting to standing as well as twisting.  Pain is a 5/10 at worst 3/10 today 1/10 at best.  Doing his home exercises from PT.  Previous PT with some manual therapy was helpful.  Is now having constant pain.  Has a trip in 2 weeks and is concerned.  Better with laying down on his back.  Has had nabumetone which was helpful and tolerated well.  Also has tried ibuprofen.      Imaging: Plain films lumbar spine September 2024 reviewed.  These were ordered by Becky aNnce.  Transitional lumbar segment Schmorl's node L3 moderate degenerative changes.  Of disc height loss L5-S1 anterior osteophytes L3-4 L2-3.  Visualized portions of the hips unremarkable    MRI lumbar spine from November 2023 reviewed as well.  Transitional segment labeled L5 on the MRI.  Superior endplate Schmorl's node L3.  No high-grade central stenosis    Review of Systems: Pertinent positives: Headaches.  Pertinent negatives: No numbness, tingling or weakness.  No bowel or bladder incontinence.  No urinary retention.  No fevers, unintentional weight loss, balance changes, frequent falling, difficulty swallowing, or coordination difficulties.  All others reviewed are negative.           Current Outpatient Medications   Medication Sig Dispense Refill     ASPIRIN NOT PRESCRIBED, INTENTIONAL, continuous prn Antiplatelet medication not prescribed intentionally due to GI Issues / Ulcer Disease (Patient not taking: Reported on 12/4/2023) 1 each 0     CONTOUR NEXT TEST test strip USE TO TEST BLOD  SUGAR TWICE DAILY OR AS DIRECTED ++ APPOINTMENT NEEDED 100 strip 0     insulin glargine (LANTUS PEN) 100 UNIT/ML pen Inject 25 Units subcutaneously 2 times daily 45 mL 0     lisinopril-hydrochlorothiazide (ZESTORETIC) 10-12.5 MG tablet TAKE 1 TABLET BY MOUTH DAILY 90 tablet 1     methylPREDNISolone (MEDROL DOSEPAK) 4 MG tablet therapy pack Follow Package Directions 21 tablet 0     methylPREDNISolone (MEDROL DOSEPAK) 4 MG tablet therapy pack Follow Package Directions 21 tablet 0     nabumetone (RELAFEN) 500 MG tablet Take 1-2 tablets (500-1,000 mg) by mouth 2 times daily as needed for moderate pain 60 tablet 1     Semaglutide, 1 MG/DOSE, (OZEMPIC, 1 MG/DOSE,) 4 MG/3ML pen Inject 1 mg subcutaneously every 7 days 9 mL 0     simvastatin (ZOCOR) 10 MG tablet TAKE ONE TABLET (10 MG) BY MOUTH AT BEDTIME 90 tablet 1     Current Facility-Administered Medications   Medication Dose Route Frequency Provider Last Rate Last Admin     betamethasone acet & sod phos (CELESTONE) injection 3 mg  3 mg      3 mg at 07/29/24 1142     betamethasone acet & sod phos (CELESTONE) injection 6 mg  6 mg   Travis Peña MD   6 mg at 10/02/23 0934     lidocaine (PF) (XYLOCAINE) 1 % injection 0.5 mL  0.5 mL      0.5 mL at 07/29/24 1142     lidocaine (PF) (XYLOCAINE) 1 % injection 0.5 mL  0.5 mL   Travis Peña MD   0.5 mL at 10/02/23 0934     lidocaine (PF) (XYLOCAINE) 1 % injection 5 mL  5 mL   Arben Araya MD   5 mL at 08/17/23 1214     lidocaine (PF) (XYLOCAINE) 1 % injection 5 mL  5 mL   Arben Araya MD   5 mL at 08/17/23 1214     triamcinolone (KENALOG-40) injection 40 mg  40 mg   Arben Araay MD   40 mg at 08/17/23 1214     triamcinolone (KENALOG-40) injection 40 mg  40 mg   Arben Araya MD   40 mg at 08/17/23 1214       Past Medical History:   Diagnosis Date     AR (allergic rhinitis)      Diabetes (H)      Hypertension goal BP (blood pressure) < 140/90 12/20/2010     Osteoarthritis of hip 5/4/2022      Sleep apnea        The following portions of the patient's history were reviewed and updated as appropriate: allergies, current medications, past family history, past medical history, past social history, past surgical history and problem list.           Objective:   Physical Exam:    /68   Pulse 67   Wt 259 lb (117.5 kg)   BMI 37.16 kg/m    There is no height or weight on file to calculate BMI.      General: Alert and oriented with normal affect. Attention, knowledge, memory, and language are intact. No acute distress.   Eyes: Sclerae are clear.  Respirations: Unlabored. CV: No lower extremity edema.     Gait:  Nonantalgic    Sensation is intact to light touch throughout the  lower extremities.  Reflexes are  2+ patellar and Achilles     Manual muscle testing reveals:  Right /Left out of 5     5/5 hip flexors  5/5 knee flexors  5/5 knee extensors  5/5 ankle plantar flexors  5/5 ankle dorsiflexors  5/5  EHL     Structural exam:  Thoracic spine:  T12 rotated left sidebent left. Lumbar spine: L5 rotated right sidebent left. Pelvis: Right innominate upslip, anterior inferior right innominate. Sacrum: Left on left forward sacral torsion. Lower extremity: Hypertonic hip flexors and quadriceps bilaterally    Procedure:    After discussing the risks and benefits of osteopathic manipulative medicine, verbal consent was obtained. The somatic dysfunctions listed above were treated with the following techniques:  Thoracic spine:  Lateral recumbent muscle energy.  Lumbar spine: Soft tissue technique and lateral recumbent muscle energy  . Pelvis: Still technique and Isometrics, respiratory technique. Sacrum: Myofascial release.  Lower extremities: Muscle energy.     The patient tolerated the procedure well and had improved range of motion in all areas treated prior to leaving the clinic.          Again, thank you for allowing me to participate in the care of your patient.        Sincerely,        Ashish Lee DO

## 2024-10-10 NOTE — PROGRESS NOTES
Assessment/Plan:      Wei was seen today for back pain.    Diagnoses and all orders for this visit:    Lumbar spine pain  -     Physical Therapy  Referral; Future  -     methylPREDNISolone (MEDROL DOSEPAK) 4 MG tablet therapy pack; Follow Package Directions  -     nabumetone (RELAFEN) 500 MG tablet; Take 1-2 tablets (500-1,000 mg) by mouth 2 times daily as needed for moderate pain.  -     methocarbamol (ROBAXIN) 500 MG tablet; Take 1 tablet (500 mg) by mouth 2 times daily as needed for muscle spasms.    Myofascial pain  -     Physical Therapy  Referral; Future  -     nabumetone (RELAFEN) 500 MG tablet; Take 1-2 tablets (500-1,000 mg) by mouth 2 times daily as needed for moderate pain.  -     methocarbamol (ROBAXIN) 500 MG tablet; Take 1 tablet (500 mg) by mouth 2 times daily as needed for muscle spasms.    Schmorl's lumbosacral nodes    Somatic dysfunction of thoracic region  -     OSTEOPATHIC MANIP,5-6 BODY REGN    Somatic dysfunction of lumbar region  -     OSTEOPATHIC MANIP,5-6 BODY REGN    Somatic dysfunction of sacroiliac joint  -     OSTEOPATHIC MANIP,5-6 BODY REGN    Somatic dysfunction of pelvis region  -     OSTEOPATHIC MANIP,5-6 BODY REGN    Somatic dysfunction of lower extremity  -     OSTEOPATHIC MANIP,5-6 BODY REGN         Assessment: Pleasant 58 year old male with a history of hypertension, diabetes mellitus,Osteoarthritis, sleep apnea with:     1.  Acute on chronic lumbar spine pain.  Last flare in June of this year.  Has had intermittent right lower extremity radicular pain.  Has had prior pain at the lumbosacral junction consistent with  discogenic pain/annular tear disc herniation without radicular pain.    He does have some mild degenerative change in the lower lumbar spine on AP view of the hips/pelvis.    right leg radicular pain and was seen at urgent care had reported plain films done I do not have those available.  Temporary improvement with Medrol Dosepak.  Has a right  L1-2 disc extrusion abutting the right L1 and L2 nerves also compression deformity L3 with Schmorl's node and T2 signal change of the sacrum could represent insufficiency fracture.  On my review the compression deformity seems more consistent with a significant Schmorl's node.  Did well with Medrol Dosepak in June along with physical therapy and trial of nabumetone.  Pain is flared over the past couple of weeks again without any new injury consistent with mechanical pain superimposed on degenerative changes.  Significant flare has a trip coming in 2 weeks.     2.  Myofascial pain lumbar spine and gluteal region.     3.  Somatic dysfunctions of the   thoracic spine, lumbar spine, sacrum, pelvis, lower extremities that contribute to the patient's pain complaints.    Discussion:    1.  I discussed the diagnosis and treatment options.  I discussed options of medication such as Medrol Dosepak along with ongoing NSAIDs muscle relaxers, Osteopathic manipulative medicine T.    2.  Recommend starting pelvic joint dysfunction PT.    3.  Will refill nabumetone to take as needed for his ongoing pain flare    4.  Will provide Medrol Dosepak if Osteopathic manipulative medicine and nabumetone not helpful.    5.  Trial methocarbamol at bedtime for myofascial pain.    6.  Would recommend Osteopathic manipulative medicine trial today for acute pain flare.  He agrees to proceed.  Please see attached procedure note.    7.  Start pelvic isometric exercises which were provided.    8.  Follow-up with me 1 month.             It was our pleasure caring for your patient today, if there any questions or concerns please do not hesitate to contact us.      Subjective:   Patient ID: Wei Wolf is a 58 year old male.    History of Present Illness: Patient presents for significant pain flare lumbar spine.  Had previous pain flare in June.  Did quite well with Medrol Dosepak and started PT.  Had recent x-ray as well.  Did fairly well until  about 2 to 3 weeks ago.  Was doing some welding crouched over stood up and felt a pop in his back.  Has had increased pain lumbosacral junction only no longer pain down the legs but significant pain with any sitting to standing as well as twisting.  Pain is a 5/10 at worst 3/10 today 1/10 at best.  Doing his home exercises from PT.  Previous PT with some manual therapy was helpful.  Is now having constant pain.  Has a trip in 2 weeks and is concerned.  Better with laying down on his back.  Has had nabumetone which was helpful and tolerated well.  Also has tried ibuprofen.      Imaging: Plain films lumbar spine September 2024 reviewed.  These were ordered by Becky Nance.  Transitional lumbar segment Schmorl's node L3 moderate degenerative changes.  Of disc height loss L5-S1 anterior osteophytes L3-4 L2-3.  Visualized portions of the hips unremarkable    MRI lumbar spine from November 2023 reviewed as well.  Transitional segment labeled L5 on the MRI.  Superior endplate Schmorl's node L3.  No high-grade central stenosis    Review of Systems: Pertinent positives: Headaches.  Pertinent negatives: No numbness, tingling or weakness.  No bowel or bladder incontinence.  No urinary retention.  No fevers, unintentional weight loss, balance changes, frequent falling, difficulty swallowing, or coordination difficulties.  All others reviewed are negative.           Current Outpatient Medications   Medication Sig Dispense Refill    ASPIRIN NOT PRESCRIBED, INTENTIONAL, continuous prn Antiplatelet medication not prescribed intentionally due to GI Issues / Ulcer Disease (Patient not taking: Reported on 12/4/2023) 1 each 0    CONTOUR NEXT TEST test strip USE TO TEST BLOD SUGAR TWICE DAILY OR AS DIRECTED ++ APPOINTMENT NEEDED 100 strip 0    insulin glargine (LANTUS PEN) 100 UNIT/ML pen Inject 25 Units subcutaneously 2 times daily 45 mL 0    lisinopril-hydrochlorothiazide (ZESTORETIC) 10-12.5 MG tablet TAKE 1 TABLET BY MOUTH  DAILY 90 tablet 1    methylPREDNISolone (MEDROL DOSEPAK) 4 MG tablet therapy pack Follow Package Directions 21 tablet 0    methylPREDNISolone (MEDROL DOSEPAK) 4 MG tablet therapy pack Follow Package Directions 21 tablet 0    nabumetone (RELAFEN) 500 MG tablet Take 1-2 tablets (500-1,000 mg) by mouth 2 times daily as needed for moderate pain 60 tablet 1    Semaglutide, 1 MG/DOSE, (OZEMPIC, 1 MG/DOSE,) 4 MG/3ML pen Inject 1 mg subcutaneously every 7 days 9 mL 0    simvastatin (ZOCOR) 10 MG tablet TAKE ONE TABLET (10 MG) BY MOUTH AT BEDTIME 90 tablet 1     Current Facility-Administered Medications   Medication Dose Route Frequency Provider Last Rate Last Admin    betamethasone acet & sod phos (CELESTONE) injection 3 mg  3 mg      3 mg at 07/29/24 1142    betamethasone acet & sod phos (CELESTONE) injection 6 mg  6 mg   Travis Peña MD   6 mg at 10/02/23 0934    lidocaine (PF) (XYLOCAINE) 1 % injection 0.5 mL  0.5 mL      0.5 mL at 07/29/24 1142    lidocaine (PF) (XYLOCAINE) 1 % injection 0.5 mL  0.5 mL   Travis Peña MD   0.5 mL at 10/02/23 0934    lidocaine (PF) (XYLOCAINE) 1 % injection 5 mL  5 mL   Arben Araya MD   5 mL at 08/17/23 1214    lidocaine (PF) (XYLOCAINE) 1 % injection 5 mL  5 mL   Arben Araya MD   5 mL at 08/17/23 1214    triamcinolone (KENALOG-40) injection 40 mg  40 mg   Arben Araya MD   40 mg at 08/17/23 1214    triamcinolone (KENALOG-40) injection 40 mg  40 mg   Arben Araya MD   40 mg at 08/17/23 1214       Past Medical History:   Diagnosis Date    AR (allergic rhinitis)     Diabetes (H)     Hypertension goal BP (blood pressure) < 140/90 12/20/2010    Osteoarthritis of hip 5/4/2022    Sleep apnea        The following portions of the patient's history were reviewed and updated as appropriate: allergies, current medications, past family history, past medical history, past social history, past surgical history and problem list.           Objective:    Physical Exam:    /68   Pulse 67   Wt 259 lb (117.5 kg)   BMI 37.16 kg/m    There is no height or weight on file to calculate BMI.      General: Alert and oriented with normal affect. Attention, knowledge, memory, and language are intact. No acute distress.   Eyes: Sclerae are clear.  Respirations: Unlabored. CV: No lower extremity edema.     Gait:  Nonantalgic    Sensation is intact to light touch throughout the  lower extremities.  Reflexes are  2+ patellar and Achilles     Manual muscle testing reveals:  Right /Left out of 5     5/5 hip flexors  5/5 knee flexors  5/5 knee extensors  5/5 ankle plantar flexors  5/5 ankle dorsiflexors  5/5  EHL     Structural exam:  Thoracic spine:  T12 rotated left sidebent left. Lumbar spine: L5 rotated right sidebent left. Pelvis: Right innominate upslip, anterior inferior right innominate. Sacrum: Left on left forward sacral torsion. Lower extremity: Hypertonic hip flexors and quadriceps bilaterally    Procedure:    After discussing the risks and benefits of osteopathic manipulative medicine, verbal consent was obtained. The somatic dysfunctions listed above were treated with the following techniques:  Thoracic spine:  Lateral recumbent muscle energy.  Lumbar spine: Soft tissue technique and lateral recumbent muscle energy  . Pelvis: Still technique and Isometrics, respiratory technique. Sacrum: Myofascial release.  Lower extremities: Muscle energy.     The patient tolerated the procedure well and had improved range of motion in all areas treated prior to leaving the clinic.

## 2024-10-14 ENCOUNTER — THERAPY VISIT (OUTPATIENT)
Dept: PHYSICAL THERAPY | Facility: CLINIC | Age: 59
End: 2024-10-14
Attending: PHYSICAL MEDICINE & REHABILITATION
Payer: COMMERCIAL

## 2024-10-14 DIAGNOSIS — M54.50 LUMBAR SPINE PAIN: ICD-10-CM

## 2024-10-14 DIAGNOSIS — M79.18 MYOFASCIAL PAIN: ICD-10-CM

## 2024-10-14 PROCEDURE — 97110 THERAPEUTIC EXERCISES: CPT | Mod: GP | Performed by: PHYSICAL THERAPIST

## 2024-10-14 PROCEDURE — 97140 MANUAL THERAPY 1/> REGIONS: CPT | Mod: GP | Performed by: PHYSICAL THERAPIST

## 2024-10-14 PROCEDURE — 97161 PT EVAL LOW COMPLEX 20 MIN: CPT | Mod: GP | Performed by: PHYSICAL THERAPIST

## 2024-10-14 NOTE — PROGRESS NOTES
PHYSICAL THERAPY EVALUATION  Type of Visit: Evaluation       Fall Risk Screen:  Fall screen completed by: PT  Have you fallen 2 or more times in the past year?: No  Have you fallen and had an injury in the past year?: No  Is patient a fall risk?: No    Subjective       Presenting condition or subjective complaint: Lower back and hip painPt has had back pain on and off for a few years. Back went out in June and then again about 3 weeks ago. Anytime he hunches over for a long time it will be irritated. This time, was kneeling. Started to irritate him so stopped however continues to be irritated. Had a steroid pack which in the past have worked really well but this time, didn't seem to help as much. When he saw the MD, did some manipulations which did seem to help for the day per patient but did seem to come back. Has been there all the time. 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   Date of onset: 09/23/24    Relevant medical history:     Dates & types of surgery:      Prior diagnostic imaging/testing results: MRI; X-ray; Bone scan     Prior therapy history for the same diagnosis, illness or injury: Yes      Prior Level of Function  Transfers:   Ambulation:   ADL:   IADL:     Living Environment  Social support: With a significant other or spouse   Type of home: House   Stairs to enter the home: Yes 3 Is there a railing: No     Ramp: No   Stairs inside the home: Yes 13 Is there a railing: Yes     Help at home: None  Equipment owned:       Employment: Yes   Hobbies/Interests: Fishing, [employer]    Patient goals for therapy: Not hurt most of the time    Pain assessment:      Objective   LUMBAR SPINE EVALUATION  PAIN: Pain Level at Rest: 0/10  Pain Level with Use: 6/10  Pain Location: central LB region.  Does have a history of R sided radiculopathy however has not had sx's into legs this episode  Pain Quality: Sharp  Pain Frequency: intermittent  Pain is  Worst: daytime  Pain is Exacerbated By: bending, walking initially but then can stand up better, standing in one spot for a long time, sleeping  Pain is Relieved By: laying flat on his back, laying on his side. Ends up rolling a lot.   Pain Progression: Unchanged  INTEGUMENTARY (edema, incisions):    POSTURE: Standing Posture: Rounded shoulders, Forward head, Lordosis decreased, Thoracic kyphosis increased  Sitting Posture: Lordosis decreased, Thoracic kyphosis increased  Does stand with slight deviation to the L.  GAIT:   Weightbearing Status:   Assistive Device(s):   Gait Deviations:   BALANCE/PROPRIOCEPTION:   WEIGHTBEARING ALIGNMENT:  Normal pelvic alignment in standing. Did have lack of movement through B SI R>L however denied pain.   NON-WEIGHTBEARING ALIGNMENT:  Normal pelvic alignment in supine and prone.    ROM:   (Degrees) Left AROM Left PROM  Right AROM Right PROM   Hip Flexion       Hip Extension       Hip Abduction       Hip Adduction       Hip Internal Rotation       Hip External Rotation       Knee Flexion       Knee Extension       Lumbar Side glide WNL - pain To knee + pain on the L   Lumbar Flexion To knees ++ pain   Lumbar Extension -50% - pain   Pain:   End feel:   PELVIC/SI SCREEN: Steven (March): Decreased movement noted B R>L  STRENGTH: WNL    MYOTOMES:   DTR S:   CORD SIGNS:   DERMATOMES:   NEURAL TENSION:    Left Right   SLR Negative  Negative    SLR with DF     Femoral Nerve     Slump Negative  Positive   Zach (Lumbar)     Zach (Thoracic)     Zach (Cervical)     Median     Ulnar     Radial        FLEXIBILITY: Decreased hip IR L, Decreased hip ER L, Decreased hamstrings L, Decreased hip IR R, Decreased hip ER R, Decreased hamstrings R  LUMBAR/HIP Special Tests:    Left Right   SEAN Negative  Negative    FADIR/Labrum/TIFFANIE     Femoral Nerve     Carlin's     Piriformis     Quadrant Testing     SLR     Slump     Андрей with Extension     Gavin             PELVIS/SI SPECIAL TESTS:   FUNCTIONAL  TESTS:   PALPATION: WNL  SPINAL SEGMENTAL CONCLUSIONS:  Significant restrictions noted in thoracic and lumbar spine L>R. Decreased sacral mobility also noted B.      Assessment & Plan   CLINICAL IMPRESSIONS  Medical Diagnosis: LBP/Myofascial pain    Treatment Diagnosis: LBP   Impression/Assessment: Patient is a 58 year old male with LBP complaints.  The following significant findings have been identified: Pain, Decreased ROM/flexibility, Decreased joint mobility, Decreased strength, Impaired muscle performance, Decreased activity tolerance, and Impaired posture. These impairments interfere with their ability to perform self care tasks, work tasks, recreational activities, household chores, driving , household mobility, and community mobility as compared to previous level of function.     Clinical Decision Making (Complexity):  Clinical Presentation: Stable/Uncomplicated  Clinical Presentation Rationale: based on medical and personal factors listed in PT evaluation  Clinical Decision Making (Complexity): Low complexity    PLAN OF CARE  Treatment Interventions:  Interventions: Manual Therapy, Neuromuscular Re-education, Therapeutic Activity, Therapeutic Exercise, Self-Care/Home Management    Long Term Goals     PT Goal 1  Goal Identifier: Bending  Goal Description: Pt to be able to bend without pain  Rationale: to maximize safety and independence with performance of ADLs and functional tasks;to maximize safety and independence within the home;to maximize safety and independence within the community;to maximize safety and independence with transportation;to maximize safety and independence with self cares  Target Date: 12/09/24      Frequency of Treatment: 1x/wk  Duration of Treatment: 6-8 weeks    Recommended Referrals to Other Professionals:   Education Assessment:   Learner/Method: No Barriers to Learning    Risks and benefits of evaluation/treatment have been explained.   Patient/Family/caregiver agrees with Plan  of Care.     Evaluation Time:     PT Eval, Low Complexity Minutes (96885): 15       Signing Clinician: Manuela Lawson PT

## 2024-10-22 ENCOUNTER — THERAPY VISIT (OUTPATIENT)
Dept: PHYSICAL THERAPY | Facility: CLINIC | Age: 59
End: 2024-10-22
Payer: COMMERCIAL

## 2024-10-22 DIAGNOSIS — M54.50 LUMBAR SPINE PAIN: Primary | ICD-10-CM

## 2024-10-22 DIAGNOSIS — M79.18 MYOFASCIAL PAIN: ICD-10-CM

## 2024-10-22 PROCEDURE — 97110 THERAPEUTIC EXERCISES: CPT | Mod: GP | Performed by: PHYSICAL THERAPIST

## 2024-10-22 PROCEDURE — 97140 MANUAL THERAPY 1/> REGIONS: CPT | Mod: GP | Performed by: PHYSICAL THERAPIST

## 2024-10-29 ENCOUNTER — THERAPY VISIT (OUTPATIENT)
Dept: PHYSICAL THERAPY | Facility: CLINIC | Age: 59
End: 2024-10-29
Payer: COMMERCIAL

## 2024-10-29 DIAGNOSIS — M54.50 LUMBAR SPINE PAIN: Primary | ICD-10-CM

## 2024-10-29 DIAGNOSIS — M79.18 MYOFASCIAL PAIN: ICD-10-CM

## 2024-10-29 PROCEDURE — 97140 MANUAL THERAPY 1/> REGIONS: CPT | Mod: GP | Performed by: PHYSICAL THERAPIST

## 2024-10-29 PROCEDURE — 97110 THERAPEUTIC EXERCISES: CPT | Mod: GP | Performed by: PHYSICAL THERAPIST

## 2024-11-11 ENCOUNTER — THERAPY VISIT (OUTPATIENT)
Dept: PHYSICAL THERAPY | Facility: CLINIC | Age: 59
End: 2024-11-11
Payer: COMMERCIAL

## 2024-11-11 DIAGNOSIS — M79.18 MYOFASCIAL PAIN: ICD-10-CM

## 2024-11-11 DIAGNOSIS — M54.50 LUMBAR SPINE PAIN: Primary | ICD-10-CM

## 2024-11-11 PROCEDURE — 97110 THERAPEUTIC EXERCISES: CPT | Mod: GP | Performed by: PHYSICAL THERAPIST

## 2024-11-11 PROCEDURE — 97140 MANUAL THERAPY 1/> REGIONS: CPT | Mod: GP | Performed by: PHYSICAL THERAPIST

## 2024-11-12 ENCOUNTER — OFFICE VISIT (OUTPATIENT)
Dept: PHYSICAL MEDICINE AND REHAB | Facility: CLINIC | Age: 59
End: 2024-11-12
Payer: COMMERCIAL

## 2024-11-12 VITALS — DIASTOLIC BLOOD PRESSURE: 63 MMHG | SYSTOLIC BLOOD PRESSURE: 131 MMHG | HEART RATE: 85 BPM

## 2024-11-12 DIAGNOSIS — S32.030A CLOSED COMPRESSION FRACTURE OF L3 LUMBAR VERTEBRA, INITIAL ENCOUNTER (H): ICD-10-CM

## 2024-11-12 DIAGNOSIS — R93.7 BONE MARROW EDEMA: ICD-10-CM

## 2024-11-12 DIAGNOSIS — M54.50 LUMBAR SPINE PAIN: Primary | ICD-10-CM

## 2024-11-12 DIAGNOSIS — M51.47 SCHMORL'S LUMBOSACRAL NODES: ICD-10-CM

## 2024-11-12 DIAGNOSIS — M79.18 MYOFASCIAL PAIN: ICD-10-CM

## 2024-11-12 ASSESSMENT — PAIN SCALES - GENERAL: PAINLEVEL_OUTOF10: MILD PAIN (2)

## 2024-11-12 NOTE — LETTER
11/12/2024      Wei Wolf  [address] [facility] [address]      Dear Colleague,    Thank you for referring your patient, Wei Wolf, to the [facility]. Please see a copy of my visit note below.    Assessment/Plan:      Wei was seen today for back pain.    Diagnoses and all orders for this visit:    Lumbar spine pain  -     MR Lumbar Spine w/o & w Contrast; Future  -     MR Pelvis Bone wo & w Contrast; Future    Schmorl's lumbosacral nodes  -     MR Lumbar Spine w/o & w Contrast; Future  -     MR Pelvis Bone wo & w Contrast; Future  -     Protein electrophoresis; Future    Closed compression fracture of L3 lumbar vertebra, initial encounter (H)    Myofascial pain    Bone marrow edema  -     MR Lumbar Spine w/o & w Contrast; Future  -     MR Pelvis Bone wo & w Contrast; Future  -     Protein electrophoresis; Future  -     CRP inflammation; Future  -     Erythrocyte sedimentation rate auto; Future         Assessment: Pleasant 58 year old male with a history of hypertension, diabetes mellitus,Osteoarthritis, sleep apnea with:     1.  Persistent but improved acute on chronic lumbar spine pain.  Last flare in June of this year.  Has had intermittent right lower extremity radicular pain.  Has had prior pain at the lumbosacral junction consistent with  discogenic pain/annular tear disc herniation without radicular pain.    He does have some mild degenerative change in the lower lumbar spine on AP view of the hips/pelvis.    right leg radicular pain and was seen at urgent care had reported plain films done I do not have those available.  Temporary improvement with Medrol Dosepak.  Has a right L1-2 disc extrusion abutting the right L1 and L2 nerves also compression deformity L3 with Schmorl's node and T2 signal change of the sacrum could represent insufficiency fracture.  On my review the compression deformity seems more consistent with a significant Schmorl's node.   "Did well with Medrol Dosepak in June along with physical therapy and trial of nabumetone.  Pain is flared over the past couple of weeks again without any new injury consistent with mechanical pain superimposed on degenerative changes.  Had very good improvement with Osteopathic manipulative medicine and is doing well with PT.  Takes nabumetone 1-2 times a week as needed as well.     2.  Myofascial pain lumbar spine and gluteal region.      3.  Edema of the S2 segment at the edge of the MRI from 1 year ago.  L3 Schmorl's node.      Discussion:    1.  We discussed his progress over the past year.  Has waxing waning pain and did quite well for several months last fall but now having increased in his pain over the past few months persistent over the sacrum and PSIS.  We discussed options for lab work along with imaging.    2.  MRI with and without contrast lumbar spine and pelvis     3.  Lab work to evaluate for markers of inflammation and SPEP.  This is to evaluate for any metabolic process resulting in bone marrow edema    4.  Continue  Nabumetone as needed.    5.  Continue with physical therapy.    6.  Follow-up 1 month        It was our pleasure caring for your patient today, if there any questions or concerns please do not hesitate to contact us.    Over 30 minutes were spent on the date of the encounter performing chart review, patient visit and documentation in addition to any procedure.    Subjective:   Patient ID: Wei Wolf is a 58 year old male.    History of Present Illness: Patient presents for follow-up of lumbar spine pain.  Pain is at the lumbosacral junction along with over the sacrum and left PSIS.  Has a couple of different locations.  No radiation down the legs paresthesias or weakness.  Pain is relatively constant but doing much better after Medrol Dosepak and Osteopathic manipulative medicine up to and from \"going out \".  Is doing PT and he feels tight in the sacrum itself but overall much " improved but still having issues.  Worse with bending better with laying on his back.  Still takes nabumetone 1-2 times a week as prolonged sitting also causes some pain in his back.  Pain is a 3/10 at worst 2/10 today 0/10 at best.  Has had pain for over 3 years waxing and waning did well last fall no pain over the winter but now pain has been flared again.      Imaging: Plain films lumbar spine from September 2024 reviewed showing no fractures.  Transitional segment.  Schmorl's node L3 moderate disc height loss.  MRI from November 2023 reveals disc herniation on the right L1-2 Schmorl's node L3 bone marrow signal change at the S2 segment    DEXA scan from December 2023 reviewed and is normal.    Labs: Hemoglobin A1c 8.6 from September 2024.  PSA 0.35, CMP normal sodium 139 potassium 4.2 creatinine 1.1 BUN 20.8, calcium 9.0, alk phos 97, AST 20 ALT 13.      Review of Systems: Pertinent positives: Headaches.  Pertinent negatives: No numbness, tingling or weakness.  No bowel or bladder incontinence.  No urinary retention.  No fevers, unintentional weight loss, balance changes,  frequent falling, difficulty swallowing, or coordination difficulties.  All others reviewed are negative.         Current Outpatient Medications   Medication Sig Dispense Refill     ASPIRIN NOT PRESCRIBED, INTENTIONAL, continuous prn Antiplatelet medication not prescribed intentionally due to GI Issues / Ulcer Disease (Patient not taking: Reported on 12/4/2023) 1 each 0     CONTOUR NEXT TEST test strip USE TO TEST BLOD SUGAR TWICE DAILY OR AS DIRECTED ++ APPOINTMENT NEEDED 100 strip 0     insulin glargine (LANTUS PEN) 100 UNIT/ML pen Inject 25 Units subcutaneously 2 times daily 45 mL 0     lisinopril-hydrochlorothiazide (ZESTORETIC) 10-12.5 MG tablet TAKE 1 TABLET BY MOUTH DAILY 90 tablet 1     methocarbamol (ROBAXIN) 500 MG tablet Take 1 tablet (500 mg) by mouth 2 times daily as needed for muscle spasms. 60 tablet 0     methylPREDNISolone  (MEDROL DOSEPAK) 4 MG tablet therapy pack Follow Package Directions 21 tablet 0     methylPREDNISolone (MEDROL DOSEPAK) 4 MG tablet therapy pack Follow Package Directions 21 tablet 0     nabumetone (RELAFEN) 500 MG tablet Take 1-2 tablets (500-1,000 mg) by mouth 2 times daily as needed for moderate pain. 60 tablet 1     Semaglutide, 1 MG/DOSE, (OZEMPIC, 1 MG/DOSE,) 4 MG/3ML pen Inject 1 mg subcutaneously every 7 days 9 mL 0     simvastatin (ZOCOR) 10 MG tablet TAKE ONE TABLET (10 MG) BY MOUTH AT BEDTIME 90 tablet 1     Current Facility-Administered Medications   Medication Dose Route Frequency Provider Last Rate Last Admin     betamethasone acet & sod phos (CELESTONE) injection 3 mg  3 mg      3 mg at 07/29/24 1142     betamethasone acet & sod phos (CELESTONE) injection 6 mg  6 mg   Travis Peña MD   6 mg at 10/02/23 0934     lidocaine (PF) (XYLOCAINE) 1 % injection 0.5 mL  0.5 mL      0.5 mL at 07/29/24 1142     lidocaine (PF) (XYLOCAINE) 1 % injection 0.5 mL  0.5 mL   Travis Peña MD   0.5 mL at 10/02/23 0934     lidocaine (PF) (XYLOCAINE) 1 % injection 5 mL  5 mL   Arben Araya MD   5 mL at 08/17/23 1214     lidocaine (PF) (XYLOCAINE) 1 % injection 5 mL  5 mL   Arben Araya MD   5 mL at 08/17/23 1214     triamcinolone (KENALOG-40) injection 40 mg  40 mg   Arben Araya MD   40 mg at 08/17/23 1214     triamcinolone (KENALOG-40) injection 40 mg  40 mg   Arben Araya MD   40 mg at 08/17/23 1214       Past Medical History:   Diagnosis Date     AR (allergic rhinitis)      Diabetes (H)      Hypertension goal BP (blood pressure) < 140/90 12/20/2010     Osteoarthritis of hip 5/4/2022     Sleep apnea        The following portions of the patient's history were reviewed and updated as appropriate: allergies, current medications, past family history, past medical history, past social history, past surgical history and problem list.           Objective:   Physical Exam:    BP  131/63   Pulse 85   There is no height or weight on file to calculate BMI.      General: Alert and oriented with normal affect. Attention, knowledge, memory, and language are intact. No acute distress.   Eyes: Sclerae are clear.  Respirations: Unlabored. CV: No lower extremity edema.    Gait:  Nonantalgic    Sensation is intact to light touch throughout the lower extremities.  Reflexes are   2+ patellar and Achilles .    Manual muscle testing reveals:  Right /Left out of 5     5/5 hip flexors  5/5 knee flexors  5/5 knee extensors  5/5 ankle plantar flexors  5/5 ankle dorsiflexors  5/5 ankle evertors      Again, thank you for allowing me to participate in the care of your patient.        Sincerely,        Ashish Lee, DO

## 2024-11-12 NOTE — PROGRESS NOTES
Assessment/Plan:      Wei was seen today for back pain.    Diagnoses and all orders for this visit:    Lumbar spine pain  -     MR Lumbar Spine w/o & w Contrast; Future  -     MR Pelvis Bone wo & w Contrast; Future    Schmorl's lumbosacral nodes  -     MR Lumbar Spine w/o & w Contrast; Future  -     MR Pelvis Bone wo & w Contrast; Future  -     Protein electrophoresis; Future    Closed compression fracture of L3 lumbar vertebra, initial encounter (H)    Myofascial pain    Bone marrow edema  -     MR Lumbar Spine w/o & w Contrast; Future  -     MR Pelvis Bone wo & w Contrast; Future  -     Protein electrophoresis; Future  -     CRP inflammation; Future  -     Erythrocyte sedimentation rate auto; Future         Assessment: Pleasant 58 year old male with a history of hypertension, diabetes mellitus,Osteoarthritis, sleep apnea with:     1.  Persistent but improved acute on chronic lumbar spine pain.  Last flare in June of this year.  Has had intermittent right lower extremity radicular pain.  Has had prior pain at the lumbosacral junction consistent with  discogenic pain/annular tear disc herniation without radicular pain.    He does have some mild degenerative change in the lower lumbar spine on AP view of the hips/pelvis.    right leg radicular pain and was seen at urgent care had reported plain films done I do not have those available.  Temporary improvement with Medrol Dosepak.  Has a right L1-2 disc extrusion abutting the right L1 and L2 nerves also compression deformity L3 with Schmorl's node and T2 signal change of the sacrum could represent insufficiency fracture.  On my review the compression deformity seems more consistent with a significant Schmorl's node.  Did well with Medrol Dosepak in June along with physical therapy and trial of nabumetone.  Pain is flared over the past couple of weeks again without any new injury consistent with mechanical pain superimposed on degenerative changes.  Had very good  "improvement with Osteopathic manipulative medicine and is doing well with PT.  Takes nabumetone 1-2 times a week as needed as well.     2.  Myofascial pain lumbar spine and gluteal region.      3.  Edema of the S2 segment at the edge of the MRI from 1 year ago.  L3 Schmorl's node.      Discussion:    1.  We discussed his progress over the past year.  Has waxing waning pain and did quite well for several months last fall but now having increased in his pain over the past few months persistent over the sacrum and PSIS.  We discussed options for lab work along with imaging.    2.  MRI with and without contrast lumbar spine and pelvis     3.  Lab work to evaluate for markers of inflammation and SPEP.  This is to evaluate for any metabolic process resulting in bone marrow edema    4.  Continue  Nabumetone as needed.    5.  Continue with physical therapy.    6.  Follow-up 1 month        It was our pleasure caring for your patient today, if there any questions or concerns please do not hesitate to contact us.    Over 30 minutes were spent on the date of the encounter performing chart review, patient visit and documentation in addition to any procedure.    Subjective:   Patient ID: Wei Wolf is a 58 year old male.    History of Present Illness: Patient presents for follow-up of lumbar spine pain.  Pain is at the lumbosacral junction along with over the sacrum and left PSIS.  Has a couple of different locations.  No radiation down the legs paresthesias or weakness.  Pain is relatively constant but doing much better after Medrol Dosepak and Osteopathic manipulative medicine up to and from \"going out \".  Is doing PT and he feels tight in the sacrum itself but overall much improved but still having issues.  Worse with bending better with laying on his back.  Still takes nabumetone 1-2 times a week as prolonged sitting also causes some pain in his back.  Pain is a 3/10 at worst 2/10 today 0/10 at best.  Has had pain for " over 3 years waxing and waning did well last fall no pain over the winter but now pain has been flared again.      Imaging: Plain films lumbar spine from September 2024 reviewed showing no fractures.  Transitional segment.  Schmorl's node L3 moderate disc height loss.  MRI from November 2023 reveals disc herniation on the right L1-2 Schmorl's node L3 bone marrow signal change at the S2 segment    DEXA scan from December 2023 reviewed and is normal.    Labs: Hemoglobin A1c 8.6 from September 2024.  PSA 0.35, CMP normal sodium 139 potassium 4.2 creatinine 1.1 BUN 20.8, calcium 9.0, alk phos 97, AST 20 ALT 13.      Review of Systems: Pertinent positives: Headaches.  Pertinent negatives: No numbness, tingling or weakness.  No bowel or bladder incontinence.  No urinary retention.  No fevers, unintentional weight loss, balance changes,  frequent falling, difficulty swallowing, or coordination difficulties.  All others reviewed are negative.         Current Outpatient Medications   Medication Sig Dispense Refill    ASPIRIN NOT PRESCRIBED, INTENTIONAL, continuous prn Antiplatelet medication not prescribed intentionally due to GI Issues / Ulcer Disease (Patient not taking: Reported on 12/4/2023) 1 each 0    CONTOUR NEXT TEST test strip USE TO TEST BLOD SUGAR TWICE DAILY OR AS DIRECTED ++ APPOINTMENT NEEDED 100 strip 0    insulin glargine (LANTUS PEN) 100 UNIT/ML pen Inject 25 Units subcutaneously 2 times daily 45 mL 0    lisinopril-hydrochlorothiazide (ZESTORETIC) 10-12.5 MG tablet TAKE 1 TABLET BY MOUTH DAILY 90 tablet 1    methocarbamol (ROBAXIN) 500 MG tablet Take 1 tablet (500 mg) by mouth 2 times daily as needed for muscle spasms. 60 tablet 0    methylPREDNISolone (MEDROL DOSEPAK) 4 MG tablet therapy pack Follow Package Directions 21 tablet 0    methylPREDNISolone (MEDROL DOSEPAK) 4 MG tablet therapy pack Follow Package Directions 21 tablet 0    nabumetone (RELAFEN) 500 MG tablet Take 1-2 tablets (500-1,000 mg) by mouth  2 times daily as needed for moderate pain. 60 tablet 1    Semaglutide, 1 MG/DOSE, (OZEMPIC, 1 MG/DOSE,) 4 MG/3ML pen Inject 1 mg subcutaneously every 7 days 9 mL 0    simvastatin (ZOCOR) 10 MG tablet TAKE ONE TABLET (10 MG) BY MOUTH AT BEDTIME 90 tablet 1     Current Facility-Administered Medications   Medication Dose Route Frequency Provider Last Rate Last Admin    betamethasone acet & sod phos (CELESTONE) injection 3 mg  3 mg      3 mg at 07/29/24 1142    betamethasone acet & sod phos (CELESTONE) injection 6 mg  6 mg   Travis Peña MD   6 mg at 10/02/23 0934    lidocaine (PF) (XYLOCAINE) 1 % injection 0.5 mL  0.5 mL      0.5 mL at 07/29/24 1142    lidocaine (PF) (XYLOCAINE) 1 % injection 0.5 mL  0.5 mL   Travis Peña MD   0.5 mL at 10/02/23 0934    lidocaine (PF) (XYLOCAINE) 1 % injection 5 mL  5 mL   Arben Araya MD   5 mL at 08/17/23 1214    lidocaine (PF) (XYLOCAINE) 1 % injection 5 mL  5 mL   Arben Araya MD   5 mL at 08/17/23 1214    triamcinolone (KENALOG-40) injection 40 mg  40 mg   Arben Araya MD   40 mg at 08/17/23 1214    triamcinolone (KENALOG-40) injection 40 mg  40 mg   Arben Araya MD   40 mg at 08/17/23 1214       Past Medical History:   Diagnosis Date    AR (allergic rhinitis)     Diabetes (H)     Hypertension goal BP (blood pressure) < 140/90 12/20/2010    Osteoarthritis of hip 5/4/2022    Sleep apnea        The following portions of the patient's history were reviewed and updated as appropriate: allergies, current medications, past family history, past medical history, past social history, past surgical history and problem list.           Objective:   Physical Exam:    /63   Pulse 85   There is no height or weight on file to calculate BMI.      General: Alert and oriented with normal affect. Attention, knowledge, memory, and language are intact. No acute distress.   Eyes: Sclerae are clear.  Respirations: Unlabored. CV: No lower extremity  edema.    Gait:  Nonantalgic    Sensation is intact to light touch throughout the lower extremities.  Reflexes are   2+ patellar and Achilles .    Manual muscle testing reveals:  Right /Left out of 5     5/5 hip flexors  5/5 knee flexors  5/5 knee extensors  5/5 ankle plantar flexors  5/5 ankle dorsiflexors  5/5 ankle evertors

## 2024-11-12 NOTE — PATIENT INSTRUCTIONS
Continue with physical therapy home exercises  An MRI was ordered for you today.  You will be contacted by scheduling within 3 days.    If you are not contacted, please call Radiology at [phone].    Check blood work

## 2024-11-15 DIAGNOSIS — Z79.4 TYPE 2 DIABETES MELLITUS WITHOUT COMPLICATION, WITH LONG-TERM CURRENT USE OF INSULIN (H): ICD-10-CM

## 2024-11-15 DIAGNOSIS — E11.9 TYPE 2 DIABETES MELLITUS WITHOUT COMPLICATION, WITH LONG-TERM CURRENT USE OF INSULIN (H): ICD-10-CM

## 2024-11-16 RX ORDER — SEMAGLUTIDE 1.34 MG/ML
1 INJECTION, SOLUTION SUBCUTANEOUS
Qty: 9 ML | Refills: 0 | Status: SHIPPED | OUTPATIENT
Start: 2024-11-16

## 2024-11-18 DIAGNOSIS — Z79.4 TYPE 2 DIABETES MELLITUS WITH HYPERGLYCEMIA, WITH LONG-TERM CURRENT USE OF INSULIN (H): ICD-10-CM

## 2024-11-18 DIAGNOSIS — E11.65 TYPE 2 DIABETES MELLITUS WITH HYPERGLYCEMIA, WITH LONG-TERM CURRENT USE OF INSULIN (H): ICD-10-CM

## 2024-11-19 RX ORDER — INSULIN GLARGINE 100 [IU]/ML
INJECTION, SOLUTION SUBCUTANEOUS
Qty: 45 ML | Refills: 0 | Status: SHIPPED | OUTPATIENT
Start: 2024-11-19

## 2024-12-19 ENCOUNTER — ANCILLARY PROCEDURE (OUTPATIENT)
Dept: MRI IMAGING | Facility: CLINIC | Age: 59
End: 2024-12-19
Attending: PHYSICAL MEDICINE & REHABILITATION
Payer: COMMERCIAL

## 2024-12-19 DIAGNOSIS — R93.7 BONE MARROW EDEMA: ICD-10-CM

## 2024-12-19 DIAGNOSIS — M54.50 LUMBAR SPINE PAIN: ICD-10-CM

## 2024-12-19 DIAGNOSIS — M51.47 SCHMORL'S LUMBOSACRAL NODES: ICD-10-CM

## 2024-12-19 PROCEDURE — A9585 GADOBUTROL INJECTION: HCPCS | Mod: JZ | Performed by: STUDENT IN AN ORGANIZED HEALTH CARE EDUCATION/TRAINING PROGRAM

## 2024-12-19 PROCEDURE — 72158 MRI LUMBAR SPINE W/O & W/DYE: CPT | Mod: GC | Performed by: STUDENT IN AN ORGANIZED HEALTH CARE EDUCATION/TRAINING PROGRAM

## 2024-12-19 PROCEDURE — 72197 MRI PELVIS W/O & W/DYE: CPT | Performed by: RADIOLOGY

## 2024-12-19 RX ORDER — GADOBUTROL 604.72 MG/ML
10 INJECTION INTRAVENOUS ONCE
Status: COMPLETED | OUTPATIENT
Start: 2024-12-19 | End: 2024-12-19

## 2024-12-19 RX ADMIN — GADOBUTROL 10 ML: 604.72 INJECTION INTRAVENOUS at 11:21

## 2024-12-23 DIAGNOSIS — I10 HYPERTENSION GOAL BP (BLOOD PRESSURE) < 140/90: ICD-10-CM

## 2024-12-23 DIAGNOSIS — E78.00 HIGH CHOLESTEROL: ICD-10-CM

## 2024-12-23 RX ORDER — SIMVASTATIN 10 MG
10 TABLET ORAL AT BEDTIME
Qty: 90 TABLET | Refills: 0 | Status: SHIPPED | OUTPATIENT
Start: 2024-12-23

## 2024-12-23 RX ORDER — LISINOPRIL AND HYDROCHLOROTHIAZIDE 10; 12.5 MG/1; MG/1
1 TABLET ORAL DAILY
Qty: 90 TABLET | Refills: 0 | Status: SHIPPED | OUTPATIENT
Start: 2024-12-23

## 2025-01-06 ENCOUNTER — PATIENT OUTREACH (OUTPATIENT)
Dept: CARE COORDINATION | Facility: CLINIC | Age: 60
End: 2025-01-06
Payer: COMMERCIAL

## 2025-01-07 ENCOUNTER — OFFICE VISIT (OUTPATIENT)
Dept: FAMILY MEDICINE | Facility: CLINIC | Age: 60
End: 2025-01-07
Payer: COMMERCIAL

## 2025-01-07 VITALS
OXYGEN SATURATION: 100 % | WEIGHT: 260.2 LBS | BODY MASS INDEX: 37.25 KG/M2 | HEIGHT: 70 IN | DIASTOLIC BLOOD PRESSURE: 78 MMHG | SYSTOLIC BLOOD PRESSURE: 132 MMHG | HEART RATE: 85 BPM | TEMPERATURE: 97.2 F | RESPIRATION RATE: 16 BRPM

## 2025-01-07 DIAGNOSIS — M26.609 TMJ (TEMPOROMANDIBULAR JOINT SYNDROME): Primary | ICD-10-CM

## 2025-01-07 DIAGNOSIS — R09.82 POST-NASAL DRIP: ICD-10-CM

## 2025-01-07 PROCEDURE — 99214 OFFICE O/P EST MOD 30 MIN: CPT | Performed by: FAMILY MEDICINE

## 2025-01-07 PROCEDURE — G2211 COMPLEX E/M VISIT ADD ON: HCPCS | Performed by: FAMILY MEDICINE

## 2025-01-07 RX ORDER — ELASTIC BANDAGE 1"X2.2YD
1 BANDAGE TOPICAL 3 TIMES DAILY PRN
Qty: 30 PACKET | Refills: 3 | Status: SHIPPED | OUTPATIENT
Start: 2025-01-07

## 2025-01-07 RX ORDER — AZELASTINE 1 MG/ML
1 SPRAY, METERED NASAL 2 TIMES DAILY
Qty: 30 ML | Refills: 11 | Status: SHIPPED | OUTPATIENT
Start: 2025-01-07

## 2025-01-07 NOTE — PROGRESS NOTES
"  Assessment & Plan       ICD-10-CM    1. TMJ (temporomandibular joint syndrome)  M26.609       2. Post-nasal drip  R09.82 azelastine (ASTELIN) 0.1 % nasal spray     sodium chloride-sodium bicarb (CLASSIC NETI POT SINUS WASH) 2300-700 MG KIT          Recommend Nsaids, PT    The longitudinal plan of care for the diagnosis(es)/condition(s) as documented were addressed during this visit. Due to the added complexity in care, I will continue to support Wei in the subsequent management and with ongoing continuity of care.     BMI  Estimated body mass index is 37.33 kg/m  as calculated from the following:    Height as of this encounter: 1.778 m (5' 10\").    Weight as of this encounter: 118 kg (260 lb 3.2 oz).   Weight management plan: Discussed healthy diet and exercise guidelines      There are no Patient Instructions on file for this visit.    Subjective   Wei is a 59 year old, presenting for the following health issues:  Otalgia (Left ear)        1/7/2025     1:38 PM   Additional Questions   Roomed by Marissa   Accompanied by none         1/7/2025     1:38 PM   Patient Reported Additional Medications   Patient reports taking the following new medications none     History of Present Illness       Reason for visit:  Ear pain  Symptom onset:  3-7 days ago  Symptoms include:  Ear pain  Symptom intensity:  Mild  Symptom progression:  Staying the same  Had these symptoms before:  No   He is taking medications regularly.     Left ear pain  Chronic drainage in back of throat  Moved to jaw, hurts to chew            Review of Systems  Constitutional, HEENT, cardiovascular, pulmonary, gi and gu systems are negative, except as otherwise noted.      Objective    /78   Pulse 85   Temp 97.2  F (36.2  C) (Temporal)   Resp 16   Ht 1.778 m (5' 10\")   Wt 118 kg (260 lb 3.2 oz)   SpO2 100%   BMI 37.33 kg/m    Body mass index is 37.33 kg/m .  Physical Exam  Constitutional:       General: He is not in acute distress.     " Appearance: Normal appearance. He is well-developed. He is not ill-appearing.   HENT:      Head: Normocephalic and atraumatic.      Right Ear: Tympanic membrane and external ear normal.      Left Ear: Tympanic membrane and external ear normal.      Nose: Nose normal.   Eyes:      General: No scleral icterus.     Extraocular Movements: Extraocular movements intact.      Conjunctiva/sclera: Conjunctivae normal.   Cardiovascular:      Rate and Rhythm: Normal rate.   Pulmonary:      Effort: Pulmonary effort is normal.   Musculoskeletal:      Cervical back: Normal range of motion and neck supple.      Comments: Left TMJ tenderness   Skin:     General: Skin is warm and dry.   Neurological:      Mental Status: He is alert and oriented to person, place, and time.   Psychiatric:         Behavior: Behavior normal.         Thought Content: Thought content normal.         Judgment: Judgment normal.                    Signed Electronically by: Angel Allen MD

## 2025-01-20 ENCOUNTER — TELEPHONE (OUTPATIENT)
Dept: PHYSICAL MEDICINE AND REHAB | Facility: CLINIC | Age: 60
End: 2025-01-20

## 2025-01-20 DIAGNOSIS — M47.816 SPONDYLOSIS OF LUMBAR REGION WITHOUT MYELOPATHY OR RADICULOPATHY: Primary | ICD-10-CM

## 2025-01-20 NOTE — TELEPHONE ENCOUNTER
----- Message from Chloe POP sent at 1/20/2025  8:21 AM CST -----  Regarding: FW: MBB/RFA Process  Per Dr. Lee, OK to move forward with confirmatory blocks.  ----- Message -----  From: Chloe Schulz RN  Sent: 1/17/2025   7:45 AM CST  To: [facility] Procedure Support Pool  Subject: MBB/RFA Process                                  Pt was in today, 1-17, for bilateral L3, L4, L5 MBBs as ordered by Dr. Lee and performed by Dr. Appiah. Awaiting return of pain diary at this time.

## 2025-01-27 ENCOUNTER — OFFICE VISIT (OUTPATIENT)
Dept: ORTHOPEDICS | Facility: CLINIC | Age: 60
End: 2025-01-27
Payer: COMMERCIAL

## 2025-01-27 DIAGNOSIS — M19.041 OSTEOARTHRITIS OF METACARPOPHALANGEAL (MCP) JOINT OF RIGHT INDEX FINGER: Primary | ICD-10-CM

## 2025-01-27 PROCEDURE — 20600 DRAIN/INJ JOINT/BURSA W/O US: CPT | Mod: RT | Performed by: STUDENT IN AN ORGANIZED HEALTH CARE EDUCATION/TRAINING PROGRAM

## 2025-01-27 PROCEDURE — 99214 OFFICE O/P EST MOD 30 MIN: CPT | Mod: 25 | Performed by: STUDENT IN AN ORGANIZED HEALTH CARE EDUCATION/TRAINING PROGRAM

## 2025-01-27 RX ORDER — LIDOCAINE HYDROCHLORIDE 10 MG/ML
0.5 INJECTION, SOLUTION EPIDURAL; INFILTRATION; INTRACAUDAL; PERINEURAL
Status: COMPLETED | OUTPATIENT
Start: 2025-01-27 | End: 2025-01-27

## 2025-01-27 RX ORDER — BETAMETHASONE SODIUM PHOSPHATE AND BETAMETHASONE ACETATE 3; 3 MG/ML; MG/ML
3 INJECTION, SUSPENSION INTRA-ARTICULAR; INTRALESIONAL; INTRAMUSCULAR; SOFT TISSUE
Status: COMPLETED | OUTPATIENT
Start: 2025-01-27 | End: 2025-01-27

## 2025-01-27 RX ADMIN — BETAMETHASONE SODIUM PHOSPHATE AND BETAMETHASONE ACETATE 3 MG: 3; 3 INJECTION, SUSPENSION INTRA-ARTICULAR; INTRALESIONAL; INTRAMUSCULAR; SOFT TISSUE at 09:10

## 2025-01-27 RX ADMIN — LIDOCAINE HYDROCHLORIDE 0.5 ML: 10 INJECTION, SOLUTION EPIDURAL; INFILTRATION; INTRACAUDAL; PERINEURAL at 09:10

## 2025-01-27 NOTE — LETTER
1/27/2025      Wei Wolf  [address] [facility] [address]      Dear Colleague,    Thank you for referring your patient, Wei Wolf, to the [facility]. Please see a copy of my visit note below.      Office Visit    Jan 27, 2025    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 Prior patient of Dr. Corrales's. 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 He continued to have pain after the surgery and has been treated with serial steroid injections. His last was 3/30/23 and then 10/2/2023 and 7/29/2024 performed by me. He obtains near complete relief after the injections. This last injection has lasted 6 months.  He also states that the skin seems to be more durable and he has been less prone to skin tears or bleeding using betamethasone.  His symptoms started to return over the last several weeks.  He comes in today requesting an injection of steroid.  He is now considering surgery this fall.    Patient also has diminished ROM of the left index MCP joint but it is not painful.    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  There is minimal subcutaneous wasting.  Active index MCP joint flexion approximately 45 degrees but pain with terminal flexion and extension of the index MCP joint. SILT all distributions. WWP CR< 2s    Imaging:    Xrs 7/29/2024 compared to 2019 demonstrates advanced  degenerative changes at the index MCP joint with no joint space remaining     ASSESSMENT:  Right index MCP arthritis and synovitis.     PLAN:  We discussed the risks, benefits, and alternatives of treatment options available.  He wishes to have an injection today.  He wishes to schedule another 1 in April but is also looking at having MCP joint pyrocarbon arthroplasty in November.     PROCEDURE:  After written consent, verifying site and side, a sterile ChloraPrep was performed on the injection site. [place] guidance was used and 0.5 cc of betamethasone 6 mg/mL and 0.5 cc of 1% lidocaine were injected into the dorsal radial aspect of the joint using a 25-gauge needle.  The patient tolerated the procedure well.  No complications were noted.  Followup instructions were given.    I will see him back in April for a repeat injection of the MCP joint.  Case request placed so he can look at times November 2025.    The indications for surgery were discussed with the patient. The benefits, risks, and alternatives of operative management were discussed in detail with the patient. The patient understands that the risks of surgery include, but are not limited to: infection, bleeding, injury to nearby structures (such as nerves, blood vessels, and tendons), hardware failure/irritation or breakage, need for additional surgery, pain, stiffness, scarring, need for rehabilitation, and anesthetic complications.  Patient expressed understanding and elected to proceed with surgery. All questions were answered to the patient's satisfaction.    The patient voiced understanding and agreement.  All questions answered.       Travis Peña MD    Hand & Upper Extremity Surgery  Department of Orthopedic Surgery  [facility]             Small Joint Injection/Arthrocentesis: R index MCP    Date/Time: 1/27/2025 9:10 AM    Performed by: Travis Peña MD  Authorized by: Travis Peña MD  Indications:  Pain  Needle Size:   25 G  Guidance: landmark       Location:  Index finger    Site:  R index MCP                    Medications:  3 mg betamethasone acet & sod phos 6 (3-3) MG/ML; 0.5 mL lidocaine (PF) 1 %        Outcome:  Tolerated well, no immediate complications  Procedure discussed: discussed risks, benefits, and alternatives    Consent Given by:  Patient  Timeout: timeout called immediately prior to procedure    Prep: patient was prepped and draped in usual sterile fashion        [facility] [address]  [address] [facility] [id number] [phone]  Dept: [phone]  ______________________________________________________________________________    Patient: Wei Wolf   : 1965   MRN: [id number]   2025    INVASIVE PROCEDURE SAFETY CHECKLIST    Date: 2025   Procedure:Right Index MCP joint steroid injection  Patient Name: Wei Wolf  MRN: [id number]  YOB: 1965    Action: Complete sections as appropriate. Any discrepancy results in a HARD COPY until resolved.     PRE PROCEDURE:  Patient ID verified with 2 identifiers (name and  or MRN): Yes  Procedure and site verified with patient/designee (when able): Yes  Accurate consent documentation in medical record: Yes  H&P (or appropriate assessment) documented in medical record: Yes  H&P must be up to 20 days prior to procedure and updates within 24 hours of procedure as applicable: Yes  Relevant diagnostic and radiology test results appropriately labeled and displayed as applicable: NA  Procedure site(s) marked with provider initials: Yes    TIMEOUT:  Time-Out performed immediately prior to starting procedure, including verbal and active participation of all team members addressing the following:Yes  * Correct patient identify  * Confirmed that the correct side and site are marked  * An accurate procedure consent form  * Agreement on the procedure to be done  * Correct patient  position  * Relevant images and results are properly labeled and appropriately displayed  * The need to administer antibiotics or fluids for irrigation purposes during the procedure as applicable   * Safety precautions based on patient history or medication use    DURING PROCEDURE: Verification of correct person, site, and procedures any time the responsibility for care of the patient is transferred to another member of the care team.       The following medications were given:         Prior to injection, verified patient identity using patient's name and date of birth.  Due to injection administration, patient instructed to remain in clinic for 15 minutes  afterwards, and to report any adverse reaction to me immediately.    Tendon sheath injection was performed.   Medication Name: Betamethasone Sodium Phosphate and Betamethasone acetate NDC 79816-456-45  Drug Amount Wasted:  Yes: 27 mg/ml   Vial/Syringe: Single dose vial  Expiration Date:  8-6-2025    Medication Name Lidocaine 1% NDC 93994-385-45    Scribed by Ashlee Flynn ATC for Dr. Peña on January 27, 2025 at 9:15 am based on the provider's statements to me.     Ashlee Flynn ATC      Again, thank you for allowing me to participate in the care of your patient.        Sincerely,        Travis Peañ MD    Electronically signed

## 2025-01-27 NOTE — PROGRESS NOTES
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 Prior patient of Dr. Corrales's. 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 He continued to have pain after the surgery and has been treated with serial steroid injections. His last was 3/30/23 and then 10/2/2023 and 7/29/2024 performed by me. He obtains near complete relief after the injections. This last injection has lasted 6 months.  He also states that the skin seems to be more durable and he has been less prone to skin tears or bleeding using betamethasone.  His symptoms started to return over the last several weeks.  He comes in today requesting an injection of steroid.  He is now considering surgery this fall.    Patient also has diminished ROM of the left index MCP joint but it is not painful.    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  There is minimal subcutaneous wasting.  Active index MCP joint flexion approximately 45 degrees but pain with terminal flexion and extension of the index MCP joint. SILT all distributions. WWP CR< 2s    Imaging:    Xrs 7/29/2024 compared to 2019 demonstrates advanced degenerative changes at the index MCP joint with no joint space remaining     ASSESSMENT:  Right index MCP arthritis and synovitis.     PLAN:  We discussed the risks, benefits, and alternatives of treatment options available.  He wishes to have an injection  today.  He wishes to schedule another 1 in April but is also looking at having MCP joint pyrocarbon arthroplasty in November.     PROCEDURE:  After written consent, verifying site and side, a sterile ChloraPrep was performed on the injection site. [place] guidance was used and 0.5 cc of betamethasone 6 mg/mL and 0.5 cc of 1% lidocaine were injected into the dorsal radial aspect of the joint using a 25-gauge needle.  The patient tolerated the procedure well.  No complications were noted.  Followup instructions were given.    I will see him back in April for a repeat injection of the MCP joint.  Case request placed so he can look at times November 2025.    The indications for surgery were discussed with the patient. The benefits, risks, and alternatives of operative management were discussed in detail with the patient. The patient understands that the risks of surgery include, but are not limited to: infection, bleeding, injury to nearby structures (such as nerves, blood vessels, and tendons), hardware failure/irritation or breakage, need for additional surgery, pain, stiffness, scarring, need for rehabilitation, and anesthetic complications.  Patient expressed understanding and elected to proceed with surgery. All questions were answered to the patient's satisfaction.    The patient voiced understanding and agreement.  All questions answered.       Travis Peña MD    Hand & Upper Extremity Surgery  Department of Orthopedic Surgery  [facility]

## 2025-01-27 NOTE — PROGRESS NOTES
Small Joint Injection/Arthrocentesis: R index MCP    Date/Time: 2025 9:10 AM    Performed by: Travis Peña MD  Authorized by: Travis Peña MD  Indications:  Pain  Needle Size:  25 G  Guidance: landmark       Location:  Index finger    Site:  R index MCP                    Medications:  3 mg betamethasone acet & sod phos 6 (3-3) MG/ML; 0.5 mL lidocaine (PF) 1 %        Outcome:  Tolerated well, no immediate complications  Procedure discussed: discussed risks, benefits, and alternatives    Consent Given by:  Patient  Timeout: timeout called immediately prior to procedure    Prep: patient was prepped and draped in usual sterile fashion        [facility] [address]  [address] [facility] [id number] [phone]  Dept: [phone]  ______________________________________________________________________________    Patient: Wei Wolf   : 1965   MRN: [id number]   2025    INVASIVE PROCEDURE SAFETY CHECKLIST    Date: 2025   Procedure:Right Index MCP joint steroid injection  Patient Name: Wei Wolf  MRN: [id number]  YOB: 1965    Action: Complete sections as appropriate. Any discrepancy results in a HARD COPY until resolved.     PRE PROCEDURE:  Patient ID verified with 2 identifiers (name and  or MRN): Yes  Procedure and site verified with patient/designee (when able): Yes  Accurate consent documentation in medical record: Yes  H&P (or appropriate assessment) documented in medical record: Yes  H&P must be up to 20 days prior to procedure and updates within 24 hours of procedure as applicable: Yes  Relevant diagnostic and radiology test results appropriately labeled and displayed as applicable: NA  Procedure site(s) marked with provider initials: Yes    TIMEOUT:  Time-Out performed immediately prior to starting procedure, including verbal and active participation of all team members addressing the  following:Yes  * Correct patient identify  * Confirmed that the correct side and site are marked  * An accurate procedure consent form  * Agreement on the procedure to be done  * Correct patient position  * Relevant images and results are properly labeled and appropriately displayed  * The need to administer antibiotics or fluids for irrigation purposes during the procedure as applicable   * Safety precautions based on patient history or medication use    DURING PROCEDURE: Verification of correct person, site, and procedures any time the responsibility for care of the patient is transferred to another member of the care team.       The following medications were given:         Prior to injection, verified patient identity using patient's name and date of birth.  Due to injection administration, patient instructed to remain in clinic for 15 minutes  afterwards, and to report any adverse reaction to me immediately.    Tendon sheath injection was performed.   Medication Name: Betamethasone Sodium Phosphate and Betamethasone acetate NDC 18784-270-78  Drug Amount Wasted:  Yes: 27 mg/ml   Vial/Syringe: Single dose vial  Expiration Date:  8-6-2025    Medication Name Lidocaine 1% NDC 98165-724-34    Scribed by Ashlee Flynn ATC for Dr. Peña on January 27, 2025 at 9:15 am based on the provider's statements to me.     Ashlee Flynn ATC

## 2025-01-31 PROBLEM — M19.041 OSTEOARTHRITIS OF METACARPOPHALANGEAL (MCP) JOINT OF RIGHT INDEX FINGER: Status: ACTIVE | Noted: 2025-01-27

## 2025-02-05 ENCOUNTER — RADIOLOGY INJECTION OFFICE VISIT (OUTPATIENT)
Dept: PHYSICAL MEDICINE AND REHAB | Facility: CLINIC | Age: 60
End: 2025-02-05
Attending: PHYSICAL MEDICINE & REHABILITATION
Payer: COMMERCIAL

## 2025-02-05 VITALS
HEART RATE: 74 BPM | SYSTOLIC BLOOD PRESSURE: 114 MMHG | RESPIRATION RATE: 16 BRPM | DIASTOLIC BLOOD PRESSURE: 64 MMHG | TEMPERATURE: 98.3 F | OXYGEN SATURATION: 97 %

## 2025-02-05 DIAGNOSIS — M47.816 SPONDYLOSIS OF LUMBAR REGION WITHOUT MYELOPATHY OR RADICULOPATHY: ICD-10-CM

## 2025-02-05 PROCEDURE — 64494 INJ PARAVERT F JNT L/S 2 LEV: CPT | Mod: 50 | Performed by: PAIN MEDICINE

## 2025-02-05 PROCEDURE — 64493 INJ PARAVERT F JNT L/S 1 LEV: CPT | Mod: 50 | Performed by: PAIN MEDICINE

## 2025-02-05 RX ORDER — LIDOCAINE HYDROCHLORIDE 10 MG/ML
INJECTION, SOLUTION EPIDURAL; INFILTRATION; INTRACAUDAL; PERINEURAL
Status: COMPLETED | OUTPATIENT
Start: 2025-02-05 | End: 2025-02-05

## 2025-02-05 RX ADMIN — LIDOCAINE HYDROCHLORIDE 5 ML: 10 INJECTION, SOLUTION EPIDURAL; INFILTRATION; INTRACAUDAL; PERINEURAL at 08:11

## 2025-02-05 ASSESSMENT — PAIN SCALES - GENERAL
PAINLEVEL_OUTOF10: MODERATE PAIN (5)
PAINLEVEL_OUTOF10: MILD PAIN (1)

## 2025-02-05 NOTE — PATIENT INSTRUCTIONS
DISCHARGE INSTRUCTIONS    During office hours (8:00 a.m.- 4:00 p.m.) questions or concerns may be answered  by calling Spine Center Navigation Nurses at  [phone].  Messages received after hours will be returned the following business day.      In the case of an emergency, please dial 911 or seek assistance at the nearest Emergency Room/Urgent Care facility.     All Patients:  You may experience an increase in your symptoms for the first 2 days, once the numbing medication wears off.    You may resume your regular medication, no pain medication until you have completed your diary    You may shower. No swimming, tub bath or hot tub for 24 hours    Continue your activities that can cause you pain to test the blocks.                         You should not drive for the next 1 to 3 hours (have someone drive you)           POSSIBLE PROCEDURE SIDE EFFECTS  -Call Spine Center if concerned-    Increased Pain  Increased numbness/tingling     Nausea/Vomiting  Bruising/bleeding at site (hematoma)             Swelling at site (edema) Headache  Difficulty walking  Infection        Fever greater than 100.5      Please complete your pain diary and return the diary to the Spine Center at your earliest convenience.  The Spine Center will contact you once your pain diary has been received and reviewed by your provider.  Thank you.

## 2025-02-06 DIAGNOSIS — M47.816 SPONDYLOSIS OF LUMBAR REGION WITHOUT MYELOPATHY OR RADICULOPATHY: Primary | ICD-10-CM

## 2025-02-16 ENCOUNTER — MYC REFILL (OUTPATIENT)
Dept: FAMILY MEDICINE | Facility: CLINIC | Age: 60
End: 2025-02-16
Payer: COMMERCIAL

## 2025-02-16 DIAGNOSIS — Z79.4 TYPE 2 DIABETES MELLITUS WITHOUT COMPLICATION, WITH LONG-TERM CURRENT USE OF INSULIN (H): Primary | ICD-10-CM

## 2025-02-16 DIAGNOSIS — E11.65 TYPE 2 DIABETES MELLITUS WITH HYPERGLYCEMIA (H): ICD-10-CM

## 2025-02-16 DIAGNOSIS — I10 HYPERTENSION GOAL BP (BLOOD PRESSURE) < 140/90: ICD-10-CM

## 2025-02-16 DIAGNOSIS — E11.9 TYPE 2 DIABETES MELLITUS WITHOUT COMPLICATION, WITH LONG-TERM CURRENT USE OF INSULIN (H): Primary | ICD-10-CM

## 2025-02-16 DIAGNOSIS — E78.00 HIGH CHOLESTEROL: ICD-10-CM

## 2025-02-16 DIAGNOSIS — Z79.4 TYPE 2 DIABETES MELLITUS WITH HYPERGLYCEMIA, WITH LONG-TERM CURRENT USE OF INSULIN (H): ICD-10-CM

## 2025-02-16 DIAGNOSIS — E11.65 TYPE 2 DIABETES MELLITUS WITH HYPERGLYCEMIA, WITH LONG-TERM CURRENT USE OF INSULIN (H): ICD-10-CM

## 2025-02-16 DIAGNOSIS — E11.9 TYPE 2 DIABETES MELLITUS WITHOUT COMPLICATION, WITH LONG-TERM CURRENT USE OF INSULIN (H): ICD-10-CM

## 2025-02-16 DIAGNOSIS — Z79.4 TYPE 2 DIABETES MELLITUS WITHOUT COMPLICATION, WITH LONG-TERM CURRENT USE OF INSULIN (H): ICD-10-CM

## 2025-02-17 RX ORDER — LISINOPRIL AND HYDROCHLOROTHIAZIDE 10; 12.5 MG/1; MG/1
1 TABLET ORAL DAILY
Qty: 90 TABLET | Refills: 1 | Status: SHIPPED | OUTPATIENT
Start: 2025-02-17

## 2025-02-17 RX ORDER — INSULIN GLARGINE 100 [IU]/ML
25 INJECTION, SOLUTION SUBCUTANEOUS 2 TIMES DAILY
Qty: 45 ML | Refills: 0 | Status: SHIPPED | OUTPATIENT
Start: 2025-02-17

## 2025-02-17 RX ORDER — SIMVASTATIN 10 MG
10 TABLET ORAL AT BEDTIME
Qty: 90 TABLET | Refills: 2 | Status: SHIPPED | OUTPATIENT
Start: 2025-02-17

## 2025-02-17 RX ORDER — SEMAGLUTIDE 1.34 MG/ML
1 INJECTION, SOLUTION SUBCUTANEOUS
Qty: 9 ML | Refills: 0 | Status: SHIPPED | OUTPATIENT
Start: 2025-02-17

## 2025-03-05 ENCOUNTER — RADIOLOGY INJECTION OFFICE VISIT (OUTPATIENT)
Dept: PHYSICAL MEDICINE AND REHAB | Facility: CLINIC | Age: 60
End: 2025-03-05
Attending: PHYSICAL MEDICINE & REHABILITATION
Payer: COMMERCIAL

## 2025-03-05 VITALS
HEART RATE: 69 BPM | TEMPERATURE: 97.3 F | DIASTOLIC BLOOD PRESSURE: 64 MMHG | RESPIRATION RATE: 16 BRPM | OXYGEN SATURATION: 97 % | SYSTOLIC BLOOD PRESSURE: 124 MMHG

## 2025-03-05 DIAGNOSIS — M47.816 SPONDYLOSIS OF LUMBAR REGION WITHOUT MYELOPATHY OR RADICULOPATHY: ICD-10-CM

## 2025-03-05 RX ORDER — LIDOCAINE HYDROCHLORIDE 10 MG/ML
INJECTION, SOLUTION EPIDURAL; INFILTRATION; INTRACAUDAL; PERINEURAL
Status: COMPLETED | OUTPATIENT
Start: 2025-03-05 | End: 2025-03-05

## 2025-03-05 RX ORDER — BUPIVACAINE HYDROCHLORIDE 2.5 MG/ML
INJECTION, SOLUTION EPIDURAL; INFILTRATION; INTRACAUDAL; PERINEURAL
Status: COMPLETED | OUTPATIENT
Start: 2025-03-05 | End: 2025-03-05

## 2025-03-05 RX ADMIN — BUPIVACAINE HYDROCHLORIDE 6 ML: 2.5 INJECTION, SOLUTION EPIDURAL; INFILTRATION; INTRACAUDAL; PERINEURAL at 09:29

## 2025-03-05 RX ADMIN — LIDOCAINE HYDROCHLORIDE 6 ML: 10 INJECTION, SOLUTION EPIDURAL; INFILTRATION; INTRACAUDAL; PERINEURAL at 09:28

## 2025-03-05 ASSESSMENT — PAIN SCALES - GENERAL
PAINLEVEL_OUTOF10: MILD PAIN (2)
PAINLEVEL_OUTOF10: NO PAIN (0)

## 2025-03-05 NOTE — PATIENT INSTRUCTIONS
Follow-up visit with Dr. Lee in 6 weeks to discuss procedure outcome and determine care plan going forward.      DISCHARGE INSTRUCTIONS    During office hours (8:00 a.m.-4:00 p.m.) questions or concerns may be answered  by calling Spine Center Navigation Nurses at  [phone].  Messages received after hours will be returned the following business day.      In the case of an emergency,please dial 911 or seek assistance at the nearest Emergency Room/Urgent Care facility.     All Patients:  You may experience an increase in your symptoms for the first 5-7 days. Soreness may persist during the first few weeks as it takes 4-6 weeks for the nerves to fully heal.    You may use ice on the injection site,as frequently as 20 minutes each hour if needed. It is recommended NOT to apply heat during the first 24 hours.    You may take your pain medicine.    You may continue taking your regular medication.    You may shower. No swimming, tub bath or hot tub for 48hours. This also includes no lotions, ointments or patches to the needle sites as well. You may remove your bandaid/bandage as soon as you are home.    You mayresume light activities, as tolerated.    Resume your usual diet as tolerated.    It is strongly advised that you do not drive for 1-3 hours post injection.    If you have had oral sedation:  Do not drive for 8 hours post injection.             POSSIBLE PROCEDURE SIDE EFFECTS    -Call the Spine Center if you are concerned    IncreasedPain           Increased numbness/tingling      Nausea/Vomiting          Bruising/bleeding at site      Redness or swelling  Difficulty walking      Weakness          Fever greater than 100.5

## 2025-03-25 PROBLEM — M79.18 MYOFASCIAL PAIN: Status: RESOLVED | Noted: 2024-10-14 | Resolved: 2025-03-25

## 2025-03-25 PROBLEM — M54.50 LUMBAR SPINE PAIN: Status: RESOLVED | Noted: 2023-10-18 | Resolved: 2025-03-25

## 2025-03-30 ENCOUNTER — HEALTH MAINTENANCE LETTER (OUTPATIENT)
Age: 60
End: 2025-03-30

## 2025-04-15 NOTE — PROGRESS NOTES
Assessment/Plan:      Wei was seen today for back pain.    Diagnoses and all orders for this visit:    Lumbar facet arthropathy    Schmorl's lumbosacral nodes    Bone marrow edema    Myofascial pain         Assessment: Pleasant 59 year old male with a history of hypertension, diabetes mellitus,Osteoarthritis, sleep apnea with:     1.   Marked improvement of subacute on chronic lumbar spine pain.  Most significant pain at the lumbosacral junction consistent with facet arthropathy.  Mild to moderate facet arthropathy L4-5 and L5-S1 on MRI.  Had very good improvement with Osteopathic manipulative medicine and is doing well with PT.   over 80% improved following bilateral L3, 4, 5 radiofrequency ablation    2.  History of intermittent right lower extremity radicular pain.  Has had prior pain at the lumbosacral junction consistent with  discogenic pain/annular tear disc herniation without radicular pain.    He does have some mild degenerative change in the lower lumbar spine on AP view of the hips/pelvis.    right leg radicular pain and was seen at urgent care had reported plain films done I do not have those available.  Temporary improvement with Medrol Dosepak.  Has a lumbarized S1.  Right L 2-3 disc extrusion abutting the right L2 and L3 nerves also compression deformity L4 with Schmorl's node and T2 signal change of the sacrum could represent insufficiency fracture.  Shown to have resolved an MRI with contrast.  On my review the compression deformity seems more consistent with a significant Schmorl's node.  Did well with Medrol Dosepak in June along with physical therapy and trial of nabumetone.  Currently doing well.     3.  Myofascial pain lumbar spine and gluteal region.      4.  Edema of the S2-3 segment at the edge of the MRI from 12/2024.  L4 Schmorl's node.  Resolved edema on MRI with contrast.         Discussion:    1.  Overall he is doing quite well after lumbar radiofrequency ablation.  We discussed  activity in general.  He would like to golf and I would recommend slow return to activities such as golf and see how he tolerates it and slow increase in activity in general.  We discussed option of [facility] but he has home exercises and I recommend he continue with home exercises.    2.  Follow-up with me as needed      It was our pleasure caring for your patient today, if there any questions or concerns please do not hesitate to contact us.      Subjective:   Patient ID: Wei Wolf is a 59 year old male.    History of Present Illness: Patient presents for follow-up of lumbar spine pain and intermittent lower extremity radicular pain.  Overall he is doing quite well after radiofrequency ablation.  Procedure went well no issues no fevers or chills.  Over 80% improvement in symptoms.  Still has some low back pain with prolonged bending or activity.  Better with rest.  Pain is a 3/10 at worst 1/10 today and at best.  Occasionally takes Advil if he really is having a tough day.  He has tried to push his activities in a few day and had some increase in pain.  Wanted to discuss activity such as golf.       Imaging: MRI lumbar spine December 2024 reviewed again today.  Schmorl's node superior endplate of L4.  No significant central stenosis.  Mild foraminal stenosis L5-S1 bilaterally.  Mild to moderate facet arthropathy L3-4 L4-5 L5-S1.    Review of Systems:   Pertinent positives: None.  Pertinent negatives: No numbness, tingling or weakness.  No bowel or bladder incontinence.  No urinary retention.  No fevers, unintentional weight loss, balance changes, headaches, frequent falling, difficulty swallowing, or coordination difficulties.  All others reviewed are negative.           Current Outpatient Medications   Medication Sig Dispense Refill    ASPIRIN NOT PRESCRIBED, INTENTIONAL, continuous prn. Antiplatelet medication not prescribed intentionally due to GI Issues / Ulcer Disease 1 each 0     azelastine (ASTELIN) 0.1 % nasal spray Spray 1 spray into both nostrils 2 times daily. 30 mL 11    blood glucose (CONTOUR NEXT TEST) test strip Use to test blood sugar 2 times daily or as directed. 100 strip 0    insulin glargine (LANTUS SOLOSTAR) 100 UNIT/ML pen Inject 25 Units subcutaneously 2 times daily. 45 mL 0    lisinopril-hydrochlorothiazide (ZESTORETIC) 10-12.5 MG tablet Take 1 tablet by mouth daily. 90 tablet 1    methocarbamol (ROBAXIN) 500 MG tablet Take 1 tablet (500 mg) by mouth 2 times daily as needed for muscle spasms. (Patient not taking: Reported on 1/7/2025) 60 tablet 0    methylPREDNISolone (MEDROL DOSEPAK) 4 MG tablet therapy pack Follow Package Directions (Patient not taking: Reported on 1/7/2025) 21 tablet 0    nabumetone (RELAFEN) 500 MG tablet Take 1-2 tablets (500-1,000 mg) by mouth 2 times daily as needed for moderate pain. (Patient not taking: Reported on 1/7/2025) 60 tablet 1    Semaglutide, 1 MG/DOSE, (OZEMPIC, 1 MG/DOSE,) 4 MG/3ML pen Inject 1 mg subcutaneously every 7 days. 9 mL 0    simvastatin (ZOCOR) 10 MG tablet Take 1 tablet (10 mg) by mouth at bedtime. 90 tablet 2    sodium chloride-sodium bicarb (CLASSIC NETI POT SINUS WASH) 2300-700 MG KIT Spray 1 packet in nostril 3 times daily as needed. 30 packet 3     No current facility-administered medications for this visit.       Past Medical History:   Diagnosis Date    AR (allergic rhinitis)     Diabetes (H)     Hypertension goal BP (blood pressure) < 140/90 12/20/2010    Osteoarthritis of hip 5/4/2022    Sleep apnea        The following portions of the patient's history were reviewed and updated as appropriate: allergies, current medications, past family history, past medical history, past social history, past surgical history and problem list.           Objective:   Physical Exam:    /61   Pulse 76   There is no height or weight on file to calculate BMI.      General: Alert and oriented with normal affect. Attention,  knowledge, memory, and language are intact. No acute distress.   Eyes: Sclerae are clear.  Respirations: Unlabored. CV: No lower extremity edema.     Gait:  Nonantalgic    Sensation is intact to light touch throughout the   lower extremities.  Reflexes are   2+ patellar and 1+ Achilles      Manual muscle testing reveals:  Right /Left out of 5     5/5 knee flexors  5/5 knee extensors  5/5 ankle plantar flexors  5/5 ankle dorsiflexors  5/5   ankle evertors

## 2025-04-16 ENCOUNTER — OFFICE VISIT (OUTPATIENT)
Dept: PHYSICAL MEDICINE AND REHAB | Facility: CLINIC | Age: 60
End: 2025-04-16
Payer: COMMERCIAL

## 2025-04-16 VITALS — DIASTOLIC BLOOD PRESSURE: 61 MMHG | SYSTOLIC BLOOD PRESSURE: 119 MMHG | HEART RATE: 76 BPM

## 2025-04-16 DIAGNOSIS — R93.7 BONE MARROW EDEMA: ICD-10-CM

## 2025-04-16 DIAGNOSIS — M47.816 LUMBAR FACET ARTHROPATHY: Primary | ICD-10-CM

## 2025-04-16 DIAGNOSIS — M79.18 MYOFASCIAL PAIN: ICD-10-CM

## 2025-04-16 DIAGNOSIS — M51.47 SCHMORL'S LUMBOSACRAL NODES: ICD-10-CM

## 2025-04-16 ASSESSMENT — PAIN SCALES - GENERAL: PAINLEVEL_OUTOF10: MILD PAIN (1)

## 2025-04-16 NOTE — LETTER
4/16/2025      Wei Wolf  [address] [facility] [address]      Dear Colleague,    Thank you for referring your patient, Wei Wolf, to the [facility]. Please see a copy of my visit note below.    Assessment/Plan:      Wei was seen today for back pain.    Diagnoses and all orders for this visit:    Lumbar facet arthropathy    Schmorl's lumbosacral nodes    Bone marrow edema    Myofascial pain         Assessment: Pleasant 59 year old male with a history of hypertension, diabetes mellitus,Osteoarthritis, sleep apnea with:     1.   Marked improvement of subacute on chronic lumbar spine pain.  Most significant pain at the lumbosacral junction consistent with facet arthropathy.  Mild to moderate facet arthropathy L4-5 and L5-S1 on MRI.  Had very good improvement with Osteopathic manipulative medicine and is doing well with PT.   over 80% improved following bilateral L3, 4, 5 radiofrequency ablation    2.  History of intermittent right lower extremity radicular pain.  Has had prior pain at the lumbosacral junction consistent with  discogenic pain/annular tear disc herniation without radicular pain.    He does have some mild degenerative change in the lower lumbar spine on AP view of the hips/pelvis.    right leg radicular pain and was seen at urgent care had reported plain films done I do not have those available.  Temporary improvement with Medrol Dosepak.  Has a lumbarized S1.  Right L 2-3 disc extrusion abutting the right L2 and L3 nerves also compression deformity L4 with Schmorl's node and T2 signal change of the sacrum could represent insufficiency fracture.  Shown to have resolved an MRI with contrast.  On my review the compression deformity seems more consistent with a significant Schmorl's node.  Did well with Medrol Dosepak in June along with physical therapy and trial of nabumetone.  Currently doing well.     3.  Myofascial pain lumbar spine and gluteal  region.      4.  Edema of the S2-3 segment at the edge of the MRI from 12/2024.  L4 Schmorl's node.  Resolved edema on MRI with contrast.         Discussion:    1.  Overall he is doing quite well after lumbar radiofrequency ablation.  We discussed activity in general.  He would like to golf and I would recommend slow return to activities such as golf and see how he tolerates it and slow increase in activity in general.  We discussed option of [facility] but he has home exercises and I recommend he continue with home exercises.    2.  Follow-up with me as needed      It was our pleasure caring for your patient today, if there any questions or concerns please do not hesitate to contact us.      Subjective:   Patient ID: Wei Wolf is a 59 year old male.    History of Present Illness: Patient presents for follow-up of lumbar spine pain and intermittent lower extremity radicular pain.  Overall he is doing quite well after radiofrequency ablation.  Procedure went well no issues no fevers or chills.  Over 80% improvement in symptoms.  Still has some low back pain with prolonged bending or activity.  Better with rest.  Pain is a 3/10 at worst 1/10 today and at best.  Occasionally takes Advil if he really is having a tough day.  He has tried to push his activities in a few day and had some increase in pain.  Wanted to discuss activity such as golf.       Imaging: MRI lumbar spine December 2024 reviewed again today.  Schmorl's node superior endplate of L4.  No significant central stenosis.  Mild foraminal stenosis L5-S1 bilaterally.  Mild to moderate facet arthropathy L3-4 L4-5 L5-S1.    Review of Systems:   Pertinent positives: None.  Pertinent negatives: No numbness, tingling or weakness.  No bowel or bladder incontinence.  No urinary retention.  No fevers, unintentional weight loss, balance changes, headaches, frequent falling, difficulty swallowing, or coordination difficulties.  All others reviewed  are negative.           Current Outpatient Medications   Medication Sig Dispense Refill     ASPIRIN NOT PRESCRIBED, INTENTIONAL, continuous prn. Antiplatelet medication not prescribed intentionally due to GI Issues / Ulcer Disease 1 each 0     azelastine (ASTELIN) 0.1 % nasal spray Spray 1 spray into both nostrils 2 times daily. 30 mL 11     blood glucose (CONTOUR NEXT TEST) test strip Use to test blood sugar 2 times daily or as directed. 100 strip 0     insulin glargine (LANTUS SOLOSTAR) 100 UNIT/ML pen Inject 25 Units subcutaneously 2 times daily. 45 mL 0     lisinopril-hydrochlorothiazide (ZESTORETIC) 10-12.5 MG tablet Take 1 tablet by mouth daily. 90 tablet 1     methocarbamol (ROBAXIN) 500 MG tablet Take 1 tablet (500 mg) by mouth 2 times daily as needed for muscle spasms. (Patient not taking: Reported on 1/7/2025) 60 tablet 0     methylPREDNISolone (MEDROL DOSEPAK) 4 MG tablet therapy pack Follow Package Directions (Patient not taking: Reported on 1/7/2025) 21 tablet 0     nabumetone (RELAFEN) 500 MG tablet Take 1-2 tablets (500-1,000 mg) by mouth 2 times daily as needed for moderate pain. (Patient not taking: Reported on 1/7/2025) 60 tablet 1     Semaglutide, 1 MG/DOSE, (OZEMPIC, 1 MG/DOSE,) 4 MG/3ML pen Inject 1 mg subcutaneously every 7 days. 9 mL 0     simvastatin (ZOCOR) 10 MG tablet Take 1 tablet (10 mg) by mouth at bedtime. 90 tablet 2     sodium chloride-sodium bicarb (CLASSIC NETI POT SINUS WASH) 2300-700 MG KIT Spray 1 packet in nostril 3 times daily as needed. 30 packet 3     No current facility-administered medications for this visit.       Past Medical History:   Diagnosis Date     AR (allergic rhinitis)      Diabetes (H)      Hypertension goal BP (blood pressure) < 140/90 12/20/2010     Osteoarthritis of hip 5/4/2022     Sleep apnea        The following portions of the patient's history were reviewed and updated as appropriate: allergies, current medications, past family history, past medical  history, past social history, past surgical history and problem list.           Objective:   Physical Exam:    /61   Pulse 76   There is no height or weight on file to calculate BMI.      General: Alert and oriented with normal affect. Attention, knowledge, memory, and language are intact. No acute distress.   Eyes: Sclerae are clear.  Respirations: Unlabored. CV: No lower extremity edema.     Gait:  Nonantalgic    Sensation is intact to light touch throughout the   lower extremities.  Reflexes are   2+ patellar and 1+ Achilles      Manual muscle testing reveals:  Right /Left out of 5     5/5 knee flexors  5/5 knee extensors  5/5 ankle plantar flexors  5/5 ankle dorsiflexors  5/5   ankle evertors      Again, thank you for allowing me to participate in the care of your patient.        Sincerely,        Ashish Lee, DO    Electronically signed

## 2025-04-28 ENCOUNTER — OFFICE VISIT (OUTPATIENT)
Dept: ORTHOPEDICS | Facility: CLINIC | Age: 60
End: 2025-04-28
Payer: COMMERCIAL

## 2025-04-28 DIAGNOSIS — M19.041 OSTEOARTHRITIS OF METACARPOPHALANGEAL (MCP) JOINT OF RIGHT INDEX FINGER: Primary | ICD-10-CM

## 2025-04-28 PROCEDURE — 1125F AMNT PAIN NOTED PAIN PRSNT: CPT | Performed by: STUDENT IN AN ORGANIZED HEALTH CARE EDUCATION/TRAINING PROGRAM

## 2025-04-28 PROCEDURE — 99213 OFFICE O/P EST LOW 20 MIN: CPT | Mod: 25 | Performed by: STUDENT IN AN ORGANIZED HEALTH CARE EDUCATION/TRAINING PROGRAM

## 2025-04-28 PROCEDURE — 20600 DRAIN/INJ JOINT/BURSA W/O US: CPT | Mod: F6 | Performed by: STUDENT IN AN ORGANIZED HEALTH CARE EDUCATION/TRAINING PROGRAM

## 2025-04-28 RX ORDER — BETAMETHASONE SODIUM PHOSPHATE AND BETAMETHASONE ACETATE 3; 3 MG/ML; MG/ML
3 INJECTION, SUSPENSION INTRA-ARTICULAR; INTRALESIONAL; INTRAMUSCULAR; SOFT TISSUE
Status: COMPLETED | OUTPATIENT
Start: 2025-04-28 | End: 2025-04-28

## 2025-04-28 RX ORDER — LIDOCAINE HYDROCHLORIDE 10 MG/ML
0.5 INJECTION, SOLUTION EPIDURAL; INFILTRATION; INTRACAUDAL; PERINEURAL
Status: COMPLETED | OUTPATIENT
Start: 2025-04-28 | End: 2025-04-28

## 2025-04-28 RX ADMIN — BETAMETHASONE SODIUM PHOSPHATE AND BETAMETHASONE ACETATE 3 MG: 3; 3 INJECTION, SUSPENSION INTRA-ARTICULAR; INTRALESIONAL; INTRAMUSCULAR; SOFT TISSUE at 09:12

## 2025-04-28 RX ADMIN — LIDOCAINE HYDROCHLORIDE 0.5 ML: 10 INJECTION, SOLUTION EPIDURAL; INFILTRATION; INTRACAUDAL; PERINEURAL at 09:12

## 2025-04-28 NOTE — LETTER
4/28/2025      Wei Wolf  [address] [facility] [address]      Dear Colleague,    Thank you for referring your patient, Wei Wolf, to the [facility]. Please see a copy of my visit note below.      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 Prior patient of Dr. Corrales's. 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 He continued to have pain after the surgery and has been treated with serial steroid injections. His last was 3/30/23 with Dr. Corrales and then 10/2/2023, 7/29/2024, 1/27/2025 performed by me. He has obtained near complete relief after the injections. This last injection however lasted only 2.5 months, where previously they lasted 6 months.  He also states that the skin seems to be more durable and he has been less prone to skin tears or bleeding using betamethasone.  He comes in today requesting an injection of steroid.  He is scheduled for surgery in October.    Patient also has diminished ROM of the left index MCP joint but it is not painful.    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  There is minimal subcutaneous wasting.  Active index MCP joint flexion approximately 45 degrees but pain with terminal flexion and extension of the index MCP joint. SILT all distributions. WWP CR< 2s    Imaging:    Xrs 7/29/2024 compared to 2019  demonstrates advanced degenerative changes at the index MCP joint with no joint space remaining     ASSESSMENT:  Right index MCP arthritis and synovitis.     PLAN:  We discussed the risks, benefits, and alternatives of treatment options available.  He wishes to have an injection today.  He is scheduled for MCP joint pyrocarbon arthroplasty 10/16/2025     PROCEDURE:  After written consent, verifying site and side, a sterile ChloraPrep was performed on the injection site. [place]on guidance was used and 0.5 cc of betamethasone 6 mg/mL and 0.5 cc of 1% lidocaine were injected into the dorsal radial aspect of the joint using a 25-gauge needle.  The patient tolerated the procedure well.  No complications were noted.  Followup instructions were given.    I will see him back 7/14/2025 for a repeat injection of the MCP joint.  This will be right at 3 months prior to his scheduled surgery.    The patient voiced understanding and agreement.  All questions answered.       Travis Peña MD    Hand & Upper Extremity Surgery  Department of Orthopedic Surgery  [facility]             Small Joint Injection/Arthrocentesis: R index MCP    Date/Time: 4/28/2025 9:12 AM    Performed by: Travis Peña MD  Authorized by: Travis Peña MD  Indications:  Pain  Needle Size:  25 G  Guidance: landmark       Location:  Index finger    Site:  R index MCP                    Medications:  3 mg betamethasone acet & sod phos 6 (3-3) MG/ML; 0.5 mL lidocaine (PF) 1 %        Outcome:  Tolerated well, no immediate complications  Procedure discussed: discussed risks, benefits, and alternatives    Consent Given by:  Patient  Timeout: timeout called immediately prior to procedure    Prep: patient was prepped and draped in usual sterile fashion          [facility] [address] [address] [phone]  Dept:  [phone]  ______________________________________________________________________________    Patient: Wei Wolf   : 1965   MRN: [id number]   2025    INVASIVE PROCEDURE SAFETY CHECKLIST    Date: 2025   Procedure: Right index finger MCP joint steroid injection  Patient Name: Wei Wolf  MRN: [id number]  YOB: 1965    Action: Complete sections as appropriate. Any discrepancy results in a HARD COPY until resolved.     PRE PROCEDURE:  Patient ID verified with 2 identifiers (name and  or MRN): Yes  Procedure and site verified with patient/designee (when able): Yes  Accurate consent documentation in medical record: Yes  H&P (or appropriate assessment) documented in medical record: Yes  H&P must be up to 20 days prior to procedure and updates within 24 hours of procedure as applicable: Yes  Relevant diagnostic and radiology test results appropriately labeled and displayed as applicable: NA  Procedure site(s) marked with provider initials: NA    TIMEOUT:  Time-Out performed immediately prior to starting procedure, including verbal and active participation of all team members addressing the following:Yes  * Correct patient identify  * Confirmed that the correct side and site are marked  * An accurate procedure consent form  * Agreement on the procedure to be done  * Correct patient position  * Relevant images and results are properly labeled and appropriately displayed  * The need to administer antibiotics or fluids for irrigation purposes during the procedure as applicable   * Safety precautions based on patient history or medication use    DURING PROCEDURE: Verification of correct person, site, and procedures any time the responsibility for care of the patient is transferred to another member of the care team.       The following medications were given:         Prior to injection, verified patient identity using patient's name and date of birth.  Due to injection  administration, patient instructed to remain in clinic for 15 minutes  afterwards, and to report any adverse reaction to me immediately.    Joint injection was performed.    Medication Name: Betamethasone Sodium Phosphate NDC 9257-3462-85  Drug Amount Wasted:  Yes: 27 mg/ml   Vial/Syringe: Single dose vial  Expiration Date:        Medication Name Lidocaine 1% NDC 31644-706-23    Scribed by Bushra Amato ATC for Dr. Peña on April 28, 2025 at 9:14am based on the provider's statements to me.     Bushra mAato ATC      Again, thank you for allowing me to participate in the care of your patient.        Sincerely,        Travis Peña MD    Electronically signed

## 2025-04-28 NOTE — PROGRESS NOTES
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 Prior patient of Dr. Corrales's. 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 He continued to have pain after the surgery and has been treated with serial steroid injections. His last was 3/30/23 with Dr. Corrales and then 10/2/2023, 7/29/2024, 1/27/2025 performed by me. He has obtained near complete relief after the injections. This last injection however lasted only 2.5 months, where previously they lasted 6 months.  He also states that the skin seems to be more durable and he has been less prone to skin tears or bleeding using betamethasone.  He comes in today requesting an injection of steroid.  He is scheduled for surgery in October.    Patient also has diminished ROM of the left index MCP joint but it is not painful.    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  There is minimal subcutaneous wasting.  Active index MCP joint flexion approximately 45 degrees but pain with terminal flexion and extension of the index MCP joint. SILT all distributions. WWP CR< 2s    Imaging:    Xrs 7/29/2024 compared to 2019 demonstrates advanced degenerative changes at the index MCP joint with no joint space remaining     ASSESSMENT:  Right index MCP arthritis and synovitis.     PLAN:  We discussed the risks, benefits, and alternatives of treatment options available.  He wishes to  have an injection today.  He is scheduled for MCP joint pyrocarbon arthroplasty 10/16/2025     PROCEDURE:  After written consent, verifying site and side, a sterile ChloraPrep was performed on the injection site. [place]ooter guidance was used and 0.5 cc of betamethasone 6 mg/mL and 0.5 cc of 1% lidocaine were injected into the dorsal radial aspect of the joint using a 25-gauge needle.  The patient tolerated the procedure well.  No complications were noted.  Followup instructions were given.    I will see him back 7/14/2025 for a repeat injection of the MCP joint.  This will be right at 3 months prior to his scheduled surgery.    The patient voiced understanding and agreement.  All questions answered.       Travis Peña MD    Hand & Upper Extremity Surgery  Department of Orthopedic Surgery  [facility]              37.1

## 2025-04-28 NOTE — NURSING NOTE
Reason For Visit:   Chief Complaint   Patient presents with    RECHECK     3 month follow-up of a right index finger metacarpophalangeal joint osteoarthritis       Primary MD: Radha Brian  Ref. MD: Cresencio    Age: 59 year old    ?  No      There were no vitals taken for this visit.      Pain Assessment  Patient Currently in Pain: Yes (Pain started again 1 month ago)  0-10 Pain Scale: 4 (Hitting his finger will increase the pain)  Primary Pain Location: Finger (Comment which one) (Right Index)  Pain Descriptors: Constant, Sharp    Hand Dominance Evaluation  Hand Dominance: Right          QuickDASH Assessment      4/23/2025     9:02 AM   QuickDASH Main   1. Open a tight or new jar Moderate difficulty   2. Do heavy household chores (e.g., wash walls, floors) Moderate difficulty   3. Carry a shopping bag or briefcase Mild difficulty   4. Wash your back Mild difficulty   5. Use a knife to cut food Mild difficulty   6. Recreational activities in which you take some force or impact through your arm, shoulder or hand (e.g., golf, hammering, tennis, etc.) Severe difficulty   7. During the past week, to what extent has your arm, shoulder or hand problem interfered with your normal social activities with family, friends, neighbours or groups Moderately   8. During the past week, were you limited in your work or other regular daily activities as a result of your arm, shoulder or hand problem Slightly limited   9. Arm, shoulder or hand pain Moderate   10.Tingling (pins and needles) in your arm,shoulder or hand None   11. During the past week, how much difficulty have you had sleeping because of the pain in your arm, shoulder or hand No difficulty   Quickdash Ability Score 34.09          Current Outpatient Medications   Medication Sig Dispense Refill    ASPIRIN NOT PRESCRIBED, INTENTIONAL, continuous prn. Antiplatelet medication not prescribed intentionally due to GI Issues / Ulcer Disease 1 each 0    azelastine  (ASTELIN) 0.1 % nasal spray Spray 1 spray into both nostrils 2 times daily. 30 mL 11    blood glucose (CONTOUR NEXT TEST) test strip Use to test blood sugar 2 times daily or as directed. 100 strip 0    insulin glargine (LANTUS SOLOSTAR) 100 UNIT/ML pen Inject 25 Units subcutaneously 2 times daily. 45 mL 0    lisinopril-hydrochlorothiazide (ZESTORETIC) 10-12.5 MG tablet Take 1 tablet by mouth daily. 90 tablet 1    methocarbamol (ROBAXIN) 500 MG tablet Take 1 tablet (500 mg) by mouth 2 times daily as needed for muscle spasms. 60 tablet 0    methylPREDNISolone (MEDROL DOSEPAK) 4 MG tablet therapy pack Follow Package Directions 21 tablet 0    nabumetone (RELAFEN) 500 MG tablet Take 1-2 tablets (500-1,000 mg) by mouth 2 times daily as needed for moderate pain. 60 tablet 1    Semaglutide, 1 MG/DOSE, (OZEMPIC, 1 MG/DOSE,) 4 MG/3ML pen Inject 1 mg subcutaneously every 7 days. 9 mL 0    simvastatin (ZOCOR) 10 MG tablet Take 1 tablet (10 mg) by mouth at bedtime. 90 tablet 2    sodium chloride-sodium bicarb (CLASSIC NETI POT SINUS WASH) 2300-700 MG KIT Spray 1 packet in nostril 3 times daily as needed. 30 packet 3       Allergies   Allergen Reactions    Kiwi Itching     Throat itching to kiwi, watermelon, honeydew    Pollen Extract     Metformin Diarrhea       Ashlee Flynn, ATC

## 2025-04-28 NOTE — PROGRESS NOTES
Small Joint Injection/Arthrocentesis: R index MCP    Date/Time: 2025 9:12 AM    Performed by: Travis Peña MD  Authorized by: Travis Peña MD  Indications:  Pain  Needle Size:  25 G  Guidance: landmark       Location:  Index finger    Site:  R index MCP                    Medications:  3 mg betamethasone acet & sod phos 6 (3-3) MG/ML; 0.5 mL lidocaine (PF) 1 %        Outcome:  Tolerated well, no immediate complications  Procedure discussed: discussed risks, benefits, and alternatives    Consent Given by:  Patient  Timeout: timeout called immediately prior to procedure    Prep: patient was prepped and draped in usual sterile fashion          [facility]THOP[facility] [address]  [address] [facility] [id number] [phone]  Dept: [phone]  ______________________________________________________________________________    Patient: Wei Wolf   : 1965   MRN: [id number]   2025    INVASIVE PROCEDURE SAFETY CHECKLIST    Date: 2025   Procedure: Right index finger MCP joint steroid injection  Patient Name: Wei Wolf  MRN: [id number]  YOB: 1965    Action: Complete sections as appropriate. Any discrepancy results in a HARD COPY until resolved.     PRE PROCEDURE:  Patient ID verified with 2 identifiers (name and  or MRN): Yes  Procedure and site verified with patient/designee (when able): Yes  Accurate consent documentation in medical record: Yes  H&P (or appropriate assessment) documented in medical record: Yes  H&P must be up to 20 days prior to procedure and updates within 24 hours of procedure as applicable: Yes  Relevant diagnostic and radiology test results appropriately labeled and displayed as applicable: NA  Procedure site(s) marked with provider initials: NA    TIMEOUT:  Time-Out performed immediately prior to starting procedure, including verbal and active participation of all team members addressing the  following:Yes  * Correct patient identify  * Confirmed that the correct side and site are marked  * An accurate procedure consent form  * Agreement on the procedure to be done  * Correct patient position  * Relevant images and results are properly labeled and appropriately displayed  * The need to administer antibiotics or fluids for irrigation purposes during the procedure as applicable   * Safety precautions based on patient history or medication use    DURING PROCEDURE: Verification of correct person, site, and procedures any time the responsibility for care of the patient is transferred to another member of the care team.       The following medications were given:         Prior to injection, verified patient identity using patient's name and date of birth.  Due to injection administration, patient instructed to remain in clinic for 15 minutes  afterwards, and to report any adverse reaction to me immediately.    Joint injection was performed.    Medication Name: Betamethasone Sodium Phosphate NDC 3248-1827-58  Drug Amount Wasted:  Yes: 27 mg/ml   Vial/Syringe: Single dose vial  Expiration Date:        Medication Name Lidocaine 1% NDC 85894-891-39    Scribed by Bushra Amato ATC for Dr. Peña on April 28, 2025 at 9:14am based on the provider's statements to me.     Bushra Amato ATC

## 2025-05-03 DIAGNOSIS — Z79.4 TYPE 2 DIABETES MELLITUS WITHOUT COMPLICATION, WITH LONG-TERM CURRENT USE OF INSULIN (H): ICD-10-CM

## 2025-05-03 DIAGNOSIS — E11.9 TYPE 2 DIABETES MELLITUS WITHOUT COMPLICATION, WITH LONG-TERM CURRENT USE OF INSULIN (H): ICD-10-CM

## 2025-05-05 RX ORDER — SEMAGLUTIDE 1.34 MG/ML
1 INJECTION, SOLUTION SUBCUTANEOUS
Qty: 9 ML | Refills: 0 | Status: SHIPPED | OUTPATIENT
Start: 2025-05-05

## 2025-05-20 ENCOUNTER — PATIENT OUTREACH (OUTPATIENT)
Dept: FAMILY MEDICINE | Facility: CLINIC | Age: 60
End: 2025-05-20
Payer: COMMERCIAL

## 2025-05-20 NOTE — TELEPHONE ENCOUNTER
Patient Quality Outreach    Patient is due for the following:   Diabetes -  Diabetic Follow-Up Visit  Physical Preventive Adult Physical    Action(s) Taken:   Schedule a Adult Preventative    Type of outreach:    Sent tolingo message.    Questions for provider review:    None         Keshia Ellis, [facility]  Chart routed to None.

## 2025-05-22 DIAGNOSIS — Z79.4 TYPE 2 DIABETES MELLITUS WITH HYPERGLYCEMIA, WITH LONG-TERM CURRENT USE OF INSULIN (H): ICD-10-CM

## 2025-05-22 DIAGNOSIS — E11.65 TYPE 2 DIABETES MELLITUS WITH HYPERGLYCEMIA, WITH LONG-TERM CURRENT USE OF INSULIN (H): ICD-10-CM

## 2025-05-26 RX ORDER — INSULIN GLARGINE 100 [IU]/ML
25 INJECTION, SOLUTION SUBCUTANEOUS 2 TIMES DAILY
Qty: 45 ML | Refills: 0 | Status: SHIPPED | OUTPATIENT
Start: 2025-05-26

## 2025-05-27 ENCOUNTER — TELEPHONE (OUTPATIENT)
Dept: ORTHOPEDICS | Facility: CLINIC | Age: 60
End: 2025-05-27
Payer: COMMERCIAL

## 2025-05-27 NOTE — TELEPHONE ENCOUNTER
Patient confirmed scheduled appointment:  Date: 7/7/2025  Time: 9:00am  Visit type: Return Hand/Wrist  Provider:   Location: [facility] [place]

## 2025-07-07 ENCOUNTER — OFFICE VISIT (OUTPATIENT)
Dept: ORTHOPEDICS | Facility: CLINIC | Age: 60
End: 2025-07-07
Payer: COMMERCIAL

## 2025-07-07 DIAGNOSIS — M19.041 OSTEOARTHRITIS OF METACARPOPHALANGEAL (MCP) JOINT OF RIGHT INDEX FINGER: Primary | ICD-10-CM

## 2025-07-07 PROCEDURE — 20600 DRAIN/INJ JOINT/BURSA W/O US: CPT | Mod: F6 | Performed by: STUDENT IN AN ORGANIZED HEALTH CARE EDUCATION/TRAINING PROGRAM

## 2025-07-07 PROCEDURE — 99213 OFFICE O/P EST LOW 20 MIN: CPT | Mod: 25 | Performed by: STUDENT IN AN ORGANIZED HEALTH CARE EDUCATION/TRAINING PROGRAM

## 2025-07-07 RX ORDER — TRIAMCINOLONE ACETONIDE 40 MG/ML
40 INJECTION, SUSPENSION INTRA-ARTICULAR; INTRAMUSCULAR
Status: COMPLETED | OUTPATIENT
Start: 2025-07-07 | End: 2025-07-07

## 2025-07-07 RX ORDER — LIDOCAINE HYDROCHLORIDE 10 MG/ML
0.5 INJECTION, SOLUTION EPIDURAL; INFILTRATION; INTRACAUDAL; PERINEURAL
Status: COMPLETED | OUTPATIENT
Start: 2025-07-07 | End: 2025-07-07

## 2025-07-07 RX ADMIN — TRIAMCINOLONE ACETONIDE 40 MG: 40 INJECTION, SUSPENSION INTRA-ARTICULAR; INTRAMUSCULAR at 09:16

## 2025-07-07 RX ADMIN — LIDOCAINE HYDROCHLORIDE 0.5 ML: 10 INJECTION, SOLUTION EPIDURAL; INFILTRATION; INTRACAUDAL; PERINEURAL at 09:16

## 2025-07-07 NOTE — LETTER
7/7/2025      Wei Wolf  [address] [facility] [address]      Dear Colleague,    Thank you for referring your patient, Wei Wolf, to the [facility]TH[facility]. Please see a copy of my visit note below.      Office Visit    Jul 7, 2025    [facility]  Travis Peña MD  Orthopaedic Surgery Degenerative arthritis of metacarpophalangeal joint of index finger of right hand  Dx RECHECK ; Referred by Self, Referred, MD  Reason for Visit     Progress Notes       Service Date: Jul 7, 2025    HISTORY OF PRESENT ILLNESS:  Wei is a 59 year old right-hand dominant male who has right index finger metacarpophalangeal joint osteoarthritis. Prior patient of Dr. Corrales's. 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 He continued to have pain after the surgery and has been treated with serial steroid injections. His last was 3/30/23 with Dr. Corrales and then 10/2/2023, 7/29/2024, 1/27/2025, and 4/28/25 performed by me. He has obtained near complete relief after the injections. He has had diminishing effect of these injections, originally 6 months, then 2.5 months, and now only 6 weeks. He had less skin issues with betamethasone but wishes to have a kenalog injection today as they lasted longer.  He is scheduled for surgery in October.    Patient also has diminished ROM of the left index MCP joint but it is not painful.    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  There is minimal subcutaneous wasting.  Active index MCP joint flexion approximately 45 degrees but pain with terminal flexion and extension of the index MCP joint. SILT all distributions. WWP CR< 2s    Imaging:    Xrs 7/29/2024 compared to 2019 demonstrates advanced degenerative changes at the  index MCP joint with no joint space remaining     ASSESSMENT:  Right index MCP arthritis and synovitis.     PLAN:  We discussed the risks, benefits, and alternatives of treatment options available.  He wishes to have an injection today, states that he can deal with the skin irritation as this will be the last injection prior to surgery.  He is scheduled for MCP joint pyrocarbon arthroplasty 10/16/2025     PROCEDURE:  After written consent, verifying site and side, a sterile ChloraPrep was performed on the injection site. [place] guidance was used and 40 mg of Kenalog and 0.5 cc of 1% lidocaine were injected into the dorsal radial aspect of the joint using a 25-gauge needle.  The patient tolerated the procedure well.  No complications were noted.  Followup instructions were given.    Will proceed with right index MCP joint pyrocarbon arthroplasty in October.  He had no further questions.  Risk benefits, alternatives were discussed previously.    The patient voiced understanding and agreement.  All questions answered.       Travis Peña MD    Hand & Upper Extremity Surgery  Department of Orthopedic Surgery  [facility]             Hand / Upper Extremity Injection/Arthrocentesis: R index MCP    Date/Time: 7/7/2025 9:16 AM    Performed by: Travis Peña MD  Authorized by: Travis Peña MD    Indications:  Pain  Needle Size:  25 G  Guidance: landmark    Condition: osteoarthritis    Location:  Index finger  Site:  R index MCP    Medications:  40 mg triamcinolone 40 MG/ML; 0.5 mL lidocaine (PF) 1 %  Outcome:  Tolerated well, no immediate complications  Procedure discussed: discussed risks, benefits, and alternatives    Consent Given by:  Patient  Timeout: timeout called immediately prior to procedure    Prep: patient was prepped and draped in usual sterile fashion        [facility] ORTHOPEDIC [address]  [address] [facility]  [address]  [phone]  Dept: [phone]  ______________________________________________________________________________    Patient: Wei Wolf   : 1965   MRN: [id number]   2025    INVASIVE PROCEDURE SAFETY CHECKLIST    Date: 2025   Procedure:Right index finger MCP joint Steroid injection  Patient Name: Wei Wolf  MRN: [id number]  YOB: 1965    Action: Complete sections as appropriate. Any discrepancy results in a HARD COPY until resolved.     PRE PROCEDURE:  Patient ID verified with 2 identifiers (name and  or MRN): Yes  Procedure and site verified with patient/designee (when able): Yes  Accurate consent documentation in medical record: Yes  H&P (or appropriate assessment) documented in medical record: Yes  H&P must be up to 20 days prior to procedure and updates within 24 hours of procedure as applicable: Yes  Relevant diagnostic and radiology test results appropriately labeled and displayed as applicable: NA  Procedure site(s) marked with provider initials: NA    TIMEOUT:  Time-Out performed immediately prior to starting procedure, including verbal and active participation of all team members addressing the following:Yes  * Correct patient identify  * Confirmed that the correct side and site are marked  * An accurate procedure consent form  * Agreement on the procedure to be done  * Correct patient position  * Relevant images and results are properly labeled and appropriately displayed  * The need to administer antibiotics or fluids for irrigation purposes during the procedure as applicable   * Safety precautions based on patient history or medication use    DURING PROCEDURE: Verification of correct person, site, and procedures any time the responsibility for care of the patient is transferred to another member of the care team.       The following medications were given:         Prior to injection, verified patient identity using patient's name and date  of birth.  Due to injection administration, patient instructed to remain in clinic for 15 minutes  afterwards, and to report any adverse reaction to me immediately.    Joint injection was performed.    Medication Name: Kenalog 40mg/mL NDC [address]-1  Drug Amount Wasted:  None.  Vial/Syringe: Single dose vial  Expiration Date:  4/1/27    Medication Name Lidocaine 1% NDC 10819-106-82    Scribed by LASHAE KILLIAN, ATC for Dr. Peña on July 7, 2025 at 9:10am based on the provider's statements to me.     LASHAE KILLIAN, ATC      Again, thank you for allowing me to participate in the care of your patient.        Sincerely,        Travis Peña MD    Electronically signed

## 2025-07-07 NOTE — PROGRESS NOTES
Office Visit    Jul 7, 2025    [facility] Augustus Peña MD  Orthopaedic Surgery Degenerative arthritis of metacarpophalangeal joint of index finger of right hand  Dx RECHECK ; Referred by Self, MD Jennifer  Reason for Visit     Progress Notes       Service Date: Jul 7, 2025    HISTORY OF PRESENT ILLNESS:  Wei is a 59 year old right-hand dominant male who has right index finger metacarpophalangeal joint osteoarthritis. Prior patient of Dr. Corrales's. 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 He continued to have pain after the surgery and has been treated with serial steroid injections. His last was 3/30/23 with Dr. Corrales and then 10/2/2023, 7/29/2024, 1/27/2025, and 4/28/25 performed by me. He has obtained near complete relief after the injections. He has had diminishing effect of these injections, originally 6 months, then 2.5 months, and now only 6 weeks. He had less skin issues with betamethasone but wishes to have a kenalog injection today as they lasted longer.  He is scheduled for surgery in October.    Patient also has diminished ROM of the left index MCP joint but it is not painful.    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  There is minimal subcutaneous wasting.  Active index MCP joint flexion approximately 45 degrees but pain with terminal flexion and extension of the index MCP joint. SILT all distributions. WWP CR< 2s    Imaging:    Xrs 7/29/2024 compared to 2019 demonstrates advanced degenerative changes at the index MCP joint with no joint space remaining     ASSESSMENT:  Right index MCP arthritis and synovitis.     PLAN:  We discussed the risks, benefits, and alternatives of treatment options available.  He wishes to have an injection today, states that he can  deal with the skin irritation as this will be the last injection prior to surgery.  He is scheduled for MCP joint pyrocarbon arthroplasty 10/16/2025     PROCEDURE:  After written consent, verifying site and side, a sterile ChloraPrep was performed on the injection site. [place] guidance was used and 40 mg of Kenalog and 0.5 cc of 1% lidocaine were injected into the dorsal radial aspect of the joint using a 25-gauge needle.  The patient tolerated the procedure well.  No complications were noted.  Followup instructions were given.    Will proceed with right index MCP joint pyrocarbon arthroplasty in October.  He had no further questions.  Risk benefits, alternatives were discussed previously.    The patient voiced understanding and agreement.  All questions answered.       Travis Peña MD    Hand & Upper Extremity Surgery  Department of Orthopedic Surgery  [facility]

## 2025-07-07 NOTE — NURSING NOTE
Teaching Flowsheet     Visit Type: In Clinic    Time Start: 0931  Time End: 0941  Total Time Spent: 10 min.    Surgeon: Dr Travis Peña  Location of Surgery (known or anticipated): [facility]   Type of Anesthesia: MAC withRegional Block  Worker's Compensation Procedure: No    Pertinent Medical History: DiabetesType 2, last HgbA1C at 8.6% on 9-4-24, encouraged patient to have this rechecked. Provider requires approx 8.0% for surgery. Patient verbalized agreement/understanding.  HTN, Obesity  Were medical conditions reviewed and appropriate for location? Yes  BMI: Obesity Grade II BMI 35-39.9 (37.33)    Relevant Diagnosis: OA of right index MCP joint  Teaching Topic: Right index MCP joint pyrocarbon arthroplasty.    Person(s) involved in teaching:   Patient  : No.   Verified Patient's Phone Number: NO    Caregiver//  Name: Patient did not currently have the name of a caregiver, but states they will have someone they know and trust drive them home after surgery.  They will have someone they know and trust stay with them and monitor them for 24 hours post operatively.     Motivation Level:  Asks Questions: Yes  Eager to Learn: Yes  Cooperative: Yes  Receptive (willing/able to accept information): Yes  Any cultural factors/Orthodox beliefs that may influence understanding or compliance? No     Patient demonstrates understanding of the following:  Reason for the appointment, diagnosis and treatment plan: Yes  Knowledge of proper use of medications and conditions for which they are ordered (with special attention to potential side effects or drug interactions): Yes  Which situations necessitate calling provider and whom to contact: Yes     Teaching Concerns Addressed:   Proper use and care of medical equip, care aids, etc.: Yes  Nutritional needs and diet plan: Yes  Pain management techniques: Yes  Wound Care: Yes  How and/when to access community resources: Yes  Need for pre-op with in 30 days:  YES, will be done with PCP. I asked them to ensure they go over their daily medications during this visit and discuss what medications need to be stopped before surgery and when. If you are doing a pre-op with your PCP and they are not within the [facility], I ask them to fax it to our pre-op office. Patient verbalized understanding.       Does patient have difficulty getting a ride to appointments (post-ops, PT/OT): No  Patient's plan after discharge: home with family or spouse     Instructional Materials Used/Given: Preoperative surgery packet, 2 bottles of antibacterial Chlorhexidine soap. Stop Light Tool reviewed, after-hours number provided, patient verbalized understanding, had no immediate questions.    - Important Contact Info/Phone Numbers: emphasizing clinic number [phone] and after hours number [phone]  - Map/location of surgery and follow-up appointments  - Showering instructions  - Stoplight Tool     -Next step: surgery date set for 10-16-25.  Schedule HgbA1C and a pre-op with PCP.    Nataliia Lee RN

## 2025-07-07 NOTE — PROGRESS NOTES
Hand / Upper Extremity Injection/Arthrocentesis: R index MCP    Date/Time: 2025 9:16 AM    Performed by: Travis Peña MD  Authorized by: Travis Peña MD    Indications:  Pain  Needle Size:  25 G  Guidance: landmark    Condition: osteoarthritis    Location:  Index finger  Site:  R index MCP    Medications:  40 mg triamcinolone 40 MG/ML; 0.5 mL lidocaine (PF) 1 %  Outcome:  Tolerated well, no immediate complications  Procedure discussed: discussed risks, benefits, and alternatives    Consent Given by:  Patient  Timeout: timeout called immediately prior to procedure    Prep: patient was prepped and draped in usual sterile fashion        [facility] ORTHOPEDIC [address]  [address] [facility] [id number] [phone]  Dept: [phone]  ______________________________________________________________________________    Patient: Wei Wolf   : 1965   MRN: [id number]   2025    INVASIVE PROCEDURE SAFETY CHECKLIST    Date: 2025   Procedure:Right index finger MCP joint Steroid injection  Patient Name: Wei Wolf  MRN: [id number]  YOB: 1965    Action: Complete sections as appropriate. Any discrepancy results in a HARD COPY until resolved.     PRE PROCEDURE:  Patient ID verified with 2 identifiers (name and  or MRN): Yes  Procedure and site verified with patient/designee (when able): Yes  Accurate consent documentation in medical record: Yes  H&P (or appropriate assessment) documented in medical record: Yes  H&P must be up to 20 days prior to procedure and updates within 24 hours of procedure as applicable: Yes  Relevant diagnostic and radiology test results appropriately labeled and displayed as applicable: NA  Procedure site(s) marked with provider initials: NA    TIMEOUT:  Time-Out performed immediately prior to starting procedure, including verbal and active participation of all team members addressing the following:Yes  *  Correct patient identify  * Confirmed that the correct side and site are marked  * An accurate procedure consent form  * Agreement on the procedure to be done  * Correct patient position  * Relevant images and results are properly labeled and appropriately displayed  * The need to administer antibiotics or fluids for irrigation purposes during the procedure as applicable   * Safety precautions based on patient history or medication use    DURING PROCEDURE: Verification of correct person, site, and procedures any time the responsibility for care of the patient is transferred to another member of the care team.       The following medications were given:         Prior to injection, verified patient identity using patient's name and date of birth.  Due to injection administration, patient instructed to remain in clinic for 15 minutes  afterwards, and to report any adverse reaction to me immediately.    Joint injection was performed.    Medication Name: Kenalog 40mg/mL NDC [address]-7  Drug Amount Wasted:  None.  Vial/Syringe: Single dose vial  Expiration Date:  4/1/27    Medication Name Lidocaine 1% NDC 16098-567-79    Scribed by LASHAE KILLIAN, ATC for Dr. Peña on July 7, 2025 at 9:10am based on the provider's statements to me.     LASHAE KILLIAN, ATC

## 2025-07-07 NOTE — NURSING NOTE
Reason For Visit:   Chief Complaint   Patient presents with    RECHECK     Follow-up of a right index finger MCP joint osteoarthritis  possible repeat injection and discuss surgery in October Primary MD: Radha Brian  Ref. MD: Cresencio    Age: 59 year old    ?  No      There were no vitals taken for this visit.      Pain Assessment  Patient Currently in Pain: Yes  Patient's Stated Pain Goal: 9 (no pain at rest)  Primary Pain Location: Finger (Comment which one) (Right index finger)  Pain Descriptors: Intermittent, Sharp    Hand Dominance Evaluation  Hand Dominance: Right          QuickDASH Assessment      7/2/2025     8:51 AM   QuickDASH Main   1. Open a tight or new jar Moderate difficulty   2. Do heavy household chores (e.g., wash walls, floors) Mild difficulty   3. Carry a shopping bag or briefcase Mild difficulty   4. Wash your back Mild difficulty   5. Use a knife to cut food Mild difficulty   6. Recreational activities in which you take some force or impact through your arm, shoulder or hand (e.g., golf, hammering, tennis, etc.) Mild difficulty   7. During the past week, to what extent has your arm, shoulder or hand problem interfered with your normal social activities with family, friends, neighbours or groups Moderately   8. During the past week, were you limited in your work or other regular daily activities as a result of your arm, shoulder or hand problem Slightly limited   9. Arm, shoulder or hand pain Moderate   10.Tingling (pins and needles) in your arm,shoulder or hand None   11. During the past week, how much difficulty have you had sleeping because of the pain in your arm, shoulder or hand No difficulty   Quickdash Ability Score 27.27          Current Outpatient Medications   Medication Sig Dispense Refill    ASPIRIN NOT PRESCRIBED, INTENTIONAL, continuous prn. Antiplatelet medication not prescribed intentionally due to GI Issues / Ulcer Disease 1 each 0    azelastine (ASTELIN) 0.1 %  nasal spray Spray 1 spray into both nostrils 2 times daily. 30 mL 11    blood glucose (CONTOUR NEXT TEST) test strip Use to test blood sugar 2 times daily or as directed. 100 strip 0    insulin glargine (LANTUS SOLOSTAR) 100 UNIT/ML pen Inject 25 Units subcutaneously 2 times daily. 45 mL 0    LANTUS SOLOSTAR 100 UNIT/ML soln INJECT 25 UNITS SUBCUTANEOUSLY 2 TIMES DAILY. 45 mL 0    lisinopril-hydrochlorothiazide (ZESTORETIC) 10-12.5 MG tablet Take 1 tablet by mouth daily. 90 tablet 1    methocarbamol (ROBAXIN) 500 MG tablet Take 1 tablet (500 mg) by mouth 2 times daily as needed for muscle spasms. 60 tablet 0    methylPREDNISolone (MEDROL DOSEPAK) 4 MG tablet therapy pack Follow Package Directions 21 tablet 0    nabumetone (RELAFEN) 500 MG tablet Take 1-2 tablets (500-1,000 mg) by mouth 2 times daily as needed for moderate pain. 60 tablet 1    OZEMPIC, 1 MG/DOSE, 4 MG/3ML pen INJECT 1 MG SUBCUTANEOUSLY EVERY 7 DAYS. 9 mL 0    simvastatin (ZOCOR) 10 MG tablet Take 1 tablet (10 mg) by mouth at bedtime. 90 tablet 2    sodium chloride-sodium bicarb (CLASSIC NETI POT SINUS WASH) 2300-700 MG KIT Spray 1 packet in nostril 3 times daily as needed. 30 packet 3       Allergies   Allergen Reactions    Kiwi Itching     Throat itching to kiwi, watermelon, honeydew    Pollen Extract     Metformin Diarrhea       LASHAE KILLIAN, ATC

## 2025-08-06 DIAGNOSIS — E11.9 TYPE 2 DIABETES MELLITUS WITHOUT COMPLICATION, WITH LONG-TERM CURRENT USE OF INSULIN (H): ICD-10-CM

## 2025-08-06 DIAGNOSIS — Z79.4 TYPE 2 DIABETES MELLITUS WITHOUT COMPLICATION, WITH LONG-TERM CURRENT USE OF INSULIN (H): ICD-10-CM

## 2025-08-13 ENCOUNTER — MYC REFILL (OUTPATIENT)
Dept: FAMILY MEDICINE | Facility: CLINIC | Age: 60
End: 2025-08-13
Payer: COMMERCIAL

## 2025-08-13 DIAGNOSIS — Z79.4 TYPE 2 DIABETES MELLITUS WITH HYPERGLYCEMIA, WITH LONG-TERM CURRENT USE OF INSULIN (H): ICD-10-CM

## 2025-08-13 DIAGNOSIS — Z79.4 TYPE 2 DIABETES MELLITUS WITHOUT COMPLICATION, WITH LONG-TERM CURRENT USE OF INSULIN (H): ICD-10-CM

## 2025-08-13 DIAGNOSIS — E11.65 TYPE 2 DIABETES MELLITUS WITH HYPERGLYCEMIA, WITH LONG-TERM CURRENT USE OF INSULIN (H): ICD-10-CM

## 2025-08-13 DIAGNOSIS — E11.9 TYPE 2 DIABETES MELLITUS WITHOUT COMPLICATION, WITH LONG-TERM CURRENT USE OF INSULIN (H): ICD-10-CM

## 2025-08-13 RX ORDER — SEMAGLUTIDE 1.34 MG/ML
1 INJECTION, SOLUTION SUBCUTANEOUS
Qty: 9 ML | Refills: 0 | Status: SHIPPED | OUTPATIENT
Start: 2025-08-13

## 2025-08-13 RX ORDER — INSULIN GLARGINE 100 [IU]/ML
25 INJECTION, SOLUTION SUBCUTANEOUS 2 TIMES DAILY
Qty: 45 ML | Refills: 0 | Status: SHIPPED | OUTPATIENT
Start: 2025-08-13

## 2025-08-13 RX ORDER — SEMAGLUTIDE 1.34 MG/ML
1 INJECTION, SOLUTION SUBCUTANEOUS
Qty: 9 ML | Refills: 0 | Status: CANCELLED | OUTPATIENT
Start: 2025-08-13

## 2025-08-15 ENCOUNTER — TELEPHONE (OUTPATIENT)
Dept: FAMILY MEDICINE | Facility: CLINIC | Age: 60
End: 2025-08-15
Payer: COMMERCIAL

## 2025-08-15 DIAGNOSIS — Z79.4 TYPE 2 DIABETES MELLITUS WITH HYPERGLYCEMIA, WITH LONG-TERM CURRENT USE OF INSULIN (H): Primary | ICD-10-CM

## 2025-08-15 DIAGNOSIS — E11.65 TYPE 2 DIABETES MELLITUS WITH HYPERGLYCEMIA, WITH LONG-TERM CURRENT USE OF INSULIN (H): Primary | ICD-10-CM

## (undated) DEVICE — BNDG ELASTIC 3"X5YDS UNSTERILE 6611-30

## (undated) DEVICE — IMP WIRE KIRSCHNER 0.045X4" 3715-2-040
Type: IMPLANTABLE DEVICE | Site: HAND | Status: NON-FUNCTIONAL
Removed: 2017-12-21

## (undated) DEVICE — DRSG STERI STRIP 1/2X4" R1547

## (undated) DEVICE — SOL WATER IRRIG 1000ML BOTTLE 07139-09

## (undated) DEVICE — PREP CHLORAPREP 26ML TINTED ORANGE  260815

## (undated) DEVICE — CAST PADDING 4" COTTON WEBRIL STERILE 9084S

## (undated) DEVICE — SLING ARM XLG 79-99158

## (undated) DEVICE — ESU HOLSTER PLASTIC DISP E2400

## (undated) DEVICE — Device

## (undated) DEVICE — SOL WATER IRRIG 1000ML BOTTLE 2F7114

## (undated) DEVICE — SU PDS II 3-0 PS-2 18" Z497G

## (undated) DEVICE — ESU PENCIL W/HOLSTER

## (undated) DEVICE — LINEN GOWN XLG 5407

## (undated) DEVICE — CAST STOCKINETTE 4" COTTON 30-7004

## (undated) DEVICE — PACK HAND CUSTOM ASC

## (undated) DEVICE — DRAPE C-ARM OEC MINI VIEW 6800   00-901917-01

## (undated) DEVICE — SU VICRYL 4-0 PS-2 18" UND J496H

## (undated) DEVICE — SU VICRYL 4-0 PS-2 18" UND J496G

## (undated) DEVICE — SUCTION MANIFOLD NEPTUNE 2 SYS 1 PORT 702-025-000

## (undated) DEVICE — LINEN ORTHO PACK 5446

## (undated) DEVICE — SPONGE SURGIFOAM 12 1972

## (undated) DEVICE — GLOVE PROTEXIS POWDER FREE 6.5 ORTHOPEDIC 2D73ET65

## (undated) RX ORDER — TRIAMCINOLONE ACETONIDE 40 MG/ML
INJECTION, SUSPENSION INTRA-ARTICULAR; INTRAMUSCULAR
Status: DISPENSED
Start: 2022-10-20

## (undated) RX ORDER — LIDOCAINE HYDROCHLORIDE 20 MG/ML
INJECTION, SOLUTION EPIDURAL; INFILTRATION; INTRACAUDAL; PERINEURAL
Status: DISPENSED
Start: 2017-12-21

## (undated) RX ORDER — LIDOCAINE HYDROCHLORIDE 10 MG/ML
INJECTION, SOLUTION EPIDURAL; INFILTRATION; INTRACAUDAL; PERINEURAL
Status: DISPENSED
Start: 2022-07-21

## (undated) RX ORDER — TRIAMCINOLONE ACETONIDE 40 MG/ML
INJECTION, SUSPENSION INTRA-ARTICULAR; INTRAMUSCULAR
Status: DISPENSED
Start: 2020-11-19

## (undated) RX ORDER — LIDOCAINE HYDROCHLORIDE 10 MG/ML
INJECTION, SOLUTION EPIDURAL; INFILTRATION; INTRACAUDAL; PERINEURAL
Status: DISPENSED
Start: 2021-05-06

## (undated) RX ORDER — GABAPENTIN 300 MG/1
CAPSULE ORAL
Status: DISPENSED
Start: 2017-12-21

## (undated) RX ORDER — BETAMETHASONE SODIUM PHOSPHATE AND BETAMETHASONE ACETATE 3; 3 MG/ML; MG/ML
INJECTION, SUSPENSION INTRA-ARTICULAR; INTRALESIONAL; INTRAMUSCULAR; SOFT TISSUE
Status: DISPENSED
Start: 2024-07-29

## (undated) RX ORDER — LIDOCAINE HYDROCHLORIDE 10 MG/ML
INJECTION, SOLUTION EPIDURAL; INFILTRATION; INTRACAUDAL; PERINEURAL
Status: DISPENSED
Start: 2023-08-17

## (undated) RX ORDER — LIDOCAINE HYDROCHLORIDE 10 MG/ML
INJECTION, SOLUTION EPIDURAL; INFILTRATION; INTRACAUDAL; PERINEURAL
Status: DISPENSED
Start: 2019-09-26

## (undated) RX ORDER — TRIAMCINOLONE ACETONIDE 40 MG/ML
INJECTION, SUSPENSION INTRA-ARTICULAR; INTRAMUSCULAR
Status: DISPENSED
Start: 2020-06-04

## (undated) RX ORDER — TRIAMCINOLONE ACETONIDE 40 MG/ML
INJECTION, SUSPENSION INTRA-ARTICULAR; INTRAMUSCULAR
Status: DISPENSED
Start: 2021-12-09

## (undated) RX ORDER — TRIAMCINOLONE ACETONIDE 40 MG/ML
INJECTION, SUSPENSION INTRA-ARTICULAR; INTRAMUSCULAR
Status: DISPENSED
Start: 2019-02-21

## (undated) RX ORDER — LIDOCAINE HYDROCHLORIDE 10 MG/ML
INJECTION, SOLUTION EPIDURAL; INFILTRATION; INTRACAUDAL; PERINEURAL
Status: DISPENSED
Start: 2025-04-28

## (undated) RX ORDER — LIDOCAINE HYDROCHLORIDE 10 MG/ML
INJECTION, SOLUTION EPIDURAL; INFILTRATION; INTRACAUDAL; PERINEURAL
Status: DISPENSED
Start: 2025-01-27

## (undated) RX ORDER — BETAMETHASONE SODIUM PHOSPHATE AND BETAMETHASONE ACETATE 3; 3 MG/ML; MG/ML
INJECTION, SUSPENSION INTRA-ARTICULAR; INTRALESIONAL; INTRAMUSCULAR; SOFT TISSUE
Status: DISPENSED
Start: 2023-10-02

## (undated) RX ORDER — TRIAMCINOLONE ACETONIDE 40 MG/ML
INJECTION, SUSPENSION INTRA-ARTICULAR; INTRAMUSCULAR
Status: DISPENSED
Start: 2019-09-26

## (undated) RX ORDER — CEFAZOLIN SODIUM 1 G/3ML
INJECTION, POWDER, FOR SOLUTION INTRAMUSCULAR; INTRAVENOUS
Status: DISPENSED
Start: 2017-12-21

## (undated) RX ORDER — LIDOCAINE HYDROCHLORIDE 10 MG/ML
INJECTION, SOLUTION EPIDURAL; INFILTRATION; INTRACAUDAL; PERINEURAL
Status: DISPENSED
Start: 2020-06-04

## (undated) RX ORDER — LIDOCAINE HYDROCHLORIDE 10 MG/ML
INJECTION, SOLUTION INFILTRATION; PERINEURAL
Status: DISPENSED
Start: 2020-11-19

## (undated) RX ORDER — TRIAMCINOLONE ACETONIDE 40 MG/ML
INJECTION, SUSPENSION INTRA-ARTICULAR; INTRAMUSCULAR
Status: DISPENSED
Start: 2025-07-07

## (undated) RX ORDER — TRIAMCINOLONE ACETONIDE 40 MG/ML
INJECTION, SUSPENSION INTRA-ARTICULAR; INTRAMUSCULAR
Status: DISPENSED
Start: 2022-07-21

## (undated) RX ORDER — LIDOCAINE HYDROCHLORIDE 10 MG/ML
INJECTION, SOLUTION EPIDURAL; INFILTRATION; INTRACAUDAL; PERINEURAL
Status: DISPENSED
Start: 2022-05-05

## (undated) RX ORDER — LIDOCAINE HYDROCHLORIDE 10 MG/ML
INJECTION, SOLUTION EPIDURAL; INFILTRATION; INTRACAUDAL; PERINEURAL
Status: DISPENSED
Start: 2022-10-20

## (undated) RX ORDER — LIDOCAINE HYDROCHLORIDE 10 MG/ML
INJECTION, SOLUTION EPIDURAL; INFILTRATION; INTRACAUDAL; PERINEURAL
Status: DISPENSED
Start: 2024-07-29

## (undated) RX ORDER — LIDOCAINE HYDROCHLORIDE 10 MG/ML
INJECTION, SOLUTION EPIDURAL; INFILTRATION; INTRACAUDAL; PERINEURAL
Status: DISPENSED
Start: 2025-07-07

## (undated) RX ORDER — FENTANYL CITRATE 50 UG/ML
INJECTION, SOLUTION INTRAMUSCULAR; INTRAVENOUS
Status: DISPENSED
Start: 2018-10-19

## (undated) RX ORDER — ACETAMINOPHEN 325 MG/1
TABLET ORAL
Status: DISPENSED
Start: 2017-12-21

## (undated) RX ORDER — TRIAMCINOLONE ACETONIDE 40 MG/ML
INJECTION, SUSPENSION INTRA-ARTICULAR; INTRAMUSCULAR
Status: DISPENSED
Start: 2023-03-30

## (undated) RX ORDER — LIDOCAINE HYDROCHLORIDE 10 MG/ML
INJECTION, SOLUTION EPIDURAL; INFILTRATION; INTRACAUDAL; PERINEURAL
Status: DISPENSED
Start: 2021-12-09

## (undated) RX ORDER — BETAMETHASONE SODIUM PHOSPHATE AND BETAMETHASONE ACETATE 3; 3 MG/ML; MG/ML
INJECTION, SUSPENSION INTRA-ARTICULAR; INTRALESIONAL; INTRAMUSCULAR; SOFT TISSUE
Status: DISPENSED
Start: 2025-04-28

## (undated) RX ORDER — PROPOFOL 10 MG/ML
INJECTION, EMULSION INTRAVENOUS
Status: DISPENSED
Start: 2017-12-21

## (undated) RX ORDER — BETAMETHASONE SODIUM PHOSPHATE AND BETAMETHASONE ACETATE 3; 3 MG/ML; MG/ML
INJECTION, SUSPENSION INTRA-ARTICULAR; INTRALESIONAL; INTRAMUSCULAR; SOFT TISSUE
Status: DISPENSED
Start: 2025-01-27

## (undated) RX ORDER — LIDOCAINE HYDROCHLORIDE 10 MG/ML
INJECTION, SOLUTION EPIDURAL; INFILTRATION; INTRACAUDAL; PERINEURAL
Status: DISPENSED
Start: 2019-02-21

## (undated) RX ORDER — TRIAMCINOLONE ACETONIDE 40 MG/ML
INJECTION, SUSPENSION INTRA-ARTICULAR; INTRAMUSCULAR
Status: DISPENSED
Start: 2022-05-05

## (undated) RX ORDER — LIDOCAINE HYDROCHLORIDE 10 MG/ML
INJECTION, SOLUTION EPIDURAL; INFILTRATION; INTRACAUDAL; PERINEURAL
Status: DISPENSED
Start: 2023-10-02

## (undated) RX ORDER — METHYLPREDNISOLONE ACETATE 40 MG/ML
INJECTION, SUSPENSION INTRA-ARTICULAR; INTRALESIONAL; INTRAMUSCULAR; SOFT TISSUE
Status: DISPENSED
Start: 2020-11-19

## (undated) RX ORDER — FENTANYL CITRATE 50 UG/ML
INJECTION, SOLUTION INTRAMUSCULAR; INTRAVENOUS
Status: DISPENSED
Start: 2017-12-21

## (undated) RX ORDER — LIDOCAINE HYDROCHLORIDE 10 MG/ML
INJECTION, SOLUTION EPIDURAL; INFILTRATION; INTRACAUDAL; PERINEURAL
Status: DISPENSED
Start: 2023-03-30

## (undated) RX ORDER — TRIAMCINOLONE ACETONIDE 40 MG/ML
INJECTION, SUSPENSION INTRA-ARTICULAR; INTRAMUSCULAR
Status: DISPENSED
Start: 2023-08-17

## (undated) RX ORDER — TRIAMCINOLONE ACETONIDE 40 MG/ML
INJECTION, SUSPENSION INTRA-ARTICULAR; INTRAMUSCULAR
Status: DISPENSED
Start: 2021-05-06

## (undated) RX ORDER — LIDOCAINE HYDROCHLORIDE 10 MG/ML
INJECTION, SOLUTION EPIDURAL; INFILTRATION; INTRACAUDAL; PERINEURAL
Status: DISPENSED
Start: 2020-11-19